# Patient Record
Sex: MALE | Race: WHITE | NOT HISPANIC OR LATINO | Employment: OTHER | URBAN - METROPOLITAN AREA
[De-identification: names, ages, dates, MRNs, and addresses within clinical notes are randomized per-mention and may not be internally consistent; named-entity substitution may affect disease eponyms.]

---

## 2017-03-13 ENCOUNTER — PATIENT OUTREACH (OUTPATIENT)
Dept: ADMINISTRATIVE | Facility: HOSPITAL | Age: 77
End: 2017-03-13

## 2017-03-27 ENCOUNTER — LAB VISIT (OUTPATIENT)
Dept: LAB | Facility: OTHER | Age: 77
End: 2017-03-27
Attending: FAMILY MEDICINE
Payer: COMMERCIAL

## 2017-03-27 ENCOUNTER — OFFICE VISIT (OUTPATIENT)
Dept: INTERNAL MEDICINE | Facility: CLINIC | Age: 77
End: 2017-03-27
Attending: FAMILY MEDICINE
Payer: COMMERCIAL

## 2017-03-27 VITALS
HEART RATE: 78 BPM | BODY MASS INDEX: 24.48 KG/M2 | WEIGHT: 156 LBS | HEIGHT: 67 IN | SYSTOLIC BLOOD PRESSURE: 110 MMHG | DIASTOLIC BLOOD PRESSURE: 64 MMHG | OXYGEN SATURATION: 97 %

## 2017-03-27 DIAGNOSIS — Z00.00 PREVENTATIVE HEALTH CARE: ICD-10-CM

## 2017-03-27 DIAGNOSIS — N40.0 BENIGN NON-NODULAR PROSTATIC HYPERPLASIA WITHOUT LOWER URINARY TRACT SYMPTOMS: ICD-10-CM

## 2017-03-27 DIAGNOSIS — G60.8 HEREDITARY SENSORY NEUROPATHY: ICD-10-CM

## 2017-03-27 DIAGNOSIS — M79.605 PAIN IN BOTH LOWER EXTREMITIES: ICD-10-CM

## 2017-03-27 DIAGNOSIS — M79.604 PAIN IN BOTH LOWER EXTREMITIES: ICD-10-CM

## 2017-03-27 DIAGNOSIS — R06.83 SNORING: ICD-10-CM

## 2017-03-27 DIAGNOSIS — Z00.00 PREVENTATIVE HEALTH CARE: Primary | ICD-10-CM

## 2017-03-27 LAB
ALBUMIN SERPL BCP-MCNC: 3.8 G/DL
ALP SERPL-CCNC: 57 U/L
ALT SERPL W/O P-5'-P-CCNC: 28 U/L
ANION GAP SERPL CALC-SCNC: 10 MMOL/L
AST SERPL-CCNC: 29 U/L
BASOPHILS # BLD AUTO: 0.02 K/UL
BASOPHILS NFR BLD: 0.3 %
BILIRUB SERPL-MCNC: 0.3 MG/DL
BUN SERPL-MCNC: 30 MG/DL
CALCIUM SERPL-MCNC: 9.5 MG/DL
CHLORIDE SERPL-SCNC: 103 MMOL/L
CO2 SERPL-SCNC: 26 MMOL/L
CREAT SERPL-MCNC: 0.8 MG/DL
DIFFERENTIAL METHOD: ABNORMAL
EOSINOPHIL # BLD AUTO: 0.1 K/UL
EOSINOPHIL NFR BLD: 1.8 %
ERYTHROCYTE [DISTWIDTH] IN BLOOD BY AUTOMATED COUNT: 13.1 %
EST. GFR  (AFRICAN AMERICAN): >60 ML/MIN/1.73 M^2
EST. GFR  (NON AFRICAN AMERICAN): >60 ML/MIN/1.73 M^2
GLUCOSE SERPL-MCNC: 89 MG/DL
HCT VFR BLD AUTO: 40.6 %
HGB BLD-MCNC: 13.2 G/DL
LYMPHOCYTES # BLD AUTO: 2.2 K/UL
LYMPHOCYTES NFR BLD: 31.6 %
MCH RBC QN AUTO: 31.2 PG
MCHC RBC AUTO-ENTMCNC: 32.5 %
MCV RBC AUTO: 96 FL
MONOCYTES # BLD AUTO: 0.5 K/UL
MONOCYTES NFR BLD: 7.6 %
NEUTROPHILS # BLD AUTO: 4.2 K/UL
NEUTROPHILS NFR BLD: 58.6 %
PLATELET # BLD AUTO: 263 K/UL
PMV BLD AUTO: 9.7 FL
POTASSIUM SERPL-SCNC: 4.6 MMOL/L
PROT SERPL-MCNC: 7.3 G/DL
RBC # BLD AUTO: 4.23 M/UL
SODIUM SERPL-SCNC: 139 MMOL/L
TSH SERPL DL<=0.005 MIU/L-ACNC: 2.16 UIU/ML
WBC # BLD AUTO: 7.09 K/UL

## 2017-03-27 PROCEDURE — 36415 COLL VENOUS BLD VENIPUNCTURE: CPT

## 2017-03-27 PROCEDURE — 83036 HEMOGLOBIN GLYCOSYLATED A1C: CPT

## 2017-03-27 PROCEDURE — 99999 PR PBB SHADOW E&M-EST. PATIENT-LVL III: CPT | Mod: PBBFAC,,, | Performed by: FAMILY MEDICINE

## 2017-03-27 PROCEDURE — 80061 LIPID PANEL: CPT

## 2017-03-27 PROCEDURE — 84153 ASSAY OF PSA TOTAL: CPT

## 2017-03-27 PROCEDURE — 99397 PER PM REEVAL EST PAT 65+ YR: CPT | Mod: S$GLB,,, | Performed by: FAMILY MEDICINE

## 2017-03-27 PROCEDURE — 85025 COMPLETE CBC W/AUTO DIFF WBC: CPT

## 2017-03-27 PROCEDURE — 80053 COMPREHEN METABOLIC PANEL: CPT

## 2017-03-27 PROCEDURE — 84443 ASSAY THYROID STIM HORMONE: CPT

## 2017-03-27 RX ORDER — HYDROCODONE BITARTRATE AND ACETAMINOPHEN 7.5; 325 MG/1; MG/1
TABLET ORAL
Refills: 0 | COMMUNITY
Start: 2017-03-16 | End: 2017-03-27

## 2017-03-27 RX ORDER — ALPRAZOLAM 0.5 MG/1
TABLET ORAL
Refills: 0 | COMMUNITY
Start: 2017-03-16 | End: 2017-03-27

## 2017-03-27 RX ORDER — AMOXICILLIN AND CLAVULANATE POTASSIUM 875; 125 MG/1; MG/1
TABLET, FILM COATED ORAL
Refills: 0 | COMMUNITY
Start: 2017-03-16 | End: 2017-03-27

## 2017-03-27 NOTE — MR AVS SNAPSHOT
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  Call for this free service at [phone].             Medications           Message regarding Medications     Verify the changes and/or additions to your medication regime listed below are the same as discussed with your clinician today.  If any of these changes or additions are incorrect, please notify your healthcare provider.        STOP taking these medications     ibuprofen (ADVIL,MOTRIN) 100 MG tablet Take 100 mg by mouth every 6 (six) hours as needed.      hydrocodone-acetaminophen 7.5-325mg (NORCO) 7.5-325 mg per tablet TK 1 T PO Q 4 TO 6 H PRN P    amitriptyline (ELAVIL) 25 MG tablet Take 25 mg by mouth every other day.    amoxicillin-clavulanate 875-125mg (AUGMENTIN) 875-125 mg per tablet     alprazolam (XANAX) 0.5 MG tablet TK 1 T PO THE NIGHT BEFORE AND 1 HOUR BEFORE  "DENTAL PROCEDURE           Verify that the below list of medications is an accurate representation of the medications you are currently taking.  If none reported, the list may be blank. If incorrect, please contact your healthcare provider. Carry this list with you in case of emergency.           Current Medications     ALPHA LIPOIC ACID ORAL Take by mouth once daily.     ascorbic acid (VITAMIN C) 500 MG tablet Take 500 mg by mouth once daily.    fluticasone (FLONASE) 50 mcg/actuation nasal spray 2 sprays by Each Nare route once daily.    MULTIVIT-IRON-MIN-FOLIC ACID 3,500-18-0.4 UNIT-MG-MG ORAL CHEW Take by mouth once daily.     PREVIDENT 5000 BOOSTER PLUS 1.1 % Pste once daily.     multivitamin (ONE DAILY MULTIVITAMIN) per tablet Take 1 tablet by mouth once daily.    sildenafil (VIAGRA) 100 MG tablet TAKE 1 TABLET BY MOUTH AS NEEDED           Clinical Reference Information           Your Vitals Were     BP Pulse Height Weight SpO2 BMI    110/64 78 5' 7" (1.702 m) 70.8 kg (156 lb) 97% 24.43 kg/m2      Blood Pressure          Most Recent Value    BP  110/64      Allergies as of 3/27/2017     Gabapentin    Vioxx [Rofecoxib]      Immunizations Administered on Date of Encounter - 3/27/2017     None      Orders Placed During Today's Visit      Normal Orders This Visit    Ambulatory consult to Sleep Disorders     Ambulatory Referral to Physical/Occupational Therapy     Future Labs/Procedures Expected by Expires    CBC auto differential  3/27/2017 6/25/2017    Comprehensive metabolic panel  3/27/2017 5/26/2017    Hemoglobin A1c  3/27/2017 6/25/2017    Lipid panel  3/27/2017 5/26/2017    PSA, Screening  3/27/2017 6/25/2017    TSH  3/27/2017 6/25/2017      Language Assistance Services     ATTENTION: Language assistance services are available, free of charge. Please call [phone].      ATENCIÓN: Si habla jonathan, tiene a de la cruz disposición servicios gratuitos de asistencia lingüística. Llame al [phone].     CHÚ Ý: " N?u b?n nói Ti?ng Vi?t, có các d?ch v? h? tr? ngôn ng? mi?n phí dành cho b?n. G?i s? [phone].         [facility] complies with applicable Federal civil rights laws and does not discriminate on the basis of race, color, national origin, age, disability, or sex.

## 2017-03-27 NOTE — PROGRESS NOTES
CHIEF COMPLAINT:  Follow-up blood pressure    HISTORY OF PRESENT ILLNESS: The patient is a generally healthy 76 year-old WM.  The patient has no specific complaints today other than continuing neuropathic pain for which she is had a complete workup in the last year. He has tried multiple medications and is intolerant of all of them due to various side effects. He is currently using a topical cream which is fairly effective.  He also does well with physical therapy.    He was recently diagnosed with ocular degeneration.  When he was referred to the retina specialist his blood pressure was 135/80.  His retinal specialist is quite concerned about that.  The pressures very good in the office today.  He takes no medications for it.  He has been checking his blood pressure since his retinal visit and it has been good.  I reassured him that it was likely a situational blood pressure spike.    He also was advised that he should be evaluated for sleep apnea.  He freely admits that he does have a lot of problems with snoring per his wife.  I think an evaluation would be appropriate.    The patient used to see Dr. Bass.     REVIEW OF SYSTEMS:  GENERAL: No fever, chills, fatigability or weight loss.  SKIN: No rashes, itching or changes in color or texture of skin.  HEAD: No headaches or recent head trauma.  EYES: Visual acuity fine. No photophobia, ocular pain or diplopia.  EARS: Denies ear pain, discharge or vertigo.  NOSE: No loss of smell, no epistaxis or postnasal drip.  MOUTH & THROAT: No hoarseness or change in voice. No excessive gum bleeding.  NODES: Denies swollen glands.  CHEST: Denies SANCHES, cyanosis, wheezing, cough and sputum production.  CARDIOVASCULAR: Denies chest pain, PND, orthopnea or reduced exercise tolerance.  ABDOMEN: Appetite fine. No weight loss. Denies diarrhea, abdominal pain, hematemesis or blood in stool.  URINARY: No flank pain, dysuria or hematuria.  PERIPHERAL VASCULAR: No claudication or  "cyanosis.  MUSCULOSKELETAL: No joint stiffness or swelling. Denies back pain.  NEUROLOGIC: No history of seizures, paralysis, alteration of gait or coordination.    SOCIAL HISTORY: The patient does not smoke.  The patient consumes alcohol socially.  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.    PHYSICAL EXAMINATION:     Blood pressure 110/64, pulse 78, height 5' 7" (1.702 m), weight 70.8 kg (156 lb), SpO2 97 %.    APPEARANCE: Well nourished, well developed, in no acute distress.    HEAD: Normocephalic, atraumatic.  EYES: PERRL. EOMI.  Conjunctivae without injection and  anicteric  EARS: TM's intact. Light reflex normal. No retraction or perforation.    NOSE: Mucosa pink. Airway clear.  MOUTH & THROAT: No tonsillar enlargement. No pharyngeal erythema or exudate. No stridor.  NECK: Supple.   NODES: No cervical, axillary or inguinal lymph node enlargement.  CHEST: Lungs clear to auscultation.  No retractions are noted.  No rales or rhonchi are present.  CARDIOVASCULAR: Normal S1, S2. No rubs, murmurs or gallops.  ABDOMEN: Bowel sounds normal. Not distended. Soft. No tenderness or masses.  No ascites is noted.  MUSCULOSKELETAL:  There is no clubbing, cyanosis, or edema of the extremities x4.  There is full range of motion of the lumbar spine.  There is full range of motion of the extremities x4.  There is no deformity noted.    NEUROLOGIC:       Normal speech development.      Hearing normal.      Normal gait.      Motor and sensory exams grossly normal.      DTR's normal.  PSYCHIATRIC: Patient is alert and oriented x3.  Thought processes are all normal.  There is no homicidality.  There is no suicidality.  There is no evidence of psychosis.    LABORATORY/RADIOLOGY:   Chart reviewed.      ASSESSMENT:   Annual exam  Episodic hypertension, likely situational  Snoring  Idiopathic peripheral neuropathy    PLAN:  We will follow-up blood work which we expect to be normal.    He will keep a " blood pressure diary.  He is reassured that he does not need any treatment for his blood pressure.  Sleep medicine referral  Over-the-counter supplements as recommended by retinal specialist    Return to clinic in one year.

## 2017-03-28 LAB
CHOLEST/HDLC SERPL: 4.1 {RATIO}
COMPLEXED PSA SERPL-MCNC: 1.8 NG/ML
ESTIMATED AVG GLUCOSE: 111 MG/DL
HBA1C MFR BLD HPLC: 5.5 %
HDL/CHOLESTEROL RATIO: 24.4 %
HDLC SERPL-MCNC: 209 MG/DL
HDLC SERPL-MCNC: 51 MG/DL
LDLC SERPL CALC-MCNC: 119.8 MG/DL
NONHDLC SERPL-MCNC: 158 MG/DL
TRIGL SERPL-MCNC: 191 MG/DL

## 2017-03-29 ENCOUNTER — TELEPHONE (OUTPATIENT)
Dept: INTERNAL MEDICINE | Facility: CLINIC | Age: 77
End: 2017-03-29

## 2017-03-29 NOTE — TELEPHONE ENCOUNTER
----- Message from Neo Sneed MD sent at 3/28/2017  2:42 PM CDT -----  Laboratory is normal.  No significant changes from last year.  No changes in medications.  Followup as scheduled.

## 2017-04-06 ENCOUNTER — CLINICAL SUPPORT (OUTPATIENT)
Dept: REHABILITATION | Facility: HOSPITAL | Age: 77
End: 2017-04-06
Attending: FAMILY MEDICINE
Payer: COMMERCIAL

## 2017-04-06 DIAGNOSIS — M79.605 PAIN IN BOTH LOWER EXTREMITIES: ICD-10-CM

## 2017-04-06 DIAGNOSIS — M79.671 FOOT PAIN, BILATERAL: Primary | ICD-10-CM

## 2017-04-06 DIAGNOSIS — M79.604 PAIN IN BOTH LOWER EXTREMITIES: ICD-10-CM

## 2017-04-06 DIAGNOSIS — M79.672 FOOT PAIN, BILATERAL: Primary | ICD-10-CM

## 2017-04-06 PROBLEM — M79.606 LEG PAIN: Status: ACTIVE | Noted: 2017-04-06

## 2017-04-06 PROCEDURE — 97161 PT EVAL LOW COMPLEX 20 MIN: CPT

## 2017-04-06 PROCEDURE — 97110 THERAPEUTIC EXERCISES: CPT

## 2017-04-06 NOTE — PROGRESS NOTES
Physical Therapy Initial Evaluation     Name: Cricket Morrow [facility] Number: [id number]    Cricket Morrow is a 76 y.o. male evaluated on 04/06/2017.     Diagnosis:   Encounter Diagnosis   Name Primary?    Foot pain, bilateral Yes     Physician: Neo Sneed*  Treatment Orders: PT Eval and Treat    Past Medical History:   Diagnosis Date    Hereditary sensory neuropathy      Current Outpatient Prescriptions   Medication Sig    ALPHA LIPOIC ACID ORAL Take by mouth once daily.     ascorbic acid (VITAMIN C) 500 MG tablet Take 500 mg by mouth once daily.    fluticasone (FLONASE) 50 mcg/actuation nasal spray 2 sprays by Each Nare route once daily.    MULTIVIT-IRON-MIN-FOLIC ACID 3,500-18-0.4 UNIT-MG-MG ORAL CHEW Take by mouth once daily.     multivitamin (ONE DAILY MULTIVITAMIN) per tablet Take 1 tablet by mouth once daily.    PREVIDENT 5000 BOOSTER PLUS 1.1 % Pste once daily.     sildenafil (VIAGRA) 100 MG tablet TAKE 1 TABLET BY MOUTH AS NEEDED     No current facility-administered medications for this visit.      Review of patient's allergies indicates:   Allergen Reactions    Gabapentin Other (See Comments)     Sexual side effects    Vioxx [rofecoxib] Other (See Comments)     Abdominal pain     Precautions: standard    Subjective     Patient States: B thigh / upper leg pain waking him up in the night for approx 6 months.  Previously seen for B foot pain secondary to neuropathy without evidence or diagnosis of DM.  Pt reports pain decreases with movement  Pain Scale: Cricket Morrow rates pain on a scale of 0-10 to be 7 at worst; 1 currently; 1 at best . B groin/upper thigh  Onset: gradual  Radicular symptoms:  Tingling B feet from previous neuropathy  Aggravating factors:   Sitting/nighttime  Easing factors:  Movement/walking/exercise  Prior Therapy: PT with good results  Functional Deficits Leading to Referral: pain limiting activities/weakness LE's  Prior  functional status: pt walks daily for exercise  DME owned/used: none  Occupation:  Professor at [employer]                        Pts goals:  Exercises for legs    Objective     Posture: rounded shoulder/fwd head/rounded shoulders    Range of Motion: Knee   Left Right   Flexion: 140 140   Extension 0 0     HIP abd 20 R  40 L c/ pain  HIP flex L 110       R 115  Strength: Knee   Left Right   Quadriceps 4/5 4+/5   Hamstrings 4/5 4+/5       Strength: Hip    Left Right   Iliopsoas 4/5 4+/5   PGM 4/5 4/5   Adductors 4-/5 4/5   Hip ext 4- 4-            Joint Mobility: WNL  Palpation:TTP left medial/inner thigh/B medial distal adductors  Sensation: intact to light touch  Flexibility:  L HS -20 R -10/ Right piriformis tighter than L  Edema:  Left: absent  Right: absent    Gait: Mcdonnell ambulated with no assistive device.  Level of Assistance: independent  Patient displays decreased base of support.   Balance: Maintains SLS 3 seconds with good balance strategies.    DTR's:   Left Right   Patella Tendon 2+ 2+       TREATMENT     Time In: 2:10 pm  Time Out: 3:00pm    PT Evaluation Completed? Yes  Discussed Plan of Care with patient: Yes    Cricket Morrow received 15 minutes of therapeutic exercise including:   Bike  SLR 20x  SL ABD 20x  QS c/add squeeze 10x  HS stretch 1 x30sec  Quad stretch  Standing adductor stretch  ADD/ABD  SL clams  Bridges    Written Home Exercises Provided: SLR/SL ABD/QS/HSS/adductor stretch/Quad stretch  Cricket Morrow demonstrated good understanding of the education provided. Patient demonstrated good return demonstration of skill of exercises.    ASSESSMENT     Patient presents with decreased B LE strength/flexibility/  Pt prognosis is Good.  Pt will benefit from skilled outpatient physical therapy to address the above stated deficits, provide pt/family education and to maximize pt's level of independence.     History  Co-morbidities and personal factors that may impact the plan of care Examination  Body  Structures and Functions, activity limitations and participation restrictions that may impact the plan of care Clinical Presentation        stable Decision Making/ Complexity Score      Low complexity   Co-morbidities:         neuropathy        Personal Factors:    Body Regions:  B LE's  Body Systems:     Decreased  BLE strength  Decreased flexibility B LE's  Pain with ADL's      Activity limitations:       Participation Restrictions:                  Pt's spiritual, cultural and educational needs considered and pt agreeable to plan of care and goals as stated below:     Anticipated Barriers for physical therapy: none  Short Term GOALS: 3 weeks. Pt agrees with goals set.  1. Patient demonstrates independence with HEP.   2. Patient demonstrates independence with Postural Awareness.   3. Patient demonstrates independence with body mechanics.     Tool: FOTO Knee Survey  Score: 35% Limitation      PLAN     Outpatient physical therapy 1 times weekly to include: pt ed, hep, therapeutic exercises, neuromuscular re-education/ balance exercises, joint mobilizations, aquatic therapy and modalities prn. Cont PT for  6 weeks. Pt may be seen by PTA as part of the rehabilitation team.   Pt wishes to try exercises at home, will come in for 1-2 visits after evaluation to increase intensity of exercises and fine tune program to be done I  Therapist: Carli Colin, PT  4/6/2017

## 2017-04-20 ENCOUNTER — OFFICE VISIT (OUTPATIENT)
Dept: SLEEP MEDICINE | Facility: CLINIC | Age: 77
End: 2017-04-20
Attending: FAMILY MEDICINE
Payer: COMMERCIAL

## 2017-04-20 VITALS
WEIGHT: 154.31 LBS | DIASTOLIC BLOOD PRESSURE: 74 MMHG | HEART RATE: 68 BPM | BODY MASS INDEX: 24.22 KG/M2 | HEIGHT: 67 IN | SYSTOLIC BLOOD PRESSURE: 122 MMHG

## 2017-04-20 DIAGNOSIS — G47.30 UNSPECIFIED SLEEP APNEA: Primary | ICD-10-CM

## 2017-04-20 PROCEDURE — 99204 OFFICE O/P NEW MOD 45 MIN: CPT | Mod: S$GLB,,, | Performed by: NURSE PRACTITIONER

## 2017-04-20 PROCEDURE — 1126F AMNT PAIN NOTED NONE PRSNT: CPT | Mod: S$GLB,,, | Performed by: NURSE PRACTITIONER

## 2017-04-20 PROCEDURE — 1159F MED LIST DOCD IN RCRD: CPT | Mod: S$GLB,,, | Performed by: NURSE PRACTITIONER

## 2017-04-20 PROCEDURE — 1157F ADVNC CARE PLAN IN RCRD: CPT | Mod: 8P,S$GLB,, | Performed by: NURSE PRACTITIONER

## 2017-04-20 PROCEDURE — 1160F RVW MEDS BY RX/DR IN RCRD: CPT | Mod: S$GLB,,, | Performed by: NURSE PRACTITIONER

## 2017-04-20 PROCEDURE — 99999 PR PBB SHADOW E&M-EST. PATIENT-LVL III: CPT | Mod: PBBFAC,,, | Performed by: NURSE PRACTITIONER

## 2017-04-20 NOTE — PROGRESS NOTES
"Cricket Mcdonnell was seen as a new patient, referred by Dr. Sneed, for the evaluation of obstructive sleep apnea.     CHIEF COMPLAINT: Disrupted sleep    HISTORY OF PRESENT ILLNESS:Cricket Mcdonnell a male presents for the evaluation of obstructive sleep apnea. He is not sure whey he is here today. Recent dx of macular degeneration. Had some spikes in blood pressure.  He has never had a sleep study. Wife does not endorse snoring. He has deviated nasal septum and uses Flonase NS bid. Wakes up refreshed. Denies witnessed apneic pauses. Nocturia 3-4x, he attributes to his age. Wears mouth guard for teeth grinding. Sleeps 8h/night. Denies am headaches. Sleeps on his back to help foot neuropathic pain. Sleeps pretty still throughout the night. +oral drying in am.     Neuropathic pain, intolerable to meds s/e  Has seen ENT surgeon, no need for repair septal deviation    Denies symptoms of restless legs or kicking during sleep.     On todays [place] Sleepiness Scale the patient scores a 3/24.     FAMILY HISTORY: No known sleep disorders.     SOCIAL HISTORY: . no ETOH, no tobacco. [employer] professor creative writing    REVIEW OF SYSTEMS:  Sleep related symptoms as per HPI; Sinus congestion; Denies dyspnea; Denies palpitations; Denies acid reflux; Denies polyuria; Denies headaches;Denies mood disturbance.  Otherwise, a balance of 10 systems reviewed is negative        PHYSICAL EXAM:   /74  Pulse 68  Ht 5' 7" (1.702 m)  Wt 70 kg (154 lb 5.2 oz)  BMI 24.17 kg/m2  GENERAL: W/N,W/D  body habitus, well groomed   HEENT: Conjunctivae are non-erythematous; Pupils equal, round, and reactive to light; Nose is symmetrical; Nasal mucosa is normal; Septum is deviated left; Inferior turbinates are normal; Nasal airflow is normal; Posterior pharynx is pink; Modified Mallampati: IV; Posterior palate is low; Tonsils absent; Uvula is normal;Tongue is high,coated; Dentition is fair; No TMJ tenderness; Jaw opening and " protrusion without click and without discomfort.   NECK: Supple. Neck circumference is 15.25 inches. No thyromegaly. No palpable nodes.   SKIN: On face and neck: No abrasions, no rashes, no lesions. No subcutaneous nodules are palpable.   RESPIRATORY: Chest is clear to auscultation. Normal chest expansion and non-labored breathing at rest.   CARDIOVASCULAR: Normal S1, S2. No murmurs, gallops or rubs. No carotid bruits bilaterally.   EXTREMITIES: No edema. No clubbing. No cyanosis. Station normal. Gait normal.   NEURO/PSYCH: Oriented to time, place and person. Normal attention span and concentration. Affect is full. Mood is normal.       ASSESSMENT:     Unspecified Sleep Apnea, with symptoms of questionable snoring, frequent disrupted sleep, nasal septal deviation, teeth grinding, mild daytime fatigue, with exam findings of a mildy crowded oral airway, with medical comorbidities of macular degernation. Warrants further investigation for untreated sleep apnea.     PLAN:   1. Home Sleep Study, discussed plan of care. [facility] results  2. Discussed etiology of KARMA and potential ramifications of untreated KARMA, including stroke, heart disease, HTN.  We discussed potential treatment options, which could include weight loss (10-15%), body positioning, continuous positive airway pressure (CPAP-definitive), mandibular advancement splint by dentist, or referral for surgical consideration.   3. The patient was advised to abstain from driving should he feel sleepy or drowsy.   4. Continue Flonase NS to reduce potential UARS.    Thank you for allowing me the opportunity to participate in the care of your patient

## 2017-04-20 NOTE — PATIENT INSTRUCTIONS
Obstructive Sleep Apnea  Obstructive sleep apnea is a condition that causes your air passages to become narrowed or blocked during sleep. As a result, breathing stops for short periods. Your body wakes up enough for breathing to begin again, though you don't remember it. The cycle of stopped breathing and brief awakenings can repeat dozens of times a night. This prevents the body from getting to the deeper stages of sleep that are needed for good rest.  Signs of sleep apnea include loud snoring, noisy breathing, and gasping sounds during sleep. Daytime symptoms include waking up tired after a full night's sleep, waking up with headaches, feeling very sleepy or falling asleep during the day, and having problems with memory or concentration.  Risk factors for sleep apnea include:  · Being overweight  · Being a man, or a woman in menopause  · Smoking  · Using alcohol or sedating medications or herbs  · Having enlarged structures in the nose or throat  Home care  Lifestyle changes that can help treat snoring and sleep apnea include the following:  · If you are overweight, lose weight. Talk to your healthcare provider about a weight-loss plan for you.  · Avoid alcohol for 3 to 4 hours before bedtime. Avoid sedating medications. Ask your healthcare provider about the medications you take.  · If you smoke, talk to your healthcare provider about ways to quit.  · Sleep on your side. This can help prevent gravity from pulling relaxed throat tissues into your breathing passages.  · If you have allergies or sinus problems that block your nose, ask your healthcare provider for help.  Follow up  Follow up with your healthcare provider as advised. A diagnosis of sleep apnea is made with a sleep study. Your healthcare provider can tell you more about this test.  When to seek medical care  Sleep apnea can make you more likely to have certain health problems. These include high blood pressure, heart attack, stroke, and sexual  dysfunction. If you have sleep apnea, talk to your healthcare provider about the best treatments for you.  Date Last Reviewed: 5/3/2015  © 3110-0406 [employer]. [address] [place] [address]. All rights reserved. This information is not intended as a substitute for professional medical care. Always follow your healthcare professional's instructions.        Continuous Positive Air Pressure (CPAP)  Continuous positive air pressure (CPAP) uses gentle air pressure to hold the airway open. CPAP is often the most effective treatment for sleep apnea and severe snoring. It works very well for many people. But keep in mind that it can take several adjustments before the setup is right for you.    How CPAP works  The CPAPmachine  is a small portable pump beside the bed. The pump sends air through a hose, which is held over your nose and mouth by a mask. Mild air pressure is gently pushed through your airway. The air pressure nudges sagging tissues aside. This widens the airway so you can breathe better. CPAP may be combined with other kinds of therapy for sleep apnea.     A mask over the nose gently directs air into the throat to keep the airway open.   Types of air pressure treatments  There are different types of CPAP. Your doctor or CPAP technician will help you decide which type is best for you:  · Basic CPAP keeps the pressure constant all night long.  · A bilevel device (BiPAP) provides more pressure when you breathe in and less when you breathe out. A BiPAP machine also may be set to provide automatic breaths to maintain breathing if you stop breathing while sleeping.  · An autoCPAP device automatically adjusts pressure throughout the night and in response to changes such as body position, sleep stage, and snoring.  Date Last Reviewed: 8/10/2015  © 6204-7660 [employer]. [address] [place] [address]. All rights reserved. This information is not intended as a  substitute for professional medical care. Always follow your healthcare professional's instructions.        Mouthpieces for Sleep Apnea  For simple snoring or mild to moderate apnea, a special mouthpiece may help. A dental specialist works with your healthcare provider to build and fit a mouthpiece just for you. A follow-up sleep study checks how well the device is working for you. Mouthpieces are also called oral appliances.     Moving the jaw and tongue forward with a mouthpiece can open the airway to reduce sleep apnea.   Moving the jaw forward  Most mouthpieces move the jaw and tongue forward. That keeps the tongue from blocking the airway. Mouthpieces can work well, but they are not for everyone. Work with your healthcare provider to get a mouthpiece that fits just right for you. Oral appliances are usually custom made for you by a dental professional. And, avoid over-the-counter mouthpieces -- they often do not work.  Tips  To have the most success with your mouthpiece, keep these tips in mind:  · It will take some time to get used to wearing a mouthpiece. At first it may feel uncomfortable or make your mouth water. If these problems last, tell your healthcare provider.  · Expect several rounds of adjustments to get the mouthpiece to fit and work just right for you.  · Mouthpieces dont cure the problems that cause snoring or sleep apnea. So you need to use your mouthpiece all night, every night.  · Follow your healthcare providers instructions for keeping the mouthpiece clean.  · When youre not wearing your mouthpiece, store it in its case.  Date Last Reviewed: 8/9/2015 © 2000-2016 [employer]. [address] [place] [address], [place] [address]. All rights reserved. This information is not intended as a substitute for professional medical care. Always follow your healthcare professional's instructions.      Christina or Antione will contact you to schedule your sleep study. Their number is [phone] (ext  [phone]). The [facility] is located on 7th floor of the [facility].    We will call you when the sleep study results are ready - if you have not heard from us by 2 weeks from the date of the study, please call [phone] (ext 2).    You are advised to abstain from driving should you feel sleepy or drowsy.

## 2017-04-20 NOTE — LETTER
April 20, 2017      Neo Sneed MD  [address]  [address]  [facility] [address]           [facility]  [address]  [facility] [address]  Phone: [phone]          Patient: Cricket Mcdonnell   MR Number: [id number]   YOB: 1940   Date of Visit: 4/20/2017       Dear Dr. Neo Sneed:    Thank you for referring Cricket Mcdonnell to me for evaluation. Attached you will find relevant portions of my assessment and plan of care.    If you have questions, please do not hesitate to call me. I look forward to following Cricket Mcdonnell along with you.    Sincerely,    Anyi Orta, NP    Enclosure  CC:  No Recipients    If you would like to receive this communication electronically, please contact [email or web address] or [phone] to request more information on [employer] Link access.    For providers and/or their staff who would like to refer a patient to [facility], please contact us through our one-stop-shop provider referral line, [facility] , at [phone].    If you feel you have received this communication in error or would no longer like to receive these types of communications, please e-mail [email or web address]

## 2017-04-20 NOTE — MR AVS SNAPSHOT
[facility]  [address]  [facility] [address]  Phone: [phone]                  Cricket Mcdonnell   2017 11:00 AM   Office Visit    Description:  Male : 1940   Provider:  Anyi Orta NP   Department:  [facility]           Diagnoses this Visit        Comments    Unspecified sleep apnea    -  Primary            To Do List           Future Appointments        Provider Department Dept Phone    2017 10:00 AM Carli Colin PT [facility]       Goals (5 Years of Data)     None      [facility]     [facility] Care Line -  Assistance  Unless otherwise directed by your provider, please contact [facility], our nurse care line that is available for  assistance.     Registered nurses in the [facility] provide: appointment scheduling, clinical advisement, health education, and other advisory services.  Call: [phone] (toll free)               Medications           Message regarding Medications     Verify the changes and/or additions to your medication regime listed below are the same as discussed with your clinician today.  If any of these changes or additions are incorrect, please notify your healthcare provider.             Verify that the below list of medications is an accurate representation of the medications you are currently taking.  If none reported, the list may be blank. If incorrect, please contact your healthcare provider. Carry this list with you in case of emergency.           Current Medications     ALPHA LIPOIC ACID ORAL Take by mouth once daily.     ascorbic acid (VITAMIN C) 500 MG tablet Take 500 mg by mouth once daily.    fluticasone (FLONASE) 50 mcg/actuation nasal spray 2 sprays by Each Nare route once daily.    MULTIVIT-IRON-MIN-FOLIC ACID 3,500-18-0.4 UNIT-MG-MG ORAL CHEW Take by mouth once daily.     multivitamin (ONE DAILY MULTIVITAMIN) per tablet Take 1 tablet by  "mouth once daily.    PREVIDENT 5000 BOOSTER PLUS 1.1 % Pste once daily.     sildenafil (VIAGRA) 100 MG tablet TAKE 1 TABLET BY MOUTH AS NEEDED           Clinical Reference Information           Your Vitals Were     BP Pulse Height Weight BMI    122/74 68 5' 7" (1.702 m) 70 kg (154 lb 5.2 oz) 24.17 kg/m2      Blood Pressure          Most Recent Value    BP  122/74      Allergies as of 4/20/2017     Gabapentin    Vioxx [Rofecoxib]      Immunizations Administered on Date of Encounter - 4/20/2017     None      Orders Placed During Today's Visit     Future Labs/Procedures Expected by Expires    Home Sleep Studies  As directed 4/20/2018      Instructions      Obstructive Sleep Apnea  Obstructive sleep apnea is a condition that causes your air passages to become narrowed or blocked during sleep. As a result, breathing stops for short periods. Your body wakes up enough for breathing to begin again, though you don't remember it. The cycle of stopped breathing and brief awakenings can repeat dozens of times a night. This prevents the body from getting to the deeper stages of sleep that are needed for good rest.  Signs of sleep apnea include loud snoring, noisy breathing, and gasping sounds during sleep. Daytime symptoms include waking up tired after a full night's sleep, waking up with headaches, feeling very sleepy or falling asleep during the day, and having problems with memory or concentration.  Risk factors for sleep apnea include:  · Being overweight  · Being a man, or a woman in menopause  · Smoking  · Using alcohol or sedating medications or herbs  · Having enlarged structures in the nose or throat  Home care  Lifestyle changes that can help treat snoring and sleep apnea include the following:  · If you are overweight, lose weight. Talk to your healthcare provider about a weight-loss plan for you.  · Avoid alcohol for 3 to 4 hours before bedtime. Avoid sedating medications. Ask your healthcare provider about the " medications you take.  · If you smoke, talk to your healthcare provider about ways to quit.  · Sleep on your side. This can help prevent gravity from pulling relaxed throat tissues into your breathing passages.  · If you have allergies or sinus problems that block your nose, ask your healthcare provider for help.  Follow up  Follow up with your healthcare provider as advised. A diagnosis of sleep apnea is made with a sleep study. Your healthcare provider can tell you more about this test.  When to seek medical care  Sleep apnea can make you more likely to have certain health problems. These include high blood pressure, heart attack, stroke, and sexual dysfunction. If you have sleep apnea, talk to your healthcare provider about the best treatments for you.  Date Last Reviewed: 5/3/2015  © 0423-7113 [employer]. [address] [place] [address], [place] [address]. All rights reserved. This information is not intended as a substitute for professional medical care. Always follow your healthcare professional's instructions.        Continuous Positive Air Pressure (CPAP)  Continuous positive air pressure (CPAP) uses gentle air pressure to hold the airway open. CPAP is often the most effective treatment for sleep apnea and severe snoring. It works very well for many people. But keep in mind that it can take several adjustments before the setup is right for you.    How CPAP works  The CPAPmachine  is a small portable pump beside the bed. The pump sends air through a hose, which is held over your nose and mouth by a mask. Mild air pressure is gently pushed through your airway. The air pressure nudges sagging tissues aside. This widens the airway so you can breathe better. CPAP may be combined with other kinds of therapy for sleep apnea.     A mask over the nose gently directs air into the throat to keep the airway open.   Types of air pressure treatments  There are different types of CPAP. Your doctor or CPAP technician  will help you decide which type is best for you:  · Basic CPAP keeps the pressure constant all night long.  · A bilevel device (BiPAP) provides more pressure when you breathe in and less when you breathe out. A BiPAP machine also may be set to provide automatic breaths to maintain breathing if you stop breathing while sleeping.  · An autoCPAP device automatically adjusts pressure throughout the night and in response to changes such as body position, sleep stage, and snoring.  Date Last Reviewed: 8/10/2015  © 5365-0630 [employer]. [address] [place] [address]. All rights reserved. This information is not intended as a substitute for professional medical care. Always follow your healthcare professional's instructions.        Mouthpieces for Sleep Apnea  For simple snoring or mild to moderate apnea, a special mouthpiece may help. A dental specialist works with your healthcare provider to build and fit a mouthpiece just for you. A follow-up sleep study checks how well the device is working for you. Mouthpieces are also called oral appliances.     Moving the jaw and tongue forward with a mouthpiece can open the airway to reduce sleep apnea.   Moving the jaw forward  Most mouthpieces move the jaw and tongue forward. That keeps the tongue from blocking the airway. Mouthpieces can work well, but they are not for everyone. Work with your healthcare provider to get a mouthpiece that fits just right for you. Oral appliances are usually custom made for you by a dental professional. And, avoid over-the-counter mouthpieces -- they often do not work.  Tips  To have the most success with your mouthpiece, keep these tips in mind:  · It will take some time to get used to wearing a mouthpiece. At first it may feel uncomfortable or make your mouth water. If these problems last, tell your healthcare provider.  · Expect several rounds of adjustments to get the mouthpiece to fit and work just right for  you.  · Mouthpieces dont cure the problems that cause snoring or sleep apnea. So you need to use your mouthpiece all night, every night.  · Follow your healthcare providers instructions for keeping the mouthpiece clean.  · When youre not wearing your mouthpiece, store it in its case.  Date Last Reviewed: 8/9/2015  © 4907-0191 [employer]. [address] [place] [address], [place] [address]. All rights reserved. This information is not intended as a substitute for professional medical care. Always follow your healthcare professional's instructions.      Christina or Antione will contact you to schedule your sleep study. Their number is [phone] (ext 1). The [facility] is located on 7th floor of the [facility].    We will call you when the sleep study results are ready - if you have not heard from us by 2 weeks from the date of the study, please call [phone] (ext 2).    You are advised to abstain from driving should you feel sleepy or drowsy.         Language Assistance Services     ATTENTION: Language assistance services are available, free of charge. Please call [phone].      ATENCIÓN: Si habla español, tiene a de la cruz disposición servicios gratuitos de asistencia lingüística. Llame al [phone].     CHÚ Ý: N?u b?n nói Ti?ng Vi?t, có các d?ch v? h? tr? ngôn ng? mi?n phí dành cho b?n. G?i s? [phone].         [facility] complies with applicable Federal civil rights laws and does not discriminate on the basis of race, color, national origin, age, disability, or sex.

## 2017-04-25 ENCOUNTER — TELEPHONE (OUTPATIENT)
Dept: SLEEP MEDICINE | Facility: OTHER | Age: 77
End: 2017-04-25

## 2017-04-28 ENCOUNTER — TELEPHONE (OUTPATIENT)
Dept: SLEEP MEDICINE | Facility: OTHER | Age: 77
End: 2017-04-28

## 2017-05-02 ENCOUNTER — TELEPHONE (OUTPATIENT)
Dept: SLEEP MEDICINE | Facility: OTHER | Age: 77
End: 2017-05-02

## 2017-05-09 ENCOUNTER — CLINICAL SUPPORT (OUTPATIENT)
Dept: REHABILITATION | Facility: HOSPITAL | Age: 77
End: 2017-05-09
Attending: FAMILY MEDICINE
Payer: COMMERCIAL

## 2017-05-09 DIAGNOSIS — M79.605 PAIN IN BOTH LOWER EXTREMITIES: Primary | ICD-10-CM

## 2017-05-09 DIAGNOSIS — M79.604 PAIN IN BOTH LOWER EXTREMITIES: Primary | ICD-10-CM

## 2017-05-09 PROCEDURE — 97110 THERAPEUTIC EXERCISES: CPT

## 2017-05-09 NOTE — PROGRESS NOTES
Physical Therapy Daily Note     Name: Cricket Morrow [facility] Number: [id number]  Diagnosis:   Encounter Diagnosis   Name Primary?    Pain in both lower extremities Yes     Physician: eNo Sneed*  Precautions:  Visit #: 2 of 6  PTA Visit #: 0  Time In: 2:30pm  Time Out: 3:30pm  Total Treatment Time 1:1: 20  Cancels/no shows:0    Subjective     Pt reports: the exercises have helped in terms of  weakness  Pain Scale: Cricket Morrow rates pain on a scale of 0-10 to be 2 currently.    Objective     Cricket Morrow received individual therapeutic exercises to develop strength, endurance and ROM for 60 minutes including:  Bike 5 minutes  SLR 20x  SL ABD 20x  QS c/add squeeze 20x  HS stretch 2 x30sec  Quad stretch prone 3x 30sec  Standing adductor stretch  ADD/ABD 20x YTB  SL clams 10x  Bridges 20 x YTB  Steamboats 20x RTB    Written Home Exercises Provided:SLR/SL ABD/QS/HSS/adductor stretch/Quad stretch, added bridge with band/steamboats/crabwalks/SL clams.  Issued OTB for ex  Pt demo good understanding of the education provided. Cricket Morrow demonstrated good return demonstration of activities.     Education provided re: discussed benefits of exercises  Cricket Morrow verbalized good understanding of education provided.   No spiritual or educational barriers to learning provided    PRECAUTIONS standard  Assessment     Patient tolerated treatment well.  Pt able to complete all ex without increased pain.  Added to HEP. Pt to come for one more visit then D/C to I HEP  This is a 76 y.o. male referred to outpatient physical therapy and presents with a medical diagnosis of B Leg pain and demonstrates limitations as described in the problem list. Pt prognosis is Good. Pt will continue to benefit from skilled outpatient physical therapy to address the deficits listed in the problem list, provide pt/family education and to maximize pt's level of independence in the home and  community environment.     Goals as follows:  Short Term GOALS: 3 weeks. Pt agrees with goals set.  1. Patient demonstrates independence with HEP.   2. Patient demonstrates independence with Postural Awareness.   3. Patient demonstrates independence with body mechanics.        Plan     Continue with established Plan of Care towards PT goals.    Therapist: Carli Colin, PT  5/9/2017

## 2017-05-23 ENCOUNTER — CLINICAL SUPPORT (OUTPATIENT)
Dept: REHABILITATION | Facility: HOSPITAL | Age: 77
End: 2017-05-23
Attending: FAMILY MEDICINE
Payer: COMMERCIAL

## 2017-05-23 DIAGNOSIS — M79.605 PAIN IN BOTH LOWER EXTREMITIES: Primary | ICD-10-CM

## 2017-05-23 DIAGNOSIS — M79.604 PAIN IN BOTH LOWER EXTREMITIES: Primary | ICD-10-CM

## 2017-05-23 PROCEDURE — 97110 THERAPEUTIC EXERCISES: CPT

## 2017-05-23 NOTE — PROGRESS NOTES
DC/   Physical Therapy Daily Note     Name: Cricket Morrow [facility] Number: [id number]  Diagnosis:   Encounter Diagnosis   Name Primary?    Pain in both lower extremities Yes     Physician: Neo Sneed*  Precautions:  Visit #: 3 of 6  PTA Visit #: 0  Time In: 3:00pm  Time Out: 3:45pm  Total Treatment Time 1:1: 30  Cancels/no shows:0    Subjective     Pt reports: I had flu last week and didn't exercise as much as normal  Pain Scale: Cricket Morrow rates pain on a scale of 0-10 to be 2 currently.    Objective       FOTO 18%  Cricket Morrow received individual therapeutic exercises to develop strength, endurance and ROM for 45 minutes including:  Bike 5 minutes  SLR 20x  SL ABD 20x  QS c/add squeeze 20x  HS stretch 2 x30sec  Quad stretch prone 3x 30sec  Standing adductor stretch  ADD/ABD 20x YTB  SL clams 10x  Bridges 20 x YTB  Steamboats 20x RTB    Written Home Exercises Provided:SLR/SL ABD/QS/HSS/adductor stretch/Quad stretch, added bridge with band/steamboats/crabwalks/SL clams.  Issued OTB for ex. Added hip flex stretch in supine/wall squats/HR/  Pt demo good understanding of the education provided. Cricket Morrow demonstrated good return demonstration of activities.     Education provided re: discussed benefits of exercises.  Instructed pt to stretch L>R LE secondary to dec flexibility  Cricket Morrow verbalized good understanding of education provided.   No spiritual or educational barriers to learning provided    PRECAUTIONS standard  Assessment     Patient tolerated treatment well.  Pt D/C'd today secondary to going on a trip abroad.  Added to HEP to facilitate continued stretching and Flexibility    Goals as follows:  Short Term GOALS: 3 weeks. Pt agrees with goals set.  1. Patient demonstrates independence with HEP. Met  2. Patient demonstrates independence with Postural Awareness. Met  3. Patient demonstrates independence with body mechanics. Met       Plan   DC to  I HEP  Therapist: Carli Colin, PT  5/23/2017

## 2017-05-24 ENCOUNTER — TELEPHONE (OUTPATIENT)
Dept: INTERNAL MEDICINE | Facility: CLINIC | Age: 77
End: 2017-05-24

## 2017-05-24 NOTE — TELEPHONE ENCOUNTER
----- Message from Letha Altamirano sent at 5/24/2017  2:03 PM CDT -----  Contact: pt  Pt called and states he receive a letter in the mail for him to call and schedule an follow up appt in the month of July, pt states he normally see the doctor once a year and he already came in. Pt  Would like for someone to call him back in regards to the letter. Pt can be reached at [phone].

## 2017-05-25 ENCOUNTER — TELEPHONE (OUTPATIENT)
Dept: UROLOGY | Facility: CLINIC | Age: 77
End: 2017-05-25

## 2017-05-25 NOTE — TELEPHONE ENCOUNTER
----- Message from Cynthia Elizalde LPN sent at 5/24/2017  2:06 PM CDT -----  Contact: pt  i attempted to call pt and let him know that was the soonest dr donaldson could see him. i was unable to get him. Left message with contact info  ----- Message -----  From: Letha Altamirano  Sent: 5/24/2017   1:59 PM  To: Kenrick PATEL Staff    Pt called and states he would like a sooner appt then 7/24/17. Pt would like for someone to call him back in regards to an appt. Pt can be reached at [phone].

## 2017-06-08 ENCOUNTER — TELEPHONE (OUTPATIENT)
Dept: SLEEP MEDICINE | Facility: OTHER | Age: 77
End: 2017-06-08

## 2017-06-09 ENCOUNTER — HOSPITAL ENCOUNTER (OUTPATIENT)
Dept: SLEEP MEDICINE | Facility: OTHER | Age: 77
Discharge: HOME | End: 2017-06-09
Attending: NURSE PRACTITIONER
Payer: COMMERCIAL

## 2017-06-09 DIAGNOSIS — G47.33 OSA (OBSTRUCTIVE SLEEP APNEA): ICD-10-CM

## 2017-06-09 DIAGNOSIS — G47.30 UNSPECIFIED SLEEP APNEA: ICD-10-CM

## 2017-06-09 PROCEDURE — 95800 SLP STDY UNATTENDED: CPT | Mod: 26,,, | Performed by: PSYCHIATRY & NEUROLOGY

## 2017-06-09 PROCEDURE — 95800 SLP STDY UNATTENDED: CPT

## 2017-06-20 ENCOUNTER — PATIENT MESSAGE (OUTPATIENT)
Dept: SLEEP MEDICINE | Facility: CLINIC | Age: 77
End: 2017-06-20

## 2017-06-20 DIAGNOSIS — G47.33 OBSTRUCTIVE SLEEP APNEA: Primary | ICD-10-CM

## 2017-07-27 ENCOUNTER — OFFICE VISIT (OUTPATIENT)
Dept: INTERNAL MEDICINE | Facility: CLINIC | Age: 77
End: 2017-07-27
Attending: FAMILY MEDICINE
Payer: COMMERCIAL

## 2017-07-27 VITALS
HEIGHT: 67 IN | WEIGHT: 156.31 LBS | HEART RATE: 56 BPM | DIASTOLIC BLOOD PRESSURE: 70 MMHG | BODY MASS INDEX: 24.53 KG/M2 | SYSTOLIC BLOOD PRESSURE: 112 MMHG

## 2017-07-27 DIAGNOSIS — H35.30 MACULAR DEGENERATION OF RIGHT EYE: Primary | ICD-10-CM

## 2017-07-27 DIAGNOSIS — G47.33 OSA (OBSTRUCTIVE SLEEP APNEA): ICD-10-CM

## 2017-07-27 DIAGNOSIS — G60.8 HEREDITARY SENSORY NEUROPATHY: ICD-10-CM

## 2017-07-27 DIAGNOSIS — N40.1 BENIGN PROSTATIC HYPERPLASIA WITH LOWER URINARY TRACT SYMPTOMS, SYMPTOM DETAILS UNSPECIFIED: ICD-10-CM

## 2017-07-27 PROCEDURE — 99999 PR PBB SHADOW E&M-EST. PATIENT-LVL III: CPT | Mod: PBBFAC,,, | Performed by: FAMILY MEDICINE

## 2017-07-27 PROCEDURE — 1126F AMNT PAIN NOTED NONE PRSNT: CPT | Mod: S$GLB,,, | Performed by: FAMILY MEDICINE

## 2017-07-27 PROCEDURE — 1159F MED LIST DOCD IN RCRD: CPT | Mod: S$GLB,,, | Performed by: FAMILY MEDICINE

## 2017-07-27 PROCEDURE — 99214 OFFICE O/P EST MOD 30 MIN: CPT | Mod: S$GLB,,, | Performed by: FAMILY MEDICINE

## 2017-07-27 RX ORDER — NEOMYCIN SULFATE, POLYMYXIN B SULFATE, AND DEXAMETHASONE 3.5; 10000; 1 MG/G; [USP'U]/G; MG/G
OINTMENT OPHTHALMIC
COMMUNITY
Start: 2017-06-21 | End: 2017-08-01

## 2017-07-27 RX ORDER — LOTEPREDNOL ETABONATE 5 MG/ML
SUSPENSION/ DROPS OPHTHALMIC
Refills: 1 | COMMUNITY
Start: 2017-06-12 | End: 2020-03-11

## 2017-07-27 NOTE — PROGRESS NOTES
CHIEF COMPLAINT:  Follow-up macular degeneration    HISTORY OF PRESENT ILLNESS: The patient is a generally healthy 76 year-old WM.  The patient has no specific complaints today other than continuing neuropathic pain for which she is had a complete workup in the last year. He has tried multiple medications and is intolerant of all of them due to various side effects. He is currently using a topical cream which is fairly effective.  He also does well with physical therapy.    He was recently diagnosed with macular degeneration.  When he was seen in his retinal specialist office yesterday he was given the same list of risk factors for progression of the macular degeneration.  The first one is blood pressure.  Once again his blood pressure is well controlled.  Second longest cholesterol.  His cholesterol is well controlled.  The third one is stress.  He states he is under no stress.  He adamantly declines any medication for stress as he is under no stress.  The fourth one is untreated obstructive sleep apnea.  He has had a sleep study this year that did demonstrate severe sleep apnea.  He has no interest in any treatment for sleep apnea other than a pill.  Since there is no pill for sleep apnea he declines any treatment for it at this time.  Therefore there is nothing that we can do to assist the retinal specialist with his concerns.  This was communicated to the patient today.    The patient used to see Dr. Bass.     REVIEW OF SYSTEMS:  GENERAL: No fever, chills, fatigability or weight loss.  SKIN: No rashes, itching or changes in color or texture of skin.  HEAD: No headaches or recent head trauma.  EYES:  No photophobia, ocular pain or diplopia.  EARS: Denies ear pain, discharge or vertigo.  NOSE: No loss of smell, no epistaxis or postnasal drip.  MOUTH & THROAT: No hoarseness or change in voice. No excessive gum bleeding.  NODES: Denies swollen glands.  CHEST: Denies SANCHES, cyanosis, wheezing, cough and sputum  "production.  CARDIOVASCULAR: Denies chest pain, PND, orthopnea or reduced exercise tolerance.  ABDOMEN: Appetite fine. No weight loss. Denies diarrhea, abdominal pain, hematemesis or blood in stool.  URINARY: No flank pain, dysuria or hematuria.  PERIPHERAL VASCULAR: No claudication or cyanosis.  MUSCULOSKELETAL: No joint stiffness or swelling. Denies back pain.  NEUROLOGIC: No history of seizures, paralysis, alteration of gait or coordination.    SOCIAL HISTORY: The patient does not smoke.  The patient consumes alcohol socially.  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    PHYSICAL EXAMINATION:     Blood pressure 112/70, pulse (!) 56, height 5' 7" (1.702 m), weight 70.9 kg (156 lb 4.9 oz).    APPEARANCE: Well nourished, well developed, in no acute distress.    HEAD: Normocephalic, atraumatic.  EYES: PERRL. EOMI.  Conjunctivae without injection and  anicteric  EARS: TM's intact. Light reflex normal. No retraction or perforation.    NOSE: Mucosa pink. Airway clear.  MOUTH & THROAT: No tonsillar enlargement. No pharyngeal erythema or exudate. No stridor.  NECK: Supple.   NODES: No cervical, axillary or inguinal lymph node enlargement.  CHEST: Lungs clear to auscultation.  No retractions are noted.  No rales or rhonchi are present.  CARDIOVASCULAR: Normal S1, S2. No rubs, murmurs or gallops.  ABDOMEN: Bowel sounds normal. Not distended. Soft. No tenderness or masses.  No ascites is noted.  MUSCULOSKELETAL:  There is no clubbing, cyanosis, or edema of the extremities x4.  There is full range of motion of the lumbar spine.  There is full range of motion of the extremities x4.  There is no deformity noted.    NEUROLOGIC:       Normal speech development.      Hearing normal.      Normal gait.      Motor and sensory exams grossly normal.      DTR's normal.  PSYCHIATRIC: Patient is alert and oriented x3.  Thought processes are all normal.  There is no homicidality.  There is no " suicidality.  There is no evidence of psychosis.    LABORATORY/RADIOLOGY:   Chart reviewed.      ASSESSMENT:   Macular degeneration  Snoring due to sleep study proven severe sleep apnea  Idiopathic peripheral neuropathy    PLAN:  He is reassured that he is doing everything that can be done to minimize his risk for progression of the macular degeneration.    He declines referral to sleep for further management of the KARMA  Over-the-counter supplements as recommended by retinal specialist    Return to clinic in one year.

## 2017-08-01 ENCOUNTER — OFFICE VISIT (OUTPATIENT)
Dept: UROLOGY | Facility: CLINIC | Age: 77
End: 2017-08-01
Payer: COMMERCIAL

## 2017-08-01 VITALS — RESPIRATION RATE: 16 BRPM | BODY MASS INDEX: 24.63 KG/M2 | HEIGHT: 67 IN | WEIGHT: 156.94 LBS

## 2017-08-01 DIAGNOSIS — R35.1 NOCTURIA: ICD-10-CM

## 2017-08-01 DIAGNOSIS — Z80.42 FAMILY HISTORY OF PROSTATE CANCER: ICD-10-CM

## 2017-08-01 DIAGNOSIS — N52.9 INABILITY TO MAINTAIN ERECTION: Primary | ICD-10-CM

## 2017-08-01 DIAGNOSIS — N40.1 BENIGN PROSTATIC HYPERPLASIA WITH LOWER URINARY TRACT SYMPTOMS, SYMPTOM DETAILS UNSPECIFIED: ICD-10-CM

## 2017-08-01 PROCEDURE — 1126F AMNT PAIN NOTED NONE PRSNT: CPT | Mod: S$GLB,,, | Performed by: UROLOGY

## 2017-08-01 PROCEDURE — 1159F MED LIST DOCD IN RCRD: CPT | Mod: S$GLB,,, | Performed by: UROLOGY

## 2017-08-01 PROCEDURE — 99999 PR PBB SHADOW E&M-EST. PATIENT-LVL III: CPT | Mod: PBBFAC,,, | Performed by: UROLOGY

## 2017-08-01 PROCEDURE — 99214 OFFICE O/P EST MOD 30 MIN: CPT | Mod: S$GLB,,, | Performed by: UROLOGY

## 2017-08-01 RX ORDER — SILDENAFIL 100 MG/1
TABLET, FILM COATED ORAL
Qty: 6 TABLET | Refills: 11 | Status: SHIPPED | OUTPATIENT
Start: 2017-08-01 | End: 2018-05-22 | Stop reason: SDUPTHER

## 2017-08-01 NOTE — PROGRESS NOTES
Cricket Mcdonnell is a 76 y.o. man who is here for the follow up of Benign Prostatic Hypertrophy (rtc one year -voices no complaints ) and Erectile Dysfunction (needs viagra refill )  no problem with emptying, he drinks a lot of water resulting nocturia x 4. Not bothered by it.   However regarding ED, he can only use Viagra once a week due to the cost.  Denies flank pain, dysuira, hematuria.  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.    His father was diagnosed with prostate cancer at the age of late 80's but lived up to 90's.  Denies flank pain, dysuira, hematuria .      Past Medical History:   Diagnosis Date    Hereditary sensory neuropathy      Past Surgical History:   Procedure Laterality Date    HERNIA REPAIR      tonsillectomy       Social History   Substance Use Topics    Smoking status: Former Smoker     Quit date: 7/19/2001    Smokeless tobacco: Never Used    Alcohol use No     Family History   Problem Relation Age of Onset    Lung cancer Sister     COPD Sister      Allergy:  Allergies   Allergen Reactions    Gabapentin Other (See Comments)     Sexual side effects    Vioxx [Rofecoxib] Other (See Comments)     Abdominal pain     Outpatient Encounter Prescriptions as of 8/1/2017   Medication Sig Dispense Refill    ALPHA LIPOIC ACID ORAL Take by mouth once daily.       ascorbic acid (VITAMIN C) 500 MG tablet Take 500 mg by mouth once daily.      fluticasone (FLONASE) 50 mcg/actuation nasal spray 2 sprays by Each Nare route once daily. 1 Bottle 2    LOTEMAX 0.5 % ophthalmic suspension INT 1 GTT IN OD BID FOR 4 WEEKS  1    MULTIVIT-IRON-MIN-FOLIC ACID 3,500-18-0.4 UNIT-MG-MG ORAL CHEW Take by mouth once daily.       PREVIDENT 5000 BOOSTER PLUS 1.1 % Pste once daily.   0    sildenafil (VIAGRA) 100 MG tablet TAKE 1 TABLET BY MOUTH AS NEEDED 6 tablet 11    [DISCONTINUED] sildenafil (VIAGRA) 100 MG tablet TAKE 1 TABLET BY MOUTH AS NEEDED 6 tablet 11    [DISCONTINUED] multivitamin (ONE  DAILY MULTIVITAMIN) per tablet Take 1 tablet by mouth once daily.      [DISCONTINUED] neomycin-polymyxin-dexamethasone (DEXACINE) 3.5 mg/g-10,000 unit/g-0.1 % Oint        No facility-administered encounter medications on file as of 8/1/2017.      Review of Systems   General ROS: GENERAL:  No weight gain or loss  SKIN:  No rashes or lacerations  HEAD:  No headaches  EYES:  No exophthalmos, jaundice or blindness  EARS:  No dizziness, tinnitus or hearing loss  NOSE:  No changes in smell  MOUTH & THROAT:  No dyskinetic movements or obvious goiter  CHEST:  No shortness of breath, hyperventilation or cough  CARDIOVASCULAR:  No tachycardia or chest pain  ABDOMEN:  No nausea, vomiting, pain, constipation or diarrhea  URINARY:  No frequency, dysuria or sexual dysfunction  ENDOCRINE:  No polydipsia, polyuria  MUSCULOSKELETAL:  No pain or stiffness of the joints  NEUROLOGIC:  No weakness, sensory changes, seizures, confusion, memory loss, tremor or other abnormal movements  Physical Exam     Vitals:    08/01/17 1338   Resp: 16     General Appearance:  Alert, cooperative, no distress, appears stated age   Head:  Normocephalic, without obvious abnormality, atraumatic   Eyes:  PERRL, conjunctiva/corneas clear, EOM's intact, fundi benign, both eyes   Ears:  Normal TM's and external ear canals, both ears   Nose: Nares normal, septum midline, mucosa normal, no drainage or sinus tenderness   Throat: Lips, mucosa, and tongue normal; teeth and gums normal   Neck: Supple, symmetrical, trachea midline, no adenopathy, thyroid: not enlarged, symmetric, no tenderness/mass/nodules, no carotid bruit or JVD   Back:   Symmetric, no curvature, ROM normal, no CVA tenderness   Lungs:   Clear to auscultation bilaterally, respirations unlabored   Chest Wall:  No tenderness or deformity   Heart:  Regular rate and rhythm, S1, S2 normal, no murmur, rub or gallop   Abdomen:   Soft, non-tender, bowel sounds active all four quadrants,  no masses, no  organomegaly of liver and spleen  No hernia noted   Genitalia:  Scrotum: no rash or lesion  Normal symmetric epididymis without masses  Normal vas palpated  Normal size, symmetric testicles with no masses   Normal urethral meatus with no discharge  Normal circumcised penis with no lesion   Rectal:  Normal perineum and anus upon inspection.  Normal tone, no masses or tenderness;   Extremities: Extremities normal, atraumatic, no cyanosis or edema   Pulses: 2+ and symmetric   Skin: Skin color, texture, turgor normal, no rashes or lesions   Lymph nodes: Cervical, supraclavicular, and axillary nodes normal   Neurologic: Normal     Prostate 20 grams smooth with no nodule or tenderness.    LABS:  Lab Results   Component Value Date    PSA 1.8 03/27/2017    PSA 1.82 05/31/2013    PSA 1.62 07/03/2012    PSA 1.55 05/31/2012    PSA 1.7 07/01/2011    PSA 1.9 06/09/2010    PSA 1.8 05/13/2009    PSA 1.0 06/17/2008    PSA 0.8 06/05/2007    PSA 0.9 03/23/2007    PSADIAG 1.7 07/07/2016    PSADIAG 1.9 07/17/2014    PSATOTAL 2.1 07/06/2015    PSAFREE 0.32 07/06/2015    PSAFREEPCT 15.24 07/06/2015     Results for orders placed or performed in visit on 07/17/14   Prostate Specific Antigen, Diagnostic   Result Value Ref Range    PSA DIAGNOSTIC 1.9 0.00-4.00 ng/mL     Lab Results   Component Value Date    CREATININE 0.8 03/27/2017    CREATININE 0.8 07/07/2016    CREATININE 0.9 07/06/2015     Results for orders placed or performed in visit on 07/19/13   Testosterone   Result Value Ref Range    Testosterone, Total 302 195 - 1138 ng/dl     Assessment and Plan:  Cricket Morrow was seen today for benign prostatic hypertrophy and erectile dysfunction.    Diagnoses and all orders for this visit:    Inability to maintain erection  -     sildenafil (VIAGRA) 100 MG tablet; TAKE 1 TABLET BY MOUTH AS NEEDED    Nocturia    Benign prostatic hyperplasia with lower urinary tract symptoms, symptom details unspecified    Family history of prostate  cancer    viagra refills given.  He can get discount coupons thru website.  Reassurance given.  He wants to continue to check his PSA considering his family hx of prostate cancer.  I explained that his PSA is low and stable. No significant risk of developing prostate cancer at his age knowing that his PSA has been low and stable.  He can be followed by his PCP for refill meds.  RTC prn.  No more PSA is needed.     I spent 25 minutes with the patient of which more than half was spent in direct consultation with the patient in regards to our treatment and plan.      Follow-up:  Return if symptoms worsen or fail to improve.

## 2017-08-01 NOTE — PATIENT INSTRUCTIONS
Lab Results   Component Value Date    PSA 1.8 03/27/2017    PSA 1.82 05/31/2013    PSA 1.62 07/03/2012    PSADIAG 1.7 07/07/2016    PSADIAG 1.9 07/17/2014    PSATOTAL 2.1 07/06/2015    PSAFREE 0.32 07/06/2015    PSAFREEPCT 15.24 07/06/2015

## 2017-08-03 DIAGNOSIS — N52.9 INABILITY TO MAINTAIN ERECTION: ICD-10-CM

## 2017-08-03 RX ORDER — SILDENAFIL CITRATE 100 MG/1
TABLET, FILM COATED ORAL
Qty: 6 TABLET | Refills: 0 | Status: SHIPPED | OUTPATIENT
Start: 2017-08-03 | End: 2018-03-01 | Stop reason: SDUPTHER

## 2017-11-09 ENCOUNTER — OFFICE VISIT (OUTPATIENT)
Dept: INTERNAL MEDICINE | Facility: CLINIC | Age: 77
End: 2017-11-09
Attending: FAMILY MEDICINE
Payer: COMMERCIAL

## 2017-11-09 VITALS
HEART RATE: 70 BPM | WEIGHT: 153.44 LBS | HEIGHT: 67 IN | SYSTOLIC BLOOD PRESSURE: 104 MMHG | DIASTOLIC BLOOD PRESSURE: 70 MMHG | BODY MASS INDEX: 24.08 KG/M2

## 2017-11-09 DIAGNOSIS — H35.30 MACULAR DEGENERATION OF RIGHT EYE: ICD-10-CM

## 2017-11-09 DIAGNOSIS — G47.33 OSA (OBSTRUCTIVE SLEEP APNEA): Primary | ICD-10-CM

## 2017-11-09 PROCEDURE — 99999 PR PBB SHADOW E&M-EST. PATIENT-LVL III: CPT | Mod: PBBFAC,,, | Performed by: FAMILY MEDICINE

## 2017-11-09 PROCEDURE — 99214 OFFICE O/P EST MOD 30 MIN: CPT | Mod: S$GLB,,, | Performed by: FAMILY MEDICINE

## 2017-11-09 RX ORDER — LATANOPROST 50 UG/ML
SOLUTION/ DROPS OPHTHALMIC
COMMUNITY
Start: 2017-10-26 | End: 2020-03-11

## 2017-11-09 RX ORDER — NEOMYCIN SULFATE, POLYMYXIN B SULFATE, AND DEXAMETHASONE 3.5; 10000; 1 MG/G; [USP'U]/G; MG/G
OINTMENT OPHTHALMIC
COMMUNITY
Start: 2017-10-03 | End: 2020-03-11

## 2017-11-09 NOTE — PROGRESS NOTES
CHIEF COMPLAINT:  Follow-up macular degeneration    HISTORY OF PRESENT ILLNESS: The patient is a generally healthy 76 year-old WM.  The patient has continuing neuropathic pain for which he had a complete workup in the last year. He has tried multiple medications and is intolerant of all of them due to various side effects.    He was recently diagnosed with macular degeneration.  When he was seen in his retinal specialist office recently.  He was told that he should get on an antidepressant to help with his ocular health.  He vehemently denies depression.  There was no specific class were dose of antidepressant recommended per patient.  I am not really sure what the role would be.  He is going to consult with his ophthalmologist this afternoon and get back to me with more specific recommendations.      His primary complaint is that he has excessive daytime somnolence.  This is not surprising given his severe apnea.  He  He does have untreated obstructive sleep apnea.  He has had a sleep study this year that did demonstrate severe sleep apnea.  We went over this once again today.  This visit was almost a rehash of the last time we got together.  He doesn't remember seeing the sleep specialist.  He does not particularly have interest in any treatment for sleep apnea.  I think his ocular problems and daytime somnolence would both benefit from aggressive treatment of his KARMA.  He is somewhat reluctantly agreeable to see the sleep specialists again.    The patient used to see Dr. Bass.     REVIEW OF SYSTEMS:  GENERAL: No fever, chills, fatigability or weight loss.  SKIN: No rashes, itching or changes in color or texture of skin.  HEAD: No headaches or recent head trauma.  EYES:  No photophobia, ocular pain or diplopia.  EARS: Denies ear pain, discharge or vertigo.  NOSE: No loss of smell, no epistaxis or postnasal drip.  MOUTH & THROAT: No hoarseness or change in voice. No excessive gum bleeding.  NODES: Denies swollen  "glands.  CHEST: Denies SANCHES, cyanosis, wheezing, cough and sputum production.  CARDIOVASCULAR: Denies chest pain, PND, orthopnea or reduced exercise tolerance.  ABDOMEN: Appetite fine. No weight loss. Denies diarrhea, abdominal pain, hematemesis or blood in stool.  URINARY: No flank pain, dysuria or hematuria.  PERIPHERAL VASCULAR: No claudication or cyanosis.  MUSCULOSKELETAL: No joint stiffness or swelling. Denies back pain.  NEUROLOGIC: No history of seizures, paralysis, alteration of gait or coordination.    SOCIAL HISTORY: The patient does not smoke.  The patient consumes alcohol socially.  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    PHYSICAL EXAMINATION:     Blood pressure 104/70, pulse 70, height 5' 7" (1.702 m), weight 69.6 kg (153 lb 7 oz).    APPEARANCE: Well nourished, well developed, in no acute distress.    HEAD: Normocephalic, atraumatic.  EYES: PERRL. EOMI.  Conjunctivae without injection and  anicteric  EARS: TM's intact. Light reflex normal. No retraction or perforation.    NOSE: Mucosa pink. Airway clear.  MOUTH & THROAT: No tonsillar enlargement. No pharyngeal erythema or exudate. No stridor.  NECK: Supple.   NODES: No cervical, axillary or inguinal lymph node enlargement.  CHEST: Lungs clear to auscultation.  No retractions are noted.  No rales or rhonchi are present.  CARDIOVASCULAR: Normal S1, S2. No rubs, murmurs or gallops.  ABDOMEN: Bowel sounds normal. Not distended. Soft. No tenderness or masses.  No ascites is noted.  MUSCULOSKELETAL:  There is no clubbing, cyanosis, or edema of the extremities x4.  There is full range of motion of the lumbar spine.  There is full range of motion of the extremities x4.  There is no deformity noted.    NEUROLOGIC:       Normal speech development.      Hearing normal.      Normal gait.      Motor and sensory exams grossly normal.      DTR's normal.  PSYCHIATRIC: Patient is alert and oriented x3.  Thought processes are " all normal.  There is no homicidality.  There is no suicidality.  There is no evidence of psychosis.    LABORATORY/RADIOLOGY:   Chart reviewed.      ASSESSMENT:   Macular degeneration  Snoring due to sleep study proven severe sleep apnea  Idiopathic peripheral neuropathy    PLAN:  He is aware that treating his KARMA would be helpful to minimize his risk for progression of the macular degeneration.    Referral to sleep for further management of the KARMA  Over-the-counter supplements as recommended by retinal specialist  He is meeting with his ophthalmologist this afternoon.  He will query whether there is some specific antidepressant that is appropriate.  Return to clinic in one year.

## 2017-11-17 ENCOUNTER — OFFICE VISIT (OUTPATIENT)
Dept: SLEEP MEDICINE | Facility: CLINIC | Age: 77
End: 2017-11-17
Attending: FAMILY MEDICINE
Payer: COMMERCIAL

## 2017-11-17 VITALS
HEIGHT: 67 IN | OXYGEN SATURATION: 95 % | HEART RATE: 79 BPM | BODY MASS INDEX: 23.76 KG/M2 | SYSTOLIC BLOOD PRESSURE: 100 MMHG | DIASTOLIC BLOOD PRESSURE: 62 MMHG | WEIGHT: 151.38 LBS

## 2017-11-17 DIAGNOSIS — G47.33 OBSTRUCTIVE SLEEP APNEA: Primary | ICD-10-CM

## 2017-11-17 DIAGNOSIS — Z71.89 CPAP USE COUNSELING: ICD-10-CM

## 2017-11-17 PROCEDURE — 99999 PR PBB SHADOW E&M-EST. PATIENT-LVL IV: CPT | Mod: PBBFAC,,, | Performed by: NURSE PRACTITIONER

## 2017-11-17 PROCEDURE — 99214 OFFICE O/P EST MOD 30 MIN: CPT | Mod: S$GLB,,, | Performed by: NURSE PRACTITIONER

## 2017-11-17 RX ORDER — BRIMONIDINE TARTRATE 2 MG/ML
SOLUTION/ DROPS OPHTHALMIC
COMMUNITY
Start: 2017-11-09 | End: 2021-04-16

## 2017-11-17 NOTE — PROGRESS NOTES
Cricket Mcdonnell was seen in follow-up for KARMA management and re-discuss treatment options. Last seen in clinic by LISE Orta NP 04/20/2017. This is his initial visit with me.     04/20/2017 LISE Orta NP: CHIEF COMPLAINT: Disrupted sleep    HISTORY OF PRESENT ILLNESS:Cricekt Mcdonnell a male presents for the evaluation of obstructive sleep apnea. He is not sure whey he is here today. Recent dx of macular degeneration. Had some spikes in blood pressure.  He has never had a sleep study. Wife does not endorse snoring. He has deviated nasal septum and uses Flonase NS bid. Wakes up refreshed. Denies witnessed apneic pauses. Nocturia 3-4x, he attributes to his age. Wears mouth guard for teeth grinding. Sleeps 8h/night. Denies am headaches. Sleeps on his back to help foot neuropathic pain. Sleeps pretty still throughout the night. +oral drying in am.     Neuropathic pain, intolerable to meds s/e  Has seen ENT surgeon, no need for repair septal deviation    Denies symptoms of restless legs or kicking during sleep.     On todays [place] Sleepiness Scale the patient scores a 3/24.     INTERVAL HISTORY:    11/17/2017 KAYLEY Claros NP: Since pt's last clinic appt he has completed HST On 06/09/2017. Results previously emailed to pt, but re-discussed today per patient's request. Pt was prescribed APAP but he did not get set up with it as he was not open at the time to wear interface for PAP therapy. Many reservations about using mask for sleep. Recently told by ophthalmologist that KARMA treatment could also help macular degeneration and has been motivated since to trial therapy. ESS 7.     Baseline Sleep Study: 06/09/2017 The overall AHI was 45 and overall RDI was 46. The oxygen lisa was 70.8% and % time < 90% SpO2 was 37.7%.       FAMILY HISTORY: No known sleep disorders.     SOCIAL HISTORY: . no ETOH, no tobacco. Mayo gutierrez creative writing    REVIEW OF SYSTEMS:  Sleep related symptoms as per HPI; Sinus congestion;  "Denies dyspnea; Denies palpitations; Denies acid reflux; Denies polyuria; Denies headaches;Denies mood disturbance.  Otherwise, a balance of 10 systems reviewed is negative    PHYSICAL EXAM:   /62 (Patient Position: Sitting, BP Method: Medium (Manual))   Pulse 79   Ht 5' 7" (1.702 m)   Wt 68.6 kg (151 lb 5.5 oz)   SpO2 95%   BMI 23.70 kg/m²   GENERAL: W/N,W/D  body habitus, well groomed   HEENT: Conjunctivae are non-erythematous; Pupils equal, round, and reactive to light; Nose is symmetrical; Nasal mucosa is normal; Septum is deviated left; Inferior turbinates are normal; Nasal airflow is normal; Posterior pharynx is pink; Modified Mallampati: IV; Posterior palate is low; Tonsils absent; Uvula is normal;Tongue is high,coated; Dentition is fair; No TMJ tenderness; Jaw opening and protrusion without click and without discomfort.   NECK: Supple. Neck circumference is 15.25 inches. No thyromegaly. No palpable nodes.   SKIN: On face and neck: No abrasions, no rashes, no lesions. No subcutaneous nodules are palpable.   RESPIRATORY: Chest is clear to auscultation. Normal chest expansion and non-labored breathing at rest.   CARDIOVASCULAR: Normal S1, S2. No murmurs, gallops or rubs. No carotid bruits bilaterally.   EXTREMITIES: No edema. No clubbing. No cyanosis. Station normal. Gait normal.   NEURO/PSYCH: Oriented to time, place and person. Normal attention span and concentration. Affect is full. Mood is normal.       ASSESSMENT:     Obstructive sleep apnea, severe by AHI. The patient symptomatically has symptoms of questionable snoring, frequent disrupted sleep, nasal septal deviation, teeth grinding, mild daytime fatigue, with exam findings of a mildy crowded oral airway, with medical comorbidities of macular degernation. This warrants treatment for KARMA.     PLAN:     Education: During our discussion today, we talked about the etiology of obstructive sleep apnea as well as the potential ramifications of " untreated sleep apnea, which could include daytime sleepiness, hypertension, heart disease and/or stroke. We discussed potential treatment options, which could include weight loss, body positioning, continuous positive airway pressure (CPAP), OA, EPAP, or referral for surgical consideration.     Treatment:  -prefers treatment ASAP via APAP vs dedicated titration study  -trial APAP 6 - 20 cm with nasal mask at [facility] today at 12 pm. If patient has difficulty with CPAP adherence or ongoing KARMA symptoms despite CPAP adherence, then consider an in-lab titration sleep study in order to determine optimal fixed CPAP setting.  -RTC 31 - 90 days after     Precautions: The patient was advised to abstain from driving should they feel sleepy  or drowsy.

## 2018-01-25 ENCOUNTER — OFFICE VISIT (OUTPATIENT)
Dept: SLEEP MEDICINE | Facility: CLINIC | Age: 78
End: 2018-01-25
Payer: COMMERCIAL

## 2018-01-25 VITALS
DIASTOLIC BLOOD PRESSURE: 64 MMHG | HEIGHT: 67 IN | HEART RATE: 58 BPM | BODY MASS INDEX: 24.4 KG/M2 | SYSTOLIC BLOOD PRESSURE: 103 MMHG | WEIGHT: 155.44 LBS

## 2018-01-25 DIAGNOSIS — G47.33 OSA (OBSTRUCTIVE SLEEP APNEA): Primary | ICD-10-CM

## 2018-01-25 DIAGNOSIS — Z71.89 CPAP USE COUNSELING: ICD-10-CM

## 2018-01-25 PROCEDURE — 99213 OFFICE O/P EST LOW 20 MIN: CPT | Mod: SA,S$GLB,, | Performed by: NURSE PRACTITIONER

## 2018-01-25 PROCEDURE — 99999 PR PBB SHADOW E&M-EST. PATIENT-LVL IV: CPT | Mod: PBBFAC,,, | Performed by: NURSE PRACTITIONER

## 2018-01-25 NOTE — PROGRESS NOTES
Cricket Mcdonnell was seen in follow-up for KARMA management and CPAP equipment check after set up.     INTERVAL HISTORY:    01/25/2018 KAYLEY Claros NP: Returns after set up of CPAP machine on 11/17/2017. Pt reports sleeping considerably better with CPAP - sleep is continuous and refreshing. Denies oral/nasal drying with Dreamwear nasal mask. Pressure was initially very difficult to get acclimated to due to deviated septum, but is now comfortable. Denies pressure intolerance. ESS 5.     CPAP Interrogation: APAP 6 - 20 cm  Compliance Summary Days with Device Usage: 28 days Percentage of Days >=4 Hours: 63.3% Average Usage (Days Used): 4 hrs. 16 mins. 1 secs. Average Usage (All Days): 3 hrs. 58 mins. 57 secs.   Apnea Indices Average AHI: 9.4 Average OA Index: 5.9 Average CA Index: 2.0  Large Leak Average Time in Large Leak: 1 mins. 43 secs. Average % of Night in Large Leak: 0.7%   Periodic Breathing Average % of Night in PB: 6.8%   90%tile pressure: 13.2 cm  Machine condition: non-disposable and disposable filters both dark gray       11/17/2017 KAYLEY Claros NP: Since pt's last clinic appt he has completed HST On 06/09/2017. Results previously emailed to pt, but re-discussed today per patient's request. Pt was prescribed APAP but he did not get set up with it as he was not open at the time to wear interface for PAP therapy. Many reservations about using mask for sleep. Recently told by ophthalmologist that KARMA treatment could also help macular degeneration and has been motivated since to trial therapy. ESS 7.       04/20/2017 LISE Orta NP: CHIEF COMPLAINT: Disrupted sleep    HISTORY OF PRESENT ILLNESS:Cricket Mcdonnell a male presents for the evaluation of obstructive sleep apnea. He is not sure whey he is here today. Recent dx of macular degeneration. Had some spikes in blood pressure.  He has never had a sleep study. Wife does not endorse snoring. He has deviated nasal septum and uses Flonase NS bid. Wakes up refreshed.  "Denies witnessed apneic pauses. Nocturia 3-4x, he attributes to his age. Wears mouth guard for teeth grinding. Sleeps 8h/night. Denies am headaches. Sleeps on his back to help foot neuropathic pain. Sleeps pretty still throughout the night. +oral drying in am.     Neuropathic pain, intolerable to meds s/e  Has seen ENT surgeon, no need for repair septal deviation    Denies symptoms of restless legs or kicking during sleep.     On todays [place] Sleepiness Scale the patient scores a 3/24.     Baseline Sleep Study: 06/09/2017 The overall AHI was 45 and overall RDI was 46. The oxygen lisa was 70.8% and % time < 90% SpO2 was 37.7%.       FAMILY HISTORY: No known sleep disorders.     SOCIAL HISTORY: . no ETOH, no tobacco. Mayo gutierrez creative writing    REVIEW OF SYSTEMS:  Sleep related symptoms as per HPI; Sinus congestion; Denies dyspnea; Denies palpitations; Denies acid reflux; Denies polyuria; Denies headaches;Denies mood disturbance.  Otherwise, a balance of 10 systems reviewed is negative    PHYSICAL EXAM:   /64   Pulse (!) 58   Ht 5' 7" (1.702 m)   Wt 70.5 kg (155 lb 6.8 oz)   BMI 24.34 kg/m²   GENERAL: W/N,W/D  body habitus, well groomed   HEENT: Conjunctivae are non-erythematous; Pupils equal, round, and reactive to light; Nose is symmetrical; Nasal mucosa is normal; Septum is deviated left; Inferior turbinates are normal; Nasal airflow is normal; Posterior pharynx is pink; Modified Mallampati: IV; Posterior palate is low; Tonsils absent; Uvula is normal;Tongue is high,coated; Dentition is fair; No TMJ tenderness; Jaw opening and protrusion without click and without discomfort.   NECK: Supple. Neck circumference is 15.25 inches. No thyromegaly. No palpable nodes.   SKIN: On face and neck: No abrasions, no rashes, no lesions. No subcutaneous nodules are palpable.   RESPIRATORY: Chest is clear to auscultation. Normal chest expansion and non-labored breathing at rest.   CARDIOVASCULAR: " Normal S1, S2. No murmurs, gallops or rubs. No carotid bruits bilaterally.   EXTREMITIES: No edema. No clubbing. No cyanosis. Station normal. Gait normal.   NEURO/PSYCH: Oriented to time, place and person. Normal attention span and concentration. Affect is full. Mood is normal.       ASSESSMENT:     Obstructive sleep apnea, severe by AHI. The patient symptomatically has symptoms of questionable snoring, frequent disrupted sleep, nasal septal deviation, teeth grinding, mild daytime fatigue, resolved with CPAP use. The patient has not yet met insurance compliance requirements since set up but is symptomatically benefiting from CPAP. Medical comorbidities of macular degernation. This warrants treatment for KARMA.     PLAN:     Treatment:  -continue auto CPAP 6 - 20 cm with nasal mask. Increase mask-on time, goal >6 hours per night, ultimately every hour every night  -Immediately go to [facility] to get replacement filters   -If mild residual AHI persists despite increased compliance, discussed considering an in-lab titration sleep study in order to determine optimal fixed CPAP setting; to be discussed further at short-interval f/u  -RTC 2-3 weeks    Education: During our discussion today, we talked about the etiology of obstructive sleep apnea as well as the potential ramifications of untreated sleep apnea, which could include daytime sleepiness, hypertension, heart disease and/or stroke. We discussed potential treatment options, which could include weight loss, body positioning, continuous positive airway pressure (CPAP), OA, EPAP, or referral for surgical consideration.     Precautions: The patient was advised to abstain from driving should they feel sleepy  or drowsy.

## 2018-02-16 DIAGNOSIS — M65.9 SYNOVITIS AND TENOSYNOVITIS, UNSPECIFIED: Primary | ICD-10-CM

## 2018-03-01 ENCOUNTER — OFFICE VISIT (OUTPATIENT)
Dept: SLEEP MEDICINE | Facility: CLINIC | Age: 78
End: 2018-03-01
Payer: COMMERCIAL

## 2018-03-01 VITALS
WEIGHT: 153.69 LBS | HEART RATE: 58 BPM | DIASTOLIC BLOOD PRESSURE: 66 MMHG | SYSTOLIC BLOOD PRESSURE: 104 MMHG | HEIGHT: 67 IN | BODY MASS INDEX: 24.12 KG/M2

## 2018-03-01 DIAGNOSIS — G47.33 OSA (OBSTRUCTIVE SLEEP APNEA): Primary | ICD-10-CM

## 2018-03-01 PROCEDURE — 99213 OFFICE O/P EST LOW 20 MIN: CPT | Mod: SA,S$GLB,, | Performed by: NURSE PRACTITIONER

## 2018-03-01 PROCEDURE — 99999 PR PBB SHADOW E&M-EST. PATIENT-LVL III: CPT | Mod: PBBFAC,,, | Performed by: NURSE PRACTITIONER

## 2018-03-01 RX ORDER — TIMOLOL MALEATE 5 MG/ML
SOLUTION/ DROPS OPHTHALMIC
COMMUNITY
Start: 2018-02-08 | End: 2018-10-09

## 2018-03-01 RX ORDER — EFINACONAZOLE 100 MG/ML
SOLUTION TOPICAL
Refills: 6 | COMMUNITY
Start: 2018-02-01 | End: 2020-03-11

## 2018-03-01 RX ORDER — TIMOLOL MALEATE 2.5 MG/ML
1 SOLUTION/ DROPS OPHTHALMIC DAILY
COMMUNITY
Start: 2018-02-20 | End: 2024-02-26 | Stop reason: ALTCHOICE

## 2018-03-01 RX ORDER — OFLOXACIN 3 MG/ML
SOLUTION/ DROPS OPHTHALMIC
COMMUNITY
Start: 2018-02-20 | End: 2018-03-01

## 2018-03-01 NOTE — PROGRESS NOTES
"Cricket Mcdonnell was seen in follow-up for KARMA management and CPAP equipment check.    INTERVAL HISTORY:    03/01/2018 KAYLEY Claros NP: Pt did not meet insurance compliance during last visit. Now using APAP nightly with resolution of interrupted sleep and daytime fatigue. "I can really tell a difference now that I am using it every night". Denies oral/nasal drying with nasal mask. Denies pressure intolerance.     CPAP Interrogation: APAP 6 - 20 cm   Compliance Summary Days with Device Usage: 30 days Percentage of Days >=4 Hours: 93.3% Average Usage (Days Used): 5 hrs. 49 mins. 58 secs. Average Usage (All Days): 5 hrs. 49 mins. 58 secs.  Apnea Indices Average AHI: 7.5 Average OA Index: 4.8 Average CA Index: 1.5  Large Leak Average Time in Large Leak: 3 mins. 50 secs. Average % of Night in Large Leak: 1.1%   Periodic Breathing Average % of Night in PB: 1.6%  90%tile pressure: 12.4 cm    EPWORTH SLEEPINESS SCALE 3/1/2018   Sitting and reading 1   Watching TV 1   Sitting, inactive in a public place (e.g. a theatre or a meeting) 0   As a passenger in a car for an hour without a break 0   Lying down to rest in the afternoon when circumstances permit 1   Sitting and talking to someone 0   Sitting quietly after a lunch without alcohol 0   In a car, while stopped for a few minutes in traffic 0   Total score 3           01/25/2018 KAYLEY Claros NP: Returns after set up of CPAP machine on 11/17/2017. Pt reports sleeping considerably better with CPAP - sleep is continuous and refreshing. Denies oral/nasal drying with Dreamwear nasal mask. Pressure was initially very difficult to get acclimated to due to deviated septum, but is now comfortable. Denies pressure intolerance. ESS 5.     CPAP Interrogation: APAP 6 - 20 cm  Compliance Summary Days with Device Usage: 28 days Percentage of Days >=4 Hours: 63.3% Average Usage (Days Used): 4 hrs. 16 mins. 1 secs. Average Usage (All Days): 3 hrs. 58 mins. 57 secs.   Apnea Indices Average AHI: " 9.4 Average OA Index: 5.9 Average CA Index: 2.0  Large Leak Average Time in Large Leak: 1 mins. 43 secs. Average % of Night in Large Leak: 0.7%   Periodic Breathing Average % of Night in PB: 6.8%   90%tile pressure: 13.2 cm  Machine condition: non-disposable and disposable filters both dark gray       11/17/2017 KAYLEY Claros NP: Since pt's last clinic appt he has completed HST On 06/09/2017. Results previously emailed to pt, but re-discussed today per patient's request. Pt was prescribed APAP but he did not get set up with it as he was not open at the time to wear interface for PAP therapy. Many reservations about using mask for sleep. Recently told by ophthalmologist that KARMA treatment could also help macular degeneration and has been motivated since to trial therapy. ESS 7.       04/20/2017 LISE Orta NP: CHIEF COMPLAINT: Disrupted sleep    HISTORY OF PRESENT ILLNESS:Cricket Mcdonnell a male presents for the evaluation of obstructive sleep apnea. He is not sure whey he is here today. Recent dx of macular degeneration. Had some spikes in blood pressure.  He has never had a sleep study. Wife does not endorse snoring. He has deviated nasal septum and uses Flonase NS bid. Wakes up refreshed. Denies witnessed apneic pauses. Nocturia 3-4x, he attributes to his age. Wears mouth guard for teeth grinding. Sleeps 8h/night. Denies am headaches. Sleeps on his back to help foot neuropathic pain. Sleeps pretty still throughout the night. +oral drying in am.     Neuropathic pain, intolerable to meds s/e  Has seen ENT surgeon, no need for repair septal deviation    Denies symptoms of restless legs or kicking during sleep.     On todays [place] Sleepiness Scale the patient scores a 3/24.     Baseline Sleep Study: 06/09/2017 HST The overall AHI was 45 and overall RDI was 46. The oxygen lisa was 70.8% and % time < 90% SpO2 was 37.7%.       FAMILY HISTORY: No known sleep disorders.     SOCIAL HISTORY: . no ETOH, no tobacco.  "[employer] professor creative writing    REVIEW OF SYSTEMS:  Sleep related symptoms as per HPI; Sinus congestion; Denies dyspnea; Denies palpitations; Denies acid reflux; Denies polyuria; Denies headaches;Denies mood disturbance.  Otherwise, a balance of 10 systems reviewed is negative    PHYSICAL EXAM:   /66   Pulse (!) 58   Ht 5' 7" (1.702 m)   Wt 69.7 kg (153 lb 10.6 oz)   BMI 24.07 kg/m²   GENERAL: W/N,W/D  body habitus, well groomed   HEENT: Conjunctivae are non-erythematous; Pupils equal, round, and reactive to light; Nose is symmetrical; Nasal mucosa is normal; Septum is deviated left; Inferior turbinates are normal; Nasal airflow is normal; Posterior pharynx is pink; Modified Mallampati: IV; Posterior palate is low; Tonsils absent; Uvula is normal;Tongue is high,coated; Dentition is fair; No TMJ tenderness; Jaw opening and protrusion without click and without discomfort.   NECK: Supple. Neck circumference is 15.25 inches. No thyromegaly. No palpable nodes.   SKIN: On face and neck: No abrasions, no rashes, no lesions. No subcutaneous nodules are palpable.   RESPIRATORY: Chest is clear to auscultation. Normal chest expansion and non-labored breathing at rest.   CARDIOVASCULAR: Normal S1, S2. No murmurs, gallops or rubs. No carotid bruits bilaterally.   EXTREMITIES: No edema. No clubbing. No cyanosis. Station normal. Gait normal.   NEURO/PSYCH: Oriented to time, place and person. Normal attention span and concentration. Affect is full. Mood is normal.       ASSESSMENT:     Obstructive sleep apnea, severe by AHI. The patient symptomatically has symptoms of questionable snoring, frequent disrupted sleep, and mild daytime fatigue, resolved with CPAP use. The patient is adherent on CPAP and experiencing symptomatic benefit despite mild residual AHI. Medical comorbidities of macular degernation. This warrants treatment for KARMA.     PLAN:     Treatment:  -Recommended at this time to proceed with titration " study since AHI > 5 despite increasing mask-on time. However, pt prefers to postpone to see if further improving CPAP use will further improve AHI to < 5 because he admits he still has more improvement.   -continue auto CPAP 6 - 20 cm with nasal mask. Increase mask-on time, goal >6 hours per night, ultimately every hour every night  -RTC 3 months, if residual AHI pt agreed to proceed with dedicated in-lab sleep study for optimal CPAP setting    Education: During our discussion today, we talked about the etiology of obstructive sleep apnea as well as the potential ramifications of untreated sleep apnea, which could include daytime sleepiness, hypertension, heart disease and/or stroke. We discussed potential treatment options, which could include weight loss, body positioning, continuous positive airway pressure (CPAP), OA, EPAP, or referral for surgical consideration.     Precautions: The patient was advised to abstain from driving should they feel sleepy  or drowsy.

## 2018-03-02 ENCOUNTER — CLINICAL SUPPORT (OUTPATIENT)
Dept: REHABILITATION | Facility: HOSPITAL | Age: 78
End: 2018-03-02
Attending: PODIATRIST
Payer: COMMERCIAL

## 2018-03-02 DIAGNOSIS — M25.579 CHRONIC ANKLE PAIN, UNSPECIFIED LATERALITY: ICD-10-CM

## 2018-03-02 DIAGNOSIS — G89.29 CHRONIC ANKLE PAIN, UNSPECIFIED LATERALITY: ICD-10-CM

## 2018-03-02 PROBLEM — M79.606 LEG PAIN: Status: RESOLVED | Noted: 2017-04-06 | Resolved: 2018-03-02

## 2018-03-02 PROCEDURE — 97161 PT EVAL LOW COMPLEX 20 MIN: CPT

## 2018-03-02 NOTE — PLAN OF CARE
Physical Therapy Initial Evaluation     Name: Cricket Morrow [facility] Number: [id number]    Cricket Morrow is a 77 y.o. male evaluated on 03/02/2018.     Diagnosis:   Encounter Diagnosis   Name Primary?    Chronic ankle pain, unspecified laterality      Physician: Bharat Sotelo DPM  Treatment Orders: PT Eval and Treat    Past Medical History:   Diagnosis Date    Hereditary sensory neuropathy      Current Outpatient Prescriptions   Medication Sig    ALPHA LIPOIC ACID ORAL Take by mouth once daily.     ascorbic acid (VITAMIN C) 500 MG tablet Take 500 mg by mouth once daily.    brimonidine 0.2% (ALPHAGAN) 0.2 % Drop     fluticasone (FLONASE) 50 mcg/actuation nasal spray 2 sprays by Each Nare route once daily.    JUBLIA 10 % Betty APPLY ONE DROP TO ALL SMALLER TOES AND 2 DROPS TO GREATER TOES DAILY    latanoprost 0.005 % ophthalmic solution     LOTEMAX 0.5 % ophthalmic suspension INT 1 GTT IN OD BID FOR 4 WEEKS    MULTIVIT-IRON-MIN-FOLIC ACID 3,500-18-0.4 UNIT-MG-MG ORAL CHEW Take by mouth once daily.     neomycin-polymyxin-dexamethasone (DEXACINE) 3.5 mg/g-10,000 unit/g-0.1 % Oint     PREVIDENT 5000 BOOSTER PLUS 1.1 % Pste once daily.     sildenafil (VIAGRA) 100 MG tablet TAKE 1 TABLET BY MOUTH AS NEEDED    timolol maleate 0.25% (TIMOPTIC) 0.25 % Drop     timolol maleate 0.5% (TIMOPTIC) 0.5 % Drop      No current facility-administered medications for this visit.      Review of patient's allergies indicates:   Allergen Reactions    Amitriptyline Other (See Comments)     Sleepy    Duloxetine Other (See Comments)     Sleepy    Gabapentin Other (See Comments)     Sexual side effects    Lortab [hydrocodone-acetaminophen]     Vioxx [rofecoxib] Other (See Comments)     Abdominal pain     Precautions: Fall    Time In: 11:00 am   Time Out: 11:50 am     Subjective     Patient reports he has neuropathy in B LEs. Pt has B ankle pain (L>R). Pt got L ankle  injections 2 weeks ago with some benefit. Pt reports his feet are very sensitive but wouldn't say that they are numb. Pt reports shoes and socks irritate his feet because they are so sensitive. Pt reports he is taking medication to help with the neuropathy but the ankle pain is persistent. Pt reports he buys all of his shoes from [employer] and they are customized to his feet. Pt denies any trauma. Pt reports the pain is worst at night and wakes him up during the night. Pt reports he gets up and walks around when the pain wakes him up, which helps decrease the pain for him to return to sleep temporarily until it happens again. Pt reports he gets cramping in B calf muscles that is improved with walking. Pt has not had any imaging on ankles/feet.   Onset: gradual  Pain Scale: Cricket Morrow rates pain on a scale of 0-10 to be 8 at worst; 4 currently; 2 at best.  Aggravating Factors: worst at night - pain wakes pt up  Relieving Factors: walking   Prior Therapy: yes, PT was beneficial in the past   Functional Deficits Leading to Referral: pain and limitations with functional mobility  Prior functional status: walks for exercise every morning and evening, independent, swims   Occupation:  Professor at [employer], sits the majority of the day                        Patient Goals: alleviate the pain     Objective     Observation: mild B pes planus with increase pronation     Palpation: B lateral ankle malleolus and peroneals (L>R)    Range of Motion: Ankle (degrees)   Left Right   Dorsiflexion: 0 -2   Plantarflexion 40 40   Inversion 8 5   Eversion 15 15     Strength: Ankle    Left Right   Gastrocnemius 4/5 4/5   Soleus 4/5 4/5   Dorsiflexion 5/5 5/5   Inversion 4/5 4/5   Eversion 4+/5 4+/5     Strength: Knee   Left Right   Quadriceps 5/5 5/5   Hamstrings 5/5 5/5       Strength: Hip    Left Right   Iliopsoas 4+/5 5/5   PGM 4+/5 4+/5   IR 5/5 5/5   ER 5/5 5/5   Ext 4/5 4/5       Joint Mobility: decrease talocrural mobility  "  Sensation: intact to light touch    Significant lack of soleus/gastroc flexibility     Edema:  Left: absent  Right: absent    Gait: Mcdonnell ambulated with none.  Level of Assistance: independent  Patient displays decreased heel strike, no push-off.     TREATMENT     PT Evaluation Completed? Yes  Discussed Plan of Care with patient: Yes    Cricket Morrow received 5 minutes of therapeutic exercise:   Seated calf stretch 3x30"  Ankle ABCs 2x    Written Home Exercises Provided: exercises listed above (see handout)  Cricket Morrow demo good understanding of the education provided. Patient demo good return demo of skill of exercises.    ASSESSMENT     History  Co-morbidities and personal factors that may impact the plan of care Examination  Body Structures and Functions, activity limitations and participation restrictions that may impact the plan of care    Clinical Presentation   Co-morbidities:   advanced age  neuropathy       Personal Factors:   age Body Regions:   lower extremities    Body Systems:    gross symmetry  ROM  strength  gross coordinated movement  balance  gait            Participation Restrictions:   Sleep disturbances secondary to pain, abnormal gait     Activity limitations:   Learning and applying knowledge  no deficits    General Tasks and Commands  no deficits    Communication  no deficits    Mobility  walking    Self care  no deficits    Domestic Life  no deficits    Interactions/Relationships  no deficits    Life Areas  no deficits    Community and Social Life  community life  recreation and leisure         stable and uncomplicated                      low   low  low Decision Making/ Complexity Score:  low     Pt presents with B ankle pain, gait deviations, decreased strength, decreased ROM, decreased flexibility, and subsequent functional limitations. Pt will benefit from PT to address these deficits.  Pt prognosis is Good.  Pt will benefit from skilled outpatient physical therapy to address the above " stated deficits, provide pt/family education and to maximize pt's level of independence.     Medical necessity is demonstrated by the following IMPAIRMENTS/PROBLEMS:  1. Decreased ROM/joint mobility   2. Decreased strength  3. Pain/tenderness  4. Functional limitations     Pt's spiritual, cultural and educational needs considered and pt agreeable to plan of care and goals as stated below:     Anticipated Barriers for physical therapy: chronic pain    Short Term GOALS: 3 weeks. Pt agrees with goals set.  1. Patient demonstrates independence with HEP.   2. Patient demonstrates independence with Postural Awareness.   3. Patient demonstrates independence with body mechanics.     Long Term GOALS: 6 weeks. Pt agrees with goals set.  1. Patient demonstrates increased B ankle DR to 5 to improve tolerance to functional activities pain free.   2. Patient demonstrates increased strength BLE's to 5/5 or greater to improve tolerance to functional activities pain free.   3. Patient demonstrates improved overall function per FOTO Ankle Survey to 29% Limitation or less.    Functional Limitations Reports  Tool: FOTO Ankle Survey   Score: 39% Limitation     PLAN     Outpatient physical therapy 2 times weekly to include: pt ed, hep, therapeutic exercises, neuromuscular re-education/ balance exercises, joint mobilizations, aquatic therapy and modalities prn. Cont PT for  6 weeks. Pt may be seen by PTA as part of the rehabilitation team.     Therapist: Luis Chan, PT  3/2/2018

## 2018-03-13 ENCOUNTER — CLINICAL SUPPORT (OUTPATIENT)
Dept: REHABILITATION | Facility: HOSPITAL | Age: 78
End: 2018-03-13
Attending: PODIATRIST
Payer: COMMERCIAL

## 2018-03-13 DIAGNOSIS — M25.579 CHRONIC ANKLE PAIN, UNSPECIFIED LATERALITY: Primary | ICD-10-CM

## 2018-03-13 DIAGNOSIS — G89.29 CHRONIC ANKLE PAIN, UNSPECIFIED LATERALITY: Primary | ICD-10-CM

## 2018-03-13 PROCEDURE — 97110 THERAPEUTIC EXERCISES: CPT

## 2018-03-13 NOTE — PROGRESS NOTES
Physical Therapy Daily Note     Name: Cricket Morrow [facility] Number: [id number]  Diagnosis:   Encounter Diagnosis   Name Primary?    Chronic ankle pain, unspecified laterality Yes     Physician: Bharat Sotelo DPM  Precautions: Neuropathy B feet  Visit #: 2 of 12  PTA Visit #: -  Time In: 3:35 p  Time Out: 4:25 p  Total Treatment Time 1:1: 50    Evaluation Date: 3/2/18  Visit # authorized: 20  Authorization period: 12/31/2018  Plan of care Expiration: 04/13/2018  MD referral: Bharat Sotelo DPM    Subjective     Pt reports: Patient reports no pain in B feet, mainly behind B knees.  Patient has been compliant with HEP.   Pain Scale: Cricket Morrow rates pain on a scale of 0-10 to be 2 currently.    FOTO from evaluation:  39%  Objective     Cricket Morrow received individual therapeutic exercises to develop strength, endurance, ROM, flexibility and posture for 45 minutes including:  Bike 5' - stopped at 2' 2* to p!  Ankle alphabet 1x  Bridges 3x10  SLR 3x10  Clams 3x10  DKTC 3x10 w SB  Step Ups L3 3x10  Shuttle 2C 3x10  Steamboats B x10 each direction    Written Home Exercises Provided: Eval  Pt demo good understanding of the education provided. Cricket Morrow demonstrated good return demonstration of activities.     Education provided re: performance of HEP three times per day to decrease symptoms.  Also educated patient on proper sitting and standing posture.   Cricket Morrow verbalized good understanding of education provided.   No spiritual or educational barriers to learning provided    Assessment     Patient tolerated treatment well.  Patient reported no increased symptoms throughout treatment.  Introduced new exercises with good response.  Patient is a good candidate for therapy and is making good progress towards goals.      This is a 77 y.o. male referred to outpatient physical therapy and presents with a medical diagnosis of ankle pain and demonstrates limitations as  described in the problem list. Pt prognosis is Good. Pt will continue to benefit from skilled outpatient physical therapy to address the deficits listed in the problem list, provide pt/family education and to maximize pt's level of independence in the home and community environment.     Goals as follows:  Short Term GOALS: 3 weeks. Pt agrees with goals set.  1. Patient demonstrates independence with HEP.   2. Patient demonstrates independence with Postural Awareness.   3. Patient demonstrates independence with body mechanics.      Long Term GOALS: 6 weeks. Pt agrees with goals set.  1. Patient demonstrates increased B ankle DR to 5 to improve tolerance to functional activities pain free.   2. Patient demonstrates increased strength BLE's to 5/5 or greater to improve tolerance to functional activities pain free.   3. Patient demonstrates improved overall function per FOTO Ankle Survey to 29% Limitation or less.     Plan     Continue with established Plan of Care towards PT goals.    Therapist: Kayleen Gregory, PT  3/13/2018

## 2018-04-27 ENCOUNTER — TELEPHONE (OUTPATIENT)
Dept: INTERNAL MEDICINE | Facility: CLINIC | Age: 78
End: 2018-04-27

## 2018-04-27 DIAGNOSIS — Z00.00 ANNUAL PHYSICAL EXAM: Primary | ICD-10-CM

## 2018-04-27 NOTE — TELEPHONE ENCOUNTER
----- Message from Opal Morton sent at 4/27/2018  3:05 PM CDT -----  Contact: MALACHI GOULD [[id number]]            Name of Who is Calling: MALACHI GOULD [[id number]]    What is the request in detail: Patient call and scheduled an appointment for 05/22 and would like to schedule his labs for 05/21 at 1pm      Can the clinic reply by [facility]: No      What Number to Call Back if not in [facility]: [phone]

## 2018-05-09 ENCOUNTER — PATIENT OUTREACH (OUTPATIENT)
Dept: ADMINISTRATIVE | Facility: HOSPITAL | Age: 78
End: 2018-05-09

## 2018-05-09 NOTE — PROGRESS NOTES
[facility] is committed to your overall health.  To help you get the most out of each of your visits, we will review your information to make sure you are up to date on all of your recommended tests and/or procedures.       Your PCP  Neo Sneed MD   found that you may be due for:       Health Maintenance Due   Topic Date Due    Zoster Vaccine  11/19/2000    Pneumococcal (65+) (1 of 2 - PCV13) 11/19/2005     You are more than welcome to visit our pharmacy here on campus to receive your vaccinations on the same day of your upcoming scheduled visit.         If you have had any of the above done at another facility, please bring the records or information with you so that your record at [facility] will be complete.  If you would like to schedule any of these, please contact me.     If you are currently taking medication, please bring it with you to your appointment for review.     Also, if you have any type of Advanced Directives, please bring them with you to your office visit so we may scan them into your chart.       Thank you for Choosing [facility] for your healthcare needs.        Additional Information  If you have questions, you can email [email or web address] or call [phone]  to talk to our [facility] staff. Remember, [facility] is NOT to be used for urgent needs. For medical emergencies, dial 911.

## 2018-05-21 ENCOUNTER — LAB VISIT (OUTPATIENT)
Dept: LAB | Facility: OTHER | Age: 78
End: 2018-05-21
Attending: FAMILY MEDICINE
Payer: COMMERCIAL

## 2018-05-21 DIAGNOSIS — Z00.00 ANNUAL PHYSICAL EXAM: ICD-10-CM

## 2018-05-21 PROBLEM — J31.0 CHRONIC RHINITIS: Status: ACTIVE | Noted: 2018-05-21

## 2018-05-21 PROBLEM — J34.2 DEVIATED NASAL SEPTUM: Status: ACTIVE | Noted: 2018-05-21

## 2018-05-21 PROBLEM — J34.89 NASAL OBSTRUCTION: Status: ACTIVE | Noted: 2018-05-21

## 2018-05-21 PROBLEM — J34.3 HYPERTROPHY OF NASAL TURBINATES: Status: ACTIVE | Noted: 2018-05-21

## 2018-05-21 LAB
ALBUMIN SERPL BCP-MCNC: 4.1 G/DL
ALP SERPL-CCNC: 56 U/L
ALT SERPL W/O P-5'-P-CCNC: 25 U/L
ANION GAP SERPL CALC-SCNC: 7 MMOL/L
AST SERPL-CCNC: 27 U/L
BASOPHILS # BLD AUTO: 0.02 K/UL
BASOPHILS NFR BLD: 0.4 %
BILIRUB SERPL-MCNC: 0.5 MG/DL
BUN SERPL-MCNC: 12 MG/DL
CALCIUM SERPL-MCNC: 9.4 MG/DL
CHLORIDE SERPL-SCNC: 104 MMOL/L
CHOLEST SERPL-MCNC: 209 MG/DL
CHOLEST/HDLC SERPL: 3.7 {RATIO}
CO2 SERPL-SCNC: 25 MMOL/L
COMPLEXED PSA SERPL-MCNC: 2.1 NG/ML
CREAT SERPL-MCNC: 0.7 MG/DL
DIFFERENTIAL METHOD: ABNORMAL
EOSINOPHIL # BLD AUTO: 0.1 K/UL
EOSINOPHIL NFR BLD: 1.8 %
ERYTHROCYTE [DISTWIDTH] IN BLOOD BY AUTOMATED COUNT: 12.9 %
EST. GFR  (AFRICAN AMERICAN): >60 ML/MIN/1.73 M^2
EST. GFR  (NON AFRICAN AMERICAN): >60 ML/MIN/1.73 M^2
ESTIMATED AVG GLUCOSE: 97 MG/DL
GLUCOSE SERPL-MCNC: 103 MG/DL
HBA1C MFR BLD HPLC: 5 %
HCT VFR BLD AUTO: 38.7 %
HDLC SERPL-MCNC: 56 MG/DL
HDLC SERPL: 26.8 %
HGB BLD-MCNC: 13 G/DL
LDLC SERPL CALC-MCNC: 135.4 MG/DL
LYMPHOCYTES # BLD AUTO: 1.6 K/UL
LYMPHOCYTES NFR BLD: 32 %
MCH RBC QN AUTO: 32.5 PG
MCHC RBC AUTO-ENTMCNC: 33.6 G/DL
MCV RBC AUTO: 97 FL
MONOCYTES # BLD AUTO: 0.4 K/UL
MONOCYTES NFR BLD: 8.2 %
NEUTROPHILS # BLD AUTO: 2.9 K/UL
NEUTROPHILS NFR BLD: 57.6 %
NONHDLC SERPL-MCNC: 153 MG/DL
PLATELET # BLD AUTO: 241 K/UL
PMV BLD AUTO: 9.6 FL
POTASSIUM SERPL-SCNC: 4 MMOL/L
PROT SERPL-MCNC: 7.1 G/DL
RBC # BLD AUTO: 4 M/UL
SODIUM SERPL-SCNC: 136 MMOL/L
TRIGL SERPL-MCNC: 88 MG/DL
TSH SERPL DL<=0.005 MIU/L-ACNC: 1.87 UIU/ML
WBC # BLD AUTO: 5.03 K/UL

## 2018-05-21 PROCEDURE — 84443 ASSAY THYROID STIM HORMONE: CPT

## 2018-05-21 PROCEDURE — 80061 LIPID PANEL: CPT

## 2018-05-21 PROCEDURE — 84153 ASSAY OF PSA TOTAL: CPT

## 2018-05-21 PROCEDURE — 85025 COMPLETE CBC W/AUTO DIFF WBC: CPT

## 2018-05-21 PROCEDURE — 36415 COLL VENOUS BLD VENIPUNCTURE: CPT

## 2018-05-21 PROCEDURE — 83036 HEMOGLOBIN GLYCOSYLATED A1C: CPT

## 2018-05-21 PROCEDURE — 80053 COMPREHEN METABOLIC PANEL: CPT

## 2018-05-22 ENCOUNTER — OFFICE VISIT (OUTPATIENT)
Dept: INTERNAL MEDICINE | Facility: CLINIC | Age: 78
End: 2018-05-22
Attending: FAMILY MEDICINE
Payer: COMMERCIAL

## 2018-05-22 VITALS
WEIGHT: 150.13 LBS | OXYGEN SATURATION: 95 % | SYSTOLIC BLOOD PRESSURE: 110 MMHG | DIASTOLIC BLOOD PRESSURE: 60 MMHG | HEART RATE: 71 BPM | HEIGHT: 67 IN | BODY MASS INDEX: 23.56 KG/M2

## 2018-05-22 DIAGNOSIS — G60.8 HEREDITARY SENSORY NEUROPATHY: ICD-10-CM

## 2018-05-22 DIAGNOSIS — M79.604 PAIN IN BOTH LOWER EXTREMITIES: ICD-10-CM

## 2018-05-22 DIAGNOSIS — Z23 NEED FOR VACCINATION: ICD-10-CM

## 2018-05-22 DIAGNOSIS — M79.605 PAIN IN BOTH LOWER EXTREMITIES: ICD-10-CM

## 2018-05-22 DIAGNOSIS — M25.571 CHRONIC PAIN OF BOTH ANKLES: ICD-10-CM

## 2018-05-22 DIAGNOSIS — G89.29 CHRONIC PAIN OF BOTH ANKLES: ICD-10-CM

## 2018-05-22 DIAGNOSIS — N52.9 INABILITY TO MAINTAIN ERECTION: ICD-10-CM

## 2018-05-22 DIAGNOSIS — M25.572 CHRONIC PAIN OF BOTH ANKLES: ICD-10-CM

## 2018-05-22 DIAGNOSIS — G47.33 OSA (OBSTRUCTIVE SLEEP APNEA): ICD-10-CM

## 2018-05-22 DIAGNOSIS — Z00.00 ANNUAL PHYSICAL EXAM: Primary | ICD-10-CM

## 2018-05-22 PROCEDURE — 99999 PR PBB SHADOW E&M-EST. PATIENT-LVL III: CPT | Mod: PBBFAC,,, | Performed by: FAMILY MEDICINE

## 2018-05-22 PROCEDURE — 90670 PCV13 VACCINE IM: CPT | Mod: S$GLB,,, | Performed by: FAMILY MEDICINE

## 2018-05-22 PROCEDURE — 99397 PER PM REEVAL EST PAT 65+ YR: CPT | Mod: 25,S$GLB,, | Performed by: FAMILY MEDICINE

## 2018-05-22 PROCEDURE — 90471 IMMUNIZATION ADMIN: CPT | Mod: S$GLB,,, | Performed by: FAMILY MEDICINE

## 2018-05-22 RX ORDER — OFLOXACIN 3 MG/ML
SOLUTION/ DROPS OPHTHALMIC
Refills: 0 | COMMUNITY
Start: 2018-05-02 | End: 2020-06-12

## 2018-05-22 RX ORDER — IBUPROFEN AND FAMOTIDINE 800; 26.6 MG/1; MG/1
1 TABLET, COATED ORAL 3 TIMES DAILY
Refills: 0 | COMMUNITY
Start: 2018-05-09 | End: 2020-06-12

## 2018-05-22 RX ORDER — SILDENAFIL 100 MG/1
TABLET, FILM COATED ORAL
Qty: 6 TABLET | Refills: 11 | Status: SHIPPED | OUTPATIENT
Start: 2018-05-22 | End: 2020-01-16

## 2018-05-22 NOTE — PROGRESS NOTES
Patient given Prevnar 13 IM  in the LD using 25G x 1 needle,site cleaned with alcohol prep. Patient tolerated well and Band-Aid was applied. Lot#[id number] Exp:2/20. Patient advised to wait in the lobby for 15 min to make sure no adverse reactions occur. Patient states verbal understanding and has no further questions. Patient given vaccine information sheet.  Ndc#[id number]

## 2018-05-22 NOTE — PROGRESS NOTES
"CHIEF COMPLAINT:  Annual exam in pt w/ macular degeneration and sleep apnea    HISTORY OF PRESENT ILLNESS: The patient is a generally healthy 77 year-old WM.  The patient has continuing neuropathic pain for which he had a complete workup in the last year. He has tried multiple medications and is intolerant of all of them due to various side effects.    He was previously diagnosed with macular degeneration.     His primary complaint is that he has excessive daytime somnolence.  This is not surprising given his severe apnea.  He does have CPAP for obstructive sleep apnea.  He has had very good results in terms of daytime somnolence and MD is improved as well.    The patient used to see Dr. Bass.     REVIEW OF SYSTEMS:  GENERAL: No fever, chills, fatigability or weight loss.  SKIN: No rashes, itching or changes in color or texture of skin.  HEAD: No headaches or recent head trauma.  EYES:  No photophobia, ocular pain or diplopia.  EARS: Denies ear pain, discharge or vertigo.  NOSE: No loss of smell, no epistaxis or postnasal drip.  MOUTH & THROAT: No hoarseness or change in voice. No excessive gum bleeding.  NODES: Denies swollen glands.  CHEST: Denies SANCHES, cyanosis, wheezing, cough and sputum production.  CARDIOVASCULAR: Denies chest pain, PND, orthopnea or reduced exercise tolerance.  ABDOMEN: Appetite fine. No weight loss. Denies diarrhea, abdominal pain, hematemesis or blood in stool.  URINARY: No flank pain, dysuria or hematuria.  PERIPHERAL VASCULAR: No claudication or cyanosis.  MUSCULOSKELETAL: No joint stiffness or swelling. Denies back pain.  NEUROLOGIC: No history of seizures, paralysis, alteration of gait or coordination.    SOCIAL HISTORY: The patient does not smoke.  The patient consumes alcohol socially.  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.    PHYSICAL EXAMINATION:     Blood pressure 110/60, pulse 71, height 5' 7" (1.702 m), weight 68.1 kg (150 lb 2.1 oz), " SpO2 95 %.    APPEARANCE: Well nourished, well developed, in no acute distress.    HEAD: Normocephalic, atraumatic.  EYES: PERRL. EOMI.  Conjunctivae without injection and  anicteric  EARS: TM's intact. Light reflex normal. No retraction or perforation.    NOSE: Mucosa pink. Airway clear.  MOUTH & THROAT: No tonsillar enlargement. No pharyngeal erythema or exudate. No stridor.  NECK: Supple.   NODES: No cervical, axillary or inguinal lymph node enlargement.  CHEST: Lungs clear to auscultation.  No retractions are noted.  No rales or rhonchi are present.  CARDIOVASCULAR: Normal S1, S2. No rubs, murmurs or gallops.  ABDOMEN: Bowel sounds normal. Not distended. Soft. No tenderness or masses.  No ascites is noted.  MUSCULOSKELETAL:  There is no clubbing, cyanosis, or edema of the extremities x4.  There is full range of motion of the lumbar spine.  There is full range of motion of the extremities x4.  There is no deformity noted.    NEUROLOGIC:       Normal speech development.      Hearing normal.      Normal gait.      Motor and sensory exams grossly normal.      DTR's normal.  PSYCHIATRIC: Patient is alert and oriented x3.  Thought processes are all normal.  There is no homicidality.  There is no suicidality.  There is no evidence of psychosis.    LABORATORY/RADIOLOGY:   Chart reviewed.      ASSESSMENT:   Annual  Macular degeneration  Snoring due to sleep study proven severe sleep apnea  Idiopathic peripheral neuropathy    PLAN:  Recent blood work looked very good.  He is aware that treating his KARMA is helpful to minimize his risk for progression of the macular degeneration.  Follow up sleep for KARMA  Over-the-counter supplements as recommended by retinal specialist  Return to clinic in one year.

## 2018-05-31 ENCOUNTER — CLINICAL SUPPORT (OUTPATIENT)
Dept: REHABILITATION | Facility: HOSPITAL | Age: 78
End: 2018-05-31
Attending: FAMILY MEDICINE
Payer: COMMERCIAL

## 2018-05-31 DIAGNOSIS — M25.572 CHRONIC PAIN OF BOTH ANKLES: Primary | ICD-10-CM

## 2018-05-31 DIAGNOSIS — G89.29 CHRONIC PAIN OF BOTH ANKLES: Primary | ICD-10-CM

## 2018-05-31 DIAGNOSIS — M25.571 CHRONIC PAIN OF BOTH ANKLES: Primary | ICD-10-CM

## 2018-05-31 PROCEDURE — 97161 PT EVAL LOW COMPLEX 20 MIN: CPT

## 2018-05-31 PROCEDURE — 97110 THERAPEUTIC EXERCISES: CPT

## 2018-05-31 NOTE — PLAN OF CARE
Physical Therapy Initial Evaluation     Name: Cricket Morrow [facility] Number: [id number]    Cricket Morrow is a 77 y.o. male evaluated on 05/31/2018.     Diagnosis:   Encounter Diagnosis   Name Primary?    Chronic pain of both ankles Yes     Physician: Neo Sneed*  Treatment Orders: PT Eval and Treat    Past Medical History:   Diagnosis Date    Hereditary sensory neuropathy      Current Outpatient Prescriptions   Medication Sig    ALPHA LIPOIC ACID ORAL Take by mouth once daily.     ascorbic acid (VITAMIN C) 500 MG tablet Take 500 mg by mouth once daily.    brimonidine 0.2% (ALPHAGAN) 0.2 % Drop     DUEXIS 800-26.6 mg Tab Take 1 tablet by mouth 3 (three) times daily.    fluticasone (FLONASE) 50 mcg/actuation nasal spray 2 sprays by Each Nare route once daily.    JUBLIA 10 % Betty APPLY ONE DROP TO ALL SMALLER TOES AND 2 DROPS TO GREATER TOES DAILY    latanoprost 0.005 % ophthalmic solution     LOTEMAX 0.5 % ophthalmic suspension INT 1 GTT IN OD BID FOR 4 WEEKS    MULTIVIT-IRON-MIN-FOLIC ACID 3,500-18-0.4 UNIT-MG-MG ORAL CHEW Take by mouth once daily.     neomycin-polymyxin-dexamethasone (DEXACINE) 3.5 mg/g-10,000 unit/g-0.1 % Oint     ofloxacin (OCUFLOX) 0.3 % ophthalmic solution INSTILL 1 DROP IN LEFT EYE TID - USE ONLY 3 DAYS AFTER EACH INJECTION    PREVIDENT 5000 BOOSTER PLUS 1.1 % Pste once daily.     sildenafil (VIAGRA) 100 MG tablet TAKE 1 TABLET BY MOUTH AS NEEDED    timolol maleate 0.25% (TIMOPTIC) 0.25 % Drop     timolol maleate 0.5% (TIMOPTIC) 0.5 % Drop      No current facility-administered medications for this visit.      Review of patient's allergies indicates:   Allergen Reactions    Amitriptyline Other (See Comments)     Sleepy    Duloxetine Other (See Comments)     Sleepy    Gabapentin Other (See Comments)     Sexual side effects    Lortab [hydrocodone-acetaminophen]     [place]     Poison ivy extract     Vioxx  [rofecoxib] Other (See Comments)     Abdominal pain     Precautions: Fall    Time In: 1:00 pm   Time Out: 11:55 pm     Evaluation Date: 5/31/18  Visit # authorized: 20  Authorization period: 12/31/18  Plan of care Expiration: 7/19/18    Subjective     Patient reports he has neuropathy in B LEs. Pt has B ankle pain (L>R) that he feels has worsened lately. Pt got L ankle injections with some benefit. Pt reports his feet are numb and it worsens with sleeping. Pt reports he is taking medication to help with the neuropathy but the ankle pain is persistent. Pt reports he buys all of his shoes from foot [employer] and they are customized to his feet. Pt denies any trauma. Pt reports the pain is worst at night and wakes him up during the night. Pt reports he gets up and walks around when the pain wakes him up, which helps decrease the pain for him to return to sleep temporarily until it happens again. Pt reports he gets cramping in B calf muscles that is improved with walking. Pt has not had any imaging on ankles/feet. The cause of neuropathy is unknown.   Onset: gradual  Pain Scale: Cricket Morrow rates pain on a scale of 0-10 to be 8 at worst; 3 currently; 1 at best.  Aggravating Factors: worst at night - pain wakes pt up, prolonged sitting   Relieving Factors: walking   Prior Therapy: yes, PT was beneficial in the past   Functional Deficits Leading to Referral: pain and limitations with functional mobility  Prior functional status: walks for exercise every morning and evening, independent, swims   Occupation: recently retired professor at [employer]                     Patient Goals: alleviate the pain     Objective     Observation: mild B pes planus with increase pronation      Palpation: B medial and lateral ankle malleolus     Range of Motion: Ankle (degrees)    Left Right   Dorsiflexion: -4 0   Plantarflexion 40 36   Inversion 12 5   Eversion 12 15      Strength: Ankle     Left Right   Gastrocnemius 4/5 4/5   Soleus 4+/5 4+/5  "  Dorsiflexion 4+/5 4+/5   Inversion 4/5 4/5   Eversion 4+/5 4+/5      Strength: Knee    Left Right   Quadriceps 5/5 5/5   Hamstrings 5/5 5/5      Strength: Hip     Left Right   Iliopsoas 4/5 4+/5   PGM 4/5 4/5   IR 5/5 5/5   ER 5/5 5/5   Ext 4-/5 4-/5         Joint Mobility: decrease talocrural mobility   Sensation: intact to light touch     Significant lack of soleus/gastroc flexibility      Edema:  Left: absent  Right: absent     Gait: Mcdonnell ambulated with none.  Level of Assistance: independent  Patient displays decreased heel strike, no push-off.     TREATMENT     PT Evaluation Completed? Yes  Discussed Plan of Care with patient: Yes     Cricket Morrow received 5 minutes of therapeutic exercise:   Bike 5 min  Standing calf stretch on wedge 3x30"  Ankle ABCs 2x  Bridges 2x10  SL hip ABD 2x10     Written Home Exercises Provided: pt continues to perform HEP from previous eval (seated calf stretch and ankle ABCs)  Cricket Morrow demo good understanding of the education provided. Patient demo good return demo of skill of exercises.    ASSESSMENT              History  Co-morbidities and personal factors that may impact the plan of care Examination  Body Structures and Functions, activity limitations and participation restrictions that may impact the plan of care      Clinical Presentation   Co-morbidities:   advanced age  neuropathy         Personal Factors:   age Body Regions:   lower extremities     Body Systems:    gross symmetry  ROM  strength  gross coordinated movement  balance  gait                 Participation Restrictions:   Sleep disturbances secondary to pain, abnormal gait       Activity limitations:   Learning and applying knowledge  no deficits     General Tasks and Commands  no deficits     Communication  no deficits     Mobility  walking     Self care  no deficits     Domestic Life  no deficits     Interactions/Relationships  no deficits     Life Areas  no deficits     Community and Social Life  community " life  recreation and leisure             stable and uncomplicated                                low   low   low Decision Making/ Complexity Score:  low      Pt presents with B ankle pain, gait deviations, decreased strength, decreased ROM, decreased flexibility, and subsequent functional limitations. Pt will benefit from PT to address these deficits. Good tolerance to exercises today with no increased pain or adverse reaction.  Pt prognosis is Good.  Pt will benefit from skilled outpatient physical therapy to address the above stated deficits, provide pt/family education and to maximize pt's level of independence.      Medical necessity is demonstrated by the following IMPAIRMENTS/PROBLEMS:  1. Decreased ROM/joint mobility   2. Decreased strength  3. Pain/tenderness  4. Functional limitations      Pt's spiritual, cultural and educational needs considered and pt agreeable to plan of care and goals as stated below:      Anticipated Barriers for physical therapy: chronic pain     Short Term GOALS: 3 weeks. Pt agrees with goals set.  1. Patient demonstrates independence with HEP.   2. Patient demonstrates independence with Postural Awareness.   3. Patient demonstrates independence with body mechanics.      Long Term GOALS: 6 weeks. Pt agrees with goals set.  1. Patient demonstrates increased B ankle DR to 5 to improve tolerance to functional activities pain free.   2. Patient demonstrates increased strength BLE's to 5/5 or greater to improve tolerance to functional activities pain free.   3. Patient demonstrates improved overall function per FOTO Ankle Survey to 29% Limitation or less.     Functional Limitations Reports  Tool: FOTO Ankle Survey   Score: 41% Limitation     PLAN     Outpatient physical therapy 2 times weekly to include: pt ed, hep, therapeutic exercises, neuromuscular re-education/ balance exercises, joint mobilizations, aquatic therapy and modalities prn. Cont PT for  6 weeks. Pt may be seen by PTA as  part of the rehabilitation team.     Therapist: Luis Chan, PT  5/31/2018

## 2018-06-04 ENCOUNTER — PATIENT OUTREACH (OUTPATIENT)
Dept: ADMINISTRATIVE | Facility: HOSPITAL | Age: 78
End: 2018-06-04

## 2018-06-04 ENCOUNTER — OFFICE VISIT (OUTPATIENT)
Dept: SLEEP MEDICINE | Facility: CLINIC | Age: 78
End: 2018-06-04
Payer: COMMERCIAL

## 2018-06-04 VITALS
HEART RATE: 76 BPM | WEIGHT: 149.5 LBS | SYSTOLIC BLOOD PRESSURE: 106 MMHG | BODY MASS INDEX: 23.46 KG/M2 | OXYGEN SATURATION: 97 % | DIASTOLIC BLOOD PRESSURE: 64 MMHG | HEIGHT: 67 IN

## 2018-06-04 DIAGNOSIS — G47.33 OSA (OBSTRUCTIVE SLEEP APNEA): Primary | ICD-10-CM

## 2018-06-04 PROCEDURE — 99999 PR PBB SHADOW E&M-EST. PATIENT-LVL IV: CPT | Mod: PBBFAC,,, | Performed by: NURSE PRACTITIONER

## 2018-06-04 PROCEDURE — 99214 OFFICE O/P EST MOD 30 MIN: CPT | Mod: S$GLB,,, | Performed by: NURSE PRACTITIONER

## 2018-06-04 NOTE — PROGRESS NOTES
[facility] is committed to your overall health.  To help you get the most out of each of your visits, we will review your information to make sure you are up to date on all of your recommended tests and/or procedures.       Your PCP  Neo Sneed MD   found that you may be due for:       Health Maintenance Due   Topic Date Due    Zoster Vaccine  11/19/2000             If you have had any of the above done at another facility, please bring the records or information with you so that your record at [facility] will be complete.  If you would like to schedule any of these, please contact me.     If you are currently taking medication, please bring it with you to your appointment for review.     Also, if you have any type of Advanced Directives, please bring them with you to your office visit so we may scan them into your chart.       Thank you for Choosing [facility] for your healthcare needs.        Additional Information  If you have questions, you can email [email or web address] or call [phone]  to talk to our [facility] staff. Remember, [facility] is NOT to be used for urgent needs. For medical emergencies, dial 911.

## 2018-06-04 NOTE — PROGRESS NOTES
"Cricket Mcdonnell was seen in follow-up for KARMA management and CPAP equipment check.    INTERVAL HISTORY:    2018 KAYLEY Claros NP: Continues to use CPAP most nights. Denies breakthrough symptoms. Denies oral/nasal drying. Wife passed away in March and this has affected sleep, now sleep normalizing. Reports that he requires less caffeine during the day and now feels restored with as much as 7 hours of sleep vs requiring 8 hours. Sleep is also better consolidated. Has upcoming trip to [place]  - 2018 - addressed travel questions with CPAP today.   EPWORTH SLEEPINESS SCALE 2018   Sitting and reading 0   Watching TV 0   Sitting, inactive in a public place (e.g. a theatre or a meeting) 0   As a passenger in a car for an hour without a break 0   Lying down to rest in the afternoon when circumstances permit 1   Sitting and talking to someone 0   Sitting quietly after a lunch without alcohol 0   In a car, while stopped for a few minutes in traffic 0   Total score 1       CPAP Interrogation: DreamStation APAP 6 - 20 cm, Therapy Hours: 2211.3, Blower Hours: 2581.3, Days > 4 hours: , 30-day average: 5.2 hours, Mask Fit: 96%, Predicted AHI: 11, Periodic Breathin%, 90%tile pressure: 13.8 cm    2018 KAYLEY Claros NP: Pt did not meet insurance compliance during last visit. Now using APAP nightly with resolution of interrupted sleep and daytime fatigue. "I can really tell a difference now that I am using it every night". Denies oral/nasal drying with nasal mask. Denies pressure intolerance.     CPAP Interrogation: APAP 6 - 20 cm   Compliance Summary Days with Device Usage: 30 days Percentage of Days >=4 Hours: 93.3% Average Usage (Days Used): 5 hrs. 49 mins. 58 secs. Average Usage (All Days): 5 hrs. 49 mins. 58 secs.  Apnea Indices Average AHI: 7.5 Average OA Index: 4.8 Average CA Index: 1.5  Large Leak Average Time in Large Leak: 3 mins. 50 secs. Average % of Night in Large Leak: 1.1%   Periodic " Breathing Average % of Night in PB: 1.6%  90%tile pressure: 12.4 cm    EPWORTH SLEEPINESS SCALE 3/1/2018   Sitting and reading 1   Watching TV 1   Sitting, inactive in a public place (e.g. a theatre or a meeting) 0   As a passenger in a car for an hour without a break 0   Lying down to rest in the afternoon when circumstances permit 1   Sitting and talking to someone 0   Sitting quietly after a lunch without alcohol 0   In a car, while stopped for a few minutes in traffic 0   Total score 3           01/25/2018 KAYLEY lCaros NP: Returns after set up of CPAP machine on 11/17/2017. Pt reports sleeping considerably better with CPAP - sleep is continuous and refreshing. Denies oral/nasal drying with Dreamwear nasal mask. Pressure was initially very difficult to get acclimated to due to deviated septum, but is now comfortable. Denies pressure intolerance. ESS 5.     CPAP Interrogation: APAP 6 - 20 cm  Compliance Summary Days with Device Usage: 28 days Percentage of Days >=4 Hours: 63.3% Average Usage (Days Used): 4 hrs. 16 mins. 1 secs. Average Usage (All Days): 3 hrs. 58 mins. 57 secs.   Apnea Indices Average AHI: 9.4 Average OA Index: 5.9 Average CA Index: 2.0  Large Leak Average Time in Large Leak: 1 mins. 43 secs. Average % of Night in Large Leak: 0.7%   Periodic Breathing Average % of Night in PB: 6.8%   90%tile pressure: 13.2 cm  Machine condition: non-disposable and disposable filters both dark gray       11/17/2017 KAYLEY Claros NP: Since pt's last clinic appt he has completed HST On 06/09/2017. Results previously emailed to pt, but re-discussed today per patient's request. Pt was prescribed APAP but he did not get set up with it as he was not open at the time to wear interface for PAP therapy. Many reservations about using mask for sleep. Recently told by ophthalmologist that KARMA treatment could also help macular degeneration and has been motivated since to trial therapy. ESS 7.       04/20/2017 LISE Orta NP: CHIEF  "COMPLAINT: Disrupted sleep    HISTORY OF PRESENT ILLNESS:Cricket Mcdonnell a male presents for the evaluation of obstructive sleep apnea. He is not sure whey he is here today. Recent dx of macular degeneration. Had some spikes in blood pressure.  He has never had a sleep study. Wife does not endorse snoring. He has deviated nasal septum and uses Flonase NS bid. Wakes up refreshed. Denies witnessed apneic pauses. Nocturia 3-4x, he attributes to his age. Wears mouth guard for teeth grinding. Sleeps 8h/night. Denies am headaches. Sleeps on his back to help foot neuropathic pain. Sleeps pretty still throughout the night. +oral drying in am.     Neuropathic pain, intolerable to meds s/e  Has seen ENT surgeon, no need for repair septal deviation    Denies symptoms of restless legs or kicking during sleep.     On todays [place] Sleepiness Scale the patient scores a 3/24.     Baseline Sleep Study: 06/09/2017 HST The overall AHI was 45 and overall RDI was 46. The oxygen lisa was 70.8% and % time < 90% SpO2 was 37.7%.       FAMILY HISTORY: No known sleep disorders.     SOCIAL HISTORY: . no ETOH, no tobacco. [employer] professor creative writing    REVIEW OF SYSTEMS:  Sleep related symptoms as per HPI; Sinus congestion; Denies dyspnea; Denies palpitations; Denies acid reflux; Denies polyuria; Denies headaches;Denies mood disturbance.  Otherwise, a balance of 10 systems reviewed is negative    PHYSICAL EXAM:   /64 (BP Location: Right arm, Patient Position: Sitting, BP Method: Medium (Manual))   Pulse 76   Ht 5' 7" (1.702 m)   Wt 67.8 kg (149 lb 7.6 oz)   SpO2 97%   BMI 23.41 kg/m²   GENERAL: W/N,W/D  body habitus, well groomed   HEENT: Conjunctivae are non-erythematous; Pupils equal, round, and reactive to light; Nose is symmetrical; Nasal mucosa is normal; Septum is deviated left; Inferior turbinates are normal; Nasal airflow is normal; Posterior pharynx is pink; Modified Mallampati: IV; Posterior palate is " low; Tonsils absent; Uvula is normal;Tongue is high,coated; Dentition is fair; No TMJ tenderness; Jaw opening and protrusion without click and without discomfort.   NECK: Supple. Neck circumference is 15.25 inches. No thyromegaly. No palpable nodes.   SKIN: On face and neck: No abrasions, no rashes, no lesions. No subcutaneous nodules are palpable.   RESPIRATORY: Chest is clear to auscultation. Normal chest expansion and non-labored breathing at rest.   CARDIOVASCULAR: Normal S1, S2. No murmurs, gallops or rubs. No carotid bruits bilaterally.   EXTREMITIES: No edema. No clubbing. No cyanosis. Station normal. Gait normal.   NEURO/PSYCH: Oriented to time, place and person. Normal attention span and concentration. Affect is full. Mood is normal.       ASSESSMENT:     Obstructive sleep apnea, severe by AHI. The patient symptomatically has symptoms of questionable snoring, frequent disrupted sleep, and mild daytime fatigue, resolved with CPAP use. The patient is adherent on CPAP and experiencing symptomatic benefit despite mild residual AHI. Medical comorbidities of macular degernation. This warrants treatment for KARMA.     PLAN:     Treatment:  -Recommended at this time to proceed with titration study since AHI > 5 despite increasing mask-on time. However, pt prefers to postpone due to upcoming travel and he feels significant symptomatic improvement. If residual AHI persists at 6 month follow-up pt agreed to seriously consider recommended titration study at that time  -continue auto CPAP, pressure adjusted to 10 - 20 cm with Ramp x15 min starting at 4 cm.  Increase mask-on time, goal >6 hours per night, ultimately every hour every night  -Immediately go to [facility] to get replacement supplies   -RTC 6 months    Education: During our discussion today, we talked about the etiology of obstructive sleep apnea as well as the potential ramifications of untreated sleep apnea, which could include daytime sleepiness, hypertension,  heart disease and/or stroke. We discussed potential treatment options, which could include weight loss, body positioning, continuous positive airway pressure (CPAP), OA, EPAP, or referral for surgical consideration.     Precautions: The patient was advised to abstain from driving should they feel sleepy  or drowsy.

## 2018-06-06 ENCOUNTER — CLINICAL SUPPORT (OUTPATIENT)
Dept: REHABILITATION | Facility: HOSPITAL | Age: 78
End: 2018-06-06
Attending: PODIATRIST
Payer: COMMERCIAL

## 2018-06-06 DIAGNOSIS — G89.29 CHRONIC PAIN OF BOTH ANKLES: Primary | ICD-10-CM

## 2018-06-06 DIAGNOSIS — M25.571 CHRONIC PAIN OF BOTH ANKLES: Primary | ICD-10-CM

## 2018-06-06 DIAGNOSIS — M25.572 CHRONIC PAIN OF BOTH ANKLES: Primary | ICD-10-CM

## 2018-06-06 PROCEDURE — 97110 THERAPEUTIC EXERCISES: CPT

## 2018-06-06 NOTE — PROGRESS NOTES
"                                                    Physical Therapy Daily Note     Name: Cricket Morrow [facility] Number: [id number]  Diagnosis:   Encounter Diagnosis   Name Primary?    Chronic pain of both ankles Yes     Physician: Neo Sneed*  Precautions: neuropathy B LEs  Visit #: 2 of 12  PTA Visit #: 1  Time In: 10:30  Time Out: 11:12  Total Treatment Time 1:1: 38    Evaluation Date: 5/31/18  Plan of care Expiration: 7/12/18      Subjective     Pt reports: pain not too bad today B ankle  Pain Scale: Cricket Morrow rates pain at B ankles on a scale of 0-10 to be 3 currently.    Objective     Cricket Morrow received individual therapeutic exercises to develop strength, endurance, ROM and core stabilization for 42 minutes including:  Bike x 5 min  Gastroc S 2x1'  Shuttle 2 c 2x10  Tandem stance 2x30" 1xR 1xL  Rhomberg on airex 2x30"  1x EO/1x EC  Step up on airex 2x10 B    SLR 2x10  Bridges 2x10  Ankle 4 way YTB 2x10  SL hip abd 2x10 R 1x10 L    Cricket Morrow received the following manual therapy techniques: NT      Written Home Exercises Provided: as per kellen  Pt demo good understanding of the education provided. Cricket Morrow demonstrated good return demonstration of activities.     Education provided re: proper mechanics for SL hip   Cricket Morrow verbalized good understanding of education provided.   No spiritual or educational barriers to learning provided    Assessment     Patient tolerated rafael well w/o adverse reaction. Pt had difficulty w/ SL hip abd 2* weakness and pain, try in standing next session. Tandem stance challenging, times of LOB but able to catch self EOT. Pt benefits from skilled PT.   This is a 77 y.o. male referred to outpatient physical therapy and presents with a medical diagnosis of B ankle pain and demonstrates limitations as described in the problem list. Pt prognosis is Good. Pt will continue to benefit from skilled outpatient physical therapy to address the deficits listed in the problem " list, provide pt/family education and to maximize pt's level of independence in the home and community environment.     Goals as follows:  Short Term GOALS: 3 weeks. Pt agrees with goals set.  1. Patient demonstrates independence with HEP.   2. Patient demonstrates independence with Postural Awareness.   3. Patient demonstrates independence with body mechanics.      Long Term GOALS: 6 weeks. Pt agrees with goals set.  1. Patient demonstrates increased B ankle DR to 5 to improve tolerance to functional activities pain free.   2. Patient demonstrates increased strength BLE's to 5/5 or greater to improve tolerance to functional activities pain free.   3. Patient demonstrates improved overall function per FOTO Ankle Survey to 29% Limitation or less     Plan     Continue with established Plan of Care towards PT goals.    Therapist: Liliam Scott PTA  6/6/2018    Luis Chan PT and Liliam Scott PTA met face to face to discuss patient's treatment plan and progress towards established goals.  Treatment will be continued as described in initial report/eval and progress notes. Patient will be seen by physical therapist every sixth visit and minimally once per month.

## 2018-06-08 ENCOUNTER — CLINICAL SUPPORT (OUTPATIENT)
Dept: REHABILITATION | Facility: HOSPITAL | Age: 78
End: 2018-06-08
Attending: PODIATRIST
Payer: COMMERCIAL

## 2018-06-08 DIAGNOSIS — G89.29 CHRONIC PAIN OF BOTH ANKLES: Primary | ICD-10-CM

## 2018-06-08 DIAGNOSIS — M25.572 CHRONIC PAIN OF BOTH ANKLES: Primary | ICD-10-CM

## 2018-06-08 DIAGNOSIS — M25.571 CHRONIC PAIN OF BOTH ANKLES: Primary | ICD-10-CM

## 2018-06-08 PROCEDURE — 97110 THERAPEUTIC EXERCISES: CPT

## 2018-06-08 NOTE — PROGRESS NOTES
"                                                    Physical Therapy Daily Note     Name: Cricket Morrow [facility] Number: [id number]  Diagnosis:   Encounter Diagnosis   Name Primary?    Chronic pain of both ankles Yes     Physician: Neo Sneed*  Precautions: neuropathy B LEs  Visit #: 3 of 12  PTA Visit #: 2  Time In: 10:00  Time Out: 10:55  Total Treatment Time 1:1: 55    Evaluation Date: 5/31/18  Plan of care Expiration: 7/12/18      Subjective     Pt reports: B ankles feel good  Pain Scale: Cricket Morrow rates pain at B ankles on a scale of 0-10 to be 3 currently.    Objective     Cricket Morrow received individual therapeutic exercises to develop strength, endurance, ROM and core stabilization for 55 minutes including:  Bike x 5 min  Gastroc S 2x1'  Shuttle 2 c 2x10  Tandem stance 2x30" 1xR 1xL  Rhomberg on airex 2x30"  1x EO/1x EC  Step up on airex 2x10 B  Crabwalks no resistance 1 lap  Rocker board 20x ea.  Standing hip abd 2x10  Heel raises 2x10    SLR 3x10  Bridges 3x10  Ankle 4 way YTB 3x10  SL hip abd 2x10 R 1x10 L-NT    Cricket Morrow received the following manual therapy techniques: NT      Written Home Exercises Provided: standing hip abd, SLR, bridges, heel raises  Pt demo good understanding of the education provided. Cricket Morrow demonstrated good return demonstration of activities.     Education provided re: proper mechanics for SL hip   Cricket Morrow verbalized good understanding of education provided.   No spiritual or educational barriers to learning provided    Assessment     Patient tolerated rafael well w/o adverse reaction. Pt performed hip abd better and less discomfort in standing. Added rafael above to work on ankle stability and strength. Updated HEP. Cont to benefit from skilled PT.   This is a 77 y.o. male referred to outpatient physical therapy and presents with a medical diagnosis of B ankle pain and demonstrates limitations as described in the problem list. Pt prognosis is Good. Pt will " continue to benefit from skilled outpatient physical therapy to address the deficits listed in the problem list, provide pt/family education and to maximize pt's level of independence in the home and community environment.     Goals as follows:  Short Term GOALS: 3 weeks. Pt agrees with goals set.  1. Patient demonstrates independence with HEP.   2. Patient demonstrates independence with Postural Awareness.   3. Patient demonstrates independence with body mechanics.      Long Term GOALS: 6 weeks. Pt agrees with goals set.  1. Patient demonstrates increased B ankle DR to 5 to improve tolerance to functional activities pain free.   2. Patient demonstrates increased strength BLE's to 5/5 or greater to improve tolerance to functional activities pain free.   3. Patient demonstrates improved overall function per FOTO Ankle Survey to 29% Limitation or less     Plan     Continue with established Plan of Care towards PT goals.    Therapist: Liliam Scott PTA  6/8/2018    Luis Chan PT and Liliam Scott PTA met face to face to discuss patient's treatment plan and progress towards established goals.  Treatment will be continued as described in initial report/eval and progress notes. Patient will be seen by physical therapist every sixth visit and minimally once per month.

## 2018-06-11 ENCOUNTER — CLINICAL SUPPORT (OUTPATIENT)
Dept: REHABILITATION | Facility: HOSPITAL | Age: 78
End: 2018-06-11
Attending: PODIATRIST
Payer: COMMERCIAL

## 2018-06-11 DIAGNOSIS — M25.572 CHRONIC PAIN OF BOTH ANKLES: Primary | ICD-10-CM

## 2018-06-11 DIAGNOSIS — G89.29 CHRONIC PAIN OF BOTH ANKLES: Primary | ICD-10-CM

## 2018-06-11 DIAGNOSIS — M25.571 CHRONIC PAIN OF BOTH ANKLES: Primary | ICD-10-CM

## 2018-06-11 PROCEDURE — 97110 THERAPEUTIC EXERCISES: CPT

## 2018-06-11 NOTE — PROGRESS NOTES
"                                                    Physical Therapy Daily Note     Name: Cricket oMrrow [facility] Number: [id number]  Diagnosis:   Encounter Diagnosis   Name Primary?    Chronic pain of both ankles Yes     Physician: Neo Sneed*  Precautions: neuropathy B LEs  Visit #: 4 of 12  PTA Visit #: 2  Time In: 10:30 am   Time Out: 11:30 am     Evaluation Date: 5/31/18  Plan of care Expiration: 7/12/18    Subjective     Pt reports: "its getting better"  Pain Scale: Cricket Morrow rates pain at B ankles on a scale of 0-10 to be 1.5 currently.    Objective     Cricket Morrow received individual therapeutic exercises to develop strength, endurance, ROM and core stabilization for 60 minutes including:  Bike x 5 min  Gastroc S 2x1'  Shuttle 2 c 3x10  Tandem stance 2x30" 1xR 1xL  Rhomberg on airex 2x30"  1x EO/1x EC  Step up on airex 2x10 B  Crabwalks no resistance 2 laps  Rocker board 30x ea.  Standing hip abd 3x10  Heel raises 3x10    SLR 3x10  Bridges 3x10  Ankle 4 way YTB 3x10  SL hip abd 2x10  Clams 2x10     Cricket Morrow received the following manual therapy techniques: NT    Written Home Exercises Provided: standing hip abd, SLR, bridges, heel raises  Pt demo good understanding of the education provided. Cricket Morrow demonstrated good return demonstration of activities.     Education provided re: proper mechanics for SL hip   Cricket Morrow verbalized good understanding of education provided.   No spiritual or educational barriers to learning provided    Assessment     Patient tolerated rafael well w/o adverse reaction. Pt challenged with SL hip ABD. Pt progressing well with decreased subjective complaints. Cont to benefit from skilled PT.   This is a 77 y.o. male referred to outpatient physical therapy and presents with a medical diagnosis of B ankle pain and demonstrates limitations as described in the problem list. Pt prognosis is Good. Pt will continue to benefit from skilled outpatient physical therapy to " address the deficits listed in the problem list, provide pt/family education and to maximize pt's level of independence in the home and community environment.     Goals as follows:  Short Term GOALS: 3 weeks. Pt agrees with goals set.  1. Patient demonstrates independence with HEP.   2. Patient demonstrates independence with Postural Awareness.   3. Patient demonstrates independence with body mechanics.      Long Term GOALS: 6 weeks. Pt agrees with goals set.  1. Patient demonstrates increased B ankle DR to 5 to improve tolerance to functional activities pain free.   2. Patient demonstrates increased strength BLE's to 5/5 or greater to improve tolerance to functional activities pain free.   3. Patient demonstrates improved overall function per FOTO Ankle Survey to 29% Limitation or less     Plan     Continue with established Plan of Care towards PT goals.    Therapist: Luis Chan PT  6/11/2018    Luis Chan PT and Liliam Scott PTA met face to face to discuss patient's treatment plan and progress towards established goals.  Treatment will be continued as described in initial report/eval and progress notes. Patient will be seen by physical therapist every sixth visit and minimally once per month.

## 2018-06-13 ENCOUNTER — CLINICAL SUPPORT (OUTPATIENT)
Dept: REHABILITATION | Facility: HOSPITAL | Age: 78
End: 2018-06-13
Attending: PODIATRIST
Payer: COMMERCIAL

## 2018-06-13 DIAGNOSIS — M25.571 CHRONIC PAIN OF BOTH ANKLES: Primary | ICD-10-CM

## 2018-06-13 DIAGNOSIS — M25.572 CHRONIC PAIN OF BOTH ANKLES: Primary | ICD-10-CM

## 2018-06-13 DIAGNOSIS — G89.29 CHRONIC PAIN OF BOTH ANKLES: Primary | ICD-10-CM

## 2018-06-13 PROCEDURE — 97110 THERAPEUTIC EXERCISES: CPT

## 2018-06-13 NOTE — PROGRESS NOTES
"                                                    Physical Therapy Daily Note     Name: Cricket Morrow [facility] Number: [id number]  Diagnosis:   Encounter Diagnosis   Name Primary?    Chronic pain of both ankles Yes     Physician: Neo Sneed*  Precautions: neuropathy B LEs  Visit #: 5 of 12  PTA Visit #: 0  Time In: 11:20 am   Time Out: 12:17 pm     Evaluation Date: 5/31/18  Plan of care Expiration: 7/12/18    Subjective     Pt reports: "its getting better"  Pain Scale: Cricket Morrow rates pain at B ankles on a scale of 0-10 to be 1.5 currently.    Objective     Cricket Morrow received individual therapeutic exercises to develop strength, endurance, ROM and core stabilization for 57 minutes including:  Bike x 5 min  Gastroc S 2x1'  Shuttle 2 c 3x10  Tandem stance 2x30" 1xR 1xL  Rhomberg on airex 2x30"  EO/EC  Step up on airex 2x10 B  Crabwalks RTB 2 laps  Rocker board 30x ea.  Steamboats RTB 2x10  Heel raises 3x10  Marches on trampoline 1'  rebounder yellow SB on airex 2x10      SLR 3x10 - NT  Bridges 3x10  Ankle 4 way YTB 3x10  SL hip abd 2x10 - NT  Clams 3x10     Cricket Morrow received the following manual therapy techniques: NT    Written Home Exercises Provided: standing hip abd, SLR, bridges, heel raises  Pt demo good understanding of the education provided. Cricket Morrow demonstrated good return demonstration of activities.     Education provided re: proper mechanics for SL hip   Cricket Morrow verbalized good understanding of education provided.   No spiritual or educational barriers to learning provided    Assessment     Patient tolerated rafael well w/o adverse reaction. Added dynamic balance exercises with good tolerance. Pt progressing well with decreased subjective complaints. Cont to benefit from skilled PT.   This is a 77 y.o. male referred to outpatient physical therapy and presents with a medical diagnosis of B ankle pain and demonstrates limitations as described in the problem list. Pt prognosis is Good. " Pt will continue to benefit from skilled outpatient physical therapy to address the deficits listed in the problem list, provide pt/family education and to maximize pt's level of independence in the home and community environment.     Goals as follows:  Short Term GOALS: 3 weeks. Pt agrees with goals set.  1. Patient demonstrates independence with HEP.   2. Patient demonstrates independence with Postural Awareness.   3. Patient demonstrates independence with body mechanics.      Long Term GOALS: 6 weeks. Pt agrees with goals set.  1. Patient demonstrates increased B ankle DR to 5 to improve tolerance to functional activities pain free.   2. Patient demonstrates increased strength BLE's to 5/5 or greater to improve tolerance to functional activities pain free.   3. Patient demonstrates improved overall function per FOTO Ankle Survey to 29% Limitation or less     Plan     Continue with established Plan of Care towards PT goals.    Therapist: Luis Chan PT  6/13/2018    Luis Chan PT and Liliam Scott PTA met face to face to discuss patient's treatment plan and progress towards established goals.  Treatment will be continued as described in initial report/eval and progress notes. Patient will be seen by physical therapist every sixth visit and minimally once per month.

## 2018-07-16 ENCOUNTER — CLINICAL SUPPORT (OUTPATIENT)
Dept: REHABILITATION | Facility: HOSPITAL | Age: 78
End: 2018-07-16
Attending: PODIATRIST
Payer: COMMERCIAL

## 2018-07-16 DIAGNOSIS — M25.572 CHRONIC PAIN OF BOTH ANKLES: Primary | ICD-10-CM

## 2018-07-16 DIAGNOSIS — G89.29 CHRONIC PAIN OF BOTH ANKLES: Primary | ICD-10-CM

## 2018-07-16 DIAGNOSIS — M25.571 CHRONIC PAIN OF BOTH ANKLES: Primary | ICD-10-CM

## 2018-07-16 PROCEDURE — 97110 THERAPEUTIC EXERCISES: CPT

## 2018-07-16 NOTE — PROGRESS NOTES
"                                                    Physical Therapy Daily Note     Name: Cricket Morrow [facility] Number: [id number]  Diagnosis:   Encounter Diagnosis   Name Primary?    Chronic pain of both ankles Yes     Physician: Neo Sneed*  Precautions: neuropathy B LEs  Visit #: 6 of 12   PTA Visit #: 0  Time In: 9:00 am   Time Out: 10:02 am     Evaluation Date: 5/31/18, re-eval 7/16/18  Plan of care Expiration: 7/12/18, new exp 8/13/18    Subjective     Pt reports: he is feeling better but had some spasms in the adductors during traveling.  Pain Scale: Cricket Morrow rates pain at B ankles on a scale of 0-10 to be 1.5 currently.    Objective   Range of Motion: Ankle (degrees)    Left Right   Dorsiflexion: -3 0   Plantarflexion 45 49   Eversion 10 5   Inversion  10 12      Strength: Ankle     Left Right   Gastrocnemius 5/5 5/5   Soleus 4/5 4/5   Dorsiflexion 5/5 5/5   Inversion 5/5 5/5   Eversion 4+/5 5/5      Strength: Knee    Left Right   Quadriceps 5/5 5/5   Hamstrings 5/5 5/5         Strength: Hip     Left Right   Iliopsoas 4+/5 5/5   PGM 4+/5 5/5   IR 5/5 5/5   ER 5/5 5/5   Ext 4/5 4/5      Cricket Morrow received individual therapeutic exercises to develop strength, endurance, ROM and core stabilization for 60 minutes including:  Bike x 5 min  Gastroc S 2x1'  Shuttle 2 c 3x10  Tandem stance 2x30" 1xR 1xL  Rhomberg on airex 2x30"  EO/EC  Step up on airex 3x10 B  Crabwalks RTB 2 laps  Rocker board 30x ea.  Steamboats RTB 2x10  Heel raises 3x10  Marches on trampoline 1'  rebounder yellow SB on airex 2x10      SLR 3x10 - NT  Bridges 3x10 - NT  Ankle 4 way YTB 3x10 - NT  SL hip abd 2x10 - NT  Clams 3x10 - NT    Cricket Morrow received the following manual therapy techniques: NT    Written Home Exercises Provided: standing hip abd, SLR, bridges, heel raises  Pt demo good understanding of the education provided. Cricket Morrow demonstrated good return demonstration of activities.     Education provided re: " discussed progress with pt; discussed importance of stretching as HEP  Cricket Morrow verbalized good understanding of education provided.   No spiritual or educational barriers to learning provided    Assessment     Since start of care, pt has made good progress toward all goal areas as evidenced by increased LE strength, improved ROM, and improved balance with ability to tolerate more challenging exercises. Due to pt lapse in treatment secondary to pt going out of town, pt cont to have remaining deficits of decreased ankle ROM with B LE tightness and decreased hip strength. Pt will cont to benefit from skilled PT interventions to address remaining deficits to improve function mobility.   This is a 77 y.o. male referred to outpatient physical therapy and presents with a medical diagnosis of B ankle pain and demonstrates limitations as described in the problem list. Pt prognosis is Good. Pt will continue to benefit from skilled outpatient physical therapy to address the deficits listed in the problem list, provide pt/family education and to maximize pt's level of independence in the home and community environment.     Goals as follows:  Short Term GOALS: 3 weeks. Pt agrees with goals set.  1. Patient demonstrates independence with HEP. - MET 7/16/18  2. Patient demonstrates independence with Postural Awareness. - MET 7/16/18  3. Patient demonstrates independence with body mechanics. - MET 7/16/18     Long Term GOALS: 6 weeks. Pt agrees with goals set.  1. Patient demonstrates increased B ankle DF to 5 to improve tolerance to functional activities pain free. - IN PROGRESS  2. Patient demonstrates increased strength BLE's to 5/5 or greater to improve tolerance to functional activities pain free. - IN PROGRESS  3. Patient demonstrates improved overall function per FOTO Ankle Survey to 29% Limitation or less - IN PROGRESS     Plan     Continue PT 2x/week x4 weeks to continues progressing towards PT goals.    Therapist:  Luis Chan, PT  7/16/2018    Luis Chan, PT and Liliam Scott PTA met face to face to discuss patient's treatment plan and progress towards established goals.  Treatment will be continued as described in initial report/eval and progress notes. Patient will be seen by physical therapist every sixth visit and minimally once per month.

## 2018-07-16 NOTE — PLAN OF CARE
"                                                    Physical Therapy Daily Note     Name: Cricket Morrow [facility] Number: [id number]  Diagnosis:   Encounter Diagnosis   Name Primary?    Chronic pain of both ankles Yes     Physician: Neo Sneed*  Precautions: neuropathy B LEs  Visit #: 6 of 12   PTA Visit #: 0  Time In: 9:00 am   Time Out: 10:02 am     Evaluation Date: 5/31/18, re-eval 7/16/18  Plan of care Expiration: 7/12/18, new exp 8/13/18    Subjective     Pt reports: he is feeling better but had some spasms in the adductors during traveling.  Pain Scale: Cricket Morrow rates pain at B ankles on a scale of 0-10 to be 1.5 currently.    Objective   Range of Motion: Ankle (degrees)    Left Right   Dorsiflexion: -3 0   Plantarflexion 45 49   Eversion 10 5   Inversion  10 12      Strength: Ankle     Left Right   Gastrocnemius 5/5 5/5   Soleus 4/5 4/5   Dorsiflexion 5/5 5/5   Inversion 5/5 5/5   Eversion 4+/5 5/5      Strength: Knee    Left Right   Quadriceps 5/5 5/5   Hamstrings 5/5 5/5         Strength: Hip     Left Right   Iliopsoas 4+/5 5/5   PGM 4+/5 5/5   IR 5/5 5/5   ER 5/5 5/5   Ext 4/5 4/5      Cricket Morrow received individual therapeutic exercises to develop strength, endurance, ROM and core stabilization for 60 minutes including:  Bike x 5 min  Gastroc S 2x1'  Shuttle 2 c 3x10  Tandem stance 2x30" 1xR 1xL  Rhomberg on airex 2x30"  EO/EC  Step up on airex 3x10 B  Crabwalks RTB 2 laps  Rocker board 30x ea.  Steamboats RTB 2x10  Heel raises 3x10  Marches on trampoline 1'  rebounder yellow SB on airex 2x10      SLR 3x10 - NT  Bridges 3x10 - NT  Ankle 4 way YTB 3x10 - NT  SL hip abd 2x10 - NT  Clams 3x10 - NT    Cricket Morrow received the following manual therapy techniques: NT    Written Home Exercises Provided: standing hip abd, SLR, bridges, heel raises  Pt demo good understanding of the education provided. Cricket Morrow demonstrated good return demonstration of activities.     Education provided re: " discussed progress with pt; discussed importance of stretching as HEP  Cricket Morrow verbalized good understanding of education provided.   No spiritual or educational barriers to learning provided    Assessment     Since start of care, pt has made good progress toward all goal areas as evidenced by increased LE strength, improved ROM, and improved balance with ability to tolerate more challenging exercises. Due to pt lapse in treatment secondary to pt going out of town, pt cont to have remaining deficits of decreased ankle ROM with B LE tightness and decreased hip strength. Pt will cont to benefit from skilled PT interventions to address remaining deficits to improve function mobility.   This is a 77 y.o. male referred to outpatient physical therapy and presents with a medical diagnosis of B ankle pain and demonstrates limitations as described in the problem list. Pt prognosis is Good. Pt will continue to benefit from skilled outpatient physical therapy to address the deficits listed in the problem list, provide pt/family education and to maximize pt's level of independence in the home and community environment.     Goals as follows:  Short Term GOALS: 3 weeks. Pt agrees with goals set.  1. Patient demonstrates independence with HEP. - MET 7/16/18  2. Patient demonstrates independence with Postural Awareness. - MET 7/16/18  3. Patient demonstrates independence with body mechanics. - MET 7/16/18     Long Term GOALS: 6 weeks. Pt agrees with goals set.  1. Patient demonstrates increased B ankle DF to 5 to improve tolerance to functional activities pain free. - IN PROGRESS  2. Patient demonstrates increased strength BLE's to 5/5 or greater to improve tolerance to functional activities pain free. - IN PROGRESS  3. Patient demonstrates improved overall function per FOTO Ankle Survey to 29% Limitation or less - IN PROGRESS     Plan     Continue PT 2x/week x4 weeks to continues progressing towards PT goals.    Therapist:  Luis Chan, PT  7/16/2018    Luis Chan, PT and Liliam Scott PTA met face to face to discuss patient's treatment plan and progress towards established goals.  Treatment will be continued as described in initial report/eval and progress notes. Patient will be seen by physical therapist every sixth visit and minimally once per month.

## 2018-09-18 DIAGNOSIS — N52.9 INABILITY TO MAINTAIN ERECTION: ICD-10-CM

## 2018-09-19 RX ORDER — SILDENAFIL 100 MG/1
TABLET, FILM COATED ORAL
Qty: 6 TABLET | Refills: 7 | Status: SHIPPED | OUTPATIENT
Start: 2018-09-19 | End: 2019-05-16

## 2018-09-26 ENCOUNTER — DOCUMENTATION ONLY (OUTPATIENT)
Dept: REHABILITATION | Facility: HOSPITAL | Age: 78
End: 2018-09-26

## 2018-09-26 NOTE — PROGRESS NOTES
Name: Cricket Mcdonnell  Referring Provider: MD Nedra  PT Order: PT evaluate and treat  Clinical #: [id number]  Discharge Summary Date: 9/26/2018  Diagnosis: chronic B ankle pain    Patient was seen for 6 OP PT visits from 5/31/18 to 7/16/18. Treatment included: evaluation, HEP, pt education, manual therapy, ther ex, and stretching. PT unable to fully assess goal achievement as pt did not return for follow up sessions/did not reschedule follow up visits. This patient is discharged from OP PT Services.    Anitha Maya, PT, DPT

## 2018-10-08 ENCOUNTER — TELEPHONE (OUTPATIENT)
Dept: INTERNAL MEDICINE | Facility: CLINIC | Age: 78
End: 2018-10-08

## 2018-10-08 NOTE — TELEPHONE ENCOUNTER
----- Message from Sue Rosario sent at 10/5/2018  4:05 PM CDT -----  Contact: Self            Name of Who is Calling: Self      What is the request in detail:  Pt states he needs a PA for the  Viagra prescription submitted to the [facility]'s pharmacy at [phone] fax [phone]. Please contact to further discuss and advise.         Can the clinic reply by [facility]: N      What [facility] Back [facility]: [phone]

## 2018-10-08 NOTE — TELEPHONE ENCOUNTER
Deniedon September 21  PA Case: [id number], Status: Denied. Notification: Completed.    Pt notified that it was denied, he said he was aware, but wanted to know if his PCP tried to get it approved would it then be approved. I told him to contact his insurance, his insurance denied the rx and the prior authorization.

## 2018-10-09 ENCOUNTER — OFFICE VISIT (OUTPATIENT)
Dept: INTERNAL MEDICINE | Facility: CLINIC | Age: 78
End: 2018-10-09
Attending: FAMILY MEDICINE
Payer: MEDICARE

## 2018-10-09 VITALS
HEIGHT: 67 IN | DIASTOLIC BLOOD PRESSURE: 72 MMHG | BODY MASS INDEX: 23.43 KG/M2 | HEART RATE: 61 BPM | WEIGHT: 149.25 LBS | OXYGEN SATURATION: 99 % | SYSTOLIC BLOOD PRESSURE: 109 MMHG

## 2018-10-09 DIAGNOSIS — N52.9 INABILITY TO MAINTAIN ERECTION: ICD-10-CM

## 2018-10-09 DIAGNOSIS — N40.0 BENIGN NON-NODULAR PROSTATIC HYPERPLASIA WITHOUT LOWER URINARY TRACT SYMPTOMS: ICD-10-CM

## 2018-10-09 DIAGNOSIS — H35.30 MACULAR DEGENERATION OF RIGHT EYE, UNSPECIFIED TYPE: ICD-10-CM

## 2018-10-09 DIAGNOSIS — G47.33 OSA (OBSTRUCTIVE SLEEP APNEA): Primary | ICD-10-CM

## 2018-10-09 PROCEDURE — 99999 PR PBB SHADOW E&M-EST. PATIENT-LVL III: CPT | Mod: PBBFAC,,, | Performed by: FAMILY MEDICINE

## 2018-10-09 PROCEDURE — 99213 OFFICE O/P EST LOW 20 MIN: CPT | Mod: PBBFAC | Performed by: FAMILY MEDICINE

## 2018-10-09 PROCEDURE — 1101F PT FALLS ASSESS-DOCD LE1/YR: CPT | Mod: CPTII,,, | Performed by: FAMILY MEDICINE

## 2018-10-09 PROCEDURE — 99214 OFFICE O/P EST MOD 30 MIN: CPT | Mod: S$PBB,,, | Performed by: FAMILY MEDICINE

## 2018-10-09 RX ORDER — ACETAZOLAMIDE 250 MG/1
250 TABLET ORAL 3 TIMES DAILY
COMMUNITY
End: 2020-03-11

## 2018-10-09 RX ORDER — SILDENAFIL 100 MG/1
100 TABLET, FILM COATED ORAL DAILY PRN
Qty: 30 TABLET | Refills: 2 | Status: SHIPPED | OUTPATIENT
Start: 2018-10-09 | End: 2019-05-16

## 2018-10-09 RX ORDER — ESCITALOPRAM OXALATE 10 MG/1
10 TABLET ORAL DAILY
COMMUNITY
End: 2020-03-11

## 2018-10-09 NOTE — PROGRESS NOTES
CHIEF COMPLAINT:  Preop exam in pt w/ macular degeneration and sleep apnea    HISTORY OF PRESENT ILLNESS: The patient is a generally healthy 77 year-old WM.      The patient has continuing neuropathic pain for which he had a complete workup in the last year. He has tried multiple medications and is intolerant of all of them due to various side effects.    He was previously diagnosed with macular degeneration.  He is scheduled to undergo ophthalmological surgery with general anesthesia in the near future.  He has undergone general anesthesia in past without any problems. He is remarkably healthy.    His primary complaint is that he has excessive daytime somnolence.  This is not surprising given his severe apnea.  He does have CPAP for obstructive sleep apnea.  He has had very good results in terms of daytime somnolence and MD is improved as well.    The patient used to see Dr. Bass.     REVIEW OF SYSTEMS:  GENERAL: No fever, chills, fatigability or weight loss.  SKIN: No rashes, itching or changes in color or texture of skin.  HEAD: No headaches or recent head trauma.  EYES:  No photophobia, ocular pain or diplopia.  EARS: Denies ear pain, discharge or vertigo.  NOSE: No loss of smell, no epistaxis or postnasal drip.  MOUTH & THROAT: No hoarseness or change in voice. No excessive gum bleeding.  NODES: Denies swollen glands.  CHEST: Denies SANCHES, cyanosis, wheezing, cough and sputum production.  CARDIOVASCULAR: Denies chest pain, PND, orthopnea or reduced exercise tolerance.  ABDOMEN: Appetite fine. No weight loss. Denies diarrhea, abdominal pain, hematemesis or blood in stool.  URINARY: No flank pain, dysuria or hematuria.  PERIPHERAL VASCULAR: No claudication or cyanosis.  MUSCULOSKELETAL: No joint stiffness or swelling. Denies back pain.  NEUROLOGIC: No history of seizures, paralysis, alteration of gait or coordination.    SOCIAL HISTORY: The patient does not smoke.  The patient consumes alcohol socially.  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    PHYSICAL EXAMINATION:     Blood pressure 109/72, pulse 61, height 5' 7" (1.702 m), weight 67.7 kg (149 lb 4 oz), SpO2 99 %.    APPEARANCE: Well nourished, well developed, in no acute distress.    HEAD: Normocephalic, atraumatic.  EYES: PERRL. EOMI.  Conjunctivae without injection and  anicteric  EARS: TM's intact. Light reflex normal. No retraction or perforation.    NOSE: Mucosa pink. Airway clear.  MOUTH & THROAT: No tonsillar enlargement. No pharyngeal erythema or exudate. No stridor.  NECK: Supple.   NODES: No cervical, axillary or inguinal lymph node enlargement.  CHEST: Lungs clear to auscultation.  No retractions are noted.  No rales or rhonchi are present.  CARDIOVASCULAR: Normal S1, S2. No rubs, murmurs or gallops.  ABDOMEN: Bowel sounds normal. Not distended. Soft. No tenderness or masses.  No ascites is noted.  MUSCULOSKELETAL:  There is no clubbing, cyanosis, or edema of the extremities x4.  There is full range of motion of the lumbar spine.  There is full range of motion of the extremities x4.  There is no deformity noted.    NEUROLOGIC:       Normal speech development.      Hearing normal.      Normal gait.      Motor and sensory exams grossly normal.      DTR's normal.  PSYCHIATRIC: Patient is alert and oriented x3.  Thought processes are all normal.  There is no homicidality.  There is no suicidality.  There is no evidence of psychosis.    LABORATORY/RADIOLOGY:   Chart reviewed.      ASSESSMENT:   Macular degeneration  Snoring due to sleep study proven severe sleep apnea  Idiopathic peripheral neuropathy    PLAN:  He is medically optimized and cleared for general anesthesia an ophthalmological surgery at minimal risk  He is aware that treating his KARMA is helpful to minimize his risk for progression of the macular degeneration.  Follow up sleep for KARMA  Over-the-counter supplements as recommended by retinal " specialist  Return to clinic next year.

## 2018-10-18 ENCOUNTER — CLINICAL SUPPORT (OUTPATIENT)
Dept: AUDIOLOGY | Facility: CLINIC | Age: 78
End: 2018-10-18
Payer: MEDICARE

## 2018-10-18 DIAGNOSIS — H90.3 SENSORINEURAL HEARING LOSS, BILATERAL: Primary | ICD-10-CM

## 2018-10-18 PROCEDURE — 92557 COMPREHENSIVE HEARING TEST: CPT | Mod: PBBFAC | Performed by: AUDIOLOGIST-HEARING AID FITTER

## 2018-10-18 NOTE — PROGRESS NOTES
Cricket Mcdonnell was seen in the clinic today for a hearing evaluation. Mr. Mcdonnell's last hearing test was in 2016.      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 A speech reception threshold was obtained at 35 dBHL in both ears. Speech recognition was 100% in both ears.    Recommendations:  1. Otologic evaluation  2. Annual hearing evaluation  3. Hearing aid consult

## 2018-11-30 ENCOUNTER — TELEPHONE (OUTPATIENT)
Dept: SLEEP MEDICINE | Facility: CLINIC | Age: 78
End: 2018-11-30

## 2018-11-30 ENCOUNTER — TELEPHONE (OUTPATIENT)
Dept: INTERNAL MEDICINE | Facility: CLINIC | Age: 78
End: 2018-11-30

## 2018-11-30 ENCOUNTER — PATIENT MESSAGE (OUTPATIENT)
Dept: SLEEP MEDICINE | Facility: CLINIC | Age: 78
End: 2018-11-30

## 2018-11-30 DIAGNOSIS — Z12.11 COLON CANCER SCREENING: Primary | ICD-10-CM

## 2018-11-30 NOTE — TELEPHONE ENCOUNTER
----- Message from Christina Rausch sent at 11/30/2018 12:49 PM CST -----  Contact: pt   Name of Who is Calling: MALACHI GOULD [[id number]]    What is the request in detail: patient is calling in regards to not having a colonoscopy and a prostate exam in a while and would like to know next steps....Please contact to further discuss and advise      Can the clinic reply by [facility]: No     What Number to Call Back if not in [facility]: [phone]/

## 2018-12-03 ENCOUNTER — TELEPHONE (OUTPATIENT)
Dept: ENDOSCOPY | Facility: HOSPITAL | Age: 78
End: 2018-12-03

## 2018-12-03 DIAGNOSIS — Z12.11 SPECIAL SCREENING FOR MALIGNANT NEOPLASMS, COLON: Primary | ICD-10-CM

## 2018-12-03 RX ORDER — POLYETHYLENE GLYCOL 3350, SODIUM SULFATE ANHYDROUS, SODIUM BICARBONATE, SODIUM CHLORIDE, POTASSIUM CHLORIDE 236; 22.74; 6.74; 5.86; 2.97 G/4L; G/4L; G/4L; G/4L; G/4L
4 POWDER, FOR SOLUTION ORAL ONCE
Qty: 4000 ML | Refills: 0 | Status: SHIPPED | OUTPATIENT
Start: 2018-12-03 | End: 2018-12-03

## 2018-12-04 ENCOUNTER — TELEPHONE (OUTPATIENT)
Dept: SLEEP MEDICINE | Facility: CLINIC | Age: 78
End: 2018-12-04

## 2018-12-05 ENCOUNTER — OFFICE VISIT (OUTPATIENT)
Dept: SLEEP MEDICINE | Facility: CLINIC | Age: 78
End: 2018-12-05
Payer: MEDICARE

## 2018-12-05 ENCOUNTER — PATIENT MESSAGE (OUTPATIENT)
Dept: SLEEP MEDICINE | Facility: CLINIC | Age: 78
End: 2018-12-05

## 2018-12-05 VITALS
HEIGHT: 67 IN | WEIGHT: 154.13 LBS | DIASTOLIC BLOOD PRESSURE: 60 MMHG | SYSTOLIC BLOOD PRESSURE: 101 MMHG | BODY MASS INDEX: 24.19 KG/M2 | HEART RATE: 60 BPM

## 2018-12-05 DIAGNOSIS — G47.33 OBSTRUCTIVE SLEEP APNEA: Primary | ICD-10-CM

## 2018-12-05 PROCEDURE — 1101F PT FALLS ASSESS-DOCD LE1/YR: CPT | Mod: CPTII,HCNC,S$GLB, | Performed by: NURSE PRACTITIONER

## 2018-12-05 PROCEDURE — 99999 PR PBB SHADOW E&M-EST. PATIENT-LVL IV: CPT | Mod: PBBFAC,HCNC,, | Performed by: NURSE PRACTITIONER

## 2018-12-05 PROCEDURE — 99214 OFFICE O/P EST MOD 30 MIN: CPT | Mod: HCNC,S$GLB,, | Performed by: NURSE PRACTITIONER

## 2018-12-05 NOTE — PATIENT INSTRUCTIONS
Recommended replacement schedule for CPAP and BPAP supplies:    1) CPAP Mask, change every 3 months. Notes: clean at least once per week    2) CPAP Headgear, change every 3 - 6 months. Notes: Clean as needed, when dirty    3) CPAP tubing, change every 3 months. Notes: Clean at least once per week     4) Disposable filter, 2 new filters every month. Notes: Change at least once a month or when dirty    5) Non-disposable filter, change every 6 months. Notes: clean at least once per week     6) CPAP chinstrap, change every 6 months. Notes: Clean as needed, when dirty     7) Full Face Mask, change every 3 months. Notes: Clean at least once per week    8) Nasal CPAP mask, change every 3 months. Notes: Clean at least once per week     9) Humidifier Chamber, change every 6 months. Notes: Use distilled water; change water every 1 to 2 days and clean once per week     10) CPAP machine, new machine generally every 5 years. Notes: frequency depends on insurance     11) BPAP machine, new machine generally every 5 years. Notes: frequency depends on insurance     Note: Clean all supplies at least once a week with warm soapy water and/or water and vinegar (3:1 ratio).     *To order supplies please call your designated durable medical equipment (DME) or also referred to as home medical equipment (HME) company.     *CPAP/BPAP supplies prescription expires after one year from date it was written       Sleep Clinic: where you discuss snoring, sleep disruption, fatigue, restless legs or sleep behaviors with a sleep specialist; review sleep study results; formulate treatment plan   Sleep Lab: comprehensive in-lab sleep studies are monitored overnight in a private room; in-home sleep studies are monitored by a portable sleep study recording device  Durable Medical Equipment/Home Medical Equipment: specialized sleep equipment, contact your DME company for CPAP supplies, mask fitting, or questions about CPAP machines

## 2018-12-05 NOTE — PROGRESS NOTES
Cricket Cristhian Mcdonnell was seen in follow-up for KARMA management and CPAP equipment check.    INTERVAL HISTORY:    2018 KAYLEY Claros NP: Continues to use CPAP regularly without breakthrough symptoms. Finds that sleep is continuous and refreshing with CPAP. No CPAP use for nearly 10 days due to bacterial URI that required antibiotic treatment. Since URI symptoms improved have resumed PAP use. Per pt he can tell difference when he did not use machine. Finds therapy very helpful. Denies oral/nasal drying with Dreamwear nasal pillows. Denies pressure intolerance.     Dreamstation APAP 10 - 20 cm, 3329.3816.9, > 4 hours: , 30-day ave:4.2 hours, MF: 98%, Predicted AHI: 6.6, PB: 1%, 90%tile pressure: 14.8 cm  Needs to replace both disposable and non-disposable filters    2018 KAYLEY Claros NP: Continues to use CPAP most nights. Denies breakthrough symptoms. Denies oral/nasal drying. Wife passed away in March and this has affected sleep, now sleep normalizing. Reports that he requires less caffeine during the day and now feels restored with as much as 7 hours of sleep vs requiring 8 hours. Sleep is also better consolidated. Has upcoming trip to [place]  - 2018 - addressed travel questions with CPAP today.   EPWORTH SLEEPINESS SCALE 2018   Sitting and reading 0   Watching TV 0   Sitting, inactive in a public place (e.g. a theatre or a meeting) 0   As a passenger in a car for an hour without a break 0   Lying down to rest in the afternoon when circumstances permit 1   Sitting and talking to someone 0   Sitting quietly after a lunch without alcohol 0   In a car, while stopped for a few minutes in traffic 0   Total score 1       CPAP Interrogation: DreamStation APAP 6 - 20 cm, Therapy Hours: 2211.3, Blower Hours: 2581.3, Days > 4 hours: , 30-day average: 5.2 hours, Mask Fit: 96%, Predicted AHI: 11, Periodic Breathin%, 90%tile pressure: 13.8 cm    2018 KAYLEY Claros NP: Pt did not meet  "insurance compliance during last visit. Now using APAP nightly with resolution of interrupted sleep and daytime fatigue. "I can really tell a difference now that I am using it every night". Denies oral/nasal drying with nasal mask. Denies pressure intolerance.     CPAP Interrogation: APAP 6 - 20 cm   Compliance Summary Days with Device Usage: 30 days Percentage of Days >=4 Hours: 93.3% Average Usage (Days Used): 5 hrs. 49 mins. 58 secs. Average Usage (All Days): 5 hrs. 49 mins. 58 secs.  Apnea Indices Average AHI: 7.5 Average OA Index: 4.8 Average CA Index: 1.5  Large Leak Average Time in Large Leak: 3 mins. 50 secs. Average % of Night in Large Leak: 1.1%   Periodic Breathing Average % of Night in PB: 1.6%  90%tile pressure: 12.4 cm    EPWORTH SLEEPINESS SCALE 3/1/2018   Sitting and reading 1   Watching TV 1   Sitting, inactive in a public place (e.g. a theatre or a meeting) 0   As a passenger in a car for an hour without a break 0   Lying down to rest in the afternoon when circumstances permit 1   Sitting and talking to someone 0   Sitting quietly after a lunch without alcohol 0   In a car, while stopped for a few minutes in traffic 0   Total score 3           01/25/2018 KAYLEY Claros NP: Returns after set up of CPAP machine on 11/17/2017. Pt reports sleeping considerably better with CPAP - sleep is continuous and refreshing. Denies oral/nasal drying with Dreamwear nasal mask. Pressure was initially very difficult to get acclimated to due to deviated septum, but is now comfortable. Denies pressure intolerance. ESS 5.     CPAP Interrogation: APAP 6 - 20 cm  Compliance Summary Days with Device Usage: 28 days Percentage of Days >=4 Hours: 63.3% Average Usage (Days Used): 4 hrs. 16 mins. 1 secs. Average Usage (All Days): 3 hrs. 58 mins. 57 secs.   Apnea Indices Average AHI: 9.4 Average OA Index: 5.9 Average CA Index: 2.0  Large Leak Average Time in Large Leak: 1 mins. 43 secs. Average % of Night in Large Leak: 0.7% "   Periodic Breathing Average % of Night in PB: 6.8%   90%tile pressure: 13.2 cm  Machine condition: non-disposable and disposable filters both dark gray       11/17/2017 KAYLEY Claros NP: Since pt's last clinic appt he has completed HST On 06/09/2017. Results previously emailed to pt, but re-discussed today per patient's request. Pt was prescribed APAP but he did not get set up with it as he was not open at the time to wear interface for PAP therapy. Many reservations about using mask for sleep. Recently told by ophthalmologist that KARMA treatment could also help macular degeneration and has been motivated since to trial therapy. ESS 7.       04/20/2017 LISE Orta NP: CHIEF COMPLAINT: Disrupted sleep    HISTORY OF PRESENT ILLNESS:Cricket Mcdonnell a male presents for the evaluation of obstructive sleep apnea. He is not sure whey he is here today. Recent dx of macular degeneration. Had some spikes in blood pressure.  He has never had a sleep study. Wife does not endorse snoring. He has deviated nasal septum and uses Flonase NS bid. Wakes up refreshed. Denies witnessed apneic pauses. Nocturia 3-4x, he attributes to his age. Wears mouth guard for teeth grinding. Sleeps 8h/night. Denies am headaches. Sleeps on his back to help foot neuropathic pain. Sleeps pretty still throughout the night. +oral drying in am.     Neuropathic pain, intolerable to meds s/e  Has seen ENT surgeon, no need for repair septal deviation    Denies symptoms of restless legs or kicking during sleep.     On todays [place] Sleepiness Scale the patient scores a 3/24.     Baseline Sleep Study: 06/09/2017 HST The overall AHI was 45 and overall RDI was 46. The oxygen lisa was 70.8% and % time < 90% SpO2 was 37.7%.       FAMILY HISTORY: No known sleep disorders.     SOCIAL HISTORY: . no ETOH, no tobacco. Mayo gtuierrez creative writing    REVIEW OF SYSTEMS:  Sleep related symptoms as per HPI; Sinus congestion; Denies dyspnea; Denies palpitations;  "Denies acid reflux; Denies polyuria; Denies headaches;Denies mood disturbance.  Otherwise, a balance of 10 systems reviewed is negative    PHYSICAL EXAM:   /60   Pulse 60   Ht 5' 7" (1.702 m)   Wt 69.9 kg (154 lb 1.6 oz)   BMI 24.14 kg/m²   GENERAL: W/N,W/D  body habitus, well groomed     ASSESSMENT:     Obstructive sleep apnea, severe by AHI. The patient symptomatically has symptoms of questionable snoring, frequent disrupted sleep, and mild daytime fatigue, resolved with CPAP use. The patient is adherent on CPAP and experiencing significant symptomatic benefit despite mild residual AHI. Medical comorbidities of macular degernation. This warrants treatment for KARMA.     PLAN:     Treatment:  -continue auto CPAP 10 - 20 cm   -[facility] to [facility] to get replacement supplies   -RTC 12 months, sooner if needed     Education: During our discussion today, we talked about the etiology of obstructive sleep apnea as well as the potential ramifications of untreated sleep apnea, which could include daytime sleepiness, hypertension, heart disease and/or stroke. We discussed potential treatment options, which could include weight loss, body positioning, continuous positive airway pressure (CPAP), OA, EPAP, or referral for surgical consideration.     Precautions: The patient was advised to abstain from driving should they feel sleepy  or drowsy.     "

## 2019-01-02 ENCOUNTER — NURSE TRIAGE (OUTPATIENT)
Dept: ADMINISTRATIVE | Facility: CLINIC | Age: 79
End: 2019-01-02

## 2019-01-03 ENCOUNTER — ANESTHESIA (OUTPATIENT)
Dept: ENDOSCOPY | Facility: HOSPITAL | Age: 79
End: 2019-01-03
Payer: MEDICARE

## 2019-01-03 ENCOUNTER — ANESTHESIA EVENT (OUTPATIENT)
Dept: ENDOSCOPY | Facility: HOSPITAL | Age: 79
End: 2019-01-03
Payer: MEDICARE

## 2019-01-03 ENCOUNTER — HOSPITAL ENCOUNTER (OUTPATIENT)
Facility: HOSPITAL | Age: 79
Discharge: HOME | End: 2019-01-03
Attending: COLON & RECTAL SURGERY | Admitting: COLON & RECTAL SURGERY
Payer: MEDICARE

## 2019-01-03 VITALS
SYSTOLIC BLOOD PRESSURE: 139 MMHG | WEIGHT: 150 LBS | BODY MASS INDEX: 23.54 KG/M2 | TEMPERATURE: 98 F | HEIGHT: 67 IN | RESPIRATION RATE: 18 BRPM | DIASTOLIC BLOOD PRESSURE: 72 MMHG | OXYGEN SATURATION: 97 % | HEART RATE: 58 BPM

## 2019-01-03 DIAGNOSIS — Z12.11 SCREENING FOR COLON CANCER: ICD-10-CM

## 2019-01-03 PROCEDURE — 37000008 HC ANESTHESIA 1ST 15 MINUTES: Performed by: COLON & RECTAL SURGERY

## 2019-01-03 PROCEDURE — E9220 PRA ENDO ANESTHESIA: HCPCS | Mod: HCNC,,, | Performed by: NURSE ANESTHETIST, CERTIFIED REGISTERED

## 2019-01-03 PROCEDURE — G0121 COLON CA SCRN NOT HI RSK IND: HCPCS | Mod: ,,, | Performed by: COLON & RECTAL SURGERY

## 2019-01-03 PROCEDURE — 25000003 PHARM REV CODE 250: Mod: HCNC | Performed by: NURSE PRACTITIONER

## 2019-01-03 PROCEDURE — E9220 PRA ENDO ANESTHESIA: ICD-10-PCS | Mod: HCNC,,, | Performed by: NURSE ANESTHETIST, CERTIFIED REGISTERED

## 2019-01-03 PROCEDURE — G0121 COLON CA SCRN NOT HI RSK IND: ICD-10-PCS | Mod: ,,, | Performed by: COLON & RECTAL SURGERY

## 2019-01-03 PROCEDURE — G0121 COLON CA SCRN NOT HI RSK IND: HCPCS | Performed by: COLON & RECTAL SURGERY

## 2019-01-03 PROCEDURE — 37000009 HC ANESTHESIA EA ADD 15 MINS: Performed by: COLON & RECTAL SURGERY

## 2019-01-03 PROCEDURE — 63600175 PHARM REV CODE 636 W HCPCS: Mod: HCNC | Performed by: NURSE ANESTHETIST, CERTIFIED REGISTERED

## 2019-01-03 RX ORDER — PROPOFOL 10 MG/ML
VIAL (ML) INTRAVENOUS CONTINUOUS PRN
Status: DISCONTINUED | OUTPATIENT
Start: 2019-01-03 | End: 2019-01-03

## 2019-01-03 RX ORDER — SODIUM CHLORIDE 0.9 % (FLUSH) 0.9 %
3 SYRINGE (ML) INJECTION
Status: DISCONTINUED | OUTPATIENT
Start: 2019-01-03 | End: 2019-01-03 | Stop reason: HOSPADM

## 2019-01-03 RX ORDER — PROPOFOL 10 MG/ML
VIAL (ML) INTRAVENOUS
Status: DISCONTINUED | OUTPATIENT
Start: 2019-01-03 | End: 2019-01-03

## 2019-01-03 RX ORDER — SODIUM CHLORIDE 9 MG/ML
INJECTION, SOLUTION INTRAVENOUS CONTINUOUS
Status: DISCONTINUED | OUTPATIENT
Start: 2019-01-03 | End: 2019-01-03 | Stop reason: HOSPADM

## 2019-01-03 RX ORDER — LIDOCAINE HCL/PF 100 MG/5ML
SYRINGE (ML) INTRAVENOUS
Status: DISCONTINUED | OUTPATIENT
Start: 2019-01-03 | End: 2019-01-03

## 2019-01-03 RX ADMIN — PROPOFOL 125 MCG/KG/MIN: 10 INJECTION, EMULSION INTRAVENOUS at 02:01

## 2019-01-03 RX ADMIN — LIDOCAINE HYDROCHLORIDE 80 MG: 20 INJECTION, SOLUTION INTRAVENOUS at 02:01

## 2019-01-03 RX ADMIN — PROPOFOL 60 MG: 10 INJECTION, EMULSION INTRAVENOUS at 02:01

## 2019-01-03 RX ADMIN — SODIUM CHLORIDE: 0.9 INJECTION, SOLUTION INTRAVENOUS at 12:01

## 2019-01-03 NOTE — ANESTHESIA PREPROCEDURE EVALUATION
01/03/2019  Cricket Mcdonnell is a 78 y.o., male.  Past Surgical History:   Procedure Laterality Date    CATARACT EXTRACTION      galucoma surgery      HERNIA REPAIR      tonsillectomy       Past Medical History:   Diagnosis Date    Hereditary sensory neuropathy        Anesthesia Evaluation    I have reviewed the Patient Summary Reports.    I have reviewed the Nursing Notes.   I have reviewed the Medications.     Review of Systems  Anesthesia Hx:  No problems with previous Anesthesia    Hematology/Oncology:  Hematology Normal   Oncology Normal     EENT/Dental:EENT/Dental Normal   Cardiovascular:  Cardiovascular Normal     Pulmonary:   Sleep Apnea    Renal/:  Renal/ Normal     Musculoskeletal:  Musculoskeletal Normal    Neurological:   Neuromuscular Disease,    Endocrine:  Endocrine Normal    Dermatological:  Skin Normal    Psych:   Psychiatric History          Physical Exam  General:  Well nourished    Airway/Jaw/Neck:  Airway Findings: Mouth Opening: Normal Tongue: Normal  Mallampati: II  Improves to I with phonation.  TM Distance: Normal, at least 6 cm        Eyes/Ears/Nose:  EYES/EARS/NOSE FINDINGS: Normal     Heart/Vascular:  Heart Findings: Normal Heart murmur: negative Vascular Findings: Normal    Abdomen:  Abdomen Findings: Normal    Musculoskeletal:  Musculoskeletal Findings: Normal   Skin:  Skin Findings: Normal    Mental Status:  Mental Status Findings: Normal        Anesthesia Plan  Type of Anesthesia, risks & benefits discussed:  Anesthesia Type:  general  Patient's Preference: general  Intra-op Monitoring Plan: standard ASA monitors  Intra-op Monitoring Plan Comments:   Post Op Pain Control Plan: IV/PO Opioids PRN and multimodal analgesia  Post Op Pain Control Plan Comments:   Induction:   IV  Beta Blocker:  Patient is not currently on a Beta-Blocker (No further documentation required).        Informed Consent: Patient understands risks and agrees with Anesthesia plan.  Questions answered. Anesthesia consent signed with patient.  ASA Score: 2     Day of Surgery Review of History & Physical: I have interviewed and examined the patient. I have reviewed the patient's H&P dated:  There are no significant changes.  H&P update referred to the surgeon.         Ready For Surgery From Anesthesia Perspective.

## 2019-01-03 NOTE — TELEPHONE ENCOUNTER
"    Reason for Disposition   Question about upcoming scheduled test, no triage required and triager able to answer question    Answer Assessment - Initial Assessment Questions  1. REASON FOR CALL or QUESTION: "What is your reason for calling today?" or "How can I best help you?" or "What question do you have that I can help answer?"      Can he have coffee tomorrow prior to colonoscopy?    Protocols used: ST INFORMATION ONLY CALL-A-AH      "

## 2019-01-03 NOTE — H&P
Endoscopy H&P    Procedure : Colonoscopy      asymptomatic screening exam  No rectal bleeding, change in bowel caliber, fever, chills or weight loss.  No family or personal hx of colon ca.      Past Medical History:   Diagnosis Date    Hereditary sensory neuropathy      Sedation Problems: NO    Past Surgical History:   Procedure Laterality Date    HERNIA REPAIR      tonsillectomy           Family History   Problem Relation Age of Onset    COPD Sister     Lung cancer Sister      Fam Hx of Sedation Problems: NO  Social History     Socioeconomic History    Marital status:      Spouse name: Not on file    Number of children: Not on file    Years of education: Not on file    Highest education level: Not on file   Social Needs    Financial resource strain: Not on file    Food insecurity - worry: Not on file    Food insecurity - inability: Not on file    Transportation needs - medical: Not on file    Transportation needs - non-medical: Not on file   Occupational History     Employer: [employer]   Tobacco Use    Smoking status: Former Smoker     Last attempt to quit: 2001     Years since quittin.4    Smokeless tobacco: Never Used   Substance and Sexual Activity    Alcohol use: No     Alcohol/week: 0.0 oz    Drug use: No    Sexual activity: Yes     Partners: Female   Other Topics Concern    Not on file   Social History Narrative    Not on file       No current facility-administered medications on file prior to encounter.      Current Outpatient Medications on File Prior to Encounter   Medication Sig Dispense Refill    acetaZOLAMIDE (DIAMOX) 250 MG tablet Take 250 mg by mouth 3 (three) times daily.      ALPHA LIPOIC ACID ORAL Take by mouth once daily.       ascorbic acid (VITAMIN C) 500 MG tablet Take 500 mg by mouth once daily.      brimonidine 0.2% (ALPHAGAN) 0.2 % Drop       DUEXIS 800-26.6 mg Tab Take 1 tablet by mouth 3 (three) times daily.  0    escitalopram oxalate  (LEXAPRO) 10 MG tablet Take 10 mg by mouth once daily.      fluticasone (FLONASE) 50 mcg/actuation nasal spray 2 sprays by Each Nare route once daily. 1 Bottle 2    JUBLIA 10 % Betty APPLY ONE DROP TO ALL SMALLER TOES AND 2 DROPS TO GREATER TOES DAILY  6    latanoprost 0.005 % ophthalmic solution       LOTEMAX 0.5 % ophthalmic suspension INT 1 GTT IN OD BID FOR 4 WEEKS  1    MULTIVIT-IRON-MIN-FOLIC ACID 3,500-18-0.4 UNIT-MG-MG ORAL CHEW Take by mouth once daily.       neomycin-polymyxin-dexamethasone (DEXACINE) 3.5 mg/g-10,000 unit/g-0.1 % Oint       ofloxacin (OCUFLOX) 0.3 % ophthalmic solution INSTILL 1 DROP IN LEFT EYE TID - USE ONLY 3 DAYS AFTER EACH INJECTION  0    PREVIDENT 5000 BOOSTER PLUS 1.1 % Pste once daily.   0    sildenafil (VIAGRA) 100 MG tablet TAKE 1 TABLET BY MOUTH AS NEEDED 6 tablet 11    sildenafil (VIAGRA) 100 MG tablet TAKE 1 TABLET BY MOUTH AS NEEDED AS DIRECTED 6 tablet 7    sildenafil (VIAGRA) 100 MG tablet Take 1 tablet (100 mg total) by mouth daily as needed for Erectile Dysfunction. 30 tablet 2    timolol maleate 0.25% (TIMOPTIC) 0.25 % Drop          Review of patient's allergies indicates:   Allergen Reactions    Amitriptyline Other (See Comments)     Sleepy    Duloxetine Other (See Comments)     Sleepy    Gabapentin Other (See Comments)     Sexual side effects    Lortab [hydrocodone-acetaminophen]     Cooper     Poison ivy extract     Vioxx [rofecoxib] Other (See Comments)     Abdominal pain       Review of Systems -   Constitutional: no fever or chills, pain controlled   Respiratory: no cough or shortness of breath   Cardiovascular: no chest pain or palpitations   Gastrointestinal: no abdominal pain or vomiting   Genitourinary: no hematuria or dysuria   Hematologic/Lymphatic: no easy bruising or lymphadenopathy   Musculoskeletal: no arthralgias or myalgias   Neurological: no seizures or tremors     Physical Exam:    Gen: NAD   HEENT: NCAT, trachea midline  Pulm:  Unlabored  Abd: Soft, nttp. No rebound, guarding.   Extremities: no cyanosis or edema, or clubbing  Skin: Skin color, texture, turgor normal. No rashes or lesions    Deep Sedation: Mallampati Score per anesthesia     SedationPlan :Choice     ASA : II

## 2019-01-03 NOTE — PROVATION PATIENT INSTRUCTIONS
Discharge Summary/Instructions after an Endoscopic Procedure  Patient Name: Cricket Mcdonnell  Patient MRN: [id number]  Patient YOB: 1940 Thursday, January 03, 2019  Cholo Yates MD  RESTRICTIONS:  During your procedure today, you received medications for sedation.  These   medications may affect your judgment, balance and coordination.  Therefore,   for 24 hours, you have the following restrictions:   - DO NOT drive a car, operate machinery, make legal/financial decisions,   sign important papers or drink alcohol.    ACTIVITY:  Today: no heavy lifting, straining or running due to procedural   sedation/anesthesia.  The following day: return to full activity including work.  DIET:  Eat and drink normally unless instructed otherwise.     TREATMENT FOR COMMON SIDE EFFECTS:  - Mild abdominal pain, nausea, belching, bloating or excessive gas:  rest,   eat lightly and use a heating pad.  - Sore Throat: treat with throat lozenges and/or gargle with warm salt   water.  - Because air was used during the procedure, expelling large amounts of air   from your rectum or belching is normal.  - If a bowel prep was taken, you may not have a bowel movement for 1-3 days.    This is normal.  SYMPTOMS TO WATCH FOR AND REPORT TO YOUR PHYSICIAN:  1. Abdominal pain or bloating, other than gas cramps.  2. Chest pain.  3. Back pain.  4. Signs of infection such as: chills or fever occurring within 24 hours   after the procedure.  5. Rectal bleeding, which would show as bright red, maroon, or black stools.   (A tablespoon of blood from the rectum is not serious, especially if   hemorrhoids are present.)  6. Vomiting.  7. Weakness or dizziness.  GO DIRECTLY TO THE NEAREST EMERGENCY ROOM IF YOU HAVE ANY OF THE FOLLOWING:      Difficulty breathing              Chills and/or fever over 101 F   Persistent vomiting and/or vomiting blood   Severe abdominal pain   Severe chest pain   Black, tarry stools   Bleeding- more than one  tablespoon   Any other symptom or condition that you feel may need urgent attention  Your doctor recommends these additional instructions:  If any biopsies were taken, your doctors clinic will contact you in 1 to 2   weeks with any results.  - Discharge patient to home (ambulatory).   - Resume previous diet indefinitely.   - Continue present medications.   - Repeat colonoscopy in 7 years for surveillance.  For questions, problems or results please call your physician - Cholo Yates MD at Work:  [phone].  [facility] [place], EMERGENCY ROOM PHONE NUMBER: [phone]  IF A COMPLICATION OR EMERGENCY SITUATION ARISES AND YOU ARE UNABLE TO REACH   YOUR PHYSICIAN - GO DIRECTLY TO THE EMERGENCY ROOM.  Cholo Yates MD  1/3/2019 2:57:11 PM  This report has been verified and signed electronically.  [employer]

## 2019-01-03 NOTE — TRANSFER OF CARE
"Anesthesia Transfer of Care Note    Patient: Cricket Mcdonnell    Procedure(s) Performed: Procedure(s) (LRB):  COLONOSCOPY (N/A)    Patient location: GI    Anesthesia Type: general    Transport from OR: Transported from OR on room air with adequate spontaneous ventilation    Post pain: adequate analgesia    Post assessment: no apparent anesthetic complications    Post vital signs: stable    Level of consciousness: responds to stimulation and awake    Nausea/Vomiting: no nausea/vomiting    Complications: none    Transfer of care protocol was followed      Last vitals:   Visit Vitals  /61 (BP Location: Left arm, Patient Position: Sitting)   Pulse (!) 56   Temp 36.6 °C (97.9 °F) (Temporal)   Resp 16   Ht 5' 7" (1.702 m)   Wt 68 kg (150 lb)   SpO2 98%   BMI 23.49 kg/m²     "

## 2019-01-03 NOTE — ANESTHESIA POSTPROCEDURE EVALUATION
"Anesthesia Post Evaluation    Patient: Cricket Mcdonnell    Procedure(s) Performed: Procedure(s) (LRB):  COLONOSCOPY (N/A)    Final Anesthesia Type: general  Patient location during evaluation: PACU  Patient participation: Yes- Able to Participate  Level of consciousness: awake and alert and oriented  Post-procedure vital signs: reviewed and stable  Pain management: adequate  Airway patency: patent  PONV status at discharge: No PONV  Anesthetic complications: no      Cardiovascular status: blood pressure returned to baseline and hemodynamically stable  Respiratory status: unassisted, room air and spontaneous ventilation  Hydration status: euvolemic  Follow-up not needed.        Visit Vitals  BP (!) 96/54 (BP Location: Left arm, Patient Position: Lying)   Pulse (!) 54   Temp 36.5 °C (97.7 °F) (Temporal)   Resp 18   Ht 5' 7" (1.702 m)   Wt 68 kg (150 lb)   SpO2 97%   BMI 23.49 kg/m²       Pain/Willie Score: Willie Score: 10 (1/3/2019  2:58 PM)        "

## 2019-01-03 NOTE — DISCHARGE INSTRUCTIONS
Colonoscopy     A camera attached to a flexible tube with a viewing lens is used to take video pictures.     Colonoscopy is a test to view the inside of your lower digestive tract (colon and rectum). Sometimes it can show the last part of the small intestine (ileum). During the test, small pieces of tissue may be removed for testing. This is called a biopsy. Small growths, such as polyps, may also be removed.   Why is colonoscopy done?  The test is done to help look for colon cancer. And it can help find the source of abdominal pain, bleeding, and changes in bowel habits. It may be needed once a year, depending on factors such as your:  · Age  · Health history  · Family health history  · Symptoms  · Results from any prior colonoscopy  Risks and possible complications  These include:  · Bleeding               · A puncture or tear in the colon   · Risks of anesthesia  · A cancer lesion not being seen  Getting ready   To prepare for the test:  · Talk with your healthcare provider about the risks of the test (see below). Also ask your healthcare provider about alternatives to the test.  · Tell your healthcare provider about any medicines you take. Also tell him or her about any health conditions you may have.  · Make sure your rectum and colon are empty for the test. Follow the diet and bowel prep instructions exactly. If you dont, the test may need to be rescheduled.  · Plan for a friend or family member to drive you home after the test.     Colonoscopy provides an inside view of the entire colon.     You may discuss the results with your doctor right away or at a future visit.  During the test   The test is usually done in the hospital on an outpatient basis. This means you go home the same day. The procedure takes about 30 minutes. During that time:  · You are given relaxing (sedating) medicine through an IV line. You may be drowsy, or fully asleep.  · The healthcare provider will first give you a physical exam to  check for anal and rectal problems.  · Then the anus is lubricated and the scope inserted.  · If you are awake, you may have a feeling similar to needing to have a bowel movement. You may also feel pressure as air is pumped into the colon. Its OK to pass gas during the procedure.  · Biopsy, polyp removal, or other treatments may be done during the test.  After the test   You may have gas right after the test. It can help to try to pass it to help prevent later bloating. Your healthcare provider may discuss the results with you right away. Or you may need to schedule a follow-up visit to talk about the results. After the test, you can go back to your normal eating and other activities. You may be tired from the sedation and need to rest for a few hours.  Date Last Reviewed: 11/1/2016 © 2000-2017 The "[employer]", [place] [place]. [address] [place] [address], [place] [address]. All rights reserved. This information is not intended as a substitute for professional medical care. Always follow your healthcare professional's instructions.

## 2019-01-10 ENCOUNTER — TELEPHONE (OUTPATIENT)
Dept: ENDOSCOPY | Facility: HOSPITAL | Age: 79
End: 2019-01-10

## 2019-04-01 ENCOUNTER — TELEPHONE (OUTPATIENT)
Dept: SLEEP MEDICINE | Facility: CLINIC | Age: 79
End: 2019-04-01

## 2019-04-01 DIAGNOSIS — G47.33 OSA (OBSTRUCTIVE SLEEP APNEA): Primary | ICD-10-CM

## 2019-04-01 NOTE — TELEPHONE ENCOUNTER
----- Message from Sandra Tai sent at 4/1/2019 10:40 AM CDT -----  Contact: Pt   Name of Who is Calling: MALACHI GOULD [[id number]]    What is the request in detail: Pt called to advise that his CPAP machine [place] an airport over the [place] and he wanted to purchase another. Pt states he was told an order has to be placed and he will pay out of pocket. Please call to further discuss and advise.     Can the clinic reply by [facility]: No     What Number to Call Back [facility] not [facility]: [phone]

## 2019-04-03 ENCOUNTER — TELEPHONE (OUTPATIENT)
Dept: SLEEP MEDICINE | Facility: CLINIC | Age: 79
End: 2019-04-03

## 2019-04-03 NOTE — TELEPHONE ENCOUNTER
"----- Message from Lisa Mcintyre sent at 4/3/2019  2:18 PM CDT -----  Contact: MALACHI GOULD [[id number]]  Name of Who is Calling: MALACHI GOULD [[id number]]      What is the request in detail:   Patient called requesting a call as this pertains to replacing his stole CPAP machine. Patient said, "unfortunately he doesn't have a police report."   Please give patient a call back at your earliest convenience.      THANKS!      Can the clinic reply by [facility]: No      What Number to Call Back:  MALACHI GOULD // # [phone] (H)                                  "

## 2019-04-04 ENCOUNTER — TELEPHONE (OUTPATIENT)
Dept: INTERNAL MEDICINE | Facility: CLINIC | Age: 79
End: 2019-04-04

## 2019-04-04 DIAGNOSIS — E11.9 DIABETES MELLITUS WITHOUT COMPLICATION: Primary | ICD-10-CM

## 2019-04-04 NOTE — TELEPHONE ENCOUNTER
Patient needs already taken care of by Areli Collins on 04/03/2019. Pt advised private pay for new PAP machine, $750. Pt is scheduled for set up of replacement PAP unit at [facility] on 04/08/2019.     Dr. Polo placed order for new PAP machine on 04/01/2019 at 6:06 pm. Upon chart review, pt contacted clinic on 04/01/2019 and for reasons unknown to me, intercepting MA Alf Jerez)  forwarded patient message to Anyi Orta NP who is regularly off on Mondays, so Dr. Polo who covers for You WILLIAMSON placed order for PAP per pt requested. First notification directed to me regarding this matter was morning of 04/04/2019.

## 2019-05-14 ENCOUNTER — LAB VISIT (OUTPATIENT)
Dept: LAB | Facility: OTHER | Age: 79
End: 2019-05-14
Attending: FAMILY MEDICINE
Payer: MEDICARE

## 2019-05-14 DIAGNOSIS — E11.9 DIABETES MELLITUS WITHOUT COMPLICATION: ICD-10-CM

## 2019-05-14 LAB
ALBUMIN SERPL BCP-MCNC: 4.3 G/DL (ref 3.5–5.2)
ALP SERPL-CCNC: 48 U/L (ref 55–135)
ALT SERPL W/O P-5'-P-CCNC: 26 U/L (ref 10–44)
ANION GAP SERPL CALC-SCNC: 8 MMOL/L (ref 8–16)
AST SERPL-CCNC: 23 U/L (ref 10–40)
BASOPHILS # BLD AUTO: 0.02 K/UL (ref 0–0.2)
BASOPHILS NFR BLD: 0.4 % (ref 0–1.9)
BILIRUB SERPL-MCNC: 0.5 MG/DL (ref 0.1–1)
BUN SERPL-MCNC: 18 MG/DL (ref 8–23)
CALCIUM SERPL-MCNC: 9.9 MG/DL (ref 8.7–10.5)
CHLORIDE SERPL-SCNC: 105 MMOL/L (ref 95–110)
CHOLEST SERPL-MCNC: 216 MG/DL (ref 120–199)
CHOLEST/HDLC SERPL: 3.6 {RATIO} (ref 2–5)
CO2 SERPL-SCNC: 25 MMOL/L (ref 23–29)
CREAT SERPL-MCNC: 0.7 MG/DL (ref 0.5–1.4)
DIFFERENTIAL METHOD: ABNORMAL
EOSINOPHIL # BLD AUTO: 0.1 K/UL (ref 0–0.5)
EOSINOPHIL NFR BLD: 1.3 % (ref 0–8)
ERYTHROCYTE [DISTWIDTH] IN BLOOD BY AUTOMATED COUNT: 13.2 % (ref 11.5–14.5)
EST. GFR  (AFRICAN AMERICAN): >60 ML/MIN/1.73 M^2
EST. GFR  (NON AFRICAN AMERICAN): >60 ML/MIN/1.73 M^2
ESTIMATED AVG GLUCOSE: 105 MG/DL (ref 68–131)
GLUCOSE SERPL-MCNC: 101 MG/DL (ref 70–110)
HBA1C MFR BLD HPLC: 5.3 % (ref 4–5.6)
HCT VFR BLD AUTO: 40 % (ref 40–54)
HDLC SERPL-MCNC: 60 MG/DL (ref 40–75)
HDLC SERPL: 27.8 % (ref 20–50)
HGB BLD-MCNC: 13.6 G/DL (ref 14–18)
LDLC SERPL CALC-MCNC: 132.6 MG/DL (ref 63–159)
LYMPHOCYTES # BLD AUTO: 1.9 K/UL (ref 1–4.8)
LYMPHOCYTES NFR BLD: 35.7 % (ref 18–48)
MCH RBC QN AUTO: 32.8 PG (ref 27–31)
MCHC RBC AUTO-ENTMCNC: 34 G/DL (ref 32–36)
MCV RBC AUTO: 96 FL (ref 82–98)
MONOCYTES # BLD AUTO: 0.4 K/UL (ref 0.3–1)
MONOCYTES NFR BLD: 8.2 % (ref 4–15)
NEUTROPHILS # BLD AUTO: 2.9 K/UL (ref 1.8–7.7)
NEUTROPHILS NFR BLD: 54.2 % (ref 38–73)
NONHDLC SERPL-MCNC: 156 MG/DL
PLATELET # BLD AUTO: 234 K/UL (ref 150–350)
PMV BLD AUTO: 9.5 FL (ref 9.2–12.9)
POTASSIUM SERPL-SCNC: 4.3 MMOL/L (ref 3.5–5.1)
PROT SERPL-MCNC: 7.2 G/DL (ref 6–8.4)
RBC # BLD AUTO: 4.15 M/UL (ref 4.6–6.2)
SODIUM SERPL-SCNC: 138 MMOL/L (ref 136–145)
TRIGL SERPL-MCNC: 117 MG/DL (ref 30–150)
TSH SERPL DL<=0.005 MIU/L-ACNC: 1.52 UIU/ML (ref 0.4–4)
WBC # BLD AUTO: 5.35 K/UL (ref 3.9–12.7)

## 2019-05-14 PROCEDURE — 36415 COLL VENOUS BLD VENIPUNCTURE: CPT | Mod: HCNC

## 2019-05-14 PROCEDURE — 80053 COMPREHEN METABOLIC PANEL: CPT | Mod: HCNC

## 2019-05-14 PROCEDURE — 83036 HEMOGLOBIN GLYCOSYLATED A1C: CPT | Mod: HCNC

## 2019-05-14 PROCEDURE — 84443 ASSAY THYROID STIM HORMONE: CPT | Mod: HCNC

## 2019-05-14 PROCEDURE — 80061 LIPID PANEL: CPT | Mod: HCNC

## 2019-05-14 PROCEDURE — 85025 COMPLETE CBC W/AUTO DIFF WBC: CPT | Mod: HCNC

## 2019-05-16 ENCOUNTER — OFFICE VISIT (OUTPATIENT)
Dept: INTERNAL MEDICINE | Facility: CLINIC | Age: 79
End: 2019-05-16
Attending: FAMILY MEDICINE
Payer: MEDICARE

## 2019-05-16 VITALS
DIASTOLIC BLOOD PRESSURE: 62 MMHG | BODY MASS INDEX: 23.94 KG/M2 | HEART RATE: 64 BPM | HEIGHT: 67 IN | SYSTOLIC BLOOD PRESSURE: 92 MMHG | WEIGHT: 152.56 LBS | OXYGEN SATURATION: 97 %

## 2019-05-16 DIAGNOSIS — G47.33 OSA (OBSTRUCTIVE SLEEP APNEA): ICD-10-CM

## 2019-05-16 DIAGNOSIS — M79.604 PAIN IN BOTH LOWER EXTREMITIES: ICD-10-CM

## 2019-05-16 DIAGNOSIS — Z00.00 ANNUAL PHYSICAL EXAM: Primary | ICD-10-CM

## 2019-05-16 DIAGNOSIS — G60.8 HEREDITARY SENSORY NEUROPATHY: ICD-10-CM

## 2019-05-16 DIAGNOSIS — M79.605 PAIN IN BOTH LOWER EXTREMITIES: ICD-10-CM

## 2019-05-16 DIAGNOSIS — H35.30 MACULAR DEGENERATION OF RIGHT EYE, UNSPECIFIED TYPE: ICD-10-CM

## 2019-05-16 PROCEDURE — 99999 PR PBB SHADOW E&M-EST. PATIENT-LVL III: CPT | Mod: PBBFAC,HCNC,, | Performed by: FAMILY MEDICINE

## 2019-05-16 PROCEDURE — 99999 PR PBB SHADOW E&M-EST. PATIENT-LVL III: ICD-10-PCS | Mod: PBBFAC,HCNC,, | Performed by: FAMILY MEDICINE

## 2019-05-16 PROCEDURE — 99397 PER PM REEVAL EST PAT 65+ YR: CPT | Mod: HCNC,S$GLB,, | Performed by: FAMILY MEDICINE

## 2019-05-16 PROCEDURE — 99397 PR PREVENTIVE VISIT,EST,65 & OVER: ICD-10-PCS | Mod: HCNC,S$GLB,, | Performed by: FAMILY MEDICINE

## 2019-05-16 RX ORDER — SILDENAFIL 100 MG/1
100 TABLET, FILM COATED ORAL DAILY PRN
Qty: 30 TABLET | Refills: 11 | Status: SHIPPED | OUTPATIENT
Start: 2019-05-16 | End: 2019-06-15

## 2019-05-16 NOTE — PROGRESS NOTES
"CHIEF COMPLAINT:  Annual exam in pt w/ macular degeneration and sleep apnea    HISTORY OF PRESENT ILLNESS: The patient is a generally healthy 78 year-old WM.      The patient has continuing neuropathic pain for which he had a complete workup in the past. He has tried multiple medications and is intolerant of all of them due to various side effects.    He was previously diagnosed with macular degeneration.     He does have CPAP for obstructive sleep apnea.  He has had very good results in terms of daytime somnolence and MD is improved as well.    The patient used to see Dr. Bass.     REVIEW OF SYSTEMS:  GENERAL: No fever, chills, fatigability or weight loss.  SKIN: No rashes, itching or changes in color or texture of skin.  HEAD: No headaches or recent head trauma.  EYES:  No photophobia, ocular pain or diplopia.  EARS: Denies ear pain, discharge or vertigo.  NOSE: No loss of smell, no epistaxis or postnasal drip.  MOUTH & THROAT: No hoarseness or change in voice. No excessive gum bleeding.  NODES: Denies swollen glands.  CHEST: Denies SANCHES, cyanosis, wheezing, cough and sputum production.  CARDIOVASCULAR: Denies chest pain, PND, orthopnea or reduced exercise tolerance.  ABDOMEN: Appetite fine. No weight loss. Denies diarrhea, abdominal pain, hematemesis or blood in stool.  URINARY: No flank pain, dysuria or hematuria.  PERIPHERAL VASCULAR: No claudication or cyanosis.  MUSCULOSKELETAL: No joint stiffness or swelling. Denies back pain.  NEUROLOGIC: No history of seizures, paralysis, alteration of gait or coordination.    SOCIAL HISTORY: The patient does not smoke.  The patient consumes alcohol socially.  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    PHYSICAL EXAMINATION:     Blood pressure 92/62, pulse 64, height 5' 7" (1.702 m), weight 69.2 kg (152 lb 8.9 oz), SpO2 97 %.    APPEARANCE: Well nourished, well developed, in no acute distress.    HEAD: Normocephalic, " atraumatic.  EYES: PERRL. EOMI.  Conjunctivae without injection and  anicteric  EARS: TM's intact. Light reflex normal. No retraction or perforation.    NOSE: Mucosa pink. Airway clear.  MOUTH & THROAT: No tonsillar enlargement. No pharyngeal erythema or exudate. No stridor.  NECK: Supple.   NODES: No cervical, axillary or inguinal lymph node enlargement.  CHEST: Lungs clear to auscultation.  No retractions are noted.  No rales or rhonchi are present.  CARDIOVASCULAR: Normal S1, S2. No rubs, murmurs or gallops.  ABDOMEN: Bowel sounds normal. Not distended. Soft. No tenderness or masses.  No ascites is noted.  MUSCULOSKELETAL:  There is no clubbing, cyanosis, or edema of the extremities x4.  There is full range of motion of the lumbar spine.  There is full range of motion of the extremities x4.  There is no deformity noted.    NEUROLOGIC:       Normal speech development.      Hearing normal.      Normal gait.      Motor and sensory exams grossly normal.  PSYCHIATRIC: Patient is alert and oriented x3.  Thought processes are all normal.  There is no homicidality.  There is no suicidality.  There is no evidence of psychosis.    LABORATORY/RADIOLOGY:   Chart reviewed.      ASSESSMENT:   Annual  Macular degeneration  Snoring due to sleep study proven severe sleep apnea  Idiopathic peripheral neuropathy    PLAN:  Recent blood work looked very good.  He is aware that treating his KARMA is helpful to minimize his risk for progression of the macular degeneration.  Follow up sleep for KARMA  Over-the-counter supplements as recommended by retinal specialist  Return to clinic in one year.

## 2019-05-28 ENCOUNTER — TELEPHONE (OUTPATIENT)
Dept: INTERNAL MEDICINE | Facility: CLINIC | Age: 79
End: 2019-05-28

## 2019-05-28 NOTE — TELEPHONE ENCOUNTER
----- Message from Dalila Gregory sent at 5/28/2019  2:35 PM CDT -----  Contact: pt  Name of Who is Calling: MALACHI GOULD [[id number]]    What is the request in detail: Patient is requesting a call back to discuss if he should see ........ Please contact to further discuss and advise      Can the clinic reply by [facility]: Yes     What Number to Call Back if not in [facility]: [phone]

## 2019-06-04 ENCOUNTER — OFFICE VISIT (OUTPATIENT)
Dept: UROLOGY | Facility: CLINIC | Age: 79
End: 2019-06-04
Payer: MEDICARE

## 2019-06-04 VITALS
HEIGHT: 67 IN | DIASTOLIC BLOOD PRESSURE: 73 MMHG | BODY MASS INDEX: 24.05 KG/M2 | WEIGHT: 153.25 LBS | HEART RATE: 62 BPM | SYSTOLIC BLOOD PRESSURE: 120 MMHG

## 2019-06-04 DIAGNOSIS — N40.1 BENIGN PROSTATIC HYPERPLASIA WITH URINARY OBSTRUCTION: Primary | ICD-10-CM

## 2019-06-04 DIAGNOSIS — Z80.42 FAMILY HISTORY OF PROSTATE CANCER: ICD-10-CM

## 2019-06-04 DIAGNOSIS — N13.8 BENIGN PROSTATIC HYPERPLASIA WITH URINARY OBSTRUCTION: Primary | ICD-10-CM

## 2019-06-04 PROCEDURE — 1101F PR PT FALLS ASSESS DOC 0-1 FALLS W/OUT INJ PAST YR: ICD-10-PCS | Mod: HCNC,CPTII,S$GLB, | Performed by: NURSE PRACTITIONER

## 2019-06-04 PROCEDURE — 99214 OFFICE O/P EST MOD 30 MIN: CPT | Mod: 25,HCNC,S$GLB, | Performed by: NURSE PRACTITIONER

## 2019-06-04 PROCEDURE — 51798 US URINE CAPACITY MEASURE: CPT | Mod: HCNC,S$GLB,, | Performed by: NURSE PRACTITIONER

## 2019-06-04 PROCEDURE — 99999 PR PBB SHADOW E&M-EST. PATIENT-LVL IV: ICD-10-PCS | Mod: PBBFAC,HCNC,, | Performed by: NURSE PRACTITIONER

## 2019-06-04 PROCEDURE — 99214 PR OFFICE/OUTPT VISIT, EST, LEVL IV, 30-39 MIN: ICD-10-PCS | Mod: 25,HCNC,S$GLB, | Performed by: NURSE PRACTITIONER

## 2019-06-04 PROCEDURE — 1101F PT FALLS ASSESS-DOCD LE1/YR: CPT | Mod: HCNC,CPTII,S$GLB, | Performed by: NURSE PRACTITIONER

## 2019-06-04 PROCEDURE — 99999 PR PBB SHADOW E&M-EST. PATIENT-LVL IV: CPT | Mod: PBBFAC,HCNC,, | Performed by: NURSE PRACTITIONER

## 2019-06-04 PROCEDURE — 51798 PR MEAS,POST-VOID RES,US,NON-IMAGING: ICD-10-PCS | Mod: HCNC,S$GLB,, | Performed by: NURSE PRACTITIONER

## 2019-06-04 NOTE — PATIENT INSTRUCTIONS
BPH (Enlarged Prostate)  The prostate is a gland at the base of the bladder. As some men get older, the prostate may get bigger in size. This problem is called benign prostatic hyperplasia (BPH). BPH puts pressure on the urethra. This is the tube that carries urine from the bladder to the penis. It may interfere with the flow of urine. It may also keep the bladder from emptying fully.    Symptoms of BPH include trouble starting urination and feeling as though the bladder isnt emptying all the way. It also includes a weak urine stream, dribbling and leaking of urine, and frequent and urgent urination (especially at night). BPH can increase the risk of urinary infections. It can also block off urine flow completely. If this occurs, a thin tube (catheter) may be passed into the bladder to help drain urine.  If symptoms are mild, no treatment may be needed right now. If symptoms are more severe, treatment is likely needed. The goal of treatment is to improve urine flow and reduce symptoms. Treatments can include medicine and procedures. Your healthcare provider will discuss treatment options with you as needed.  Home care  The following guidelines will help you care for yourself at home:  · Urinate as soon as you feel the urge. Don't try to hold your urine.  · Don't limit your fluid intake during the day. Drink 6 to 8 glasses of water or liquids a day. This prevents bacteria from building up in the bladder.  · Avoid drinking fluids after dinner to help reduce urination during the night.  · Avoid medicines that can worsen your symptoms. These include certain cold and allergy medicines and antidepressants. Diuretics used for high blood pressure can also worsen symptoms. Talk to your doctor about the medicines you take. Other choices may work better for you.  Prostate cancer screening  BPH does not increase the risk of prostate cancer. But because prostate cancer is a common cancer in men, screening is sometimes  recommended. This may help detect the cancer in its early stages when treatment is most effective. Factors that can increase the risk of prostate cancer include being -American or having a father or brother who had prostate cancer. A high-fat diet may also increase the risk of prostate cancer. Talk to your healthcare provider to see whether you should be screened for prostate cancer.  Follow-up care  Follow up with your healthcare provider, or as advised  To learn more, go to:  · National Kidney & Urologic Diseases Information Clearinghouse  kidney.niddk.nih.gov, 864.263.3782  When to seek medical advice  Call your healthcare provider right away if any of these occur:  · Fever of 100.4°F (38.0°C) or higher, or as advised  · Unable to pass urine for 8 hours  · Increasing pressure or pain in your bladder (lower abdomen)  · Blood in the urine  · Increasing low back pain, not related to injury  · Symptoms of urinary infection (increased urge to urinate, burning when passing urine, foul-smelling urine)  Date Last Reviewed: 7/1/2016 © 2000-2017 [employer]. [address] [place] [address], [place] [address]. All rights reserved. This information is not intended as a substitute for professional medical care. Always follow your healthcare professional's instructions.

## 2019-06-04 NOTE — PROGRESS NOTES
CHIEF COMPLAINT:    Mr. Mcdonnell is a 78 y.o. male presenting for f/u BPH.    PRESENTING ILLNESS:    Cricket Mcdonnell is a 78 y.o. male new patient to me (records of past medical, family and social history personally reviewed by me), w/ h/o KARMA, BPH, and ED.    Last seen in clinic 17 w/ Dr. Choudhary.    Today pt returns to clinic for f/u BPH. He reports feeling well since last visit. He denies dysuria, GH, irritative/obstructive urinary sxs, f/c, n/v, abd/pelvic/flank. Nocturia 0-1. FOS moderate to strong. Satisfied w/ viagra 100mg PRN, denies adv effects.    UA dip in clinic today revealed trace protein.    PVR in clinic today: 61mL.    REVIEW OF SYSTEMS:    Cricket Mcdonnell denies headache, blurred vision, fever, nausea, vomiting, chills, abdominal pain, bleeding per rectum, cough, SOB, recent loss of consciousness, recent mental status changes, seizures, dizziness, or upper or lower extremity weakness.    PATIENT HISTORY:    Past Medical History:   Diagnosis Date    Hereditary sensory neuropathy        Past Surgical History:   Procedure Laterality Date    CATARACT EXTRACTION      COLONOSCOPY N/A 1/3/2019    Performed by [facility] at [facility] ([facility])    galucoma surgery      HERNIA REPAIR      tonsillectomy         Family History   Problem Relation Age of Onset    COPD Sister     Lung cancer Sister        Social History     Socioeconomic History    Marital status:      Spouse name: Not on file    Number of children: Not on file    Years of education: Not on file    Highest education level: Not on file   Occupational History     Employer: [employer]   Social Needs    Financial resource strain: Not on file    Food insecurity:     Worry: Not on file     Inability: Not on file    Transportation needs:     Medical: Not on file     Non-medical: Not on file   Tobacco Use    Smoking status: Former Smoker     Last attempt to quit: 2001     Years since quittin.8     Smokeless tobacco: Never Used   Substance and Sexual Activity    Alcohol use: No     Alcohol/week: 0.0 oz    Drug use: No    Sexual activity: Yes     Partners: Female   Lifestyle    Physical activity:     Days per week: Not on file     Minutes per session: Not on file    Stress: Not on file   Relationships    Social connections:     Talks on phone: Not on file     Gets together: Not on file     Attends Latter-day service: Not on file     Active member of club or organization: Not on file     Attends meetings of clubs or organizations: Not on file     Relationship status: Not on file   Other Topics Concern    Not on file   Social History Narrative    Not on file       Allergies:  Amitriptyline; Duloxetine; Gabapentin; Lortab [hydrocodone-acetaminophen]; Cooper; Poison ivy extract; and Vioxx [rofecoxib]    Medications:    Current Outpatient Medications:     acetaZOLAMIDE (DIAMOX) 250 MG tablet, Take 250 mg by mouth 3 (three) times daily., Disp: , Rfl:     ALPHA LIPOIC ACID ORAL, Take by mouth once daily. , Disp: , Rfl:     ascorbic acid (VITAMIN C) 500 MG tablet, Take 500 mg by mouth once daily., Disp: , Rfl:     brimonidine 0.2% (ALPHAGAN) 0.2 % Drop, , Disp: , Rfl:     DUEXIS 800-26.6 mg Tab, Take 1 tablet by mouth 3 (three) times daily., Disp: , Rfl: 0    escitalopram oxalate (LEXAPRO) 10 MG tablet, Take 10 mg by mouth once daily., Disp: , Rfl:     fluticasone (FLONASE) 50 mcg/actuation nasal spray, 2 sprays by Each Nare route once daily., Disp: 1 Bottle, Rfl: 2    JUBLIA 10 % Betty, APPLY ONE DROP TO ALL SMALLER TOES AND 2 DROPS TO GREATER TOES DAILY, Disp: , Rfl: 6    latanoprost 0.005 % ophthalmic solution, , Disp: , Rfl:     LOTEMAX 0.5 % ophthalmic suspension, INT 1 GTT IN OD BID FOR 4 WEEKS, Disp: , Rfl: 1    MULTIVIT-IRON-MIN-FOLIC ACID 3,500-18-0.4 UNIT-MG-MG ORAL CHEW, Take by mouth once daily. , Disp: , Rfl:     neomycin-polymyxin-dexamethasone (DEXACINE) 3.5 mg/g-10,000 unit/g-0.1 %  Oint, , Disp: , Rfl:     ofloxacin (OCUFLOX) 0.3 % ophthalmic solution, INSTILL 1 DROP IN LEFT EYE TID - USE ONLY 3 DAYS AFTER EACH INJECTION, Disp: , Rfl: 0    PREVIDENT 5000 BOOSTER PLUS 1.1 % Pste, once daily. , Disp: , Rfl: 0    sildenafil (VIAGRA) 100 MG tablet, TAKE 1 TABLET BY MOUTH AS NEEDED, Disp: 6 tablet, Rfl: 11    sildenafil (VIAGRA) 100 MG tablet, Take 1 tablet (100 mg total) by mouth daily as needed for Erectile Dysfunction., Disp: 30 tablet, Rfl: 11    timolol maleate 0.25% (TIMOPTIC) 0.25 % Drop, , Disp: , Rfl:     PHYSICAL EXAMINATION:    The patient generally appears in good health, is appropriately interactive, and is in no apparent distress.     Eyes: anicteric sclerae, moist conjunctivae; no lid-lag; PERRLA     HENT: Atraumatic; oropharynx clear with moist mucous membranes and no mucosal ulcerations;normal hard and soft palate.  No evidence of lymphadenopathy.    Neck: Trachea midline.  No thyromegaly.    Musculoskeletal: No abnormal gait.    Skin: No lesions.    Mental: Cooperative with normal affect.  Is oriented to time, place, and person.    Neuro: Grossly intact.    Chest: Normal inspiratory effort.   No accessory muscles.  No audible wheezes.  Respirations symmetric on inspiration and expiration.    Heart: Regular rhythm.      Abdomen:  Soft, non-tender. No masses or organomegaly. Bladder is not palpable. No evidence of flank discomfort. No evidence of inguinal hernia.    Genitourinary: The penis is circumcised with no evidence of plaques or induration. The urethral meatus is normal. The testes, epididymides, and cord structures are normal in size and contour bilaterally. The scrotum is normal in size and contour.    The prostate is smooth, symmetrical, without nodule or induration. The seminal vesicles were normal and nonpalpable. The prostate was not tender or boggy. Rectal tone was normal no rectal mass was palpable.    Extremities: No clubbing, cyanosis, or edema.    LABS:    Lab  Results   Component Value Date    CREATININE 0.7 05/14/2019     Lab Results   Component Value Date    PSA 2.1 05/21/2018    PSA 1.8 03/27/2017    PSA 1.82 05/31/2013    PSADIAG 1.7 07/07/2016    PSADIAG 1.9 07/17/2014    PSATOTAL 2.1 07/06/2015    PSAFREE 0.32 07/06/2015    PSAFREEPCT 15.24 07/06/2015     Hemoglobin A1C   Date Value Ref Range Status   05/14/2019 5.3 4.0 - 5.6 % Final     Comment:     ADA Screening Guidelines:  5.7-6.4%  Consistent with prediabetes  >or=6.5%  Consistent with diabetes  High levels of fetal hemoglobin interfere with the HbA1C  assay. Heterozygous hemoglobin variants (HbS, HgC, etc)do  not significantly interfere with this assay.   However, presence of multiple variants may affect accuracy.     05/21/2018 5.0 4.0 - 5.6 % Final     Comment:     According to ADA guidelines, hemoglobin A1c <7.0% represents  optimal control in non-pregnant diabetic patients. Different  metrics may apply to specific patient populations.   Standards of Medical Care in Diabetes-2016.  For the purpose of screening for the presence of diabetes:  <5.7%     Consistent with the absence of diabetes  5.7-6.4%  Consistent with increasing risk for diabetes   (prediabetes)  >or=6.5%  Consistent with diabetes  Currently, no consensus exists for use of hemoglobin A1c  for diagnosis of diabetes for children.  This Hemoglobin A1c assay has significant interference with fetal   hemoglobin   (HbF). The results are invalid for patients with abnormal amounts of   HbF,   including those with known Hereditary Persistence   of Fetal Hemoglobin. Heterozygous hemoglobin variants (HbAS, HbAC,   HbAD, HbAE, HbA2) do not significantly interfere with this assay;   however, presence of multiple variants in a sample may impact the %   interference.     03/27/2017 5.5 4.5 - 6.2 % Final     Comment:     According to ADA guidelines, hemoglobin A1C <7.0% represents  optimal control in non-pregnant diabetic patients.  Different  metrics may  apply to specific populations.   Standards of Medical Care in Diabetes - 2016.  For the purpose of screening for the presence of diabetes:  <5.7%     Consistent with the absence of diabetes  5.7-6.4%  Consistent with increasing risk for diabetes   (prediabetes)  >or=6.5%  Consistent with diabetes  Currently no consensus exists for use of hemoglobin A1C  for diagnosis of diabetes for children.       IMPRESSION:    Encounter Diagnoses   Name Primary?    Benign prostatic hyperplasia with urinary obstruction Yes    Family history of prostate cancer      PLAN:    I spent 25 minutes with the patient of which more than half was spent in direct consultation with the patient in regards to our treatment and plan.    Discussed and reviewed BPH, pt is asymptomatic at this time, will continue to monitor.    Continue w/ viagra as rx'd PRN.    Education sheets provided.    F/u 1 yr for BPH, sooner if needed.     Patient verbalized and expressed understanding, and agree w/ plan.

## 2019-06-11 ENCOUNTER — CLINICAL SUPPORT (OUTPATIENT)
Dept: REHABILITATION | Facility: HOSPITAL | Age: 79
End: 2019-06-11
Attending: FAMILY MEDICINE
Payer: MEDICARE

## 2019-06-11 DIAGNOSIS — M79.605 PAIN IN BOTH LOWER EXTREMITIES: ICD-10-CM

## 2019-06-11 DIAGNOSIS — M79.604 PAIN IN BOTH LOWER EXTREMITIES: ICD-10-CM

## 2019-06-11 PROCEDURE — 97110 THERAPEUTIC EXERCISES: CPT | Mod: HCNC

## 2019-06-11 PROCEDURE — G8978 MOBILITY CURRENT STATUS: HCPCS | Mod: CJ,HCNC

## 2019-06-11 PROCEDURE — G8979 MOBILITY GOAL STATUS: HCPCS | Mod: CJ,HCNC

## 2019-06-11 PROCEDURE — 97161 PT EVAL LOW COMPLEX 20 MIN: CPT | Mod: HCNC

## 2019-06-11 NOTE — PLAN OF CARE
[facility]  Physical Therapy Initial Evaluation    Name: Cricket Mcdonnell  Clinic Number: [id number]    Therapy Diagnosis:   Encounter Diagnosis   Name Primary?    Pain in both lower extremities      Physician: Neo Sneed*    Physician Orders: PT Eval and Treat   Medical Diagnosis from Referral: M79.604,M79.605 (ICD-10-CM) - Pain in both lower extremities  Evaluation Date: 6/11/2019  Authorization Period Expiration: 12/31/19  Plan of Care Expiration: 7/26/19  Visit # / Visits authorized: 1/ 20    Time In: 3:07 pm   Time Out: 3:50 pm   Total Billable Time: 43 minutes    Precautions: Standard    Subjective   Date of onset: chronic  History of current condition - Cricket reports: that he did he PT in the past for his ankles and his feet and it really seemed to help. Pt stated that he has had neuropathy in (B) feet for over five years. Pt stated that he has been having pain in (B) calves for a couple of years. Pt stated that he just had a complete physical and was told everything looked good. Pt stated that he if does his yoga exercises regularly then does not experience back pain. Pt denies particular injury to (B) LE.      Medical History:   Past Medical History:   Diagnosis Date    Hereditary sensory neuropathy        Surgical History:   Cricket Mcdonnell  has a past surgical history that includes Hernia repair; tonsillectomy; Cataract extraction; galucoma surgery; and Colonoscopy (N/A, 1/3/2019).    Medications:   Cricket Morrow has a current medication list which includes the following prescription(s): acetazolamide, alpha lipoic acid, ascorbic acid (vitamin c), brimonidine 0.2%, duexis, escitalopram oxalate, fluticasone propionate, jublia, latanoprost, lotemax, multivit-iron-min-folic acid, neomycin-polymyxin-dexamethasone, ofloxacin, prevident 5000 booster plus, sildenafil, sildenafil, and timolol maleate 0.25%.    Allergies:   Review of patient's allergies indicates:   Allergen  "Reactions    Amitriptyline Other (See Comments)     Sleepy    Duloxetine Other (See Comments)     Sleepy    Gabapentin Other (See Comments)     Sexual side effects    Lortab [hydrocodone-acetaminophen]     Sheridan     Poison ivy extract     Vioxx [rofecoxib] Other (See Comments)     Abdominal pain        Imaging: n/a    Prior Therapy: yes   Social History: Pt stated that he lives alone.   Occupation: Pt stated that he is retired.   Prior Level of Function: independent   Current Level of Function: independent with c/o increased pain in (B) calf region when sitting for prolonged period of time    Pain:  Current 2/10, worst 8/10, best 2/10   Location: bilateral calf region    Description: Aching and Sharp  Aggravating Factors: sitting for prolonged period of time  Easing Factors: walking, stretching, moving, swimming, Tylenol     Pts goals: to learn some exercises to help him with the pain    Objective     Lumbar AROM: Pain/Dysfunction with Movement:   Flexion 85 degrees "pulling" in back and (B) LE   Extension 5 degrees    Right side bending 25 degrees    Left side bending 25 degrees        Hip Right  Left  Pain/Dysfunction with Movement    AROM MMT AROM MMT != pain   Flexion WFL 5/5 WFL 5/5    Extension WFL! 4-/5 WFL! 4-/5! C/o pain in back    Abduction WFL 4+/5 WFL 4+/5    External rotation WFL 4+/5 WFL 4+/5      Knee Right  Left  Pain/Dysfunction with Movement    AROM MMT AROM MMT ! = pain   Flexion WFL 5/5 WFL 5/5    Extension WFL 5/5 WFL! 5/5 (L) : c/o pain in (L) Thigh with extension AROM     Ankle Right  Left  Pain/Dysfunction with Movement    AROM MMT AROM MMT    Plantarflexion WFL 4+/5 WFL 4+/5    Dorsiflexion WFL 5/5 WFL 5/5      Palpation: pt did not c/o tenderness to palpation along (B) calf region     Gait: no significant gait deviations noted    CMS Impairment/Limitation/Restriction for FOTO lumbar Survey    Therapist reviewed FOTO scores for Cricket Mcdonnell on 6/11/2019.   FOTO documents entered " "into EPIC - see Media section.    Limitation Score: 33%  Category: Mobility    Current : CJ = at least 20% but < 40% impaired, limited or restricted  Goal: CJ = at least 20% but < 40% impaired, limited or restricted           TREATMENT   Treatment Time In: 3:37 pm   Treatment Time Out: 3:50 pm   Total Treatment time separate from Evaluation: 13 minutes    Cricket received therapeutic exercises to develop strength, ROM and flexibility for 13 minutes including:  Standing gastroc stretch 3x30" B  Standing soleus stretch 3x30" B  Standing heel raises 2x15  Standing hip abduction 2x10 B  Standing hip extension 2x10 B      Home Exercises and Patient Education Provided    Education provided:   - HEP    Written Home Exercises Provided: yes.  Exercises were reviewed and Cricket was able to demonstrate them prior to the end of the session.  Cricket demonstrated good  understanding of the education provided.     See EMR under Patient Instructions for exercises provided 6/11/2019.    Assessment   Cricket Morrow is a 78 y.o. male referred to outpatient Physical Therapy with a medical diagnosis of pain in both lower extremities. Pt presents with c/o chronic pain in (B) calf region, decreased (B) LE strength, and decreased functional mobility. Pt was able to perform lumbar AROM in all directions noted above without reproduction or worsening of (B) calf pain. Pt will benefit from skilled PT to address the limitations listed above in order to return pt to her highest level of function with decreased pain and limitation.     Pt prognosis is Good.   Pt will benefit from skilled outpatient Physical Therapy to address the deficits stated above and in the chart below, provide pt/family education, and to maximize pt's level of independence.     Plan of care discussed with patient: Yes  Pt's spiritual, cultural and educational needs considered and patient is agreeable to the plan of care and goals as stated below:     Anticipated Barriers for " therapy: chronicity of pain    Medical Necessity is demonstrated by the following  History  Co-morbidities and personal factors that may impact the plan of care Co-morbidities:   neuropathy    Personal Factors:   no deficits     moderate   Examination  Body Structures and Functions, activity limitations and participation restrictions that may impact the plan of care Body Regions:   back    Body Systems:    ROM  strength  gait  transfers    Participation Restrictions:   None mentioned    Activity limitations:   Learning and applying knowledge  no deficits    General Tasks and Commands  no deficits    Communication  no deficits    Mobility  sitting for prolonged period of time    Self care  no deficits    Domestic Life  no deficits    Interactions/Relationships  no deficits    Life Areas  no deficits    Community and Social Life  no deficits         high   Clinical Presentation stable and uncomplicated low   Decision Making/ Complexity Score: low     Goals:    Long Term Goals: 6 weeks   1. Pt will be independent with HEP to supplement PT in decreasing pain and improving mobility  2. Pt will improve impaired (B) LE MMT to 5/5 in order to improve strength for functional tasks  3. Pt will improve FOTO score to </= 25% limited in order to demo improved functional mobility  4. Pt will report pain in (B) calf region </= 4/10 at worst when performing ADLs in order to be able to perform ADLs with less difficulty.     Plan   Plan of care Certification: 6/11/2019 to 7/26/19.    Outpatient Physical Therapy 1 times weekly for 6 weeks to include the following interventions: Gait Training, Manual Therapy, Moist Heat/ Ice, Neuromuscular Re-ed, Patient Education, Self Care, Therapeutic Activites, Therapeutic Exercise and modalities prn.     Sue Cabrera, PT

## 2019-08-26 ENCOUNTER — DOCUMENTATION ONLY (OUTPATIENT)
Dept: REHABILITATION | Facility: HOSPITAL | Age: 79
End: 2019-08-26

## 2019-08-26 DIAGNOSIS — M79.605 PAIN IN BOTH LOWER EXTREMITIES: ICD-10-CM

## 2019-08-26 DIAGNOSIS — M79.604 PAIN IN BOTH LOWER EXTREMITIES: ICD-10-CM

## 2019-08-26 NOTE — PROGRESS NOTES
Outpatient Therapy Discharge Summary     Name: Cricket Mcdonnell  [facility] Number: [id number]    Therapy Diagnosis:   Encounter Diagnosis   Name Primary?    Pain in both lower extremities      Physician: No ref. provider found    Physician Orders: PT Eval and Treat   Medical Diagnosis from Referral: M79.604,M79.605 (ICD-10-CM) - Pain in both lower extremities  Evaluation Date: 6/11/2019    Date of Last visit: 6/11/19  Total Visits Received: 1      Assessment    Goals: unable to determine due to pt not attending PT since initial evaluation    Discharge reason: Patient has not attended therapy since 6/11/19    Plan   This patient is discharged from Physical Therapy

## 2019-12-06 PROBLEM — H35.30 AGE-RELATED MACULAR DEGENERATION: Status: ACTIVE | Noted: 2018-09-05

## 2019-12-06 PROBLEM — F41.9 ANXIETY DISORDER: Status: ACTIVE | Noted: 2018-09-05

## 2019-12-06 PROBLEM — G47.30 SLEEP APNEA: Status: ACTIVE | Noted: 2018-09-05

## 2020-01-15 ENCOUNTER — PATIENT OUTREACH (OUTPATIENT)
Dept: ADMINISTRATIVE | Facility: OTHER | Age: 80
End: 2020-01-15

## 2020-01-16 ENCOUNTER — OFFICE VISIT (OUTPATIENT)
Dept: UROLOGY | Facility: CLINIC | Age: 80
End: 2020-01-16
Payer: MEDICARE

## 2020-01-16 ENCOUNTER — TELEPHONE (OUTPATIENT)
Dept: UROLOGY | Facility: CLINIC | Age: 80
End: 2020-01-16

## 2020-01-16 ENCOUNTER — LAB VISIT (OUTPATIENT)
Dept: LAB | Facility: HOSPITAL | Age: 80
End: 2020-01-16
Payer: MEDICARE

## 2020-01-16 VITALS
HEART RATE: 63 BPM | HEIGHT: 67 IN | SYSTOLIC BLOOD PRESSURE: 107 MMHG | BODY MASS INDEX: 24.48 KG/M2 | DIASTOLIC BLOOD PRESSURE: 63 MMHG | WEIGHT: 156 LBS

## 2020-01-16 DIAGNOSIS — N52.9 INABILITY TO MAINTAIN ERECTION: ICD-10-CM

## 2020-01-16 DIAGNOSIS — R79.89 LOW TESTOSTERONE: Primary | ICD-10-CM

## 2020-01-16 DIAGNOSIS — N13.8 BENIGN PROSTATIC HYPERPLASIA WITH URINARY OBSTRUCTION: Primary | ICD-10-CM

## 2020-01-16 DIAGNOSIS — N40.1 BENIGN PROSTATIC HYPERPLASIA WITH URINARY OBSTRUCTION: Primary | ICD-10-CM

## 2020-01-16 LAB — TESTOST SERPL-MCNC: 126 NG/DL (ref 304–1227)

## 2020-01-16 PROCEDURE — 81002 PR URINALYSIS NONAUTO W/O SCOPE: ICD-10-PCS | Mod: HCNC,S$GLB,, | Performed by: NURSE PRACTITIONER

## 2020-01-16 PROCEDURE — 1159F PR MEDICATION LIST DOCUMENTED IN MEDICAL RECORD: ICD-10-PCS | Mod: HCNC,S$GLB,, | Performed by: NURSE PRACTITIONER

## 2020-01-16 PROCEDURE — 99214 OFFICE O/P EST MOD 30 MIN: CPT | Mod: HCNC,25,S$GLB, | Performed by: NURSE PRACTITIONER

## 2020-01-16 PROCEDURE — 1159F MED LIST DOCD IN RCRD: CPT | Mod: HCNC,S$GLB,, | Performed by: NURSE PRACTITIONER

## 2020-01-16 PROCEDURE — 36415 COLL VENOUS BLD VENIPUNCTURE: CPT | Mod: HCNC

## 2020-01-16 PROCEDURE — 84403 ASSAY OF TOTAL TESTOSTERONE: CPT | Mod: HCNC

## 2020-01-16 PROCEDURE — 99999 PR PBB SHADOW E&M-EST. PATIENT-LVL IV: CPT | Mod: PBBFAC,HCNC,, | Performed by: NURSE PRACTITIONER

## 2020-01-16 PROCEDURE — 1126F AMNT PAIN NOTED NONE PRSNT: CPT | Mod: HCNC,S$GLB,, | Performed by: NURSE PRACTITIONER

## 2020-01-16 PROCEDURE — 81002 URINALYSIS NONAUTO W/O SCOPE: CPT | Mod: HCNC,S$GLB,, | Performed by: NURSE PRACTITIONER

## 2020-01-16 PROCEDURE — 99999 PR PBB SHADOW E&M-EST. PATIENT-LVL IV: ICD-10-PCS | Mod: PBBFAC,HCNC,, | Performed by: NURSE PRACTITIONER

## 2020-01-16 PROCEDURE — 1126F PR PAIN SEVERITY QUANTIFIED, NO PAIN PRESENT: ICD-10-PCS | Mod: HCNC,S$GLB,, | Performed by: NURSE PRACTITIONER

## 2020-01-16 PROCEDURE — 99214 PR OFFICE/OUTPT VISIT, EST, LEVL IV, 30-39 MIN: ICD-10-PCS | Mod: HCNC,25,S$GLB, | Performed by: NURSE PRACTITIONER

## 2020-01-16 PROCEDURE — 1101F PT FALLS ASSESS-DOCD LE1/YR: CPT | Mod: HCNC,CPTII,S$GLB, | Performed by: NURSE PRACTITIONER

## 2020-01-16 PROCEDURE — 1101F PR PT FALLS ASSESS DOC 0-1 FALLS W/OUT INJ PAST YR: ICD-10-PCS | Mod: HCNC,CPTII,S$GLB, | Performed by: NURSE PRACTITIONER

## 2020-01-16 RX ORDER — GENTAMICIN SULFATE 3 MG/ML
SOLUTION/ DROPS OPHTHALMIC
COMMUNITY
Start: 2020-01-14 | End: 2020-06-12

## 2020-01-16 RX ORDER — TADALAFIL 20 MG/1
20 TABLET ORAL DAILY
Qty: 4 TABLET | Refills: 11 | Status: SHIPPED | OUTPATIENT
Start: 2020-01-16 | End: 2020-06-12

## 2020-01-16 NOTE — PATIENT INSTRUCTIONS
Oral Medicines for Erectile Dysfunction  You can use prescription oral medicine for ED. They often work well. But, like all medicines, they can have side effects. Also, you cant use them if you have certain health problems or take certain other medicines. Talk with your doctor about oral ED medicine. Ask whether it is right for you.  Types of oral ED medicines  There are three types of prescription oral ED medicines. Each one increases blood flow to the penis. When the penis is stimulated, an erection results. The types are:  · Sildenafil citrate   · Tadalafil   · Vardenafil  · Avanfil  What oral ED medicines dont do  There are some things ED medicines cant do.  · They dont cure the cause of your ED. Without the medicine, youll still have trouble getting an erection.  · They cant produce an erection without sexual stimulation.  · They wont increase sexual desire. They also wont solve any other sexual issues. Psychological, emotional, or relationship issues will not be fixed.  Taking oral ED medicines safely  · Do not combine ED medicines with other treatments unless your doctor tells you to.  · Dont take ED medicines more often or in larger doses than prescribed.  · Tell your doctor your health history. Mention all medicines you take. This includes over-the-counter drugs, supplements, and herbs.  · Ask your doctor about whether you can drink alcohol while taking ED medication.  Possible side effects of oral ED medicines  · Headache  · Facial flushing  · Runny or stuffy nose  · Indigestion  · Distortion of your color vision for a short time  · Sudden vision loss or hearing loss (rare)  · Dizziness  Risks of oral ED medicines  · Do not take ED medicines if you take medicines containing nitrates. The combination may drop your blood pressure to a dangerous level. Nitrates include nitroglycerin (a drug for angina or chest pain). They are also in poppers, an inhaled recreational drug. If youre not sure  whether youre taking nitrates, ask your doctor or pharmacist.  · Medicines called alpha-blockers can interact with ED medicines. They can cause a sudden drop in blood pressure. Alpha-blockers are a common treatment for prostate problems. They also treat high blood pressure. Be sure your doctor knows if you take these medicines.  · If youve had a heart attack or have heart disease and you have not had sex for a while, talk to your doctor. Having sex again can put extra strain on your heart. Your doctor can confirm that your heart is healthy enough for sex.  · It is rare, but some men taking ED medicines have had sudden vision loss. This may be more likely if other health problems are present. These include high blood pressure and diabetes. Ask your doctor if you are at risk for this type of vision loss.  · In rare cases, an erection may last too long. This can badly damage the blood vessels in your penis. If an erection lasts longer than 4 hours, go to the emergency room right away.   Date Last Reviewed: 1/1/2017 © 2000-2017 [employer]. [address] [place] [address]. All rights reserved. This information is not intended as a substitute for professional medical care. Always follow your healthcare professional's instructions.        Understanding Erectile Dysfunction    Erectile dysfunction (ED) is a problem getting an erection firm enough or keeping it long enough for intercourse. The problem can happen to any man at any age. But health problems that can lead to ED become more common as a man ages. Up to half of men over age 40 experience ED at some point.  Causes of ED  ED can have many causes. Most are physical. Some are emotional issues. Often, a combination of causes is involved. Causes of ED may include:  · Medical conditions such as diabetes or depression  · Smoking tobacco or marijuana  · Drinking too much alcohol  · Side effects of medications  · Injury to nerves or blood  vessels  · Emotional issues such as stress or relationship problems  ED can be treated  Prescription medications for ED are available. They help many men who try them. Depending upon the cause of the ED, though, medications may not be enough. In these cases, other treatment options are available. These include erectile aids and surgery. Your health care provider can tell you more about the treatment that is right for you. And new treatments for ED are being studied. No matter what the treatment you decide on, stay in touch with your doctor. If your symptoms persist, he or she may be able to adjust your current treatment or try something new.  Date Last Reviewed: 1/1/2017  © 8490-8731 [employer], [employer]. [address] [place] [address], [place] [address]. All rights reserved. This information is not intended as a substitute for professional medical care. Always follow your healthcare professional's instructions.

## 2020-01-16 NOTE — TELEPHONE ENCOUNTER
Discussed and reviewed low T (126). Recommend repeating AM T for definitive dx. Discussed other labs ordered as well.  F/u 1 wk to discuss potential tx options.  Pt verbalized understanding.

## 2020-01-16 NOTE — PROGRESS NOTES
CHIEF COMPLAINT:    Mr. Mcdonnell is a 79 y.o. male presenting for f/u ED.    PRESENTING ILLNESS:    Cricket Mcdonnell is a 79 y.o. male w/ h/o KARMA, BPH, and ED.    Pt last seen in clinic 6/4/19 for f/u BPH, and ED. Pt elected to continue w/ sildenafil, as he was satisfied w/ effectiveness.    Today pt returns to clinic reporting having increased difficulty maintaining erections, despite taking sildenafil 100mg on empty stomach. Reports achieving >75% rigidity, only for masturbation. Notes sildenafil is from 2 yrs ago, may be old. Has worked for him in the past. Also, reports anejaculation, and decreased volume. Denies issues w/ orgasm. Denies GH, dysuria, irritative/obstructive urinary sxs.    UA dip in clinic today is unremarkable.    REVIEW OF SYSTEMS:    Cricket Mcdonnell denies headache, blurred vision, fever, nausea, vomiting, chills, abdominal pain, pelvic pain, flank pain, bleeding per rectum, cough, SOB, recent loss of consciousness, recent mental status changes, seizures, dizziness, or upper or lower extremity weakness.    PATIENT HISTORY:    Past Medical History:   Diagnosis Date    Hereditary sensory neuropathy        Past Surgical History:   Procedure Laterality Date    CATARACT EXTRACTION      COLONOSCOPY N/A 1/3/2019    Procedure: COLONOSCOPY;  Surgeon: Cholo Yates MD;  Location: [facility] ([address] [place] [address]);  Service: Endoscopy;  Laterality: N/A;    galucoma surgery      HERNIA REPAIR      tonsillectomy         Family History   Problem Relation Age of Onset    COPD Sister     Lung cancer Sister        Social History     Socioeconomic History    Marital status:      Spouse name: Not on file    Number of children: Not on file    Years of education: Not on file    Highest education level: Not on file   Occupational History     Employer: [employer]   Social Needs    Financial resource strain: Not on file    Food insecurity:     Worry: Not on file     Inability: Not on file     Transportation needs:     Medical: Not on file     Non-medical: Not on file   Tobacco Use    Smoking status: Former Smoker     Last attempt to quit: 2001     Years since quittin.5    Smokeless tobacco: Never Used   Substance and Sexual Activity    Alcohol use: No     Alcohol/week: 0.0 standard drinks    Drug use: No    Sexual activity: Yes     Partners: Female   Lifestyle    Physical activity:     Days per week: Not on file     Minutes per session: Not on file    Stress: Not on file   Relationships    Social connections:     Talks on phone: Not on file     Gets together: Not on file     Attends [employer] service: Not on file     Active member of club or organization: Not on file     Attends meetings of clubs or organizations: Not on file     Relationship status: Not on file   Other Topics Concern    Not on file   Social History Narrative    Not on file       Allergies:  Amitriptyline; Duloxetine; Gabapentin; Lortab [hydrocodone-acetaminophen]; [place]; Poison ivy extract; and Vioxx [rofecoxib]    Medications:    Current Outpatient Medications:     brimonidine 0.2% (ALPHAGAN) 0.2 % Drop, , Disp: , Rfl:     timolol maleate 0.25% (TIMOPTIC) 0.25 % Drop, , Disp: , Rfl:     acetaZOLAMIDE (DIAMOX) 250 MG tablet, Take 250 mg by mouth 3 (three) times daily., Disp: , Rfl:     ALPHA LIPOIC ACID ORAL, Take by mouth once daily. , Disp: , Rfl:     ascorbic acid (VITAMIN C) 500 MG tablet, Take 500 mg by mouth once daily., Disp: , Rfl:     DUEXIS 800-26.6 mg Tab, Take 1 tablet by mouth 3 (three) times daily., Disp: , Rfl: 0    escitalopram oxalate (LEXAPRO) 10 MG tablet, Take 10 mg by mouth once daily., Disp: , Rfl:     fluticasone (FLONASE) 50 mcg/actuation nasal spray, 2 sprays by Each Nare route once daily. (Patient not taking: Reported on 2020), Disp: 1 Bottle, Rfl: 2    gentamicin (GARAMYCIN) 0.3 % ophthalmic solution, INT 1 GTT IN OD QID, Disp: , Rfl:     JUBLIA 10 % Betty, APPLY ONE DROP TO  ALL SMALLER TOES AND 2 DROPS TO GREATER TOES DAILY, Disp: , Rfl: 6    latanoprost 0.005 % ophthalmic solution, , Disp: , Rfl:     LOTEMAX 0.5 % ophthalmic suspension, INT 1 GTT IN OD BID FOR 4 WEEKS, Disp: , Rfl: 1    MULTIVIT-IRON-MIN-FOLIC ACID 3,500-18-0.4 UNIT-MG-MG ORAL CHEW, Take by mouth once daily. , Disp: , Rfl:     neomycin-polymyxin-dexamethasone (DEXACINE) 3.5 mg/g-10,000 unit/g-0.1 % Oint, , Disp: , Rfl:     ofloxacin (OCUFLOX) 0.3 % ophthalmic solution, INSTILL 1 DROP IN LEFT EYE TID - USE ONLY 3 DAYS AFTER EACH INJECTION, Disp: , Rfl: 0    PREVIDENT 5000 BOOSTER PLUS 1.1 % Pste, once daily. , Disp: , Rfl: 0    tadalafil (CIALIS) 20 MG Tab, Take 1 tablet (20 mg total) by mouth once daily., Disp: 4 tablet, Rfl: 11    PHYSICAL EXAMINATION:    The patient generally appears in good health, is appropriately interactive, and is in no apparent distress.     Eyes: anicteric sclerae, moist conjunctivae; no lid-lag; PERRLA     HENT: Atraumatic; oropharynx clear with moist mucous membranes and no mucosal ulcerations;normal hard and soft palate. No evidence of lymphadenopathy.    Neck: Trachea midline.  No thyromegaly.    Musculoskeletal: No abnormal gait.    Skin: No lesions.    Mental: Cooperative with normal affect.  Is oriented to time, place, and person.    Neuro: Grossly intact.    Chest: Normal inspiratory effort.   No accessory muscles.  No audible wheezes.  Respirations symmetric on inspiration and expiration.    Heart: Regular rhythm.    Extremities: No clubbing, cyanosis, or edema.    LABS:    Lab Results   Component Value Date    CREATININE 0.7 05/14/2019    CREATININE 0.7 05/21/2018    CREATININE 0.8 03/27/2017       Lab Results   Component Value Date    PSA 2.1 05/21/2018    PSA 1.8 03/27/2017    PSA 1.82 05/31/2013    PSA 1.62 07/03/2012    PSA 1.55 05/31/2012    PSA 1.7 07/01/2011    PSA 1.9 06/09/2010    PSA 1.8 05/13/2009    PSA 1.0 06/17/2008    PSA 0.8 06/05/2007    PSADIAG 1.7  07/07/2016    PSADIAG 1.9 07/17/2014    PSATOTAL 2.1 07/06/2015    PSAFREE 0.32 07/06/2015    PSAFREEPCT 15.24 07/06/2015       Hemoglobin A1C   Date Value Ref Range Status   05/14/2019 5.3 4.0 - 5.6 % Final     Comment:     ADA Screening Guidelines:  5.7-6.4%  Consistent with prediabetes  >or=6.5%  Consistent with diabetes  High levels of fetal hemoglobin interfere with the HbA1C  assay. Heterozygous hemoglobin variants (HbS, HgC, etc)do  not significantly interfere with this assay.   However, presence of multiple variants may affect accuracy.     05/21/2018 5.0 4.0 - 5.6 % Final     Comment:     According to ADA guidelines, hemoglobin A1c <7.0% represents  optimal control in non-pregnant diabetic patients. Different  metrics may apply to specific patient populations.   Standards of Medical Care in Diabetes-2016.  For the purpose of screening for the presence of diabetes:  <5.7%     Consistent with the absence of diabetes  5.7-6.4%  Consistent with increasing risk for diabetes   (prediabetes)  >or=6.5%  Consistent with diabetes  Currently, no consensus exists for use of hemoglobin A1c  for diagnosis of diabetes for children.  This Hemoglobin A1c assay has significant interference with fetal   hemoglobin   (HbF). The results are invalid for patients with abnormal amounts of   HbF,   including those with known Hereditary Persistence   of Fetal Hemoglobin. Heterozygous hemoglobin variants (HbAS, HbAC,   HbAD, HbAE, HbA2) do not significantly interfere with this assay;   however, presence of multiple variants in a sample may impact the %   interference.     03/27/2017 5.5 4.5 - 6.2 % Final     Comment:     According to ADA guidelines, hemoglobin A1C <7.0% represents  optimal control in non-pregnant diabetic patients.  Different  metrics may apply to specific populations.   Standards of Medical Care in Diabetes - 2016.  For the purpose of screening for the presence of diabetes:  <5.7%     Consistent with the absence of  diabetes  5.7-6.4%  Consistent with increasing risk for diabetes   (prediabetes)  >or=6.5%  Consistent with diabetes  Currently no consensus exists for use of hemoglobin A1C  for diagnosis of diabetes for children.         Lab Results   Component Value Date    TOTALTESTOST 302 2013        IMPRESSION:    Inability to maintain erection  -     Testosterone; Future; Expected date: 2020  -     tadalafil (CIALIS) 20 MG Tab; Take 1 tablet (20 mg total) by mouth once daily.  Dispense: 4 tablet; Refill: 11    Benign prostatic hyperplasia with urinary obstruction      PLAN:    I spent 25 minutes with the patient of which more than half was spent in direct consultation with the patient in regards to our treatment and plan.      Discussed and reviewed ED, maintaining erections, aging medication. Discussed ED tx options, benefits/burdens/potential adv effects. Pt elects trial w/ tadalafil through retail pharmacy, if too expensive discussed use of compounding pharmacy. Or if decides to return to sildenafil, w/ updated medication that is not  pt will contact clinic.    Discussed anejaculation/low volume ejaculate, potential etiologies. Pt would like to update his T today.    Education sheets provided.    Follow up in about 6 months (around 2020) for bph as scheduled.    Pt expressed understanding and agree w/ plan.

## 2020-01-17 ENCOUNTER — TELEPHONE (OUTPATIENT)
Dept: UROLOGY | Facility: CLINIC | Age: 80
End: 2020-01-17

## 2020-01-17 NOTE — TELEPHONE ENCOUNTER
----- Message from Cristhian Veras, DNP sent at 1/16/2020 12:32 PM CST -----  Regarding: pls schedule AM labs w/ 1 wk f/u to discuss results and potential tx options  pls schedule AM labs w/ 1 wk f/u to discuss results and potential tx options

## 2020-01-20 ENCOUNTER — LAB VISIT (OUTPATIENT)
Dept: LAB | Facility: HOSPITAL | Age: 80
End: 2020-01-20
Payer: MEDICARE

## 2020-01-20 DIAGNOSIS — R79.89 LOW TESTOSTERONE: ICD-10-CM

## 2020-01-20 LAB
ALBUMIN SERPL BCP-MCNC: 3.8 G/DL (ref 3.5–5.2)
ALP SERPL-CCNC: 66 U/L (ref 55–135)
ALT SERPL W/O P-5'-P-CCNC: 19 U/L (ref 10–44)
ANION GAP SERPL CALC-SCNC: 9 MMOL/L (ref 8–16)
AST SERPL-CCNC: 23 U/L (ref 10–40)
BASOPHILS # BLD AUTO: 0.03 K/UL (ref 0–0.2)
BASOPHILS NFR BLD: 0.4 % (ref 0–1.9)
BILIRUB SERPL-MCNC: 0.3 MG/DL (ref 0.1–1)
BUN SERPL-MCNC: 16 MG/DL (ref 8–23)
CALCIUM SERPL-MCNC: 9.2 MG/DL (ref 8.7–10.5)
CHLORIDE SERPL-SCNC: 106 MMOL/L (ref 95–110)
CO2 SERPL-SCNC: 26 MMOL/L (ref 23–29)
CREAT SERPL-MCNC: 0.7 MG/DL (ref 0.5–1.4)
DIFFERENTIAL METHOD: ABNORMAL
EOSINOPHIL # BLD AUTO: 0.1 K/UL (ref 0–0.5)
EOSINOPHIL NFR BLD: 1.2 % (ref 0–8)
ERYTHROCYTE [DISTWIDTH] IN BLOOD BY AUTOMATED COUNT: 12.4 % (ref 11.5–14.5)
EST. GFR  (AFRICAN AMERICAN): >60 ML/MIN/1.73 M^2
EST. GFR  (NON AFRICAN AMERICAN): >60 ML/MIN/1.73 M^2
GLUCOSE SERPL-MCNC: 64 MG/DL (ref 70–110)
HCT VFR BLD AUTO: 41.4 % (ref 40–54)
HGB BLD-MCNC: 13.4 G/DL (ref 14–18)
IMM GRANULOCYTES # BLD AUTO: 0.03 K/UL (ref 0–0.04)
IMM GRANULOCYTES NFR BLD AUTO: 0.4 % (ref 0–0.5)
LYMPHOCYTES # BLD AUTO: 1.8 K/UL (ref 1–4.8)
LYMPHOCYTES NFR BLD: 26.4 % (ref 18–48)
MCH RBC QN AUTO: 32.3 PG (ref 27–31)
MCHC RBC AUTO-ENTMCNC: 32.4 G/DL (ref 32–36)
MCV RBC AUTO: 100 FL (ref 82–98)
MONOCYTES # BLD AUTO: 0.7 K/UL (ref 0.3–1)
MONOCYTES NFR BLD: 9.9 % (ref 4–15)
NEUTROPHILS # BLD AUTO: 4.2 K/UL (ref 1.8–7.7)
NEUTROPHILS NFR BLD: 61.7 % (ref 38–73)
NRBC BLD-RTO: 0 /100 WBC
PLATELET # BLD AUTO: 243 K/UL (ref 150–350)
PMV BLD AUTO: 9.6 FL (ref 9.2–12.9)
POTASSIUM SERPL-SCNC: 4.1 MMOL/L (ref 3.5–5.1)
PROLACTIN SERPL IA-MCNC: 8.2 NG/ML (ref 3.5–19.4)
PROT SERPL-MCNC: 6.9 G/DL (ref 6–8.4)
RBC # BLD AUTO: 4.15 M/UL (ref 4.6–6.2)
SODIUM SERPL-SCNC: 141 MMOL/L (ref 136–145)
TESTOST SERPL-MCNC: 105 NG/DL (ref 304–1227)
WBC # BLD AUTO: 6.75 K/UL (ref 3.9–12.7)

## 2020-01-20 PROCEDURE — 36415 COLL VENOUS BLD VENIPUNCTURE: CPT | Mod: HCNC

## 2020-01-20 PROCEDURE — 85025 COMPLETE CBC W/AUTO DIFF WBC: CPT | Mod: HCNC

## 2020-01-20 PROCEDURE — 80053 COMPREHEN METABOLIC PANEL: CPT | Mod: HCNC

## 2020-01-20 PROCEDURE — 84146 ASSAY OF PROLACTIN: CPT | Mod: HCNC

## 2020-01-20 PROCEDURE — 84403 ASSAY OF TOTAL TESTOSTERONE: CPT | Mod: HCNC

## 2020-01-21 ENCOUNTER — PATIENT OUTREACH (OUTPATIENT)
Dept: ADMINISTRATIVE | Facility: OTHER | Age: 80
End: 2020-01-21

## 2020-01-21 ENCOUNTER — TELEPHONE (OUTPATIENT)
Dept: UROLOGY | Facility: CLINIC | Age: 80
End: 2020-01-21

## 2020-01-21 NOTE — TELEPHONE ENCOUNTER
----- Message from Radha Coffey sent at 1/21/2020  2:39 PM CST -----  Contact: Pt   Reason: Pt calling to speak with someone regard to his options to pay for visit on tomorrow he stated some how he was thrown out [employer] and cant use it until feb but he has a [facility] health plan.    Communication: [phone]

## 2020-01-22 ENCOUNTER — OFFICE VISIT (OUTPATIENT)
Dept: UROLOGY | Facility: CLINIC | Age: 80
End: 2020-01-22
Payer: MEDICARE

## 2020-01-22 VITALS
SYSTOLIC BLOOD PRESSURE: 119 MMHG | DIASTOLIC BLOOD PRESSURE: 65 MMHG | BODY MASS INDEX: 24.86 KG/M2 | WEIGHT: 158.75 LBS | HEART RATE: 65 BPM

## 2020-01-22 DIAGNOSIS — N13.8 BENIGN PROSTATIC HYPERPLASIA WITH URINARY OBSTRUCTION: ICD-10-CM

## 2020-01-22 DIAGNOSIS — N40.1 BENIGN PROSTATIC HYPERPLASIA WITH URINARY OBSTRUCTION: ICD-10-CM

## 2020-01-22 DIAGNOSIS — E29.1 TESTOSTERONE DEFICIENCY IN MALE: Primary | ICD-10-CM

## 2020-01-22 DIAGNOSIS — R79.89 LOW TESTOSTERONE IN MALE: ICD-10-CM

## 2020-01-22 PROCEDURE — 99999 PR PBB SHADOW E&M-EST. PATIENT-LVL III: ICD-10-PCS | Mod: PBBFAC,HCNC,, | Performed by: NURSE PRACTITIONER

## 2020-01-22 PROCEDURE — 99214 OFFICE O/P EST MOD 30 MIN: CPT | Mod: HCNC,S$GLB,, | Performed by: NURSE PRACTITIONER

## 2020-01-22 PROCEDURE — 1101F PR PT FALLS ASSESS DOC 0-1 FALLS W/OUT INJ PAST YR: ICD-10-PCS | Mod: HCNC,CPTII,S$GLB, | Performed by: NURSE PRACTITIONER

## 2020-01-22 PROCEDURE — 1126F PR PAIN SEVERITY QUANTIFIED, NO PAIN PRESENT: ICD-10-PCS | Mod: HCNC,S$GLB,, | Performed by: NURSE PRACTITIONER

## 2020-01-22 PROCEDURE — 1159F PR MEDICATION LIST DOCUMENTED IN MEDICAL RECORD: ICD-10-PCS | Mod: HCNC,S$GLB,, | Performed by: NURSE PRACTITIONER

## 2020-01-22 PROCEDURE — 1159F MED LIST DOCD IN RCRD: CPT | Mod: HCNC,S$GLB,, | Performed by: NURSE PRACTITIONER

## 2020-01-22 PROCEDURE — 1101F PT FALLS ASSESS-DOCD LE1/YR: CPT | Mod: HCNC,CPTII,S$GLB, | Performed by: NURSE PRACTITIONER

## 2020-01-22 PROCEDURE — 1126F AMNT PAIN NOTED NONE PRSNT: CPT | Mod: HCNC,S$GLB,, | Performed by: NURSE PRACTITIONER

## 2020-01-22 PROCEDURE — 99214 PR OFFICE/OUTPT VISIT, EST, LEVL IV, 30-39 MIN: ICD-10-PCS | Mod: HCNC,S$GLB,, | Performed by: NURSE PRACTITIONER

## 2020-01-22 PROCEDURE — 99999 PR PBB SHADOW E&M-EST. PATIENT-LVL III: CPT | Mod: PBBFAC,HCNC,, | Performed by: NURSE PRACTITIONER

## 2020-01-22 RX ORDER — TESTOSTERONE 20.25 MG/1.25G
2.5 GEL TOPICAL DAILY
Qty: 60 PACKET | Refills: 5 | Status: SHIPPED | OUTPATIENT
Start: 2020-01-22 | End: 2020-02-21

## 2020-01-22 RX ORDER — TESTOSTERONE GEL, 1% 10 MG/G
GEL TRANSDERMAL DAILY
Refills: 0 | Status: CANCELLED | OUTPATIENT
Start: 2020-01-22 | End: 2020-07-22

## 2020-01-22 NOTE — PATIENT INSTRUCTIONS
Total Testosterone  Does this test have other names?  Testosterone (total), serum testosterone  What is this test?  This test measures the level of the hormone testosterone in your blood. Testosterone is a male sex hormone (androgen) that helps male features develop. Testosterone is made in the testes and the adrenal glands. It causes the changes that occur in boys during puberty. Testosterone helps hair and muscles to grow. It also helps the penis and testes to grow. Testosterone also causes a boy's voice to deepen. Men continue to make testosterone. In adults it boosts sex drive and helps make sperm.  Women's ovaries also make small amounts of testosterone. It helps many organs and body processes in women.  The pituitary gland in your brain regulates the amount of testosterone your body makes.   Most of the testosterone in your blood attaches to two proteins: albumin and sex hormone binding globulin (SHBG). Some testosterone is not attached to proteins, or free. Free testosterone and albumin-bound testosterone are also referred to as bioavailable testosterone. This is the testosterone that is easily used by your body.  If your healthcare provider suspects that you have low or high testosterone, he or she will first test total testosterone levels. This looks at all three parts of testosterone. The free testosterone can help give more information when total testosterone is low.  Both men and women can have health problems because of low or high levels of testosterone. Women with high levels of testosterone may have polycystic ovary syndrome (PCOS). This condition marked by infertility, lack of menstruation, acne, obesity, blood sugar problems, and extra hair growth, especially on the face.  Testosterone levels in men drop as they age, but this is not considered to be hypogonadism. The FDA currently recommends against treating men with low testosterone caused only by aging.  Why do I need this test?  You may need  this test if you have symptoms of low testosterone.  Symptoms of low testosterone in men include:  · Large breasts  · Low sex drive or lack of interest in sex  · Difficulty getting an erection  · Low sperm count and other fertility problems  · Changes in the testicles  · Weak bones  · Irritability  · Difficulty concentrating  · Loss of muscle mass  · Hair loss  · Depression  · Fatigue  · Anemia  Symptoms of low testosterone in women include:  · Fertility problems  · Missed or irregular menstrual periods  · Osteoporosis  · Low sex drive  · Changes in breast tissue  · Vaginal dryness   What other tests might I have along with this test?  Your healthcare provider may order other blood tests to check hormone levels. These include:  · Follicle-stimulating hormone (FSH) test  · Luteinizing hormone (LH) test  · Thyroid stimulating hormone (TSH) test  You may also need to have:  · Biopsy of the testicles  · Imaging test, such as an MRI  · Semen analysis  · Tests of the pituitary gland   What do my test results mean?  Many things may affect your lab test results. These include the method each lab uses to do the test. Even if your test results are different from the normal value, you may not have a problem. To learn what the results mean for you, talk with your healthcare provider.  The results of this test are given in nanograms per deciliter (ng/dL). Normal test results show total testosterone levels of:  · 280 to 1,100 ng/dL for men  · 15 to 70 ng/dL for women  If your testosterone levels are lower than normal, you may have a condition that affects your testosterone production. If your testosterone levels are higher than normal, you may have a tumor on the testes or ovaries that affects your testosterone production.   How is this test done?  The test requires a blood sample, which is drawn through a needle from a vein in your arm. This test is usually done in the morning, because testosterone levels tend to be highest at  that time. But you may need to have this test more than once, and at different times of the day, to confirm low testosterone levels. This is because your testosterone level can change from morning to evening and from day to day.  Does this test pose any risks?  Taking a blood sample with a needle carries risks that include bleeding, infection, bruising, or feeling dizzy. When the needle pricks your arm, you may feel a slight stinging sensation or pain. Afterward, the site may be slightly sore.   What might affect my test results?  Some medicines may affect your test results. These include antifungal medicines such as ketoconazole and hormone medicines. Having the test done late in the day may show that your testosterone level is lower than it really is.  How do I get ready for this test?  You don't need to prepare for this test. But be sure your healthcare provider knows about all medicines, herbs, vitamins, and supplements you are taking. This includes medicines that don't need a prescription and any illicit drugs you may use.   © 8411-4173 [employer]. [address] [place] [address], [place] [address]. All rights reserved. This information is not intended as a substitute for professional medical care. Always follow your healthcare professional's instructions.

## 2020-01-22 NOTE — PROGRESS NOTES
CHIEF COMPLAINT:    Mr. Mcdonnell is a 79 y.o. male presenting for f/u low T.    PRESENTING ILLNESS:    Cricket Mcdonnell is a 79 y.o. male w/ h/o AKRMA, BPH, and ED.    Pt last seen in clinic 1/16/20 for f/u ED, and low T.    Today pt returns to clinic for low T, and discuss potential tx options. Interested in TRT w/ gel. Reports cont having increased difficulty maintaining erections, despite taking sildenafil 100mg on empty stomach. Reports achieving >75% rigidity, only for masturbation. Notes sildenafil is from 2 yrs ago, may be old. Has worked for him in the past. Also, reports anejaculation, and decreased volume. Denies issues w/ orgasm. Denies GH, dysuria, irritative/obstructive urinary sxs.    REVIEW OF SYSTEMS:    Cricket Mcdonnell denies headache, blurred vision, fever, nausea, vomiting, chills, abdominal pain, pelvic pain, flank pain, bleeding per rectum, cough, SOB, recent loss of consciousness, recent mental status changes, seizures, dizziness, or upper or lower extremity weakness.    PATIENT HISTORY:    Past Medical History:   Diagnosis Date    Hereditary sensory neuropathy        Past Surgical History:   Procedure Laterality Date    CATARACT EXTRACTION      COLONOSCOPY N/A 1/3/2019    Procedure: COLONOSCOPY;  Surgeon: Cholo Yates MD;  Location: [address]);  Service: Endoscopy;  Laterality: N/A;    galucoma surgery      HERNIA REPAIR      tonsillectomy         Family History   Problem Relation Age of Onset    COPD Sister     Lung cancer Sister        Social History     Socioeconomic History    Marital status:      Spouse name: Not on file    Number of children: Not on file    Years of education: Not on file    Highest education level: Not on file   Occupational History     Employer: [employer]   Social Needs    Financial resource strain: Not on file    Food insecurity:     Worry: Not on file     Inability: Not on file    Transportation needs:     Medical: Not  on file     Non-medical: Not on file   Tobacco Use    Smoking status: Former Smoker     Last attempt to quit: 2001     Years since quittin.5    Smokeless tobacco: Never Used   Substance and Sexual Activity    Alcohol use: No     Alcohol/week: 0.0 standard drinks    Drug use: No    Sexual activity: Yes     Partners: Female   Lifestyle    Physical activity:     Days per week: Not on file     Minutes per session: Not on file    Stress: Not on file   Relationships    Social connections:     Talks on phone: Not on file     Gets together: Not on file     Attends Holiness service: Not on file     Active member of club or organization: Not on file     Attends meetings of clubs or organizations: Not on file     Relationship status: Not on file   Other Topics Concern    Not on file   Social History Narrative    Not on file       Allergies:  Amitriptyline; Duloxetine; Gabapentin; Lortab [hydrocodone-acetaminophen]; Cooper; Poison ivy extract; and Vioxx [rofecoxib]    Medications:    Current Outpatient Medications:     acetaZOLAMIDE (DIAMOX) 250 MG tablet, Take 250 mg by mouth 3 (three) times daily., Disp: , Rfl:     ALPHA LIPOIC ACID ORAL, Take by mouth once daily. , Disp: , Rfl:     ascorbic acid (VITAMIN C) 500 MG tablet, Take 500 mg by mouth once daily., Disp: , Rfl:     brimonidine 0.2% (ALPHAGAN) 0.2 % Drop, , Disp: , Rfl:     DUEXIS 800-26.6 mg Tab, Take 1 tablet by mouth 3 (three) times daily., Disp: , Rfl: 0    escitalopram oxalate (LEXAPRO) 10 MG tablet, Take 10 mg by mouth once daily., Disp: , Rfl:     fluticasone (FLONASE) 50 mcg/actuation nasal spray, 2 sprays by Each Nare route once daily., Disp: 1 Bottle, Rfl: 2    gentamicin (GARAMYCIN) 0.3 % ophthalmic solution, INT 1 GTT IN OD QID, Disp: , Rfl:     JUBLIA 10 % Betty, APPLY ONE DROP TO ALL SMALLER TOES AND 2 DROPS TO GREATER TOES DAILY, Disp: , Rfl: 6    latanoprost 0.005 % ophthalmic solution, , Disp: , Rfl:     LOTEMAX 0.5 %  ophthalmic suspension, INT 1 GTT IN OD BID FOR 4 WEEKS, Disp: , Rfl: 1    MULTIVIT-IRON-MIN-FOLIC ACID 3,500-18-0.4 UNIT-MG-MG ORAL CHEW, Take by mouth once daily. , Disp: , Rfl:     neomycin-polymyxin-dexamethasone (DEXACINE) 3.5 mg/g-10,000 unit/g-0.1 % Oint, , Disp: , Rfl:     ofloxacin (OCUFLOX) 0.3 % ophthalmic solution, INSTILL 1 DROP IN LEFT EYE TID - USE ONLY 3 DAYS AFTER EACH INJECTION, Disp: , Rfl: 0    PREVIDENT 5000 BOOSTER PLUS 1.1 % Pste, once daily. , Disp: , Rfl: 0    tadalafil (CIALIS) 20 MG Tab, Take 1 tablet (20 mg total) by mouth once daily., Disp: 4 tablet, Rfl: 11    timolol maleate 0.25% (TIMOPTIC) 0.25 % Drop, , Disp: , Rfl:     flu vacc ep2733-11,65yr up,PF (FLUZONE HIGH-DOSE 2019-20, PF,) 180 mcg/0.5 mL Syrg, Inject 0.5 mLs into the muscle once., Disp: 0.5 mL, Rfl: 0    testosterone (ANDROGEL) 1.62 % (20.25 mg/1.25 gram) GlPk, Place 2.5 g onto the skin once daily., Disp: 60 packet, Rfl: 5    PHYSICAL EXAMINATION:    The patient generally appears in good health, is appropriately interactive, and is in no apparent distress.     Eyes: anicteric sclerae, moist conjunctivae; no lid-lag; PERRLA     HENT: Atraumatic; oropharynx clear with moist mucous membranes and no mucosal ulcerations;normal hard and soft palate. No evidence of lymphadenopathy.    Neck: Trachea midline.  No thyromegaly.    Musculoskeletal: No abnormal gait.    Skin: No lesions.    Mental: Cooperative with normal affect.  Is oriented to time, place, and person.    Neuro: Grossly intact.    Chest: Normal inspiratory effort.   No accessory muscles.  No audible wheezes.  Respirations symmetric on inspiration and expiration.    Heart: Regular rhythm.    Extremities: No clubbing, cyanosis, or edema.    LABS:    Lab Results   Component Value Date    CREATININE 0.7 01/20/2020    CREATININE 0.7 05/14/2019    CREATININE 0.7 05/21/2018       Lab Results   Component Value Date    PSA 2.1 05/21/2018    PSA 1.8 03/27/2017    PSA 1.82  05/31/2013    PSA 1.62 07/03/2012    PSA 1.55 05/31/2012    PSA 1.7 07/01/2011    PSA 1.9 06/09/2010    PSA 1.8 05/13/2009    PSA 1.0 06/17/2008    PSA 0.8 06/05/2007    PSADIAG 1.7 07/07/2016    PSADIAG 1.9 07/17/2014    PSATOTAL 2.1 07/06/2015    PSAFREE 0.32 07/06/2015    PSAFREEPCT 15.24 07/06/2015       Hemoglobin A1C   Date Value Ref Range Status   05/14/2019 5.3 4.0 - 5.6 % Final     Comment:     ADA Screening Guidelines:  5.7-6.4%  Consistent with prediabetes  >or=6.5%  Consistent with diabetes  High levels of fetal hemoglobin interfere with the HbA1C  assay. Heterozygous hemoglobin variants (HbS, HgC, etc)do  not significantly interfere with this assay.   However, presence of multiple variants may affect accuracy.     05/21/2018 5.0 4.0 - 5.6 % Final     Comment:     According to ADA guidelines, hemoglobin A1c <7.0% represents  optimal control in non-pregnant diabetic patients. Different  metrics may apply to specific patient populations.   Standards of Medical Care in Diabetes-2016.  For the purpose of screening for the presence of diabetes:  <5.7%     Consistent with the absence of diabetes  5.7-6.4%  Consistent with increasing risk for diabetes   (prediabetes)  >or=6.5%  Consistent with diabetes  Currently, no consensus exists for use of hemoglobin A1c  for diagnosis of diabetes for children.  This Hemoglobin A1c assay has significant interference with fetal   hemoglobin   (HbF). The results are invalid for patients with abnormal amounts of   HbF,   including those with known Hereditary Persistence   of Fetal Hemoglobin. Heterozygous hemoglobin variants (HbAS, HbAC,   HbAD, HbAE, HbA2) do not significantly interfere with this assay;   however, presence of multiple variants in a sample may impact the %   interference.     03/27/2017 5.5 4.5 - 6.2 % Final     Comment:     According to ADA guidelines, hemoglobin A1C <7.0% represents  optimal control in non-pregnant diabetic patients.  Different  metrics may  apply to specific populations.   Standards of Medical Care in Diabetes - 2016.  For the purpose of screening for the presence of diabetes:  <5.7%     Consistent with the absence of diabetes  5.7-6.4%  Consistent with increasing risk for diabetes   (prediabetes)  >or=6.5%  Consistent with diabetes  Currently no consensus exists for use of hemoglobin A1C  for diagnosis of diabetes for children.         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  Dispense: 60 packet; Refill: 5      PLAN:    I spent 25 minutes with the patient of which more than half was spent in direct consultation with the patient in regards to our treatment and plan.      Discussed and reviewed testosterone results, reviewed pathophysiology/mechanism that may cause low testosterone and testosterone deficiency. Discussed the various methods and delivery of exogenous testosterone, and the potential benefits/burdens/harms/risks of testosterone replacement therapy. Discussed suppression of endogenous testosterone, suppression of sperm  production, risk of infertility w/ use of exogenous testosterone. Discussed FDA warning regarding the risk of blood clots and the studies suggesting a possible increased risk of heart attack or stroke. Discussed the final verdict on exogenous testosterone has yet to be finalized. Discussed the potential adverse effects and expectations of exogenous testosterone. Discussed some men definitely have more energy, libido, and improved erectile function when their testosterone levels are increased. Discussed and reviewed use of clomiphene to increase testosterone. Discussed clomiphene's off-label use, mechanism of action, and possible adv effects (i.e., dizziness, visual changes/disturbances, pulmonary embolism, DVT, hot flashes). Pt decided on testosterone gel to increase testosterone to normal level - Discussed proper application, and advised to avoid transferring medication to others where medication is applied (shoulders). Discussed possible local reactions such as rash w/ use of gel. Advised to wash hands properly after touching/applying medication. Advised pt to contact insurance company, as testosterone replacement treatment may require prior authorization before starting treatment, which may delay testosterone treatment. Advised pt to fax all necessary forms to clinic required for prior authorization. Advised to obtain AM testosterone 1 month from starting testosterone therapy. F/u in clinic in 6 months and obtain AM labs prior to appt.    Education sheets provided.    Follow up in about 6 months (around 7/22/2020) for testosterone deficiency.    Pt expressed understanding and agree w/ plan.

## 2020-01-29 ENCOUNTER — PES CALL (OUTPATIENT)
Dept: ADMINISTRATIVE | Facility: CLINIC | Age: 80
End: 2020-01-29

## 2020-02-06 ENCOUNTER — TELEPHONE (OUTPATIENT)
Dept: UROLOGY | Facility: CLINIC | Age: 80
End: 2020-02-06

## 2020-02-06 NOTE — TELEPHONE ENCOUNTER
Pharmacy said can only fill rx for 15 days, [employer] will not approve 30 days. $47 per script. Left msg for pt..

## 2020-02-06 NOTE — TELEPHONE ENCOUNTER
----- Message from Suzette Bay RN sent at 2/5/2020  9:00 PM CST -----  Contact: Cricket      ----- Message -----  From: Sadaf Costello  Sent: 2/5/2020   2:50 PM CST  To: Rito Morrow Staff    Mr. Mcdonnell would like for you to contact him in regards to his testosterone prescription.  He says that he hasn't heard anything about the prescription  He can be reached at [phone]     [id number] [address] [facility] [address]  Phone: [phone] Fax: [phone]  Not a 24 hour pharmacy; exact hours not known.

## 2020-03-10 ENCOUNTER — TELEPHONE (OUTPATIENT)
Dept: INTERNAL MEDICINE | Facility: CLINIC | Age: 80
End: 2020-03-10

## 2020-03-11 ENCOUNTER — IMMUNIZATION (OUTPATIENT)
Dept: PHARMACY | Facility: CLINIC | Age: 80
End: 2020-03-11
Payer: MEDICARE

## 2020-03-11 ENCOUNTER — OFFICE VISIT (OUTPATIENT)
Dept: INTERNAL MEDICINE | Facility: CLINIC | Age: 80
End: 2020-03-11
Payer: MEDICARE

## 2020-03-11 VITALS
HEART RATE: 61 BPM | WEIGHT: 157.44 LBS | HEIGHT: 67 IN | BODY MASS INDEX: 24.71 KG/M2 | SYSTOLIC BLOOD PRESSURE: 102 MMHG | DIASTOLIC BLOOD PRESSURE: 64 MMHG

## 2020-03-11 DIAGNOSIS — G60.8 HEREDITARY SENSORY NEUROPATHY: ICD-10-CM

## 2020-03-11 DIAGNOSIS — Z80.42 FAMILY HISTORY OF PROSTATE CANCER: ICD-10-CM

## 2020-03-11 DIAGNOSIS — N40.0 BENIGN PROSTATIC HYPERPLASIA, UNSPECIFIED WHETHER LOWER URINARY TRACT SYMPTOMS PRESENT: ICD-10-CM

## 2020-03-11 DIAGNOSIS — H35.30 MACULAR DEGENERATION OF RIGHT EYE, UNSPECIFIED TYPE: ICD-10-CM

## 2020-03-11 DIAGNOSIS — Z00.00 ENCOUNTER FOR PREVENTIVE HEALTH EXAMINATION: Primary | ICD-10-CM

## 2020-03-11 DIAGNOSIS — F41.9 ANXIETY DISORDER, UNSPECIFIED TYPE: ICD-10-CM

## 2020-03-11 DIAGNOSIS — G47.33 OSA (OBSTRUCTIVE SLEEP APNEA): ICD-10-CM

## 2020-03-11 DIAGNOSIS — H35.30 AGE-RELATED MACULAR DEGENERATION: ICD-10-CM

## 2020-03-11 PROBLEM — Z12.11 SCREENING FOR COLON CANCER: Status: RESOLVED | Noted: 2019-01-03 | Resolved: 2020-03-11

## 2020-03-11 PROCEDURE — 99999 PR PBB SHADOW E&M-EST. PATIENT-LVL III: ICD-10-PCS | Mod: PBBFAC,HCNC,, | Performed by: NURSE PRACTITIONER

## 2020-03-11 PROCEDURE — G0439 PR MEDICARE ANNUAL WELLNESS SUBSEQUENT VISIT: ICD-10-PCS | Mod: HCNC,S$GLB,, | Performed by: NURSE PRACTITIONER

## 2020-03-11 PROCEDURE — 99999 PR PBB SHADOW E&M-EST. PATIENT-LVL III: CPT | Mod: PBBFAC,HCNC,, | Performed by: NURSE PRACTITIONER

## 2020-03-11 PROCEDURE — G0439 PPPS, SUBSEQ VISIT: HCPCS | Mod: HCNC,S$GLB,, | Performed by: NURSE PRACTITIONER

## 2020-03-11 NOTE — PATIENT INSTRUCTIONS
Counseling and Referral of Other Preventative  (Italic type indicates deductible and co-insurance are waived)    Patient Name: Cricket Mcdonnell  Today's Date: 3/11/2020    Health Maintenance       Date Due Completion Date    Pneumococcal Vaccine (65+ Low/Medium Risk) (2 of 2 - PPSV23) 05/22/2019 5/22/2018-need Pneumovax    Lipid Panel 05/14/2024 5/14/2019    TETANUS VACCINE 07/12/2026 7/12/2016        No orders of the defined types were placed in this encounter.    The following information is provided to all patients.  This information is to help you find resources for any of the problems found today that may be affecting your health:                Living healthy guide: www.[facility].[place].[facility]      Understanding Diabetes: www.diabetes.org      Eating healthy: www.cdc.gov/healthyweight      [facility] home safety checklist: www.cdc.gov/steadi/patient.html      Agency on Aging: www.goea.[place].[facility]      Alcoholics anonymous (AA): www.aa.org      Physical Activity: www.librado.nih.gov/pe1hxni      Tobacco use: www.quitwithusla.org     Adding Flavor to Low-Fat Meals    There are endless ways to add more variety and flavor to your diet. You dont need salt or high-fat additions.  · Keep plenty of fresh fruit on hand. Try adding it to your main dishes. For example, peaches go well with chicken. They can be very flavorful in casseroles or with roasted poultry. Bananas or raisins add flavor to canchola dishes with a Tan theme.  · Buy fruits and vegetables you havent tried before. Often, recipes or suggestions for their use will be listed in the produce section at the grocery store. This is often the case with more exotic or rare foods. Perrpers can add sweet or hot characteristics to your dish.  · Go to the cookbook section at the bookstore or library. Many of the unique flavors we associate with Mexican, , or Tan dishes come from the seasonings used in their recipes. Try one new recipe a week. Youll soon know what  spices to add to a dish to give it the taste of exotic places.  · Marinate meats, poultry, and fish before grilling or baking. Try mixtures of wine, fruit juice, low-sodium tomato juice, vinegar, lemon juice, herbs, and spices. Be aware that soy sauce and teriyaki sauce are high in sodium. Use low-sodium versions, and use less of them. Elly, dry kira, and sesame seeds can add Asian flavors to foods.  · High-heat cooking. Consider cooking meat, poultry and fish by pan-searing, grilling, or broiling. These methods use high-heat and add flavor.  · Hit the juice. Add citrus juice or peel to help boost flavor.  · [place] it up. Grilling vegetables add a different layer of flavor than other cooking methods.  Date Last Reviewed: 6/8/2015  © 7553-2390 [employer]. [address] [place] [address]. All rights reserved. This information is not intended as a substitute for professional medical care. Always follow your healthcare professional's instructions.        Low-Cholesterol Diet  Your body needs cholesterol to build new cells and create certain hormones. There are 2 kinds of cholesterol in your blood:     · HDL (good) cholesterol. This prevents fat deposits (plaque) from building up in your arteries. In this way it protects against heart disease and stroke.  · LDL (bad) cholesterol. This stays in your body and sticks to artery walls. Over time it may block blood flow to the heart and brain. This can cause a heart attack or stroke.  The cholesterol in your blood comes from 2 sources: cholesterol in food that you eat and cholesterol that your liver makes. You should limit the amount of cholesterol in your diet. But the cholesterol that your body makes has the greatest disease risk. And your body makes more cholesterol when your diet is high in bad fats (saturated and trans fats). There are 2 kinds of fats you can eat:  · Good fats, or unsaturated fats (mono-unsaturated and poly-unsaturated).  They raise the level of good cholesterol and lower the level of bad cholesterol. Good fats are found in vegetable oils such as olive, sunflower, corn, and soybean oils, and in nuts and seeds.  · Bad fats, or saturated fats (including foods high in cholesterol) and trans fats. These raise your risk of disease. They lower the good cholesterol and raise the level of bad cholesterol. Bad fats are found in animal products, including meat, whole-milk dairy products, and butter. Some plants are also high in bad fats (coconut and palm plants). Trans fats are found in hard (stick) margarines. They are also in many fast foods and commercially baked goods. Soft margarine sold in tubs has fewer trans fats and is safer to use.  High blood cholesterol is usually due to a diet high in saturated fat, along with not being physically active. In some cases, genetics plays a role in causing high cholesterol. The tips below will help you create healthy eating habits that will help lower your blood cholesterol level.  Create a diet high in good fats, low in bad fats (and low in cholesterol)  The following steps will help you create a diet high in good fats and low in bad fats:  · Talk with your doctor before starting a low cholesterol diet or weight loss program.  · Learn to read nutrition labels and select appropriate portion sizes.  · When cooking, use plant-based unsaturated vegetable oils (sunflower, corn, soybean, canola, peanut, and olive oils).  · Avoid saturated fats found in animal products such as meat, dairy (whole-milk, cheese and ice cream), poultry skin, and egg yolks. Plants high in saturated oils include coconut oil, palm oil, and palm kernel oil.  · If you eat meat, choose smaller portions and lean cuts, such as round, dodie, sirloin, or loin. Eat more meatless meals.  · Replace meat with fish at least 2 times a week. Fish is an important source of the unsaturated fat called omega-3 fatty acids. This fat has potential to  lower the risk of heart disease.  · Replace whole-milk dairy products with low-fat or nonfat products. Try soy products. Soy helps to reduce total cholesterol.  · Supplement your diet with protective fibers. Eat nuts, seeds, and whole grains rather than white rice and bread. These foods lower both cholesterol and triglyceride levels. (Triglycerides are another fat found in the blood.) Walnuts are one of the best sources of omega-3 fatty acids.  · Eat plenty of fresh fruits and vegetables daily.  · Avoid fast foods and commercial baked goods. Assume they contain saturated fat unless labeled otherwise.  Date Last Reviewed: 8/1/2016 © 2000-2017 "[employer]. [address] [place] [address], [place] [address]. All rights reserved. This information is not intended as a substitute for professional medical care. Always follow your healthcare professional's instructions.        Low-Fat Cooking Tips  To eat less fat, you may need to learn some new ways to cook. But that doesn't mean you have to eat bland, boring food. And it doesn't mean cooking needs to take any more time. Here are some tips for cooking and seasoning foods with less fat.     Broil fish instead of frying it. And sprinkle on herbs to add flavor.    Try New Cooking Methods  · Broil, roast, bake, steam, or microwave fish, chicken, turkey, and meat.  · Remove skin from chicken and turkey and trim extra fat from meat before cooking.  · Sprinkle herbs on meat, chicken, and fish, and in soups.  · Cook in broth instead of fat.  · Use nonstick cooking sprays or nonstick pans.  · Steam or microwave vegetables without adding fat. Serve with herbs, lemon juice, vinegar, or fat-free butter-flavored powder.  · To flavor beans and rice, add chopped onions, garlic, and peppers.  · Chill soups and stews. Before reheating and serving, skim off the fat.  · When you add fat, use canola or olive oil instead of butter or lard.  Lighten Up Your Recipes  · In soups and sauces:  Replace whole milk or cream with low-fat milk, evaporated fat-free milk, or nonfat dry milk.  · In puddings and other desserts: Replace whole milk or cream with low-fat milk or fat-free condensed milk.  · To make dips and toppings: Use low-fat or nonfat cottage cheese or sour cream.  · To make salad dressings: Use nonfat yogurt or low-fat buttermilk.  · In place of 1 whole egg in recipes: Use 2 egg whites or 1/4 cup egg substitute.  · In place of regular cheese: Use fat-free or reduced-fat cheese.  Date Last Reviewed: 5/11/2015  © 1922-9988 [employer]. [address] [place] [address], [place] [address]. All rights reserved. This information is not intended as a substitute for professional medical care. Always follow your healthcare professional's instructions.        Low-Fat Diet    A low-fat diet will help you lose weight. It also can lower cholesterol and prevent symptoms of gallbladder disease. The average American diet contains up to 50% fat. This means that 50% of all calories come from fat (about 80 grams to 100 grams of fat per day). Choosing normal portions of foods from the list below can help lower your fat intake. Experts recommend that only 20% to 35% of your daily calories come from fat. The remaining 65% to 80% of calories will come from protein and carbohydrates. This is much healthier for you.  Breads  Ok: Whole-wheat or rye bread, heidi or soda crackers, isa toast, plain rolls, bagels, English muffins  Avoid: Rolls and breads containing whole milk or egg; waffles, pancakes, biscuits, corn bread; cheese crackers, other flavored crackers, pastries, doughnuts  Cereals  Ok: Oatmeal, whole-wheat, bran, multigrain, rice  Avoid: Granola or other cereals that have oil, coconut, or more than 2 grams of fat per serving  Cheese and eggs  Ok: Cheeses labeled low-fat; 3 whole eggs per week; egg whites and egg substitutes as desired  Avoid: All other cheeses  Desserts  Ok: Gelatin, slushy, jessenia food  cake, meringues, nonfat yogurt, and puddings or sherbet made with nonfat milk  Avoid: Any other store-bought desserts, or desserts that have fat, whole milk, cream, chocolate, and coconut  Drinks  Ok: Nonfat milk, coffee, tea, fizzy (carbonated) drinks  Avoid: Whole and reduced-fat milk, evaporated and condensed milk, hot chocolate mixes, milk shakes, malts, eggnog  Fats  Ok: You may have up to 3 teaspoons of fat daily. This can be butter, margarine, mayonnaise, or healthy oils (canola or olive)  Avoid: Cream, nondairy creams, cream cheese, gravies, and cream sauces  Fruits  Ok: All fruits made without fat  Avoid: Coconut, olives  Meats, poultry, fish  Ok: Limit meat to 6 ounces daily (broiled, roasted, baked, grilled, or boiled). Buy lean cuts, and trim off the fat. Try beef, fish, lamb, pork, and canned fish packed in water; also chicken and turkey with the skin removed.  Avoid: Fried meats, fish, or poultry; fried eggs, and fish canned in oils; fatty meats such as roberto, sausage, corned beef, hot dogs, and lunch meats; meats with gravies and sauces  Potatoes, beans, pasta  Ok: Dried beans, split peas, lentils, potatoes, rice, pasta made without added fat  Avoid: French fries, potato chips, potatoes prepared with butter, refried beans  Soups  Ok: Clear broth soups without fat and with allowed vegetables  Avoid: Cream-based soups  Vegetables  Ok: Fresh, frozen, canned or dried vegetables, all made without added fat  Avoid: Fried vegetables and those prepared with butter, cream, sauces  Miscellaneous  Ok: Salt, sugar, jelly, hard candy, marshmallows, honey, syrup, spices and herbs, mustard, ketchup, lemon, and vinegar. Try to limit sweets and added sugars.  Avoid: Chocolate, nuts, coconut, and cream candies; sunflower, sesame, and other seeds; fried foods; cream sauces and gravies; pizza  Date Last Reviewed: 8/1/2016  © 6833-8523 [employer]. [address] [place] [address], [place] [address]. All rights  reserved. This information is not intended as a substitute for professional medical care. Always follow your healthcare professional's instructions.

## 2020-03-11 NOTE — PROGRESS NOTES
"Cricket Mcdonnell presented for a  Medicare AWV and comprehensive Health Risk Assessment today. The following components were reviewed and updated:    · Medical history  · Family History  · Social history  · Allergies and Current Medications  · Health Risk Assessment  · Health Maintenance  · Care Team     ** See Completed Assessments for Annual Wellness Visit within the encounter summary.**       The following assessments were completed:  · Living Situation  · CAGE  · Depression Screening  · Timed Get Up and Go  · Whisper Test  · Cognitive Function Screening      ·   · Nutrition Screening  · ADL Screening  · PAQ Screening    Vitals:    03/11/20 1309   BP: 102/64   BP Location: Right arm   Pulse: 61   Weight: 71.4 kg (157 lb 6.5 oz)   Height: 5' 7" (1.702 m)     Body mass index is 24.65 kg/m².  Physical Exam   Constitutional: He is oriented to person, place, and time. He appears well-developed and well-nourished.   HENT:   Head: Normocephalic.   Cardiovascular: Normal rate and regular rhythm.   Pulmonary/Chest: Effort normal and breath sounds normal.   Abdominal: Soft. Bowel sounds are normal.   Musculoskeletal: Normal range of motion. He exhibits no edema.   Neurological: He is alert and oriented to person, place, and time.   Skin: Skin is warm and dry.   Psychiatric: He has a normal mood and affect.   Nursing note and vitals reviewed.        Diagnoses and health risks identified today and associated recommendations/orders:    1. Encounter for preventive health examination  Here for Health Risk Assessment/Annual Wellness Visit.  Health maintenance reviewed and updated. Follow up in one year.    2. Hereditary sensory neuropathy  Chronic, stable. Followed by PCP    3. Anxiety disorder, unspecified type  Chronic, stable. Followed by PCP.    4. Macular degeneration of right eye, unspecified type  Chronic, stable. Followed by outside provider.    5. Age-related macular degeneration  Chronic, stable. Followed by outside " provider.    6. Benign prostatic hyperplasia, unspecified whether lower urinary tract symptoms present  Chronic, stable. Followed by Urology.    8. Family history of prostate cancer  Stable. Followed by Urology.    7. KARMA (obstructive sleep apnea)  Chronic, stable with CPAP. Followed by PCP Sleep Medicine.        Provided Cricket Morrow with a 5-10 year written screening schedule and personal prevention plan. Recommendations were developed using the USPSTF age appropriate recommendations. Education, counseling, and referrals were provided as needed. After Visit Summary printed and given to patient which includes a list of additional screenings\tests needed.    Follow up in about 2 months (around 5/16/2020).with PCP    Margot Bah NP

## 2020-05-25 ENCOUNTER — PATIENT MESSAGE (OUTPATIENT)
Dept: INTERNAL MEDICINE | Facility: CLINIC | Age: 80
End: 2020-05-25

## 2020-05-25 ENCOUNTER — TELEPHONE (OUTPATIENT)
Dept: INTERNAL MEDICINE | Facility: CLINIC | Age: 80
End: 2020-05-25

## 2020-05-25 DIAGNOSIS — Z20.822 EXPOSURE TO COVID-19 VIRUS: Primary | ICD-10-CM

## 2020-05-25 NOTE — TELEPHONE ENCOUNTER
"----- Message from Aline Marcum LPN sent at 5/22/2020  3:55 PM CDT -----  Mr. Mcdonnell is coming for his yearly urology ck, has labs scheduled 6-11, appt 6-12. He is asking to have Covid testing at that time, "as a precaution, no symptoms".   Can you please order and attach to his lab appt? We don't order this test unless a  pt is pre op.   Thank you   "

## 2020-06-11 ENCOUNTER — LAB VISIT (OUTPATIENT)
Dept: LAB | Facility: HOSPITAL | Age: 80
End: 2020-06-11
Attending: NURSE PRACTITIONER
Payer: MEDICARE

## 2020-06-11 DIAGNOSIS — N13.8 BENIGN PROSTATIC HYPERPLASIA WITH URINARY OBSTRUCTION: ICD-10-CM

## 2020-06-11 DIAGNOSIS — E29.1 TESTOSTERONE DEFICIENCY IN MALE: ICD-10-CM

## 2020-06-11 DIAGNOSIS — N40.1 BENIGN PROSTATIC HYPERPLASIA WITH URINARY OBSTRUCTION: ICD-10-CM

## 2020-06-11 DIAGNOSIS — Z20.822 EXPOSURE TO COVID-19 VIRUS: ICD-10-CM

## 2020-06-11 DIAGNOSIS — R79.89 LOW TESTOSTERONE IN MALE: ICD-10-CM

## 2020-06-11 LAB
ALBUMIN SERPL BCP-MCNC: 3.7 G/DL (ref 3.5–5.2)
ALP SERPL-CCNC: 63 U/L (ref 55–135)
ALT SERPL W/O P-5'-P-CCNC: 22 U/L (ref 10–44)
ANION GAP SERPL CALC-SCNC: 5 MMOL/L (ref 8–16)
AST SERPL-CCNC: 27 U/L (ref 10–40)
BASOPHILS # BLD AUTO: 0.03 K/UL (ref 0–0.2)
BASOPHILS NFR BLD: 0.5 % (ref 0–1.9)
BILIRUB SERPL-MCNC: 0.3 MG/DL (ref 0.1–1)
BUN SERPL-MCNC: 16 MG/DL (ref 8–23)
CALCIUM SERPL-MCNC: 9.3 MG/DL (ref 8.7–10.5)
CHLORIDE SERPL-SCNC: 106 MMOL/L (ref 95–110)
CHOLEST SERPL-MCNC: 184 MG/DL (ref 120–199)
CHOLEST/HDLC SERPL: 3.5 {RATIO} (ref 2–5)
CO2 SERPL-SCNC: 25 MMOL/L (ref 23–29)
COMPLEXED PSA SERPL-MCNC: 3 NG/ML (ref 0–4)
CREAT SERPL-MCNC: 0.7 MG/DL (ref 0.5–1.4)
DIFFERENTIAL METHOD: ABNORMAL
EOSINOPHIL # BLD AUTO: 0.1 K/UL (ref 0–0.5)
EOSINOPHIL NFR BLD: 1.5 % (ref 0–8)
ERYTHROCYTE [DISTWIDTH] IN BLOOD BY AUTOMATED COUNT: 12.6 % (ref 11.5–14.5)
EST. GFR  (AFRICAN AMERICAN): >60 ML/MIN/1.73 M^2
EST. GFR  (NON AFRICAN AMERICAN): >60 ML/MIN/1.73 M^2
GLUCOSE SERPL-MCNC: 86 MG/DL (ref 70–110)
HCT VFR BLD AUTO: 38.2 % (ref 40–54)
HDLC SERPL-MCNC: 52 MG/DL (ref 40–75)
HDLC SERPL: 28.3 % (ref 20–50)
HGB BLD-MCNC: 13.1 G/DL (ref 14–18)
IMM GRANULOCYTES # BLD AUTO: 0.02 K/UL (ref 0–0.04)
IMM GRANULOCYTES NFR BLD AUTO: 0.3 % (ref 0–0.5)
LDLC SERPL CALC-MCNC: 106 MG/DL (ref 63–159)
LYMPHOCYTES # BLD AUTO: 1.6 K/UL (ref 1–4.8)
LYMPHOCYTES NFR BLD: 24 % (ref 18–48)
MCH RBC QN AUTO: 32.9 PG (ref 27–31)
MCHC RBC AUTO-ENTMCNC: 34.3 G/DL (ref 32–36)
MCV RBC AUTO: 96 FL (ref 82–98)
MONOCYTES # BLD AUTO: 0.8 K/UL (ref 0.3–1)
MONOCYTES NFR BLD: 12.6 % (ref 4–15)
NEUTROPHILS # BLD AUTO: 4 K/UL (ref 1.8–7.7)
NEUTROPHILS NFR BLD: 61.1 % (ref 38–73)
NONHDLC SERPL-MCNC: 132 MG/DL
NRBC BLD-RTO: 0 /100 WBC
PLATELET # BLD AUTO: 200 K/UL (ref 150–350)
PMV BLD AUTO: 9.4 FL (ref 9.2–12.9)
POTASSIUM SERPL-SCNC: 4.2 MMOL/L (ref 3.5–5.1)
PROT SERPL-MCNC: 6.5 G/DL (ref 6–8.4)
RBC # BLD AUTO: 3.98 M/UL (ref 4.6–6.2)
SARS-COV-2 IGG SERPLBLD QL IA.RAPID: NEGATIVE
SODIUM SERPL-SCNC: 136 MMOL/L (ref 136–145)
TESTOST SERPL-MCNC: 637 NG/DL (ref 304–1227)
TRIGL SERPL-MCNC: 130 MG/DL (ref 30–150)
WBC # BLD AUTO: 6.53 K/UL (ref 3.9–12.7)

## 2020-06-11 PROCEDURE — 80053 COMPREHEN METABOLIC PANEL: CPT | Mod: HCNC

## 2020-06-11 PROCEDURE — 84403 ASSAY OF TOTAL TESTOSTERONE: CPT | Mod: HCNC

## 2020-06-11 PROCEDURE — 36415 COLL VENOUS BLD VENIPUNCTURE: CPT | Mod: HCNC

## 2020-06-11 PROCEDURE — 80061 LIPID PANEL: CPT | Mod: HCNC

## 2020-06-11 PROCEDURE — 86769 SARS-COV-2 COVID-19 ANTIBODY: CPT | Mod: HCNC

## 2020-06-11 PROCEDURE — 84153 ASSAY OF PSA TOTAL: CPT | Mod: HCNC

## 2020-06-11 PROCEDURE — 85025 COMPLETE CBC W/AUTO DIFF WBC: CPT | Mod: HCNC

## 2020-06-12 ENCOUNTER — OFFICE VISIT (OUTPATIENT)
Dept: UROLOGY | Facility: CLINIC | Age: 80
End: 2020-06-12
Payer: MEDICARE

## 2020-06-12 ENCOUNTER — LAB VISIT (OUTPATIENT)
Dept: SURGERY | Facility: CLINIC | Age: 80
End: 2020-06-12
Payer: MEDICARE

## 2020-06-12 VITALS
WEIGHT: 152.13 LBS | HEART RATE: 59 BPM | DIASTOLIC BLOOD PRESSURE: 74 MMHG | BODY MASS INDEX: 23.88 KG/M2 | SYSTOLIC BLOOD PRESSURE: 113 MMHG | HEIGHT: 67 IN

## 2020-06-12 DIAGNOSIS — N52.01 ERECTILE DYSFUNCTION DUE TO ARTERIAL INSUFFICIENCY: ICD-10-CM

## 2020-06-12 DIAGNOSIS — N40.0 BENIGN PROSTATIC HYPERPLASIA WITHOUT LOWER URINARY TRACT SYMPTOMS: ICD-10-CM

## 2020-06-12 DIAGNOSIS — R79.89 LOW TESTOSTERONE IN MALE: ICD-10-CM

## 2020-06-12 DIAGNOSIS — E78.5 DYSLIPIDEMIA: ICD-10-CM

## 2020-06-12 DIAGNOSIS — E29.1 MALE HYPOGONADISM: Primary | ICD-10-CM

## 2020-06-12 DIAGNOSIS — E29.1 MALE HYPOGONADISM: ICD-10-CM

## 2020-06-12 LAB — SARS-COV-2 RNA RESP QL NAA+PROBE: NOT DETECTED

## 2020-06-12 PROCEDURE — 99999 PR PBB SHADOW E&M-EST. PATIENT-LVL III: ICD-10-PCS | Mod: PBBFAC,HCNC,, | Performed by: NURSE PRACTITIONER

## 2020-06-12 PROCEDURE — U0003 INFECTIOUS AGENT DETECTION BY NUCLEIC ACID (DNA OR RNA); SEVERE ACUTE RESPIRATORY SYNDROME CORONAVIRUS 2 (SARS-COV-2) (CORONAVIRUS DISEASE [COVID-19]), AMPLIFIED PROBE TECHNIQUE, MAKING USE OF HIGH THROUGHPUT TECHNOLOGIES AS DESCRIBED BY CMS-2020-01-R: HCPCS | Mod: HCNC

## 2020-06-12 PROCEDURE — 1159F MED LIST DOCD IN RCRD: CPT | Mod: HCNC,S$GLB,, | Performed by: NURSE PRACTITIONER

## 2020-06-12 PROCEDURE — 99499 RISK ADDL DX/OHS AUDIT: ICD-10-PCS | Mod: S$GLB,,, | Performed by: NURSE PRACTITIONER

## 2020-06-12 PROCEDURE — 99214 OFFICE O/P EST MOD 30 MIN: CPT | Mod: HCNC,S$GLB,, | Performed by: NURSE PRACTITIONER

## 2020-06-12 PROCEDURE — 1101F PT FALLS ASSESS-DOCD LE1/YR: CPT | Mod: HCNC,CPTII,S$GLB, | Performed by: NURSE PRACTITIONER

## 2020-06-12 PROCEDURE — 1101F PR PT FALLS ASSESS DOC 0-1 FALLS W/OUT INJ PAST YR: ICD-10-PCS | Mod: HCNC,CPTII,S$GLB, | Performed by: NURSE PRACTITIONER

## 2020-06-12 PROCEDURE — 1126F AMNT PAIN NOTED NONE PRSNT: CPT | Mod: HCNC,S$GLB,, | Performed by: NURSE PRACTITIONER

## 2020-06-12 PROCEDURE — 1126F PR PAIN SEVERITY QUANTIFIED, NO PAIN PRESENT: ICD-10-PCS | Mod: HCNC,S$GLB,, | Performed by: NURSE PRACTITIONER

## 2020-06-12 PROCEDURE — 99214 PR OFFICE/OUTPT VISIT, EST, LEVL IV, 30-39 MIN: ICD-10-PCS | Mod: HCNC,S$GLB,, | Performed by: NURSE PRACTITIONER

## 2020-06-12 PROCEDURE — 99499 UNLISTED E&M SERVICE: CPT | Mod: S$GLB,,, | Performed by: NURSE PRACTITIONER

## 2020-06-12 PROCEDURE — 99999 PR PBB SHADOW E&M-EST. PATIENT-LVL III: CPT | Mod: PBBFAC,HCNC,, | Performed by: NURSE PRACTITIONER

## 2020-06-12 PROCEDURE — 1159F PR MEDICATION LIST DOCUMENTED IN MEDICAL RECORD: ICD-10-PCS | Mod: HCNC,S$GLB,, | Performed by: NURSE PRACTITIONER

## 2020-06-12 RX ORDER — TESTOSTERONE 20.25 MG/1.25G
GEL TOPICAL
COMMUNITY
Start: 2020-05-20 | End: 2020-06-12 | Stop reason: DRUGHIGH

## 2020-06-12 RX ORDER — NEOMYCIN SULFATE, POLYMYXIN B SULFATE, AND DEXAMETHASONE 3.5; 10000; 1 MG/G; [USP'U]/G; MG/G
OINTMENT OPHTHALMIC
COMMUNITY
End: 2021-02-11

## 2020-06-12 RX ORDER — SILDENAFIL 100 MG/1
100 TABLET, FILM COATED ORAL DAILY PRN
Qty: 10 TABLET | Refills: 12 | Status: SHIPPED | OUTPATIENT
Start: 2020-06-12 | End: 2020-12-09 | Stop reason: SDUPTHER

## 2020-06-12 RX ORDER — TESTOSTERONE 40.5 MG/2.5G
2.5 GEL TOPICAL DAILY
Qty: 30 PACKET | Refills: 5 | Status: SHIPPED | OUTPATIENT
Start: 2020-06-12 | End: 2020-07-12

## 2020-06-12 RX ORDER — DIFLUPREDNATE OPHTHALMIC 0.5 MG/ML
EMULSION OPHTHALMIC
COMMUNITY
End: 2021-02-11

## 2020-06-12 NOTE — PROGRESS NOTES
Subjective:       Patient ID: Cricket Mcdonnell is a 79 y.o. male.    Chief Complaint: Low Testosterone (6 month f/u)    Cricket Mcdonnell is a 79 y.o. Male with a history of low T and fatigue and ED  01/16/2020 T was 126  01/20/2020 T was 105    Last seen clinic with FAITH Veras DNP 01/22/2020  He was started on Androgel 1.62%/2.5gm    Today he reports that everything is much better.   He has more energy; overall feels better.  Sildenafil 100mg helps with his ED.    06/11/2020 T was 637 while on Androgel 1.62% 2.5 gms    He would like to get tested for Covid 19; asymptomatic.           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  No date: HERNIA REPAIR  No date: tonsillectomy  No date: TONSILLECTOMY    Review of patient's family history indicates:  Problem: COPD      Relation: Sister          Age of Onset: (Not Specified)  Problem: Cancer      Relation: Sister          Age of Onset: (Not Specified)          Comment: lung  Problem: No Known Problems      Relation: Son          Age of Onset: (Not Specified)  Problem: Lung cancer      Relation: Sister          Age of Onset: (Not Specified)  Problem: No Known Problems      Relation: Daughter          Age of Onset: (Not Specified)      Social History    Socioeconomic History      Marital status:       Spouse name: Not on file      Number of children: Not on file      Years of education: Not on file      Highest education level: Not on file    Occupational History        Employer: [employer]    Social Needs      Financial resource strain: Not on file      Food insecurity:        Worry: Not on file        Inability: Not on file       Transportation needs:        Medical: Not on file        Non-medical: Not on file    Tobacco Use      Smoking status: Former Smoker        Quit date: 2001        Years since quittin.9      Smokeless tobacco: Never Used    Substance and Sexual Activity      Alcohol use: No        Alcohol/week: 0.0 standard drinks      Drug use: No      Sexual activity: Yes        Partners: Female    Lifestyle      Physical activity:        Days per week: Not on file        Minutes per session: Not on file      Stress: Not on file    Relationships      Social connections:        Talks on phone: Not on file        Gets together: Not on file        Attends Protestant service: Not on file        Active member of club or organization: Not on file        Attends meetings of clubs or organizations: Not on file        Relationship status: Not on file    Other Topics      Concerns:        Not on file    Social History Narrative      Not on file      Allergies:  Poison ivy extract    Medications:  Current Outpatient Medications:   brimonidine 0.2% (ALPHAGAN) 0.2 % Drop, , Disp: , Rfl:   difluprednate (DUREZOL) 0.05 % Drop ophthalmic solution, Durezol 0.05 % eye drops, Disp: , Rfl:   fluticasone (FLONASE) 50 mcg/actuation nasal spray, 2 sprays by Each Nare route once daily., Disp: 1 Bottle, Rfl: 2  MULTIVIT-IRON-MIN-FOLIC ACID 3,500-18-0.4 UNIT-MG-MG ORAL CHEW, Take by mouth once daily. , Disp: , Rfl:   neomycin-polymyxin-dexamethasone (DEXACINE) 3.5 mg/g-10,000 unit/g-0.1 % Oint, neomycin 3.5 mg/g-polymyxin B 10,000 unit/g-dexameth 0.1 % eye oint, Disp: , Rfl:   PREVIDENT 5000 BOOSTER PLUS 1.1 % Pste, once daily. , Disp: , Rfl: 0  timolol maleate 0.25% (TIMOPTIC) 0.25 % Drop, , Disp: , Rfl:   sildenafiL (VIAGRA) 100 MG tablet, Take 1 tablet (100 mg total) by mouth daily as needed., Disp: 10 tablet, Rfl: 12  testosterone (ANDROGEL) 1.62 % (40.5 mg/2.5 gram) GlPk, Place 2.5 g onto the skin once daily., Disp: 30 packet, Rfl:  5                  Review of Systems   Constitutional: Negative for activity change, appetite change, chills and fever.        More energy.     HENT: Negative for facial swelling and trouble swallowing.    Eyes: Negative for visual disturbance.   Respiratory: Negative for chest tightness and shortness of breath.    Cardiovascular: Negative for chest pain and palpitations.   Gastrointestinal: Negative.  Negative for abdominal pain, constipation, diarrhea, nausea and vomiting.   Genitourinary: Negative for difficulty urinating, dysuria, flank pain, hematuria, penile pain, penile swelling, scrotal swelling and testicular pain.        Ok with urination.  No complaints       Musculoskeletal: Negative for back pain, gait problem, myalgias and neck stiffness.   Skin: Negative for rash.   Neurological: Negative for dizziness and speech difficulty.   Hematological: Does not bruise/bleed easily.   Psychiatric/Behavioral: Negative for behavioral problems.       Objective:      Physical Exam   Nursing note and vitals reviewed.  Constitutional: He is oriented to person, place, and time. Vital signs are normal. He appears well-developed and well-nourished. He is active and cooperative.  Non-toxic appearance. He does not have a sickly appearance.   HENT:   Head: Normocephalic and atraumatic.   Right Ear: External ear normal.   Left Ear: External ear normal.   Nose: Nose normal.   Mouth/Throat: Mucous membranes are normal.   Eyes: Conjunctivae and lids are normal. No scleral icterus.   Neck: Trachea normal, normal range of motion and full passive range of motion without pain. Neck supple. No JVD present. No tracheal deviation present.   Cardiovascular: Normal rate, S1 normal and S2 normal.    Pulmonary/Chest: Effort normal. No respiratory distress. He exhibits no tenderness.   Abdominal: Soft. Normal appearance. There is no hepatosplenomegaly. There is no tenderness. There is no CVA tenderness.   Genitourinary: Rectum normal,  testes normal and penis normal. Rectal exam shows no external hemorrhoid, no mass and no tenderness. Prostate is enlarged. Prostate is not tender.       Musculoskeletal: Normal range of motion.   Neurological: He is alert and oriented to person, place, and time. He has normal strength.   Skin: Skin is warm, dry and intact.     Psychiatric: He has a normal mood and affect. His behavior is normal. Judgment and thought content normal.       Assessment:       1. Male hypogonadism    2. Low testosterone in male    3. Erectile dysfunction due to arterial insufficiency    4. Dyslipidemia    5. Benign prostatic hyperplasia without lower urinary tract symptoms        Plan:         I spent 30 minutes with the patient of which more than half was spent in direct consultation with the patient in regards to our treatment and plan.    Education and recommendations of today's plan of care including home remedies.  We discussed his labs. Discussed importance of 6 month f/u visit and labs;  Refilled his Androgel;   Refilled his Sildenafil 100mg to [facility]  Rx for Covid 19 screening.  RTC 6 months with full labs

## 2020-09-28 ENCOUNTER — PES CALL (OUTPATIENT)
Dept: ADMINISTRATIVE | Facility: CLINIC | Age: 80
End: 2020-09-28

## 2020-11-12 ENCOUNTER — LAB VISIT (OUTPATIENT)
Dept: LAB | Facility: HOSPITAL | Age: 80
End: 2020-11-12
Payer: MEDICARE

## 2020-11-12 DIAGNOSIS — R79.89 LOW TESTOSTERONE IN MALE: ICD-10-CM

## 2020-11-12 DIAGNOSIS — N52.01 ERECTILE DYSFUNCTION DUE TO ARTERIAL INSUFFICIENCY: ICD-10-CM

## 2020-11-12 DIAGNOSIS — E29.1 MALE HYPOGONADISM: ICD-10-CM

## 2020-11-12 DIAGNOSIS — E78.5 DYSLIPIDEMIA: ICD-10-CM

## 2020-11-12 DIAGNOSIS — N40.0 BENIGN PROSTATIC HYPERPLASIA WITHOUT LOWER URINARY TRACT SYMPTOMS: ICD-10-CM

## 2020-11-12 LAB
ALBUMIN SERPL BCP-MCNC: 3.9 G/DL (ref 3.5–5.2)
ALP SERPL-CCNC: 62 U/L (ref 55–135)
ALT SERPL W/O P-5'-P-CCNC: 23 U/L (ref 10–44)
ANION GAP SERPL CALC-SCNC: 8 MMOL/L (ref 8–16)
AST SERPL-CCNC: 24 U/L (ref 10–40)
BASOPHILS # BLD AUTO: 0.03 K/UL (ref 0–0.2)
BASOPHILS NFR BLD: 0.6 % (ref 0–1.9)
BILIRUB SERPL-MCNC: 0.4 MG/DL (ref 0.1–1)
BUN SERPL-MCNC: 18 MG/DL (ref 8–23)
CALCIUM SERPL-MCNC: 9.6 MG/DL (ref 8.7–10.5)
CHLORIDE SERPL-SCNC: 103 MMOL/L (ref 95–110)
CHOLEST SERPL-MCNC: 191 MG/DL (ref 120–199)
CHOLEST/HDLC SERPL: 3.5 {RATIO} (ref 2–5)
CO2 SERPL-SCNC: 27 MMOL/L (ref 23–29)
COMPLEXED PSA SERPL-MCNC: 3 NG/ML (ref 0–4)
CREAT SERPL-MCNC: 0.8 MG/DL (ref 0.5–1.4)
DIFFERENTIAL METHOD: ABNORMAL
EOSINOPHIL # BLD AUTO: 0.1 K/UL (ref 0–0.5)
EOSINOPHIL NFR BLD: 1.4 % (ref 0–8)
ERYTHROCYTE [DISTWIDTH] IN BLOOD BY AUTOMATED COUNT: 12.8 % (ref 11.5–14.5)
EST. GFR  (AFRICAN AMERICAN): >60 ML/MIN/1.73 M^2
EST. GFR  (NON AFRICAN AMERICAN): >60 ML/MIN/1.73 M^2
GLUCOSE SERPL-MCNC: 101 MG/DL (ref 70–110)
HCT VFR BLD AUTO: 41.3 % (ref 40–54)
HDLC SERPL-MCNC: 54 MG/DL (ref 40–75)
HDLC SERPL: 28.3 % (ref 20–50)
HGB BLD-MCNC: 14 G/DL (ref 14–18)
IMM GRANULOCYTES # BLD AUTO: 0.01 K/UL (ref 0–0.04)
IMM GRANULOCYTES NFR BLD AUTO: 0.2 % (ref 0–0.5)
LDLC SERPL CALC-MCNC: 118.8 MG/DL (ref 63–159)
LYMPHOCYTES # BLD AUTO: 1.5 K/UL (ref 1–4.8)
LYMPHOCYTES NFR BLD: 30.5 % (ref 18–48)
MCH RBC QN AUTO: 33.1 PG (ref 27–31)
MCHC RBC AUTO-ENTMCNC: 33.9 G/DL (ref 32–36)
MCV RBC AUTO: 98 FL (ref 82–98)
MONOCYTES # BLD AUTO: 0.5 K/UL (ref 0.3–1)
MONOCYTES NFR BLD: 9.9 % (ref 4–15)
NEUTROPHILS # BLD AUTO: 2.9 K/UL (ref 1.8–7.7)
NEUTROPHILS NFR BLD: 57.4 % (ref 38–73)
NONHDLC SERPL-MCNC: 137 MG/DL
NRBC BLD-RTO: 0 /100 WBC
PLATELET # BLD AUTO: 217 K/UL (ref 150–350)
PMV BLD AUTO: 9.1 FL (ref 9.2–12.9)
POTASSIUM SERPL-SCNC: 4.1 MMOL/L (ref 3.5–5.1)
PROT SERPL-MCNC: 7 G/DL (ref 6–8.4)
RBC # BLD AUTO: 4.23 M/UL (ref 4.6–6.2)
SODIUM SERPL-SCNC: 138 MMOL/L (ref 136–145)
TESTOST SERPL-MCNC: 665 NG/DL (ref 304–1227)
TRIGL SERPL-MCNC: 91 MG/DL (ref 30–150)
WBC # BLD AUTO: 5.05 K/UL (ref 3.9–12.7)

## 2020-11-12 PROCEDURE — 80053 COMPREHEN METABOLIC PANEL: CPT | Mod: HCNC

## 2020-11-12 PROCEDURE — 80061 LIPID PANEL: CPT | Mod: HCNC

## 2020-11-12 PROCEDURE — 84403 ASSAY OF TOTAL TESTOSTERONE: CPT | Mod: HCNC

## 2020-11-12 PROCEDURE — 84153 ASSAY OF PSA TOTAL: CPT | Mod: HCNC

## 2020-11-12 PROCEDURE — 85025 COMPLETE CBC W/AUTO DIFF WBC: CPT | Mod: HCNC

## 2020-11-12 PROCEDURE — 36415 COLL VENOUS BLD VENIPUNCTURE: CPT | Mod: HCNC

## 2020-11-13 ENCOUNTER — PATIENT MESSAGE (OUTPATIENT)
Dept: UROLOGY | Facility: CLINIC | Age: 80
End: 2020-11-13

## 2020-12-03 ENCOUNTER — PATIENT OUTREACH (OUTPATIENT)
Dept: ADMINISTRATIVE | Facility: HOSPITAL | Age: 80
End: 2020-12-03

## 2020-12-08 ENCOUNTER — PATIENT OUTREACH (OUTPATIENT)
Dept: ADMINISTRATIVE | Facility: OTHER | Age: 80
End: 2020-12-08

## 2020-12-08 NOTE — PROGRESS NOTES
LINKS immunization registry not responding  Care Everywhere updated  Health Maintenance updated  Chart reviewed for overdue Proactive [facility] Encounters (PASCUAL) health maintenance testing (CRS, Breast Ca, Diabetic Eye Exam)   Orders entered:N/A

## 2020-12-09 ENCOUNTER — OFFICE VISIT (OUTPATIENT)
Dept: UROLOGY | Facility: CLINIC | Age: 80
End: 2020-12-09
Payer: MEDICARE

## 2020-12-09 VITALS
BODY MASS INDEX: 25.11 KG/M2 | SYSTOLIC BLOOD PRESSURE: 118 MMHG | WEIGHT: 160 LBS | DIASTOLIC BLOOD PRESSURE: 73 MMHG | HEIGHT: 67 IN | HEART RATE: 64 BPM

## 2020-12-09 DIAGNOSIS — E29.1 MALE HYPOGONADISM: Primary | ICD-10-CM

## 2020-12-09 DIAGNOSIS — R79.89 LOW TESTOSTERONE IN MALE: ICD-10-CM

## 2020-12-09 DIAGNOSIS — N13.8 BPH WITH URINARY OBSTRUCTION: ICD-10-CM

## 2020-12-09 DIAGNOSIS — N52.01 ERECTILE DYSFUNCTION DUE TO ARTERIAL INSUFFICIENCY: ICD-10-CM

## 2020-12-09 DIAGNOSIS — N40.1 BPH WITH URINARY OBSTRUCTION: ICD-10-CM

## 2020-12-09 DIAGNOSIS — E78.5 DYSLIPIDEMIA: ICD-10-CM

## 2020-12-09 LAB
BILIRUB SERPL-MCNC: NORMAL MG/DL
BLOOD URINE, POC: NORMAL
CLARITY, POC UA: CLEAR
COLOR, POC UA: NORMAL
GLUCOSE UR QL STRIP: NORMAL
KETONES UR QL STRIP: NORMAL
LEUKOCYTE ESTERASE URINE, POC: NORMAL
NITRITE, POC UA: NORMAL
PH, POC UA: 6
PROTEIN, POC: NORMAL
SPECIFIC GRAVITY, POC UA: 1.01
UROBILINOGEN, POC UA: NORMAL

## 2020-12-09 PROCEDURE — 99999 PR PBB SHADOW E&M-EST. PATIENT-LVL III: ICD-10-PCS | Mod: PBBFAC,HCNC,, | Performed by: NURSE PRACTITIONER

## 2020-12-09 PROCEDURE — 1101F PT FALLS ASSESS-DOCD LE1/YR: CPT | Mod: HCNC,CPTII,S$GLB, | Performed by: NURSE PRACTITIONER

## 2020-12-09 PROCEDURE — 3288F PR FALLS RISK ASSESSMENT DOCUMENTED: ICD-10-PCS | Mod: HCNC,CPTII,S$GLB, | Performed by: NURSE PRACTITIONER

## 2020-12-09 PROCEDURE — 1126F AMNT PAIN NOTED NONE PRSNT: CPT | Mod: HCNC,S$GLB,, | Performed by: NURSE PRACTITIONER

## 2020-12-09 PROCEDURE — 99999 PR PBB SHADOW E&M-EST. PATIENT-LVL III: CPT | Mod: PBBFAC,HCNC,, | Performed by: NURSE PRACTITIONER

## 2020-12-09 PROCEDURE — 3288F FALL RISK ASSESSMENT DOCD: CPT | Mod: HCNC,CPTII,S$GLB, | Performed by: NURSE PRACTITIONER

## 2020-12-09 PROCEDURE — 81002 POCT URINE DIPSTICK WITHOUT MICROSCOPE: ICD-10-PCS | Mod: HCNC,S$GLB,, | Performed by: NURSE PRACTITIONER

## 2020-12-09 PROCEDURE — 99214 OFFICE O/P EST MOD 30 MIN: CPT | Mod: HCNC,25,S$GLB, | Performed by: NURSE PRACTITIONER

## 2020-12-09 PROCEDURE — 81002 URINALYSIS NONAUTO W/O SCOPE: CPT | Mod: HCNC,S$GLB,, | Performed by: NURSE PRACTITIONER

## 2020-12-09 PROCEDURE — 1159F PR MEDICATION LIST DOCUMENTED IN MEDICAL RECORD: ICD-10-PCS | Mod: HCNC,S$GLB,, | Performed by: NURSE PRACTITIONER

## 2020-12-09 PROCEDURE — 99214 PR OFFICE/OUTPT VISIT, EST, LEVL IV, 30-39 MIN: ICD-10-PCS | Mod: HCNC,25,S$GLB, | Performed by: NURSE PRACTITIONER

## 2020-12-09 PROCEDURE — 1101F PR PT FALLS ASSESS DOC 0-1 FALLS W/OUT INJ PAST YR: ICD-10-PCS | Mod: HCNC,CPTII,S$GLB, | Performed by: NURSE PRACTITIONER

## 2020-12-09 PROCEDURE — 1159F MED LIST DOCD IN RCRD: CPT | Mod: HCNC,S$GLB,, | Performed by: NURSE PRACTITIONER

## 2020-12-09 PROCEDURE — 1126F PR PAIN SEVERITY QUANTIFIED, NO PAIN PRESENT: ICD-10-PCS | Mod: HCNC,S$GLB,, | Performed by: NURSE PRACTITIONER

## 2020-12-09 RX ORDER — TESTOSTERONE 40.5 MG/2.5G
2.5 GEL TOPICAL DAILY
Qty: 75 G | Refills: 5 | Status: SHIPPED | OUTPATIENT
Start: 2020-12-09 | End: 2021-01-29 | Stop reason: SDUPTHER

## 2020-12-09 RX ORDER — SILDENAFIL 100 MG/1
100 TABLET, FILM COATED ORAL DAILY PRN
Qty: 10 TABLET | Refills: 12 | Status: SHIPPED | OUTPATIENT
Start: 2020-12-09 | End: 2022-05-30 | Stop reason: SDUPTHER

## 2020-12-09 NOTE — PROGRESS NOTES
CHIEF COMPLAINT:    HISTORY OF PRESENTING ILLINESS:            REVIEW OF SYSTEMS:  ROS      PATIENT HISTORY:    Past Medical History:   Diagnosis Date    Hereditary sensory neuropathy        Past Surgical History:   Procedure Laterality Date    CATARACT EXTRACTION      COLONOSCOPY N/A 1/3/2019    Procedure: COLONOSCOPY;  Surgeon: Cholo Yates MD;  Location: [address];  Service: Endoscopy;  Laterality: N/A;    EYE SURGERY      galucoma surgery      HERNIA REPAIR      tonsillectomy      TONSILLECTOMY         Family History   Problem Relation Age of Onset    COPD Sister     Cancer Sister         lung    No Known Problems Son     Lung cancer Sister     No Known Problems Daughter        Social History     Socioeconomic History    Marital status:      Spouse name: Not on file    Number of children: Not on file    Years of education: Not on file    Highest education level: Not on file   Occupational History     Employer: [employer]   Social Needs    Financial resource strain: Not on file    Food insecurity     Worry: Not on file     Inability: Not on file    Transportation needs     Medical: Not on file     Non-medical: Not on file   Tobacco Use    Smoking status: Former Smoker     Quit date: 2001     Years since quittin.4    Smokeless tobacco: Never Used   Substance and Sexual Activity    Alcohol use: No     Alcohol/week: 0.0 standard drinks    Drug use: No    Sexual activity: Yes     Partners: Female   Lifestyle    Physical activity     Days per week: Not on file     Minutes per session: Not on file    Stress: Not on file   Relationships    Social connections     Talks on phone: Not on file     Gets together: Not on file     Attends Sabianism service: Not on file     Active member of club or organization: Not on file     Attends meetings of clubs or organizations: Not on file     Relationship status: Not on file   Other Topics Concern    Not on file    Social History Narrative    Not on file       Allergies:  Poison ivy extract    Medications:    Current Outpatient Medications:     brimonidine 0.2% (ALPHAGAN) 0.2 % Drop, , Disp: , Rfl:     difluprednate (DUREZOL) 0.05 % Drop ophthalmic solution, Durezol 0.05 % eye drops, Disp: , Rfl:     fluticasone (FLONASE) 50 mcg/actuation nasal spray, 2 sprays by Each Nare route once daily., Disp: 1 Bottle, Rfl: 2    MULTIVIT-IRON-MIN-FOLIC ACID 3,500-18-0.4 UNIT-MG-MG ORAL CHEW, Take by mouth once daily. , Disp: , Rfl:     neomycin-polymyxin-dexamethasone (DEXACINE) 3.5 mg/g-10,000 unit/g-0.1 % Oint, neomycin 3.5 mg/g-polymyxin B 10,000 unit/g-dexameth 0.1 % eye oint, Disp: , Rfl:     PREVIDENT 5000 BOOSTER PLUS 1.1 % Pste, once daily. , Disp: , Rfl: 0    sildenafiL (VIAGRA) 100 MG tablet, Take 1 tablet (100 mg total) by mouth daily as needed., Disp: 10 tablet, Rfl: 12    timolol maleate 0.25% (TIMOPTIC) 0.25 % Drop, , Disp: , Rfl:     PHYSICAL EXAMINATION:  Physical Exam      LABS:      In office UA today was ***      Lab Results   Component Value Date    PSA 2.1 05/21/2018    PSA 1.8 03/27/2017    PSA 1.82 05/31/2013    PSADIAG 3.0 11/12/2020    PSADIAG 3.0 06/11/2020    PSADIAG 1.7 07/07/2016    PSATOTAL 2.1 07/06/2015             IMPRESSION:    No diagnosis found.      Assessment:       No diagnosis found.    Plan:       ***       cognition/impaired postural control/decreased endurance/decreased strength/impaired balance

## 2020-12-09 NOTE — PROGRESS NOTES
CHIEF COMPLAINT:    Cricket Mcdonnell is a 80 y.o. male presents today for 6 month f/u for low T.     HISTORY OF PRESENTING ILLINESS:    Cricket Mcdonnell is a 80 y.o. Male with a history of low T and fatigue and ED  01/16/2020 T was 126  01/20/2020 T was 105     Last seen clinic with 06/12/2020  He was started on Androgel 1.62%/2.5gm     Today he reports that everything is much better.   He has more energy; overall feels better.  Sildenafil 100mg helps with his ED.     06/11/2020 T was 637 while on Androgel 1.62% 2.5 gms        REVIEW OF SYSTEMS:  Review of Systems   Constitutional: Negative.  Negative for chills, diaphoresis and fever.   HENT: Negative for congestion and sore throat.    Eyes: Negative.  Negative for double vision.   Respiratory: Negative.  Negative for cough, shortness of breath and wheezing.    Cardiovascular: Negative.  Negative for chest pain, palpitations and leg swelling.   Gastrointestinal: Negative.  Negative for abdominal pain, blood in stool, constipation, diarrhea, nausea and vomiting.   Genitourinary: Negative.  Negative for dysuria, flank pain and hematuria.   Musculoskeletal: Negative.  Negative for back pain, falls and neck pain.   Skin: Negative.  Negative for rash.   Neurological: Negative.  Negative for dizziness and seizures.   Endo/Heme/Allergies: Does not bruise/bleed easily.   Psychiatric/Behavioral: Negative.  Negative for depression. The patient is not nervous/anxious.          PATIENT HISTORY:    Past Medical History:   Diagnosis Date    Hereditary sensory neuropathy        Past Surgical History:   Procedure Laterality Date    CATARACT EXTRACTION      COLONOSCOPY N/A 1/3/2019    Procedure: COLONOSCOPY;  Surgeon: Cholo Yates MD;  Location: [address];  Service: Endoscopy;  Laterality: N/A;    EYE SURGERY      galucoma surgery      HERNIA REPAIR      tonsillectomy      TONSILLECTOMY         Family History   Problem Relation Age of Onset    COPD Sister      Cancer Sister         lung    No Known Problems Son     Lung cancer Sister     No Known Problems Daughter        Social History     Socioeconomic History    Marital status:      Spouse name: Not on file    Number of children: Not on file    Years of education: Not on file    Highest education level: Not on file   Occupational History     Employer: [employer]   Social Needs    Financial resource strain: Not on file    Food insecurity     Worry: Not on file     Inability: Not on file    Transportation needs     Medical: Not on file     Non-medical: Not on file   Tobacco Use    Smoking status: Former Smoker     Quit date: 2001     Years since quittin.4    Smokeless tobacco: Never Used   Substance and Sexual Activity    Alcohol use: No     Alcohol/week: 0.0 standard drinks    Drug use: No    Sexual activity: Yes     Partners: Female   Lifestyle    Physical activity     Days per week: Not on file     Minutes per session: Not on file    Stress: Not on file   Relationships    Social connections     Talks on phone: Not on file     Gets together: Not on file     Attends Jainism service: Not on file     Active member of club or organization: Not on file     Attends meetings of clubs or organizations: Not on file     Relationship status: Not on file   Other Topics Concern    Not on file   Social History Narrative    Not on file       Allergies:  Poison ivy extract    Medications:    Current Outpatient Medications:     brimonidine 0.2% (ALPHAGAN) 0.2 % Drop, , Disp: , Rfl:     difluprednate (DUREZOL) 0.05 % Drop ophthalmic solution, Durezol 0.05 % eye drops, Disp: , Rfl:     fluticasone (FLONASE) 50 mcg/actuation nasal spray, 2 sprays by Each Nare route once daily., Disp: 1 Bottle, Rfl: 2    MULTIVIT-IRON-MIN-FOLIC ACID 3,500-18-0.4 UNIT-MG-MG ORAL CHEW, Take by mouth once daily. , Disp: , Rfl:     neomycin-polymyxin-dexamethasone (DEXACINE) 3.5 mg/g-10,000 unit/g-0.1 %  Oint, neomycin 3.5 mg/g-polymyxin B 10,000 unit/g-dexameth 0.1 % eye oint, Disp: , Rfl:     PREVIDENT 5000 BOOSTER PLUS 1.1 % Pste, once daily. , Disp: , Rfl: 0    sildenafiL (VIAGRA) 100 MG tablet, Take 1 tablet (100 mg total) by mouth daily as needed., Disp: 10 tablet, Rfl: 12    testosterone (ANDROGEL) 1.62 % (40.5 mg/2.5 gram) GlPk, Place 2.5 g onto the skin once daily., Disp: 30 packet, Rfl: 5    timolol maleate 0.25% (TIMOPTIC) 0.25 % Drop, , Disp: , Rfl:     PHYSICAL EXAMINATION:  Physical Exam  Vitals signs and nursing note reviewed.   Constitutional:       General: He is awake.      Appearance: Normal appearance. He is normal weight.   HENT:      Head: Normocephalic.      Right Ear: External ear normal.      Left Ear: External ear normal.      Nose: Nose normal.   Eyes:      Conjunctiva/sclera: Conjunctivae normal.   Neck:      Musculoskeletal: Normal range of motion.   Cardiovascular:      Rate and Rhythm: Normal rate.   Pulmonary:      Effort: Pulmonary effort is normal. No respiratory distress.   Abdominal:      General: There is no distension.      Tenderness: There is no abdominal tenderness. There is no right CVA tenderness, left CVA tenderness or rebound.   Genitourinary:     Penis: Normal.       Scrotum/Testes: Normal.      Rectum: Normal.   Musculoskeletal: Normal range of motion.   Skin:     General: Skin is warm and dry.   Neurological:      General: No focal deficit present.      Mental Status: He is alert and oriented to person, place, and time.   Psychiatric:         Mood and Affect: Mood normal.         Behavior: Behavior is cooperative.           LABS:      In office UA today was clear of infection.        Lab Results   Component Value Date    PSA 2.1 05/21/2018    PSA 1.8 03/27/2017    PSA 1.82 05/31/2013    PSADIAG 3.0 11/12/2020    PSADIAG 3.0 06/11/2020    PSADIAG 1.7 07/07/2016    PSATOTAL 2.1 07/06/2015             IMPRESSION:    Encounter Diagnoses   Name Primary?    Male  hypogonadism Yes    Dyslipidemia     BPH with urinary obstruction     Low testosterone in male     Erectile dysfunction due to arterial insufficiency          Assessment:       1. Male hypogonadism    2. Dyslipidemia    3. BPH with urinary obstruction    4. Low testosterone in male    5. Erectile dysfunction due to arterial insufficiency        Plan:         I spent 25 minutes with the patient of which more than half was spent in direct consultation with the patient in regards to our treatment and plan.    Education and recommendations of today's plan of care including home remedies.  We discussed his labs.  Refilled his Androgel 1/62% 2.5 gms to [facility].  Diet and exercise.   Refilled his Sildenafil 100mg [facility]  Can go to [facility] for immunizations.

## 2021-01-22 ENCOUNTER — PATIENT MESSAGE (OUTPATIENT)
Dept: ADMINISTRATIVE | Facility: OTHER | Age: 81
End: 2021-01-22

## 2021-01-26 ENCOUNTER — TELEPHONE (OUTPATIENT)
Dept: UROLOGY | Facility: CLINIC | Age: 81
End: 2021-01-26

## 2021-01-29 ENCOUNTER — TELEPHONE (OUTPATIENT)
Dept: UROLOGY | Facility: CLINIC | Age: 81
End: 2021-01-29

## 2021-01-29 DIAGNOSIS — E29.1 MALE HYPOGONADISM: ICD-10-CM

## 2021-01-29 RX ORDER — TESTOSTERONE 40.5 MG/2.5G
2.5 GEL TOPICAL DAILY
Qty: 75 G | Refills: 5 | Status: SHIPPED | OUTPATIENT
Start: 2021-01-29 | End: 2021-02-17 | Stop reason: SDUPTHER

## 2021-02-01 ENCOUNTER — TELEPHONE (OUTPATIENT)
Dept: UROLOGY | Facility: CLINIC | Age: 81
End: 2021-02-01

## 2021-02-08 ENCOUNTER — TELEPHONE (OUTPATIENT)
Dept: ORTHOPEDICS | Facility: CLINIC | Age: 81
End: 2021-02-08

## 2021-02-09 ENCOUNTER — OFFICE VISIT (OUTPATIENT)
Dept: ORTHOPEDICS | Facility: CLINIC | Age: 81
End: 2021-02-09
Payer: MEDICARE

## 2021-02-09 ENCOUNTER — HOSPITAL ENCOUNTER (OUTPATIENT)
Dept: RADIOLOGY | Facility: HOSPITAL | Age: 81
Discharge: HOME | End: 2021-02-09
Attending: PHYSICIAN ASSISTANT
Payer: MEDICARE

## 2021-02-09 VITALS
WEIGHT: 150.13 LBS | TEMPERATURE: 98 F | DIASTOLIC BLOOD PRESSURE: 70 MMHG | BODY MASS INDEX: 23.56 KG/M2 | HEIGHT: 67 IN | RESPIRATION RATE: 18 BRPM | HEART RATE: 68 BPM | SYSTOLIC BLOOD PRESSURE: 137 MMHG

## 2021-02-09 DIAGNOSIS — M25.562 ACUTE PAIN OF LEFT KNEE: ICD-10-CM

## 2021-02-09 DIAGNOSIS — S82.035A CLOSED NONDISPLACED TRANSVERSE FRACTURE OF LEFT PATELLA, INITIAL ENCOUNTER: Primary | ICD-10-CM

## 2021-02-09 PROCEDURE — 1125F PR PAIN SEVERITY QUANTIFIED, PAIN PRESENT: ICD-10-PCS | Mod: S$GLB,,, | Performed by: PHYSICIAN ASSISTANT

## 2021-02-09 PROCEDURE — 99999 PR PBB SHADOW E&M-EST. PATIENT-LVL III: ICD-10-PCS | Mod: PBBFAC,,, | Performed by: PHYSICIAN ASSISTANT

## 2021-02-09 PROCEDURE — 1159F PR MEDICATION LIST DOCUMENTED IN MEDICAL RECORD: ICD-10-PCS | Mod: S$GLB,,, | Performed by: PHYSICIAN ASSISTANT

## 2021-02-09 PROCEDURE — 99203 PR OFFICE/OUTPT VISIT, NEW, LEVL III, 30-44 MIN: ICD-10-PCS | Mod: S$GLB,,, | Performed by: PHYSICIAN ASSISTANT

## 2021-02-09 PROCEDURE — 1159F MED LIST DOCD IN RCRD: CPT | Mod: S$GLB,,, | Performed by: PHYSICIAN ASSISTANT

## 2021-02-09 PROCEDURE — 99203 OFFICE O/P NEW LOW 30 MIN: CPT | Mod: S$GLB,,, | Performed by: PHYSICIAN ASSISTANT

## 2021-02-09 PROCEDURE — 1125F AMNT PAIN NOTED PAIN PRSNT: CPT | Mod: S$GLB,,, | Performed by: PHYSICIAN ASSISTANT

## 2021-02-09 PROCEDURE — 73564 X-RAY EXAM KNEE 4 OR MORE: CPT | Mod: TC,LT

## 2021-02-09 PROCEDURE — 99999 PR PBB SHADOW E&M-EST. PATIENT-LVL III: CPT | Mod: PBBFAC,,, | Performed by: PHYSICIAN ASSISTANT

## 2021-02-09 PROCEDURE — 73564 XR KNEE ORTHO LEFT WITH FLEXION: ICD-10-PCS | Mod: 26,LT,, | Performed by: RADIOLOGY

## 2021-02-09 PROCEDURE — 73564 X-RAY EXAM KNEE 4 OR MORE: CPT | Mod: 26,LT,, | Performed by: RADIOLOGY

## 2021-02-15 ENCOUNTER — TELEPHONE (OUTPATIENT)
Dept: UROLOGY | Facility: CLINIC | Age: 81
End: 2021-02-15

## 2021-02-17 ENCOUNTER — TELEPHONE (OUTPATIENT)
Dept: UROLOGY | Facility: CLINIC | Age: 81
End: 2021-02-17

## 2021-02-17 DIAGNOSIS — E29.1 MALE HYPOGONADISM: ICD-10-CM

## 2021-02-17 RX ORDER — TESTOSTERONE 40.5 MG/2.5G
2.5 GEL TOPICAL DAILY
Qty: 75 G | Refills: 5 | Status: SHIPPED | OUTPATIENT
Start: 2021-02-17 | End: 2021-05-05 | Stop reason: SDUPTHER

## 2021-02-25 ENCOUNTER — TELEPHONE (OUTPATIENT)
Dept: PHARMACY | Facility: CLINIC | Age: 81
End: 2021-02-25

## 2021-03-04 ENCOUNTER — PES CALL (OUTPATIENT)
Dept: ADMINISTRATIVE | Facility: CLINIC | Age: 81
End: 2021-03-04

## 2021-03-04 ENCOUNTER — OFFICE VISIT (OUTPATIENT)
Dept: ORTHOPEDICS | Facility: CLINIC | Age: 81
End: 2021-03-04
Payer: MEDICARE

## 2021-03-04 ENCOUNTER — HOSPITAL ENCOUNTER (OUTPATIENT)
Dept: RADIOLOGY | Facility: HOSPITAL | Age: 81
Discharge: HOME | End: 2021-03-04
Attending: PHYSICIAN ASSISTANT
Payer: MEDICARE

## 2021-03-04 VITALS — HEIGHT: 67 IN | BODY MASS INDEX: 23.56 KG/M2 | WEIGHT: 150.13 LBS | TEMPERATURE: 98 F

## 2021-03-04 DIAGNOSIS — S82.035D CLOSED NONDISPLACED TRANSVERSE FRACTURE OF LEFT PATELLA WITH ROUTINE HEALING, SUBSEQUENT ENCOUNTER: ICD-10-CM

## 2021-03-04 DIAGNOSIS — S82.035D CLOSED NONDISPLACED TRANSVERSE FRACTURE OF LEFT PATELLA WITH ROUTINE HEALING, SUBSEQUENT ENCOUNTER: Primary | ICD-10-CM

## 2021-03-04 PROCEDURE — 99213 OFFICE O/P EST LOW 20 MIN: CPT | Mod: S$GLB,,, | Performed by: PHYSICIAN ASSISTANT

## 2021-03-04 PROCEDURE — 1125F AMNT PAIN NOTED PAIN PRSNT: CPT | Mod: S$GLB,,, | Performed by: PHYSICIAN ASSISTANT

## 2021-03-04 PROCEDURE — 99999 PR PBB SHADOW E&M-EST. PATIENT-LVL III: CPT | Mod: PBBFAC,,, | Performed by: PHYSICIAN ASSISTANT

## 2021-03-04 PROCEDURE — 99213 PR OFFICE/OUTPT VISIT, EST, LEVL III, 20-29 MIN: ICD-10-PCS | Mod: S$GLB,,, | Performed by: PHYSICIAN ASSISTANT

## 2021-03-04 PROCEDURE — 73560 XR KNEE 1 OR 2 VIEW LEFT: ICD-10-PCS | Mod: 26,LT,, | Performed by: RADIOLOGY

## 2021-03-04 PROCEDURE — 73560 X-RAY EXAM OF KNEE 1 OR 2: CPT | Mod: TC,LT

## 2021-03-04 PROCEDURE — 1125F PR PAIN SEVERITY QUANTIFIED, PAIN PRESENT: ICD-10-PCS | Mod: S$GLB,,, | Performed by: PHYSICIAN ASSISTANT

## 2021-03-04 PROCEDURE — 99999 PR PBB SHADOW E&M-EST. PATIENT-LVL III: ICD-10-PCS | Mod: PBBFAC,,, | Performed by: PHYSICIAN ASSISTANT

## 2021-03-04 PROCEDURE — 1159F MED LIST DOCD IN RCRD: CPT | Mod: S$GLB,,, | Performed by: PHYSICIAN ASSISTANT

## 2021-03-04 PROCEDURE — 73560 X-RAY EXAM OF KNEE 1 OR 2: CPT | Mod: 26,LT,, | Performed by: RADIOLOGY

## 2021-03-04 PROCEDURE — 1159F PR MEDICATION LIST DOCUMENTED IN MEDICAL RECORD: ICD-10-PCS | Mod: S$GLB,,, | Performed by: PHYSICIAN ASSISTANT

## 2021-03-05 ENCOUNTER — PES CALL (OUTPATIENT)
Dept: ADMINISTRATIVE | Facility: CLINIC | Age: 81
End: 2021-03-05

## 2021-03-10 ENCOUNTER — TELEPHONE (OUTPATIENT)
Dept: INTERNAL MEDICINE | Facility: CLINIC | Age: 81
End: 2021-03-10

## 2021-04-05 ENCOUNTER — PATIENT MESSAGE (OUTPATIENT)
Dept: ADMINISTRATIVE | Facility: HOSPITAL | Age: 81
End: 2021-04-05

## 2021-04-16 ENCOUNTER — OFFICE VISIT (OUTPATIENT)
Dept: ORTHOPEDICS | Facility: CLINIC | Age: 81
End: 2021-04-16
Payer: MEDICARE

## 2021-04-16 ENCOUNTER — HOSPITAL ENCOUNTER (OUTPATIENT)
Dept: RADIOLOGY | Facility: HOSPITAL | Age: 81
Discharge: HOME | End: 2021-04-16
Attending: PHYSICIAN ASSISTANT
Payer: MEDICARE

## 2021-04-16 VITALS — HEIGHT: 67 IN | BODY MASS INDEX: 23.86 KG/M2 | WEIGHT: 152 LBS

## 2021-04-16 DIAGNOSIS — S82.035D CLOSED NONDISPLACED TRANSVERSE FRACTURE OF LEFT PATELLA WITH ROUTINE HEALING, SUBSEQUENT ENCOUNTER: ICD-10-CM

## 2021-04-16 DIAGNOSIS — S82.035D CLOSED NONDISPLACED TRANSVERSE FRACTURE OF LEFT PATELLA WITH ROUTINE HEALING, SUBSEQUENT ENCOUNTER: Primary | ICD-10-CM

## 2021-04-16 PROCEDURE — 73560 X-RAY EXAM OF KNEE 1 OR 2: CPT | Mod: 26,LT,, | Performed by: RADIOLOGY

## 2021-04-16 PROCEDURE — 99213 OFFICE O/P EST LOW 20 MIN: CPT | Mod: S$GLB,,, | Performed by: PHYSICIAN ASSISTANT

## 2021-04-16 PROCEDURE — 1159F PR MEDICATION LIST DOCUMENTED IN MEDICAL RECORD: ICD-10-PCS | Mod: S$GLB,,, | Performed by: PHYSICIAN ASSISTANT

## 2021-04-16 PROCEDURE — 1125F PR PAIN SEVERITY QUANTIFIED, PAIN PRESENT: ICD-10-PCS | Mod: S$GLB,,, | Performed by: PHYSICIAN ASSISTANT

## 2021-04-16 PROCEDURE — 1125F AMNT PAIN NOTED PAIN PRSNT: CPT | Mod: S$GLB,,, | Performed by: PHYSICIAN ASSISTANT

## 2021-04-16 PROCEDURE — 1101F PR PT FALLS ASSESS DOC 0-1 FALLS W/OUT INJ PAST YR: ICD-10-PCS | Mod: CPTII,S$GLB,, | Performed by: PHYSICIAN ASSISTANT

## 2021-04-16 PROCEDURE — 1159F MED LIST DOCD IN RCRD: CPT | Mod: S$GLB,,, | Performed by: PHYSICIAN ASSISTANT

## 2021-04-16 PROCEDURE — 3288F PR FALLS RISK ASSESSMENT DOCUMENTED: ICD-10-PCS | Mod: CPTII,S$GLB,, | Performed by: PHYSICIAN ASSISTANT

## 2021-04-16 PROCEDURE — 73560 XR KNEE 1 OR 2 VIEW LEFT: ICD-10-PCS | Mod: 26,LT,, | Performed by: RADIOLOGY

## 2021-04-16 PROCEDURE — 1101F PT FALLS ASSESS-DOCD LE1/YR: CPT | Mod: CPTII,S$GLB,, | Performed by: PHYSICIAN ASSISTANT

## 2021-04-16 PROCEDURE — 99213 PR OFFICE/OUTPT VISIT, EST, LEVL III, 20-29 MIN: ICD-10-PCS | Mod: S$GLB,,, | Performed by: PHYSICIAN ASSISTANT

## 2021-04-16 PROCEDURE — 73560 X-RAY EXAM OF KNEE 1 OR 2: CPT | Mod: TC,LT

## 2021-04-16 PROCEDURE — 3288F FALL RISK ASSESSMENT DOCD: CPT | Mod: CPTII,S$GLB,, | Performed by: PHYSICIAN ASSISTANT

## 2021-04-16 PROCEDURE — 99999 PR PBB SHADOW E&M-EST. PATIENT-LVL III: ICD-10-PCS | Mod: PBBFAC,,, | Performed by: PHYSICIAN ASSISTANT

## 2021-04-16 PROCEDURE — 99999 PR PBB SHADOW E&M-EST. PATIENT-LVL III: CPT | Mod: PBBFAC,,, | Performed by: PHYSICIAN ASSISTANT

## 2021-04-20 ENCOUNTER — TELEPHONE (OUTPATIENT)
Dept: INTERNAL MEDICINE | Facility: CLINIC | Age: 81
End: 2021-04-20

## 2021-04-21 ENCOUNTER — OFFICE VISIT (OUTPATIENT)
Dept: INTERNAL MEDICINE | Facility: CLINIC | Age: 81
End: 2021-04-21
Payer: MEDICARE

## 2021-04-21 VITALS
WEIGHT: 149.94 LBS | HEART RATE: 75 BPM | HEIGHT: 67 IN | BODY MASS INDEX: 23.53 KG/M2 | DIASTOLIC BLOOD PRESSURE: 62 MMHG | OXYGEN SATURATION: 97 % | SYSTOLIC BLOOD PRESSURE: 138 MMHG

## 2021-04-21 DIAGNOSIS — H35.3211 EXUDATIVE AGE-RELATED MACULAR DEGENERATION OF RIGHT EYE WITH ACTIVE CHOROIDAL NEOVASCULARIZATION: ICD-10-CM

## 2021-04-21 DIAGNOSIS — S82.035D CLOSED NONDISPLACED TRANSVERSE FRACTURE OF LEFT PATELLA WITH ROUTINE HEALING: ICD-10-CM

## 2021-04-21 DIAGNOSIS — F41.9 ANXIETY DISORDER, UNSPECIFIED TYPE: ICD-10-CM

## 2021-04-21 DIAGNOSIS — R53.83 FATIGUE, UNSPECIFIED TYPE: ICD-10-CM

## 2021-04-21 DIAGNOSIS — Z00.00 ENCOUNTER FOR PREVENTIVE HEALTH EXAMINATION: Primary | ICD-10-CM

## 2021-04-21 DIAGNOSIS — N40.0 BENIGN PROSTATIC HYPERPLASIA, UNSPECIFIED WHETHER LOWER URINARY TRACT SYMPTOMS PRESENT: ICD-10-CM

## 2021-04-21 DIAGNOSIS — G60.8 HEREDITARY SENSORY NEUROPATHY: ICD-10-CM

## 2021-04-21 DIAGNOSIS — G47.33 OSA (OBSTRUCTIVE SLEEP APNEA): ICD-10-CM

## 2021-04-21 PROBLEM — H35.3210 EXUDATIVE AGE-RELATED MACULAR DEGENERATION OF RIGHT EYE: Status: ACTIVE | Noted: 2017-07-27

## 2021-04-21 PROBLEM — S82.009A CLOSED FRACTURE OF PATELLA: Status: ACTIVE | Noted: 2021-04-21

## 2021-04-21 PROCEDURE — 99499 UNLISTED E&M SERVICE: CPT | Mod: S$GLB,,, | Performed by: NURSE PRACTITIONER

## 2021-04-21 PROCEDURE — 99999 PR PBB SHADOW E&M-EST. PATIENT-LVL IV: CPT | Mod: PBBFAC,,, | Performed by: NURSE PRACTITIONER

## 2021-04-21 PROCEDURE — 99999 PR PBB SHADOW E&M-EST. PATIENT-LVL IV: ICD-10-PCS | Mod: PBBFAC,,, | Performed by: NURSE PRACTITIONER

## 2021-04-21 PROCEDURE — G0439 PPPS, SUBSEQ VISIT: HCPCS | Mod: S$GLB,,, | Performed by: NURSE PRACTITIONER

## 2021-04-21 PROCEDURE — 1125F PR PAIN SEVERITY QUANTIFIED, PAIN PRESENT: ICD-10-PCS | Mod: S$GLB,,, | Performed by: NURSE PRACTITIONER

## 2021-04-21 PROCEDURE — 1158F ADVNC CARE PLAN TLK DOCD: CPT | Mod: S$GLB,,, | Performed by: NURSE PRACTITIONER

## 2021-04-21 PROCEDURE — 99499 RISK ADDL DX/OHS AUDIT: ICD-10-PCS | Mod: S$GLB,,, | Performed by: NURSE PRACTITIONER

## 2021-04-21 PROCEDURE — 3288F FALL RISK ASSESSMENT DOCD: CPT | Mod: CPTII,S$GLB,, | Performed by: NURSE PRACTITIONER

## 2021-04-21 PROCEDURE — 3288F PR FALLS RISK ASSESSMENT DOCUMENTED: ICD-10-PCS | Mod: CPTII,S$GLB,, | Performed by: NURSE PRACTITIONER

## 2021-04-21 PROCEDURE — 1100F PTFALLS ASSESS-DOCD GE2>/YR: CPT | Mod: CPTII,S$GLB,, | Performed by: NURSE PRACTITIONER

## 2021-04-21 PROCEDURE — 1100F PR PT FALLS ASSESS DOC 2+ FALLS/FALL W/INJURY/YR: ICD-10-PCS | Mod: CPTII,S$GLB,, | Performed by: NURSE PRACTITIONER

## 2021-04-21 PROCEDURE — 1125F AMNT PAIN NOTED PAIN PRSNT: CPT | Mod: S$GLB,,, | Performed by: NURSE PRACTITIONER

## 2021-04-21 PROCEDURE — 1158F PR ADVANCE CARE PLANNING DISCUSS DOCUMENTED IN MEDICAL RECORD: ICD-10-PCS | Mod: S$GLB,,, | Performed by: NURSE PRACTITIONER

## 2021-04-21 PROCEDURE — G0439 PR MEDICARE ANNUAL WELLNESS SUBSEQUENT VISIT: ICD-10-PCS | Mod: S$GLB,,, | Performed by: NURSE PRACTITIONER

## 2021-04-21 RX ORDER — FERROUS SULFATE 325(65) MG
325 TABLET, DELAYED RELEASE (ENTERIC COATED) ORAL DAILY
Status: ON HOLD | COMMUNITY
End: 2023-08-09 | Stop reason: HOSPADM

## 2021-05-05 ENCOUNTER — OFFICE VISIT (OUTPATIENT)
Dept: UROLOGY | Facility: CLINIC | Age: 81
End: 2021-05-05
Payer: MEDICARE

## 2021-05-05 VITALS
HEART RATE: 69 BPM | DIASTOLIC BLOOD PRESSURE: 72 MMHG | SYSTOLIC BLOOD PRESSURE: 119 MMHG | WEIGHT: 152 LBS | BODY MASS INDEX: 23.86 KG/M2 | HEIGHT: 67 IN

## 2021-05-05 DIAGNOSIS — N52.01 ERECTILE DYSFUNCTION DUE TO ARTERIAL INSUFFICIENCY: ICD-10-CM

## 2021-05-05 DIAGNOSIS — N40.1 BPH WITH URINARY OBSTRUCTION: ICD-10-CM

## 2021-05-05 DIAGNOSIS — N13.8 BPH WITH URINARY OBSTRUCTION: ICD-10-CM

## 2021-05-05 DIAGNOSIS — R53.83 FATIGUE, UNSPECIFIED TYPE: ICD-10-CM

## 2021-05-05 DIAGNOSIS — E29.1 MALE HYPOGONADISM: Primary | ICD-10-CM

## 2021-05-05 DIAGNOSIS — E78.5 DYSLIPIDEMIA: ICD-10-CM

## 2021-05-05 PROCEDURE — 3288F PR FALLS RISK ASSESSMENT DOCUMENTED: ICD-10-PCS | Mod: CPTII,S$GLB,, | Performed by: NURSE PRACTITIONER

## 2021-05-05 PROCEDURE — 1159F MED LIST DOCD IN RCRD: CPT | Mod: S$GLB,,, | Performed by: NURSE PRACTITIONER

## 2021-05-05 PROCEDURE — 99214 OFFICE O/P EST MOD 30 MIN: CPT | Mod: S$GLB,,, | Performed by: NURSE PRACTITIONER

## 2021-05-05 PROCEDURE — 99999 PR PBB SHADOW E&M-EST. PATIENT-LVL III: CPT | Mod: PBBFAC,,, | Performed by: NURSE PRACTITIONER

## 2021-05-05 PROCEDURE — 99214 PR OFFICE/OUTPT VISIT, EST, LEVL IV, 30-39 MIN: ICD-10-PCS | Mod: S$GLB,,, | Performed by: NURSE PRACTITIONER

## 2021-05-05 PROCEDURE — 1159F PR MEDICATION LIST DOCUMENTED IN MEDICAL RECORD: ICD-10-PCS | Mod: S$GLB,,, | Performed by: NURSE PRACTITIONER

## 2021-05-05 PROCEDURE — 1101F PR PT FALLS ASSESS DOC 0-1 FALLS W/OUT INJ PAST YR: ICD-10-PCS | Mod: CPTII,S$GLB,, | Performed by: NURSE PRACTITIONER

## 2021-05-05 PROCEDURE — 1126F PR PAIN SEVERITY QUANTIFIED, NO PAIN PRESENT: ICD-10-PCS | Mod: S$GLB,,, | Performed by: NURSE PRACTITIONER

## 2021-05-05 PROCEDURE — 99999 PR PBB SHADOW E&M-EST. PATIENT-LVL III: ICD-10-PCS | Mod: PBBFAC,,, | Performed by: NURSE PRACTITIONER

## 2021-05-05 PROCEDURE — 1126F AMNT PAIN NOTED NONE PRSNT: CPT | Mod: S$GLB,,, | Performed by: NURSE PRACTITIONER

## 2021-05-05 PROCEDURE — 3288F FALL RISK ASSESSMENT DOCD: CPT | Mod: CPTII,S$GLB,, | Performed by: NURSE PRACTITIONER

## 2021-05-05 PROCEDURE — 1101F PT FALLS ASSESS-DOCD LE1/YR: CPT | Mod: CPTII,S$GLB,, | Performed by: NURSE PRACTITIONER

## 2021-05-05 RX ORDER — TESTOSTERONE 40.5 MG/2.5G
2.5 GEL TOPICAL DAILY
Qty: 75 G | Refills: 5 | Status: SHIPPED | OUTPATIENT
Start: 2021-05-05 | End: 2021-11-06 | Stop reason: SDUPTHER

## 2021-05-07 ENCOUNTER — LAB VISIT (OUTPATIENT)
Dept: LAB | Facility: HOSPITAL | Age: 81
End: 2021-05-07
Payer: MEDICARE

## 2021-05-07 DIAGNOSIS — N40.1 BPH WITH URINARY OBSTRUCTION: ICD-10-CM

## 2021-05-07 DIAGNOSIS — N13.8 BPH WITH URINARY OBSTRUCTION: ICD-10-CM

## 2021-05-07 DIAGNOSIS — N52.01 ERECTILE DYSFUNCTION DUE TO ARTERIAL INSUFFICIENCY: ICD-10-CM

## 2021-05-07 DIAGNOSIS — R53.83 FATIGUE, UNSPECIFIED TYPE: ICD-10-CM

## 2021-05-07 DIAGNOSIS — E29.1 MALE HYPOGONADISM: ICD-10-CM

## 2021-05-07 DIAGNOSIS — E78.5 DYSLIPIDEMIA: ICD-10-CM

## 2021-05-07 LAB
ALBUMIN SERPL BCP-MCNC: 3.8 G/DL (ref 3.5–5.2)
ALP SERPL-CCNC: 61 U/L (ref 55–135)
ALT SERPL W/O P-5'-P-CCNC: 23 U/L (ref 10–44)
ANION GAP SERPL CALC-SCNC: 8 MMOL/L (ref 8–16)
AST SERPL-CCNC: 23 U/L (ref 10–40)
BASOPHILS # BLD AUTO: 0.02 K/UL (ref 0–0.2)
BASOPHILS NFR BLD: 0.2 % (ref 0–1.9)
BILIRUB SERPL-MCNC: 0.5 MG/DL (ref 0.1–1)
BUN SERPL-MCNC: 13 MG/DL (ref 8–23)
CALCIUM SERPL-MCNC: 9.5 MG/DL (ref 8.7–10.5)
CHLORIDE SERPL-SCNC: 104 MMOL/L (ref 95–110)
CHOLEST SERPL-MCNC: 188 MG/DL (ref 120–199)
CHOLEST/HDLC SERPL: 2.9 {RATIO} (ref 2–5)
CO2 SERPL-SCNC: 25 MMOL/L (ref 23–29)
COMPLEXED PSA SERPL-MCNC: 4.1 NG/ML (ref 0–4)
CREAT SERPL-MCNC: 0.7 MG/DL (ref 0.5–1.4)
DIFFERENTIAL METHOD: ABNORMAL
EOSINOPHIL # BLD AUTO: 0 K/UL (ref 0–0.5)
EOSINOPHIL NFR BLD: 0 % (ref 0–8)
ERYTHROCYTE [DISTWIDTH] IN BLOOD BY AUTOMATED COUNT: 12.2 % (ref 11.5–14.5)
EST. GFR  (AFRICAN AMERICAN): >60 ML/MIN/1.73 M^2
EST. GFR  (NON AFRICAN AMERICAN): >60 ML/MIN/1.73 M^2
GLUCOSE SERPL-MCNC: 115 MG/DL (ref 70–110)
HCT VFR BLD AUTO: 41 % (ref 40–54)
HDLC SERPL-MCNC: 65 MG/DL (ref 40–75)
HDLC SERPL: 34.6 % (ref 20–50)
HGB BLD-MCNC: 13.8 G/DL (ref 14–18)
IMM GRANULOCYTES # BLD AUTO: 0.05 K/UL (ref 0–0.04)
IMM GRANULOCYTES NFR BLD AUTO: 0.4 % (ref 0–0.5)
IRON SERPL-MCNC: 133 UG/DL (ref 45–160)
LDLC SERPL CALC-MCNC: 108.2 MG/DL (ref 63–159)
LYMPHOCYTES # BLD AUTO: 1.3 K/UL (ref 1–4.8)
LYMPHOCYTES NFR BLD: 10.7 % (ref 18–48)
MCH RBC QN AUTO: 33 PG (ref 27–31)
MCHC RBC AUTO-ENTMCNC: 33.7 G/DL (ref 32–36)
MCV RBC AUTO: 98 FL (ref 82–98)
MONOCYTES # BLD AUTO: 0.8 K/UL (ref 0.3–1)
MONOCYTES NFR BLD: 6.8 % (ref 4–15)
NEUTROPHILS # BLD AUTO: 9.8 K/UL (ref 1.8–7.7)
NEUTROPHILS NFR BLD: 81.9 % (ref 38–73)
NONHDLC SERPL-MCNC: 123 MG/DL
NRBC BLD-RTO: 0 /100 WBC
PLATELET # BLD AUTO: 229 K/UL (ref 150–450)
PMV BLD AUTO: 9.2 FL (ref 9.2–12.9)
POTASSIUM SERPL-SCNC: 4.1 MMOL/L (ref 3.5–5.1)
PROT SERPL-MCNC: 7.2 G/DL (ref 6–8.4)
RBC # BLD AUTO: 4.18 M/UL (ref 4.6–6.2)
SATURATED IRON: 39 % (ref 20–50)
SODIUM SERPL-SCNC: 137 MMOL/L (ref 136–145)
TESTOST SERPL-MCNC: 160 NG/DL (ref 304–1227)
TOTAL IRON BINDING CAPACITY: 337 UG/DL (ref 250–450)
TRANSFERRIN SERPL-MCNC: 228 MG/DL (ref 200–375)
TRIGL SERPL-MCNC: 74 MG/DL (ref 30–150)
WBC # BLD AUTO: 11.97 K/UL (ref 3.9–12.7)

## 2021-05-07 PROCEDURE — 85025 COMPLETE CBC W/AUTO DIFF WBC: CPT | Performed by: NURSE PRACTITIONER

## 2021-05-07 PROCEDURE — 80061 LIPID PANEL: CPT | Performed by: NURSE PRACTITIONER

## 2021-05-07 PROCEDURE — 80053 COMPREHEN METABOLIC PANEL: CPT | Performed by: NURSE PRACTITIONER

## 2021-05-07 PROCEDURE — 84403 ASSAY OF TOTAL TESTOSTERONE: CPT | Performed by: NURSE PRACTITIONER

## 2021-05-07 PROCEDURE — 83540 ASSAY OF IRON: CPT | Performed by: NURSE PRACTITIONER

## 2021-05-07 PROCEDURE — 36415 COLL VENOUS BLD VENIPUNCTURE: CPT | Performed by: NURSE PRACTITIONER

## 2021-05-07 PROCEDURE — 84153 ASSAY OF PSA TOTAL: CPT | Performed by: NURSE PRACTITIONER

## 2021-05-25 ENCOUNTER — OFFICE VISIT (OUTPATIENT)
Dept: INTERNAL MEDICINE | Facility: CLINIC | Age: 81
End: 2021-05-25
Attending: FAMILY MEDICINE
Payer: MEDICARE

## 2021-05-25 VITALS
BODY MASS INDEX: 24.19 KG/M2 | DIASTOLIC BLOOD PRESSURE: 65 MMHG | HEART RATE: 65 BPM | WEIGHT: 154.13 LBS | OXYGEN SATURATION: 96 % | SYSTOLIC BLOOD PRESSURE: 123 MMHG | HEIGHT: 67 IN

## 2021-05-25 DIAGNOSIS — G47.33 OSA (OBSTRUCTIVE SLEEP APNEA): ICD-10-CM

## 2021-05-25 DIAGNOSIS — G60.8 HEREDITARY SENSORY NEUROPATHY: ICD-10-CM

## 2021-05-25 DIAGNOSIS — N40.0 BENIGN PROSTATIC HYPERPLASIA, UNSPECIFIED WHETHER LOWER URINARY TRACT SYMPTOMS PRESENT: Primary | ICD-10-CM

## 2021-05-25 DIAGNOSIS — H35.3211 EXUDATIVE AGE-RELATED MACULAR DEGENERATION OF RIGHT EYE WITH ACTIVE CHOROIDAL NEOVASCULARIZATION: ICD-10-CM

## 2021-05-25 PROCEDURE — 1125F AMNT PAIN NOTED PAIN PRSNT: CPT | Mod: S$GLB,,, | Performed by: FAMILY MEDICINE

## 2021-05-25 PROCEDURE — 1159F PR MEDICATION LIST DOCUMENTED IN MEDICAL RECORD: ICD-10-PCS | Mod: S$GLB,,, | Performed by: FAMILY MEDICINE

## 2021-05-25 PROCEDURE — 1101F PR PT FALLS ASSESS DOC 0-1 FALLS W/OUT INJ PAST YR: ICD-10-PCS | Mod: CPTII,S$GLB,, | Performed by: FAMILY MEDICINE

## 2021-05-25 PROCEDURE — 99214 PR OFFICE/OUTPT VISIT, EST, LEVL IV, 30-39 MIN: ICD-10-PCS | Mod: S$GLB,,, | Performed by: FAMILY MEDICINE

## 2021-05-25 PROCEDURE — 1125F PR PAIN SEVERITY QUANTIFIED, PAIN PRESENT: ICD-10-PCS | Mod: S$GLB,,, | Performed by: FAMILY MEDICINE

## 2021-05-25 PROCEDURE — 3288F PR FALLS RISK ASSESSMENT DOCUMENTED: ICD-10-PCS | Mod: CPTII,S$GLB,, | Performed by: FAMILY MEDICINE

## 2021-05-25 PROCEDURE — 3288F FALL RISK ASSESSMENT DOCD: CPT | Mod: CPTII,S$GLB,, | Performed by: FAMILY MEDICINE

## 2021-05-25 PROCEDURE — 99214 OFFICE O/P EST MOD 30 MIN: CPT | Mod: S$GLB,,, | Performed by: FAMILY MEDICINE

## 2021-05-25 PROCEDURE — 1101F PT FALLS ASSESS-DOCD LE1/YR: CPT | Mod: CPTII,S$GLB,, | Performed by: FAMILY MEDICINE

## 2021-05-25 PROCEDURE — 99999 PR PBB SHADOW E&M-EST. PATIENT-LVL IV: CPT | Mod: PBBFAC,,, | Performed by: FAMILY MEDICINE

## 2021-05-25 PROCEDURE — 99999 PR PBB SHADOW E&M-EST. PATIENT-LVL IV: ICD-10-PCS | Mod: PBBFAC,,, | Performed by: FAMILY MEDICINE

## 2021-05-25 PROCEDURE — 1159F MED LIST DOCD IN RCRD: CPT | Mod: S$GLB,,, | Performed by: FAMILY MEDICINE

## 2021-06-02 ENCOUNTER — OFFICE VISIT (OUTPATIENT)
Dept: SLEEP MEDICINE | Facility: CLINIC | Age: 81
End: 2021-06-02
Payer: MEDICARE

## 2021-06-02 VITALS
HEART RATE: 71 BPM | WEIGHT: 154.13 LBS | DIASTOLIC BLOOD PRESSURE: 67 MMHG | SYSTOLIC BLOOD PRESSURE: 120 MMHG | HEIGHT: 67 IN | BODY MASS INDEX: 24.19 KG/M2

## 2021-06-02 DIAGNOSIS — F51.09 OTHER INSOMNIA NOT DUE TO A SUBSTANCE OR KNOWN PHYSIOLOGICAL CONDITION: ICD-10-CM

## 2021-06-02 DIAGNOSIS — R35.1 NOCTURIA: ICD-10-CM

## 2021-06-02 DIAGNOSIS — G47.33 OSA (OBSTRUCTIVE SLEEP APNEA): Primary | ICD-10-CM

## 2021-06-02 PROCEDURE — 1126F PR PAIN SEVERITY QUANTIFIED, NO PAIN PRESENT: ICD-10-PCS | Mod: S$GLB,,, | Performed by: INTERNAL MEDICINE

## 2021-06-02 PROCEDURE — 1159F MED LIST DOCD IN RCRD: CPT | Mod: S$GLB,,, | Performed by: INTERNAL MEDICINE

## 2021-06-02 PROCEDURE — 99214 OFFICE O/P EST MOD 30 MIN: CPT | Mod: S$GLB,,, | Performed by: INTERNAL MEDICINE

## 2021-06-02 PROCEDURE — 99999 PR PBB SHADOW E&M-EST. PATIENT-LVL III: CPT | Mod: PBBFAC,,, | Performed by: INTERNAL MEDICINE

## 2021-06-02 PROCEDURE — 99214 PR OFFICE/OUTPT VISIT, EST, LEVL IV, 30-39 MIN: ICD-10-PCS | Mod: S$GLB,,, | Performed by: INTERNAL MEDICINE

## 2021-06-02 PROCEDURE — 3288F PR FALLS RISK ASSESSMENT DOCUMENTED: ICD-10-PCS | Mod: CPTII,S$GLB,, | Performed by: INTERNAL MEDICINE

## 2021-06-02 PROCEDURE — 1159F PR MEDICATION LIST DOCUMENTED IN MEDICAL RECORD: ICD-10-PCS | Mod: S$GLB,,, | Performed by: INTERNAL MEDICINE

## 2021-06-02 PROCEDURE — 1101F PT FALLS ASSESS-DOCD LE1/YR: CPT | Mod: CPTII,S$GLB,, | Performed by: INTERNAL MEDICINE

## 2021-06-02 PROCEDURE — 1126F AMNT PAIN NOTED NONE PRSNT: CPT | Mod: S$GLB,,, | Performed by: INTERNAL MEDICINE

## 2021-06-02 PROCEDURE — 3288F FALL RISK ASSESSMENT DOCD: CPT | Mod: CPTII,S$GLB,, | Performed by: INTERNAL MEDICINE

## 2021-06-02 PROCEDURE — 99999 PR PBB SHADOW E&M-EST. PATIENT-LVL III: ICD-10-PCS | Mod: PBBFAC,,, | Performed by: INTERNAL MEDICINE

## 2021-06-02 PROCEDURE — 1101F PR PT FALLS ASSESS DOC 0-1 FALLS W/OUT INJ PAST YR: ICD-10-PCS | Mod: CPTII,S$GLB,, | Performed by: INTERNAL MEDICINE

## 2021-06-03 ENCOUNTER — TELEPHONE (OUTPATIENT)
Dept: SLEEP MEDICINE | Facility: OTHER | Age: 81
End: 2021-06-03

## 2021-06-03 DIAGNOSIS — G47.33 OSA (OBSTRUCTIVE SLEEP APNEA): Primary | ICD-10-CM

## 2021-06-04 ENCOUNTER — TELEPHONE (OUTPATIENT)
Dept: SLEEP MEDICINE | Facility: CLINIC | Age: 81
End: 2021-06-04

## 2021-06-08 ENCOUNTER — TELEPHONE (OUTPATIENT)
Dept: SLEEP MEDICINE | Facility: OTHER | Age: 81
End: 2021-06-08

## 2021-06-11 ENCOUNTER — TELEPHONE (OUTPATIENT)
Dept: SLEEP MEDICINE | Facility: OTHER | Age: 81
End: 2021-06-11

## 2021-06-11 ENCOUNTER — OFFICE VISIT (OUTPATIENT)
Dept: ORTHOPEDICS | Facility: CLINIC | Age: 81
End: 2021-06-11
Payer: MEDICARE

## 2021-06-11 ENCOUNTER — HOSPITAL ENCOUNTER (OUTPATIENT)
Dept: RADIOLOGY | Facility: HOSPITAL | Age: 81
Discharge: HOME | End: 2021-06-11
Attending: PHYSICIAN ASSISTANT
Payer: MEDICARE

## 2021-06-11 VITALS — BODY MASS INDEX: 24.17 KG/M2 | HEIGHT: 67 IN | RESPIRATION RATE: 18 BRPM | TEMPERATURE: 98 F | WEIGHT: 154 LBS

## 2021-06-11 DIAGNOSIS — S82.035D CLOSED NONDISPLACED TRANSVERSE FRACTURE OF LEFT PATELLA WITH ROUTINE HEALING, SUBSEQUENT ENCOUNTER: Primary | ICD-10-CM

## 2021-06-11 DIAGNOSIS — S82.035D CLOSED NONDISPLACED TRANSVERSE FRACTURE OF LEFT PATELLA WITH ROUTINE HEALING, SUBSEQUENT ENCOUNTER: ICD-10-CM

## 2021-06-11 PROCEDURE — 1125F AMNT PAIN NOTED PAIN PRSNT: CPT | Mod: S$GLB,,, | Performed by: PHYSICIAN ASSISTANT

## 2021-06-11 PROCEDURE — 73560 X-RAY EXAM OF KNEE 1 OR 2: CPT | Mod: TC,LT

## 2021-06-11 PROCEDURE — 99999 PR PBB SHADOW E&M-EST. PATIENT-LVL III: CPT | Mod: PBBFAC,,, | Performed by: PHYSICIAN ASSISTANT

## 2021-06-11 PROCEDURE — 73560 X-RAY EXAM OF KNEE 1 OR 2: CPT | Mod: 26,LT,, | Performed by: RADIOLOGY

## 2021-06-11 PROCEDURE — 1101F PR PT FALLS ASSESS DOC 0-1 FALLS W/OUT INJ PAST YR: ICD-10-PCS | Mod: CPTII,S$GLB,, | Performed by: PHYSICIAN ASSISTANT

## 2021-06-11 PROCEDURE — 3288F PR FALLS RISK ASSESSMENT DOCUMENTED: ICD-10-PCS | Mod: CPTII,S$GLB,, | Performed by: PHYSICIAN ASSISTANT

## 2021-06-11 PROCEDURE — 3288F FALL RISK ASSESSMENT DOCD: CPT | Mod: CPTII,S$GLB,, | Performed by: PHYSICIAN ASSISTANT

## 2021-06-11 PROCEDURE — 99213 PR OFFICE/OUTPT VISIT, EST, LEVL III, 20-29 MIN: ICD-10-PCS | Mod: S$GLB,,, | Performed by: PHYSICIAN ASSISTANT

## 2021-06-11 PROCEDURE — 1159F MED LIST DOCD IN RCRD: CPT | Mod: S$GLB,,, | Performed by: PHYSICIAN ASSISTANT

## 2021-06-11 PROCEDURE — 1159F PR MEDICATION LIST DOCUMENTED IN MEDICAL RECORD: ICD-10-PCS | Mod: S$GLB,,, | Performed by: PHYSICIAN ASSISTANT

## 2021-06-11 PROCEDURE — 1101F PT FALLS ASSESS-DOCD LE1/YR: CPT | Mod: CPTII,S$GLB,, | Performed by: PHYSICIAN ASSISTANT

## 2021-06-11 PROCEDURE — 73560 XR KNEE 1 OR 2 VIEW LEFT: ICD-10-PCS | Mod: 26,LT,, | Performed by: RADIOLOGY

## 2021-06-11 PROCEDURE — 1125F PR PAIN SEVERITY QUANTIFIED, PAIN PRESENT: ICD-10-PCS | Mod: S$GLB,,, | Performed by: PHYSICIAN ASSISTANT

## 2021-06-11 PROCEDURE — 99999 PR PBB SHADOW E&M-EST. PATIENT-LVL III: ICD-10-PCS | Mod: PBBFAC,,, | Performed by: PHYSICIAN ASSISTANT

## 2021-06-11 PROCEDURE — 99213 OFFICE O/P EST LOW 20 MIN: CPT | Mod: S$GLB,,, | Performed by: PHYSICIAN ASSISTANT

## 2021-06-22 ENCOUNTER — TELEPHONE (OUTPATIENT)
Dept: SLEEP MEDICINE | Facility: OTHER | Age: 81
End: 2021-06-22

## 2021-07-02 ENCOUNTER — PATIENT MESSAGE (OUTPATIENT)
Dept: SLEEP MEDICINE | Facility: CLINIC | Age: 81
End: 2021-07-02

## 2021-07-06 ENCOUNTER — PATIENT MESSAGE (OUTPATIENT)
Dept: SLEEP MEDICINE | Facility: CLINIC | Age: 81
End: 2021-07-06

## 2021-07-07 DIAGNOSIS — G47.33 OSA (OBSTRUCTIVE SLEEP APNEA): Primary | ICD-10-CM

## 2021-07-12 ENCOUNTER — OFFICE VISIT (OUTPATIENT)
Dept: INTERNAL MEDICINE | Facility: CLINIC | Age: 81
End: 2021-07-12
Attending: FAMILY MEDICINE
Payer: MEDICARE

## 2021-07-12 VITALS
SYSTOLIC BLOOD PRESSURE: 120 MMHG | OXYGEN SATURATION: 95 % | HEART RATE: 72 BPM | BODY MASS INDEX: 24.07 KG/M2 | WEIGHT: 153.69 LBS | DIASTOLIC BLOOD PRESSURE: 80 MMHG

## 2021-07-12 DIAGNOSIS — F51.01 PRIMARY INSOMNIA: Primary | ICD-10-CM

## 2021-07-12 DIAGNOSIS — G47.33 OSA (OBSTRUCTIVE SLEEP APNEA): ICD-10-CM

## 2021-07-12 DIAGNOSIS — H35.30 MACULAR DEGENERATION OF RIGHT EYE, UNSPECIFIED TYPE: ICD-10-CM

## 2021-07-12 PROCEDURE — 99999 PR PBB SHADOW E&M-EST. PATIENT-LVL III: ICD-10-PCS | Mod: PBBFAC,,, | Performed by: FAMILY MEDICINE

## 2021-07-12 PROCEDURE — 1159F PR MEDICATION LIST DOCUMENTED IN MEDICAL RECORD: ICD-10-PCS | Mod: S$GLB,,, | Performed by: FAMILY MEDICINE

## 2021-07-12 PROCEDURE — 3288F FALL RISK ASSESSMENT DOCD: CPT | Mod: CPTII,S$GLB,, | Performed by: FAMILY MEDICINE

## 2021-07-12 PROCEDURE — 99214 OFFICE O/P EST MOD 30 MIN: CPT | Mod: S$GLB,,, | Performed by: FAMILY MEDICINE

## 2021-07-12 PROCEDURE — 99499 UNLISTED E&M SERVICE: CPT | Mod: S$GLB,,, | Performed by: FAMILY MEDICINE

## 2021-07-12 PROCEDURE — 1101F PR PT FALLS ASSESS DOC 0-1 FALLS W/OUT INJ PAST YR: ICD-10-PCS | Mod: CPTII,S$GLB,, | Performed by: FAMILY MEDICINE

## 2021-07-12 PROCEDURE — 99499 RISK ADDL DX/OHS AUDIT: ICD-10-PCS | Mod: S$GLB,,, | Performed by: FAMILY MEDICINE

## 2021-07-12 PROCEDURE — 99214 PR OFFICE/OUTPT VISIT, EST, LEVL IV, 30-39 MIN: ICD-10-PCS | Mod: S$GLB,,, | Performed by: FAMILY MEDICINE

## 2021-07-12 PROCEDURE — 1159F MED LIST DOCD IN RCRD: CPT | Mod: S$GLB,,, | Performed by: FAMILY MEDICINE

## 2021-07-12 PROCEDURE — 99999 PR PBB SHADOW E&M-EST. PATIENT-LVL III: CPT | Mod: PBBFAC,,, | Performed by: FAMILY MEDICINE

## 2021-07-12 PROCEDURE — 1101F PT FALLS ASSESS-DOCD LE1/YR: CPT | Mod: CPTII,S$GLB,, | Performed by: FAMILY MEDICINE

## 2021-07-12 PROCEDURE — 3288F PR FALLS RISK ASSESSMENT DOCUMENTED: ICD-10-PCS | Mod: CPTII,S$GLB,, | Performed by: FAMILY MEDICINE

## 2021-07-12 RX ORDER — AMITRIPTYLINE HYDROCHLORIDE 10 MG/1
10 TABLET, FILM COATED ORAL NIGHTLY PRN
Qty: 30 TABLET | Refills: 5 | Status: SHIPPED | OUTPATIENT
Start: 2021-07-12 | End: 2022-05-11

## 2021-07-13 ENCOUNTER — TELEPHONE (OUTPATIENT)
Dept: SLEEP MEDICINE | Facility: CLINIC | Age: 81
End: 2021-07-13

## 2021-08-26 ENCOUNTER — TELEPHONE (OUTPATIENT)
Dept: INTERNAL MEDICINE | Facility: CLINIC | Age: 81
End: 2021-08-26

## 2021-09-27 ENCOUNTER — TELEPHONE (OUTPATIENT)
Dept: SLEEP MEDICINE | Facility: OTHER | Age: 81
End: 2021-09-27

## 2021-09-27 DIAGNOSIS — Z01.818 PRE-OP TESTING: ICD-10-CM

## 2021-09-27 DIAGNOSIS — G47.33 OSA (OBSTRUCTIVE SLEEP APNEA): Primary | ICD-10-CM

## 2021-11-04 ENCOUNTER — TELEPHONE (OUTPATIENT)
Dept: SLEEP MEDICINE | Facility: OTHER | Age: 81
End: 2021-11-04
Payer: MEDICARE

## 2021-11-04 DIAGNOSIS — E29.1 MALE HYPOGONADISM: ICD-10-CM

## 2021-11-05 DIAGNOSIS — E29.1 MALE HYPOGONADISM: ICD-10-CM

## 2021-11-05 RX ORDER — TESTOSTERONE 40.5 MG/2.5G
2.5 GEL TOPICAL DAILY
Qty: 75 G | Refills: 5 | OUTPATIENT
Start: 2021-11-05 | End: 2022-05-05

## 2021-11-06 DIAGNOSIS — E29.1 MALE HYPOGONADISM: ICD-10-CM

## 2021-11-08 ENCOUNTER — LAB VISIT (OUTPATIENT)
Dept: LAB | Facility: HOSPITAL | Age: 81
End: 2021-11-08
Payer: MEDICARE

## 2021-11-08 DIAGNOSIS — N40.1 BPH WITH URINARY OBSTRUCTION: ICD-10-CM

## 2021-11-08 DIAGNOSIS — N52.01 ERECTILE DYSFUNCTION DUE TO ARTERIAL INSUFFICIENCY: ICD-10-CM

## 2021-11-08 DIAGNOSIS — E29.1 MALE HYPOGONADISM: ICD-10-CM

## 2021-11-08 DIAGNOSIS — E78.5 DYSLIPIDEMIA: ICD-10-CM

## 2021-11-08 DIAGNOSIS — R53.83 FATIGUE, UNSPECIFIED TYPE: ICD-10-CM

## 2021-11-08 DIAGNOSIS — N13.8 BPH WITH URINARY OBSTRUCTION: ICD-10-CM

## 2021-11-08 LAB
ALBUMIN SERPL BCP-MCNC: 4 G/DL (ref 3.5–5.2)
ALP SERPL-CCNC: 51 U/L (ref 55–135)
ALT SERPL W/O P-5'-P-CCNC: 25 U/L (ref 10–44)
AST SERPL-CCNC: 23 U/L (ref 10–40)
BASOPHILS # BLD AUTO: 0.03 K/UL (ref 0–0.2)
BASOPHILS NFR BLD: 0.4 % (ref 0–1.9)
BILIRUB DIRECT SERPL-MCNC: 0.2 MG/DL (ref 0.1–0.3)
BILIRUB SERPL-MCNC: 0.6 MG/DL (ref 0.1–1)
CHOLEST SERPL-MCNC: 184 MG/DL (ref 120–199)
CHOLEST/HDLC SERPL: 3.1 {RATIO} (ref 2–5)
COMPLEXED PSA SERPL-MCNC: 3.9 NG/ML (ref 0–4)
DIFFERENTIAL METHOD: ABNORMAL
EOSINOPHIL # BLD AUTO: 0.1 K/UL (ref 0–0.5)
EOSINOPHIL NFR BLD: 1.6 % (ref 0–8)
ERYTHROCYTE [DISTWIDTH] IN BLOOD BY AUTOMATED COUNT: 12.9 % (ref 11.5–14.5)
HCT VFR BLD AUTO: 42.1 % (ref 40–54)
HDLC SERPL-MCNC: 60 MG/DL (ref 40–75)
HDLC SERPL: 32.6 % (ref 20–50)
HGB BLD-MCNC: 14.3 G/DL (ref 14–18)
IMM GRANULOCYTES # BLD AUTO: 0.02 K/UL (ref 0–0.04)
IMM GRANULOCYTES NFR BLD AUTO: 0.3 % (ref 0–0.5)
LDLC SERPL CALC-MCNC: 109.4 MG/DL (ref 63–159)
LYMPHOCYTES # BLD AUTO: 1.8 K/UL (ref 1–4.8)
LYMPHOCYTES NFR BLD: 25.6 % (ref 18–48)
MCH RBC QN AUTO: 33.2 PG (ref 27–31)
MCHC RBC AUTO-ENTMCNC: 34 G/DL (ref 32–36)
MCV RBC AUTO: 98 FL (ref 82–98)
MONOCYTES # BLD AUTO: 0.7 K/UL (ref 0.3–1)
MONOCYTES NFR BLD: 10.5 % (ref 4–15)
NEUTROPHILS # BLD AUTO: 4.2 K/UL (ref 1.8–7.7)
NEUTROPHILS NFR BLD: 61.6 % (ref 38–73)
NONHDLC SERPL-MCNC: 124 MG/DL
NRBC BLD-RTO: 0 /100 WBC
PLATELET # BLD AUTO: 226 K/UL (ref 150–450)
PMV BLD AUTO: 9.4 FL (ref 9.2–12.9)
PROT SERPL-MCNC: 6.9 G/DL (ref 6–8.4)
RBC # BLD AUTO: 4.31 M/UL (ref 4.6–6.2)
TESTOST SERPL-MCNC: 144 NG/DL (ref 304–1227)
TRIGL SERPL-MCNC: 73 MG/DL (ref 30–150)
WBC # BLD AUTO: 6.87 K/UL (ref 3.9–12.7)

## 2021-11-08 PROCEDURE — 84403 ASSAY OF TOTAL TESTOSTERONE: CPT | Performed by: NURSE PRACTITIONER

## 2021-11-08 PROCEDURE — 80076 HEPATIC FUNCTION PANEL: CPT | Performed by: NURSE PRACTITIONER

## 2021-11-08 PROCEDURE — 84153 ASSAY OF PSA TOTAL: CPT | Performed by: NURSE PRACTITIONER

## 2021-11-08 PROCEDURE — 85025 COMPLETE CBC W/AUTO DIFF WBC: CPT | Performed by: NURSE PRACTITIONER

## 2021-11-08 PROCEDURE — 36415 COLL VENOUS BLD VENIPUNCTURE: CPT | Performed by: NURSE PRACTITIONER

## 2021-11-08 PROCEDURE — 80061 LIPID PANEL: CPT | Performed by: NURSE PRACTITIONER

## 2021-11-08 RX ORDER — TESTOSTERONE 40.5 MG/2.5G
2.5 GEL TOPICAL DAILY
Qty: 75 G | Refills: 5 | Status: SHIPPED | OUTPATIENT
Start: 2021-11-08 | End: 2022-05-12 | Stop reason: SDUPTHER

## 2021-11-11 ENCOUNTER — OFFICE VISIT (OUTPATIENT)
Dept: INTERNAL MEDICINE | Facility: CLINIC | Age: 81
End: 2021-11-11
Attending: INTERNAL MEDICINE
Payer: MEDICARE

## 2021-11-11 ENCOUNTER — OFFICE VISIT (OUTPATIENT)
Dept: UROLOGY | Facility: CLINIC | Age: 81
End: 2021-11-11
Payer: MEDICARE

## 2021-11-11 VITALS
SYSTOLIC BLOOD PRESSURE: 122 MMHG | WEIGHT: 153.69 LBS | OXYGEN SATURATION: 94 % | HEIGHT: 67 IN | HEART RATE: 73 BPM | DIASTOLIC BLOOD PRESSURE: 74 MMHG | BODY MASS INDEX: 24.12 KG/M2

## 2021-11-11 VITALS
WEIGHT: 152.25 LBS | DIASTOLIC BLOOD PRESSURE: 70 MMHG | HEIGHT: 67 IN | SYSTOLIC BLOOD PRESSURE: 119 MMHG | BODY MASS INDEX: 23.9 KG/M2 | HEART RATE: 53 BPM

## 2021-11-11 DIAGNOSIS — N13.8 BPH WITH URINARY OBSTRUCTION: ICD-10-CM

## 2021-11-11 DIAGNOSIS — E78.5 DYSLIPIDEMIA: ICD-10-CM

## 2021-11-11 DIAGNOSIS — N40.1 BPH WITH URINARY OBSTRUCTION: ICD-10-CM

## 2021-11-11 DIAGNOSIS — R53.83 FATIGUE, UNSPECIFIED TYPE: ICD-10-CM

## 2021-11-11 DIAGNOSIS — G62.9 PERIPHERAL POLYNEUROPATHY: ICD-10-CM

## 2021-11-11 DIAGNOSIS — H40.1130 PRIMARY OPEN ANGLE GLAUCOMA OF BOTH EYES, UNSPECIFIED GLAUCOMA STAGE: Primary | ICD-10-CM

## 2021-11-11 DIAGNOSIS — Z01.818 PRE-OP EVALUATION: ICD-10-CM

## 2021-11-11 DIAGNOSIS — E29.1 PRIMARY MALE HYPOGONADISM: Primary | ICD-10-CM

## 2021-11-11 LAB
BILIRUB SERPL-MCNC: NORMAL MG/DL
BLOOD URINE, POC: NORMAL
CLARITY, POC UA: CLEAR
COLOR, POC UA: YELLOW
GLUCOSE UR QL STRIP: NORMAL
KETONES UR QL STRIP: NORMAL
LEUKOCYTE ESTERASE URINE, POC: NORMAL
NITRITE, POC UA: NORMAL
PH, POC UA: 6
PROTEIN, POC: NORMAL
SPECIFIC GRAVITY, POC UA: 1.01
UROBILINOGEN, POC UA: NORMAL

## 2021-11-11 PROCEDURE — 99213 PR OFFICE/OUTPT VISIT, EST, LEVL III, 20-29 MIN: ICD-10-PCS | Mod: S$GLB,,, | Performed by: INTERNAL MEDICINE

## 2021-11-11 PROCEDURE — 1160F RVW MEDS BY RX/DR IN RCRD: CPT | Mod: CPTII,S$GLB,, | Performed by: NURSE PRACTITIONER

## 2021-11-11 PROCEDURE — 1159F PR MEDICATION LIST DOCUMENTED IN MEDICAL RECORD: ICD-10-PCS | Mod: CPTII,S$GLB,, | Performed by: INTERNAL MEDICINE

## 2021-11-11 PROCEDURE — 1160F PR REVIEW ALL MEDS BY PRESCRIBER/CLIN PHARMACIST DOCUMENTED: ICD-10-PCS | Mod: CPTII,S$GLB,, | Performed by: NURSE PRACTITIONER

## 2021-11-11 PROCEDURE — 99999 PR PBB SHADOW E&M-EST. PATIENT-LVL IV: CPT | Mod: PBBFAC,,, | Performed by: NURSE PRACTITIONER

## 2021-11-11 PROCEDURE — 1101F PT FALLS ASSESS-DOCD LE1/YR: CPT | Mod: CPTII,S$GLB,, | Performed by: NURSE PRACTITIONER

## 2021-11-11 PROCEDURE — 99499 UNLISTED E&M SERVICE: CPT | Mod: S$GLB,,, | Performed by: NURSE PRACTITIONER

## 2021-11-11 PROCEDURE — 1160F RVW MEDS BY RX/DR IN RCRD: CPT | Mod: CPTII,S$GLB,, | Performed by: INTERNAL MEDICINE

## 2021-11-11 PROCEDURE — 1126F PR PAIN SEVERITY QUANTIFIED, NO PAIN PRESENT: ICD-10-PCS | Mod: CPTII,S$GLB,, | Performed by: NURSE PRACTITIONER

## 2021-11-11 PROCEDURE — 3074F PR MOST RECENT SYSTOLIC BLOOD PRESSURE < 130 MM HG: ICD-10-PCS | Mod: CPTII,S$GLB,, | Performed by: NURSE PRACTITIONER

## 2021-11-11 PROCEDURE — 99214 PR OFFICE/OUTPT VISIT, EST, LEVL IV, 30-39 MIN: ICD-10-PCS | Mod: S$GLB,,, | Performed by: NURSE PRACTITIONER

## 2021-11-11 PROCEDURE — 1160F PR REVIEW ALL MEDS BY PRESCRIBER/CLIN PHARMACIST DOCUMENTED: ICD-10-PCS | Mod: CPTII,S$GLB,, | Performed by: INTERNAL MEDICINE

## 2021-11-11 PROCEDURE — 1159F MED LIST DOCD IN RCRD: CPT | Mod: CPTII,S$GLB,, | Performed by: NURSE PRACTITIONER

## 2021-11-11 PROCEDURE — 99213 OFFICE O/P EST LOW 20 MIN: CPT | Mod: S$GLB,,, | Performed by: INTERNAL MEDICINE

## 2021-11-11 PROCEDURE — 3288F PR FALLS RISK ASSESSMENT DOCUMENTED: ICD-10-PCS | Mod: CPTII,S$GLB,, | Performed by: INTERNAL MEDICINE

## 2021-11-11 PROCEDURE — 3078F PR MOST RECENT DIASTOLIC BLOOD PRESSURE < 80 MM HG: ICD-10-PCS | Mod: CPTII,S$GLB,, | Performed by: INTERNAL MEDICINE

## 2021-11-11 PROCEDURE — 1159F PR MEDICATION LIST DOCUMENTED IN MEDICAL RECORD: ICD-10-PCS | Mod: CPTII,S$GLB,, | Performed by: NURSE PRACTITIONER

## 2021-11-11 PROCEDURE — 1101F PT FALLS ASSESS-DOCD LE1/YR: CPT | Mod: CPTII,S$GLB,, | Performed by: INTERNAL MEDICINE

## 2021-11-11 PROCEDURE — 99999 PR PBB SHADOW E&M-EST. PATIENT-LVL IV: ICD-10-PCS | Mod: PBBFAC,,, | Performed by: NURSE PRACTITIONER

## 2021-11-11 PROCEDURE — 1101F PR PT FALLS ASSESS DOC 0-1 FALLS W/OUT INJ PAST YR: ICD-10-PCS | Mod: CPTII,S$GLB,, | Performed by: NURSE PRACTITIONER

## 2021-11-11 PROCEDURE — 99999 PR PBB SHADOW E&M-EST. PATIENT-LVL III: CPT | Mod: PBBFAC,,, | Performed by: INTERNAL MEDICINE

## 2021-11-11 PROCEDURE — 3288F FALL RISK ASSESSMENT DOCD: CPT | Mod: CPTII,S$GLB,, | Performed by: NURSE PRACTITIONER

## 2021-11-11 PROCEDURE — 3074F PR MOST RECENT SYSTOLIC BLOOD PRESSURE < 130 MM HG: ICD-10-PCS | Mod: CPTII,S$GLB,, | Performed by: INTERNAL MEDICINE

## 2021-11-11 PROCEDURE — 99214 OFFICE O/P EST MOD 30 MIN: CPT | Mod: S$GLB,,, | Performed by: NURSE PRACTITIONER

## 2021-11-11 PROCEDURE — 99999 PR PBB SHADOW E&M-EST. PATIENT-LVL III: ICD-10-PCS | Mod: PBBFAC,,, | Performed by: INTERNAL MEDICINE

## 2021-11-11 PROCEDURE — 81002 URINALYSIS NONAUTO W/O SCOPE: CPT | Mod: S$GLB,,, | Performed by: NURSE PRACTITIONER

## 2021-11-11 PROCEDURE — 3078F DIAST BP <80 MM HG: CPT | Mod: CPTII,S$GLB,, | Performed by: NURSE PRACTITIONER

## 2021-11-11 PROCEDURE — 3288F FALL RISK ASSESSMENT DOCD: CPT | Mod: CPTII,S$GLB,, | Performed by: INTERNAL MEDICINE

## 2021-11-11 PROCEDURE — 3288F PR FALLS RISK ASSESSMENT DOCUMENTED: ICD-10-PCS | Mod: CPTII,S$GLB,, | Performed by: NURSE PRACTITIONER

## 2021-11-11 PROCEDURE — 3078F PR MOST RECENT DIASTOLIC BLOOD PRESSURE < 80 MM HG: ICD-10-PCS | Mod: CPTII,S$GLB,, | Performed by: NURSE PRACTITIONER

## 2021-11-11 PROCEDURE — 99499 RISK ADDL DX/OHS AUDIT: ICD-10-PCS | Mod: S$GLB,,, | Performed by: NURSE PRACTITIONER

## 2021-11-11 PROCEDURE — 3078F DIAST BP <80 MM HG: CPT | Mod: CPTII,S$GLB,, | Performed by: INTERNAL MEDICINE

## 2021-11-11 PROCEDURE — 3074F SYST BP LT 130 MM HG: CPT | Mod: CPTII,S$GLB,, | Performed by: INTERNAL MEDICINE

## 2021-11-11 PROCEDURE — 1159F MED LIST DOCD IN RCRD: CPT | Mod: CPTII,S$GLB,, | Performed by: INTERNAL MEDICINE

## 2021-11-11 PROCEDURE — 1101F PR PT FALLS ASSESS DOC 0-1 FALLS W/OUT INJ PAST YR: ICD-10-PCS | Mod: CPTII,S$GLB,, | Performed by: INTERNAL MEDICINE

## 2021-11-11 PROCEDURE — 3074F SYST BP LT 130 MM HG: CPT | Mod: CPTII,S$GLB,, | Performed by: NURSE PRACTITIONER

## 2021-11-11 PROCEDURE — 81002 POCT URINE DIPSTICK WITHOUT MICROSCOPE: ICD-10-PCS | Mod: S$GLB,,, | Performed by: NURSE PRACTITIONER

## 2021-11-11 PROCEDURE — 1126F AMNT PAIN NOTED NONE PRSNT: CPT | Mod: CPTII,S$GLB,, | Performed by: NURSE PRACTITIONER

## 2021-11-22 ENCOUNTER — TELEPHONE (OUTPATIENT)
Dept: INTERNAL MEDICINE | Facility: CLINIC | Age: 81
End: 2021-11-22
Payer: MEDICARE

## 2021-11-22 NOTE — TELEPHONE ENCOUNTER
----- Message from Opal Frances MD sent at 11/21/2021  8:18 PM CST -----  Please schedule pt's labs asap so that I can clear him for surgery once completed, thanks!

## 2021-11-29 ENCOUNTER — HOSPITAL ENCOUNTER (OUTPATIENT)
Dept: CARDIOLOGY | Facility: CLINIC | Age: 81
Discharge: HOME | End: 2021-11-29
Attending: INTERNAL MEDICINE
Payer: MEDICARE

## 2021-11-29 DIAGNOSIS — Z01.818 PRE-OP EVALUATION: ICD-10-CM

## 2021-11-29 PROCEDURE — 93005 EKG 12-LEAD: ICD-10-PCS | Mod: S$GLB,,, | Performed by: INTERNAL MEDICINE

## 2021-11-29 PROCEDURE — 93010 EKG 12-LEAD: ICD-10-PCS | Mod: S$GLB,,, | Performed by: INTERNAL MEDICINE

## 2021-11-29 PROCEDURE — 93010 ELECTROCARDIOGRAM REPORT: CPT | Mod: S$GLB,,, | Performed by: INTERNAL MEDICINE

## 2021-11-29 PROCEDURE — 93005 ELECTROCARDIOGRAM TRACING: CPT | Mod: S$GLB,,, | Performed by: INTERNAL MEDICINE

## 2022-01-04 ENCOUNTER — TELEPHONE (OUTPATIENT)
Dept: SLEEP MEDICINE | Facility: OTHER | Age: 82
End: 2022-01-04
Payer: MEDICARE

## 2022-01-13 ENCOUNTER — TELEPHONE (OUTPATIENT)
Dept: PHARMACY | Facility: CLINIC | Age: 82
End: 2022-01-13
Payer: MEDICARE

## 2022-01-13 NOTE — TELEPHONE ENCOUNTER
DOCUMENTATION ONLY  Testosterone 40.5mg/2.5gm (1.62%) gel  Approval date: 1/11/2022 to 12/31/2022   Case ID# [id number]

## 2022-02-10 ENCOUNTER — OFFICE VISIT (OUTPATIENT)
Dept: INTERNAL MEDICINE | Facility: CLINIC | Age: 82
End: 2022-02-10
Attending: FAMILY MEDICINE
Payer: MEDICARE

## 2022-02-10 VITALS
OXYGEN SATURATION: 95 % | WEIGHT: 154.56 LBS | DIASTOLIC BLOOD PRESSURE: 60 MMHG | BODY MASS INDEX: 24.26 KG/M2 | SYSTOLIC BLOOD PRESSURE: 118 MMHG | HEIGHT: 67 IN | HEART RATE: 60 BPM

## 2022-02-10 DIAGNOSIS — G47.33 OSA (OBSTRUCTIVE SLEEP APNEA): ICD-10-CM

## 2022-02-10 DIAGNOSIS — H40.1130 PRIMARY OPEN ANGLE GLAUCOMA OF BOTH EYES, UNSPECIFIED GLAUCOMA STAGE: Primary | ICD-10-CM

## 2022-02-10 DIAGNOSIS — F51.01 PRIMARY INSOMNIA: ICD-10-CM

## 2022-02-10 PROCEDURE — 99999 PR PBB SHADOW E&M-EST. PATIENT-LVL IV: ICD-10-PCS | Mod: PBBFAC,HCNC,, | Performed by: FAMILY MEDICINE

## 2022-02-10 PROCEDURE — 1101F PR PT FALLS ASSESS DOC 0-1 FALLS W/OUT INJ PAST YR: ICD-10-PCS | Mod: HCNC,CPTII,S$GLB, | Performed by: FAMILY MEDICINE

## 2022-02-10 PROCEDURE — 1101F PT FALLS ASSESS-DOCD LE1/YR: CPT | Mod: HCNC,CPTII,S$GLB, | Performed by: FAMILY MEDICINE

## 2022-02-10 PROCEDURE — 3078F PR MOST RECENT DIASTOLIC BLOOD PRESSURE < 80 MM HG: ICD-10-PCS | Mod: HCNC,CPTII,S$GLB, | Performed by: FAMILY MEDICINE

## 2022-02-10 PROCEDURE — 1160F PR REVIEW ALL MEDS BY PRESCRIBER/CLIN PHARMACIST DOCUMENTED: ICD-10-PCS | Mod: HCNC,CPTII,S$GLB, | Performed by: FAMILY MEDICINE

## 2022-02-10 PROCEDURE — 3074F SYST BP LT 130 MM HG: CPT | Mod: HCNC,CPTII,S$GLB, | Performed by: FAMILY MEDICINE

## 2022-02-10 PROCEDURE — 1125F AMNT PAIN NOTED PAIN PRSNT: CPT | Mod: HCNC,CPTII,S$GLB, | Performed by: FAMILY MEDICINE

## 2022-02-10 PROCEDURE — 3078F DIAST BP <80 MM HG: CPT | Mod: HCNC,CPTII,S$GLB, | Performed by: FAMILY MEDICINE

## 2022-02-10 PROCEDURE — 1159F MED LIST DOCD IN RCRD: CPT | Mod: HCNC,CPTII,S$GLB, | Performed by: FAMILY MEDICINE

## 2022-02-10 PROCEDURE — 3288F PR FALLS RISK ASSESSMENT DOCUMENTED: ICD-10-PCS | Mod: HCNC,CPTII,S$GLB, | Performed by: FAMILY MEDICINE

## 2022-02-10 PROCEDURE — 1159F PR MEDICATION LIST DOCUMENTED IN MEDICAL RECORD: ICD-10-PCS | Mod: HCNC,CPTII,S$GLB, | Performed by: FAMILY MEDICINE

## 2022-02-10 PROCEDURE — 99214 PR OFFICE/OUTPT VISIT, EST, LEVL IV, 30-39 MIN: ICD-10-PCS | Mod: HCNC,S$GLB,, | Performed by: FAMILY MEDICINE

## 2022-02-10 PROCEDURE — 1160F RVW MEDS BY RX/DR IN RCRD: CPT | Mod: HCNC,CPTII,S$GLB, | Performed by: FAMILY MEDICINE

## 2022-02-10 PROCEDURE — 3288F FALL RISK ASSESSMENT DOCD: CPT | Mod: HCNC,CPTII,S$GLB, | Performed by: FAMILY MEDICINE

## 2022-02-10 PROCEDURE — 99999 PR PBB SHADOW E&M-EST. PATIENT-LVL IV: CPT | Mod: PBBFAC,HCNC,, | Performed by: FAMILY MEDICINE

## 2022-02-10 PROCEDURE — 3074F PR MOST RECENT SYSTOLIC BLOOD PRESSURE < 130 MM HG: ICD-10-PCS | Mod: HCNC,CPTII,S$GLB, | Performed by: FAMILY MEDICINE

## 2022-02-10 PROCEDURE — 99214 OFFICE O/P EST MOD 30 MIN: CPT | Mod: HCNC,S$GLB,, | Performed by: FAMILY MEDICINE

## 2022-02-10 PROCEDURE — 1125F PR PAIN SEVERITY QUANTIFIED, PAIN PRESENT: ICD-10-PCS | Mod: HCNC,CPTII,S$GLB, | Performed by: FAMILY MEDICINE

## 2022-02-10 RX ORDER — BIMATOPROST 0.1 MG/ML
SOLUTION/ DROPS OPHTHALMIC NIGHTLY
Status: ON HOLD | COMMUNITY
Start: 2022-01-03 | End: 2023-08-09 | Stop reason: HOSPADM

## 2022-02-10 RX ORDER — DORZOLAMIDE HYDROCHLORIDE AND TIMOLOL MALEATE 20; 5 MG/ML; MG/ML
SOLUTION/ DROPS OPHTHALMIC 2 TIMES DAILY
COMMUNITY
Start: 2021-10-25 | End: 2022-09-06 | Stop reason: SDUPTHER

## 2022-02-10 RX ORDER — MUPIROCIN 20 MG/G
OINTMENT TOPICAL NIGHTLY
Status: ON HOLD | COMMUNITY
End: 2023-08-09 | Stop reason: HOSPADM

## 2022-02-10 RX ORDER — IBUPROFEN 800 MG/1
TABLET ORAL
Status: ON HOLD | COMMUNITY
End: 2023-08-09 | Stop reason: HOSPADM

## 2022-02-10 NOTE — PROGRESS NOTES
"CHIEF COMPLAINT:  insomnia in pt w/ macular degeneration and sleep apnea    HISTORY OF PRESENT ILLNESS: The patient is a generally healthy 81 year-old WM.  Now with insomnia felt too much sedation due to Elavil.  He is scheduled for glaucoma surgery soon    He is c/o his BP.    The patient has continuing neuropathic pain for which he had a complete workup in the past. He has tried multiple medications and is intolerant of all of them due to various side effects.  Currently pain stable and tolerable.    He was previously diagnosed with macular degeneration.     He does have CPAP for obstructive sleep apnea.  He has seen sleep clinic recently.  Also having leg cramps.    REVIEW OF SYSTEMS:  GENERAL: No fever, chills, fatigability or weight loss.  SKIN: No rashes, itching or changes in color or texture of skin.  HEAD: No headaches or recent head trauma.  EYES:  No photophobia, ocular pain or diplopia.  EARS: Denies ear pain, discharge or vertigo.  NOSE: No loss of smell, no epistaxis or postnasal drip.  MOUTH & THROAT: No hoarseness or change in voice. No excessive gum bleeding.  NODES: Denies swollen glands.  CHEST: Denies SANCHES, cyanosis, wheezing, cough and sputum production.  CARDIOVASCULAR: Denies chest pain, PND, orthopnea or reduced exercise tolerance.  ABDOMEN: Appetite fine. No weight loss. Denies diarrhea, abdominal pain, hematemesis or blood in stool.  URINARY: No flank pain, dysuria or hematuria.  PERIPHERAL VASCULAR: No claudication or cyanosis.  MUSCULOSKELETAL: No joint stiffness or swelling. Denies back pain.  NEUROLOGIC: No history of seizures, paralysis, alteration of gait or coordination.    SOCIAL HISTORY: The patient does not smoke.  The patient consumes alcohol socially.  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    PHYSICAL EXAMINATION:     Blood pressure 118/60, pulse 60, height 5' 7" (1.702 m), weight 70.1 kg (154 lb 8.7 oz), SpO2 95 %.    APPEARANCE: " Well nourished, well developed, in no acute distress.    HEAD: Normocephalic, atraumatic.  EYES: PERRL. EOMI.  Conjunctivae without injection and  Anicteric  NOSE: Mucosa pink. Airway clear.  MOUTH & THROAT: No tonsillar enlargement. No pharyngeal erythema or exudate. No stridor.  NECK: Supple.   NODES: No cervical, axillary or inguinal lymph node enlargement.  CHEST: Lungs clear to auscultation.  No retractions are noted.  No rales or rhonchi are present.  CARDIOVASCULAR: Normal S1, S2. No rubs, murmurs or gallops.  ABDOMEN: Bowel sounds normal. Not distended. Soft. No tenderness or masses.  No ascites is noted.  MUSCULOSKELETAL:  There is no clubbing, cyanosis, or edema of the extremities x4.  There is full range of motion of the lumbar spine.  There is full range of motion of the extremities x4.  There is no deformity noted.    NEUROLOGIC:       Normal speech development.      Hearing normal.      Normal gait.      Motor and sensory exams grossly normal.  PSYCHIATRIC: Patient is alert and oriented x3.  Thought processes are all normal.  There is no homicidality.  There is no suicidality.  There is no evidence of psychosis.    LABORATORY/RADIOLOGY:   Chart reviewed.      ASSESSMENT:   Glaucoma  Concern over blood pressure  Insomnia  Macular degeneration  Snoring due to sleep study proven severe sleep apnea  Idiopathic peripheral neuropathy    PLAN:  Cleared for glaucoma surgery  Reviewed normal BP ranges  He is aware that treating his KARMA is helpful to minimize his risk for progression of the macular degeneration.  Follow up sleep for KARMA  Over-the-counter supplements as recommended by retinal specialist  Return to clinic in one year.

## 2022-03-01 DIAGNOSIS — G47.33 OSA (OBSTRUCTIVE SLEEP APNEA): Primary | ICD-10-CM

## 2022-04-11 ENCOUNTER — TELEPHONE (OUTPATIENT)
Dept: INTERNAL MEDICINE | Facility: CLINIC | Age: 82
End: 2022-04-11
Payer: MEDICARE

## 2022-04-11 NOTE — TELEPHONE ENCOUNTER
Patient scheduled for fasting labs on 5/25/22 at [facility] location per request.

## 2022-04-11 NOTE — TELEPHONE ENCOUNTER
----- Message from Jesus Muhammad sent at 4/11/2022  3:57 PM CDT -----  Regarding: Order  Contact: MALACHI GOULD [[id number]]  Name of Who is Calling: MALACHI GOULD [[id number]]    What is the request in detail: Would like to speak with staff in regards to scheduling annual blood work at [facility], May 27 afternoon.       Can the clinic reply by [facility]: no      What Number to Call Back if not in [facility]: [phone]

## 2022-04-20 ENCOUNTER — IMMUNIZATION (OUTPATIENT)
Dept: PHARMACY | Facility: CLINIC | Age: 82
End: 2022-04-20
Payer: MEDICARE

## 2022-04-20 DIAGNOSIS — Z23 NEED FOR VACCINATION: Primary | ICD-10-CM

## 2022-05-02 ENCOUNTER — TELEPHONE (OUTPATIENT)
Dept: SLEEP MEDICINE | Facility: OTHER | Age: 82
End: 2022-05-02
Payer: MEDICARE

## 2022-05-09 ENCOUNTER — TELEPHONE (OUTPATIENT)
Dept: SLEEP MEDICINE | Facility: OTHER | Age: 82
End: 2022-05-09
Payer: MEDICARE

## 2022-05-11 ENCOUNTER — TELEPHONE (OUTPATIENT)
Dept: SLEEP MEDICINE | Facility: OTHER | Age: 82
End: 2022-05-11
Payer: MEDICARE

## 2022-05-11 ENCOUNTER — OFFICE VISIT (OUTPATIENT)
Dept: UROLOGY | Facility: CLINIC | Age: 82
End: 2022-05-11
Payer: MEDICARE

## 2022-05-11 VITALS
HEIGHT: 67 IN | BODY MASS INDEX: 23.88 KG/M2 | HEART RATE: 65 BPM | SYSTOLIC BLOOD PRESSURE: 133 MMHG | DIASTOLIC BLOOD PRESSURE: 75 MMHG | WEIGHT: 152.13 LBS

## 2022-05-11 DIAGNOSIS — R53.83 FATIGUE, UNSPECIFIED TYPE: ICD-10-CM

## 2022-05-11 DIAGNOSIS — N13.8 BPH WITH URINARY OBSTRUCTION: ICD-10-CM

## 2022-05-11 DIAGNOSIS — E29.1 PRIMARY MALE HYPOGONADISM: Primary | ICD-10-CM

## 2022-05-11 DIAGNOSIS — G47.00 INSOMNIA, UNSPECIFIED TYPE: ICD-10-CM

## 2022-05-11 DIAGNOSIS — N40.1 BPH WITH URINARY OBSTRUCTION: ICD-10-CM

## 2022-05-11 DIAGNOSIS — E78.5 DYSLIPIDEMIA: ICD-10-CM

## 2022-05-11 PROCEDURE — 3288F FALL RISK ASSESSMENT DOCD: CPT | Mod: CPTII,S$GLB,, | Performed by: NURSE PRACTITIONER

## 2022-05-11 PROCEDURE — 99999 PR PBB SHADOW E&M-EST. PATIENT-LVL IV: ICD-10-PCS | Mod: PBBFAC,,, | Performed by: NURSE PRACTITIONER

## 2022-05-11 PROCEDURE — 1126F PR PAIN SEVERITY QUANTIFIED, NO PAIN PRESENT: ICD-10-PCS | Mod: CPTII,S$GLB,, | Performed by: NURSE PRACTITIONER

## 2022-05-11 PROCEDURE — 3288F PR FALLS RISK ASSESSMENT DOCUMENTED: ICD-10-PCS | Mod: CPTII,S$GLB,, | Performed by: NURSE PRACTITIONER

## 2022-05-11 PROCEDURE — 1160F PR REVIEW ALL MEDS BY PRESCRIBER/CLIN PHARMACIST DOCUMENTED: ICD-10-PCS | Mod: CPTII,S$GLB,, | Performed by: NURSE PRACTITIONER

## 2022-05-11 PROCEDURE — 1159F MED LIST DOCD IN RCRD: CPT | Mod: CPTII,S$GLB,, | Performed by: NURSE PRACTITIONER

## 2022-05-11 PROCEDURE — 1160F RVW MEDS BY RX/DR IN RCRD: CPT | Mod: CPTII,S$GLB,, | Performed by: NURSE PRACTITIONER

## 2022-05-11 PROCEDURE — 99215 OFFICE O/P EST HI 40 MIN: CPT | Mod: S$GLB,,, | Performed by: NURSE PRACTITIONER

## 2022-05-11 PROCEDURE — 1159F PR MEDICATION LIST DOCUMENTED IN MEDICAL RECORD: ICD-10-PCS | Mod: CPTII,S$GLB,, | Performed by: NURSE PRACTITIONER

## 2022-05-11 PROCEDURE — 99999 PR PBB SHADOW E&M-EST. PATIENT-LVL IV: CPT | Mod: PBBFAC,,, | Performed by: NURSE PRACTITIONER

## 2022-05-11 PROCEDURE — 1101F PR PT FALLS ASSESS DOC 0-1 FALLS W/OUT INJ PAST YR: ICD-10-PCS | Mod: CPTII,S$GLB,, | Performed by: NURSE PRACTITIONER

## 2022-05-11 PROCEDURE — 3078F DIAST BP <80 MM HG: CPT | Mod: CPTII,S$GLB,, | Performed by: NURSE PRACTITIONER

## 2022-05-11 PROCEDURE — 99215 PR OFFICE/OUTPT VISIT, EST, LEVL V, 40-54 MIN: ICD-10-PCS | Mod: S$GLB,,, | Performed by: NURSE PRACTITIONER

## 2022-05-11 PROCEDURE — 3078F PR MOST RECENT DIASTOLIC BLOOD PRESSURE < 80 MM HG: ICD-10-PCS | Mod: CPTII,S$GLB,, | Performed by: NURSE PRACTITIONER

## 2022-05-11 PROCEDURE — 1126F AMNT PAIN NOTED NONE PRSNT: CPT | Mod: CPTII,S$GLB,, | Performed by: NURSE PRACTITIONER

## 2022-05-11 PROCEDURE — 1101F PT FALLS ASSESS-DOCD LE1/YR: CPT | Mod: CPTII,S$GLB,, | Performed by: NURSE PRACTITIONER

## 2022-05-11 PROCEDURE — 3075F PR MOST RECENT SYSTOLIC BLOOD PRESS GE 130-139MM HG: ICD-10-PCS | Mod: CPTII,S$GLB,, | Performed by: NURSE PRACTITIONER

## 2022-05-11 PROCEDURE — 3075F SYST BP GE 130 - 139MM HG: CPT | Mod: CPTII,S$GLB,, | Performed by: NURSE PRACTITIONER

## 2022-05-11 RX ORDER — BIMATOPROST 0.1 MG/ML
1 SOLUTION/ DROPS OPHTHALMIC
COMMUNITY
Start: 2022-01-03 | End: 2022-09-06 | Stop reason: ALTCHOICE

## 2022-05-11 RX ORDER — BUPIVACAINE HYDROCHLORIDE 2.5 MG/ML
INJECTION, SOLUTION INFILTRATION; PERINEURAL
Status: ON HOLD | COMMUNITY
Start: 2022-01-25 | End: 2023-08-09 | Stop reason: HOSPADM

## 2022-05-11 NOTE — PROGRESS NOTES
CHIEF COMPLAINT:    Cricket Mcdonnell is a 81 y.o. male presents today for Low Testosterone     HISTORY OF PRESENTING ILLINESS:    Cricket Mcdonnell is a 80 y.o. Male with a history of low T and fatigue and ED  01/16/2020 T was 126  01/20/2020 T was 105  He was started on Androgel 1.62%/2.5gm  Report feeling better; more energy with TRT.   Last seen clinic  11/11/2021.      Today he reports that everything is much better.   He has more energy; overall feels better.  Sildenafil 100mg helps with his ED.  Only issue is lack of sleep.   Daytime fatigue.  Having sleep study tomorrow  Coffee drinker in the afternoon  Melatonin not working for him  TRT labs scheduled            REVIEW OF SYSTEMS:  Review of Systems   Constitutional: Negative.  Negative for chills and fever.   Eyes: Negative for double vision.   Respiratory: Negative for cough and shortness of breath.    Cardiovascular: Negative for chest pain and palpitations.   Gastrointestinal: Negative for nausea and vomiting.   Genitourinary: Negative.  Negative for dysuria, flank pain and hematuria.   Neurological: Negative for dizziness.   Psychiatric/Behavioral: The patient has insomnia.         Issues with sleeping; sleep study tomorrow             PATIENT HISTORY:    Past Medical History:   Diagnosis Date    Hereditary sensory neuropathy     Sleep apnea     + CPAP       Past Surgical History:   Procedure Laterality Date    CATARACT EXTRACTION      COLONOSCOPY N/A 1/3/2019    Procedure: COLONOSCOPY;  Surgeon: Cholo Yates MD;  Location: [address];  Service: Endoscopy;  Laterality: N/A;    EYE SURGERY      galucoma surgery      HERNIA REPAIR      tonsillectomy      TONSILLECTOMY         Family History   Problem Relation Age of Onset    COPD Sister     Cancer Sister         lung cancer from smoking    No Known Problems Son     No Known Problems Daughter        Social History     Socioeconomic History    Marital status:     Occupational History     Employer: [employer]   Tobacco Use    Smoking status: Former Smoker     Packs/day: 0.25     Years: 30.00     Pack years: 7.50     Types: Cigarettes     Start date: 1971     Quit date: 2001     Years since quittin.8    Smokeless tobacco: Never Used    Tobacco comment: I was a light daily smoker   Substance and Sexual Activity    Alcohol use: Not Currently     Alcohol/week: 0.0 standard drinks     Comment: I gave up drinking 23 years ago    Drug use: No    Sexual activity: Yes     Partners: Female     Birth control/protection: Diaphragm       Allergies:  Poison ivy extract    Medications:    Current Outpatient Medications:     bimatoprost (LUMIGAN) 0.01 % Drop, Apply 1 drop to eye., Disp: , Rfl:     brimonidine 0.2% (ALPHAGAN) 0.2 % Drop, 1 drop into right eye twice a day, Disp: 10 mL, Rfl: 0    COSOPT, PF, 2-0.5 % Dpet ophthalmic solution, 2 (two) times a day., Disp: , Rfl:     dorzolamide-timolol 2-0.5% (COSOPT) 22.3-6.8 mg/mL ophthalmic solution, Instill 1 drop into both eyes twice a day, Disp: 10 mL, Rfl: 2    dorzolamide-timolol 2-0.5% (COSOPT) 22.3-6.8 mg/mL ophthalmic solution, Instill 1 drop into right eye twice a day, Disp: 10 mL, Rfl: 0    dorzolamide-timolol 2-0.5% (COSOPT) 22.3-6.8 mg/mL ophthalmic solution, Instill 1 drop into right eye twice a day, Disp: 10 mL, Rfl: 0    dorzolamide-timolol 2-0.5% (COSOPT) 22.3-6.8 mg/mL ophthalmic solution, Instill 1 drop Left Eye twice a day, Disp: 10 mL, Rfl: 0    ferrous sulfate 325 (65 FE) MG EC tablet, Take 325 mg by mouth once daily., Disp: , Rfl:     fluorometholone (FLAREX) 0.1 % DrpS, Instill 1 drop into left eye once a day, Disp: 5 mL, Rfl: 0    fluticasone (FLONASE) 50 mcg/actuation nasal spray, 2 sprays by Each Nare route once daily., Disp: 1 Bottle, Rfl: 2    ibuprofen (ADVIL,MOTRIN) 800 MG tablet, as needed., Disp: , Rfl:     latanoprost 0.005 % ophthalmic solution, Instill 1 drop into  right eye every evening, Disp: 2.5 mL, Rfl: 0    LUMIGAN 0.01 % Drop, every evening., Disp: , Rfl:     MARCAINE 0.25 % (2.5 mg/mL) Soln, , Disp: , Rfl:     MULTIVIT-IRON-MIN-FOLIC ACID 3,500-18-0.4 UNIT-MG-MG ORAL CHEW, Take by mouth once daily. , Disp: , Rfl:     mupirocin (BACTROBAN) 2 % ointment, every evening., Disp: , Rfl:     neomycin-polymyxin-dexamethasone (MAXITROL) 3.5 mg/g-10,000 unit/g-0.1 % Oint, Apply  a thin layer into right eye three times a day FOR THREE DAYS ONLY, Disp: 3.5 g, Rfl: 0    ofloxacin (OCUFLOX) 0.3 % ophthalmic solution, Instill 1 drop into right eye four times a day, Disp: 5 mL, Rfl: 0    prednisoLONE acetate (PRED FORTE) 1 % DrpS, Instill 1 drop into right eye once a day, Disp: 15 mL, Rfl: 0    PREVIDENT 5000 BOOSTER PLUS 1.1 % Pste, once daily. , Disp: , Rfl: 0    sildenafiL (VIAGRA) 100 MG tablet, Take 1 tablet (100 mg total) by mouth daily as needed., Disp: 10 tablet, Rfl: 12    testosterone (ANDROGEL) 1.62 % (40.5 mg/2.5 gram) GlPk, Place 2.5 g (1 packet) onto the skin once daily., Disp: 75 g, Rfl: 5    timolol maleate 0.25% (TIMOPTIC) 0.25 % Drop, 1 drop once daily., Disp: , Rfl:     PHYSICAL EXAMINATION:  Physical Exam  Vitals and nursing note reviewed.   Constitutional:       General: He is awake.      Appearance: Normal appearance.   HENT:      Head: Normocephalic.      Right Ear: External ear normal.      Left Ear: External ear normal.      Nose: Nose normal.   Cardiovascular:      Rate and Rhythm: Normal rate.   Pulmonary:      Effort: Pulmonary effort is normal. No respiratory distress.   Abdominal:      Tenderness: There is no abdominal tenderness. There is no right CVA tenderness or left CVA tenderness.   Genitourinary:     Penis: Normal and circumcised.       Testes: Normal.      Prostate: Enlarged. Not tender and no nodules present.      Rectum: Normal.      Comments: Prostate 35gms; smooth    Musculoskeletal:         General: Normal range of motion.       Cervical back: Normal range of motion.   Skin:     General: Skin is warm and dry.   Neurological:      General: No focal deficit present.      Mental Status: He is alert and oriented to person, place, and time.   Psychiatric:         Mood and Affect: Mood normal.         Behavior: Behavior is cooperative.           LABS:      Lab Results   Component Value Date    PSA 2.1 05/21/2018    PSA 1.8 03/27/2017    PSA 1.82 05/31/2013    PSADIAG 3.9 11/08/2021    PSADIAG 4.1 (H) 05/07/2021    PSADIAG 3.0 11/12/2020    PSATOTAL 2.1 07/06/2015             IMPRESSION:    Encounter Diagnoses   Name Primary?    Primary male hypogonadism Yes    BPH with urinary obstruction     Fatigue, unspecified type     Insomnia, unspecified type     Dyslipidemia          Assessment:       1. Primary male hypogonadism    2. BPH with urinary obstruction    3. Fatigue, unspecified type    4. Insomnia, unspecified type    5. Dyslipidemia        Plan:         I spent 40 minutes with the patient of which more than half was spent in direct consultation with the patient in regards to our treatment and plan.  We addressed the office findings and recent labs.   Education and recommendations of today's plan of care including home remedies and needed follow up with PCP.   Reviewed ways to relax/sleep better without medication;   We discussed his previous TRT   Diet and exercise  Refill his Androgel once I review his labs  Sildenafil PRN  RTC 6 months with labs

## 2022-05-12 ENCOUNTER — HOSPITAL ENCOUNTER (OUTPATIENT)
Dept: SLEEP MEDICINE | Facility: OTHER | Age: 82
Discharge: HOME | End: 2022-05-12
Attending: INTERNAL MEDICINE
Payer: MEDICARE

## 2022-05-12 DIAGNOSIS — E29.1 MALE HYPOGONADISM: ICD-10-CM

## 2022-05-12 DIAGNOSIS — G47.33 OSA (OBSTRUCTIVE SLEEP APNEA): ICD-10-CM

## 2022-05-12 PROCEDURE — 95811 POLYSOM 6/>YRS CPAP 4/> PARM: CPT

## 2022-05-12 PROCEDURE — 95811 POLYSOM 6/>YRS CPAP 4/> PARM: CPT | Mod: 26,,, | Performed by: INTERNAL MEDICINE

## 2022-05-12 PROCEDURE — 95811 PR POLYSOMNOGRAPHY W/CPAP: ICD-10-PCS | Mod: 26,,, | Performed by: INTERNAL MEDICINE

## 2022-05-12 RX ORDER — TESTOSTERONE 40.5 MG/2.5G
2.5 GEL TOPICAL DAILY
Qty: 75 G | Refills: 5 | Status: SHIPPED | OUTPATIENT
Start: 2022-05-12 | End: 2022-11-07 | Stop reason: SDUPTHER

## 2022-05-13 NOTE — PROGRESS NOTES
Patient set up, education, procedures explained prior to testing.  Mask/glove precaution taken.  Disposable leads/sensors used where applicable. CPAP/BIPAP titration performed using F[employer]P Simplus FFM large.  Pt cannot use nasal mask due to deviated septum which causes severe mouth leaks.  Post study follow up instructions given in the AM.

## 2022-05-16 ENCOUNTER — TELEPHONE (OUTPATIENT)
Dept: SLEEP MEDICINE | Facility: CLINIC | Age: 82
End: 2022-05-16
Payer: MEDICARE

## 2022-05-16 NOTE — TELEPHONE ENCOUNTER
Staff left a message for the pt to call back to discuss sending his sleep study to his eye doctor and PCP.

## 2022-05-23 ENCOUNTER — TELEPHONE (OUTPATIENT)
Dept: SLEEP MEDICINE | Facility: CLINIC | Age: 82
End: 2022-05-23
Payer: MEDICARE

## 2022-05-23 NOTE — TELEPHONE ENCOUNTER
Spoke to the pt and informed him that his results of his sleep study is not in his chart yet. Staff explained to him that once they are Dr. Bustos will upload it into his [employer] portal.

## 2022-05-25 ENCOUNTER — LAB VISIT (OUTPATIENT)
Dept: LAB | Facility: HOSPITAL | Age: 82
End: 2022-05-25
Payer: MEDICARE

## 2022-05-25 DIAGNOSIS — N13.8 BPH WITH URINARY OBSTRUCTION: ICD-10-CM

## 2022-05-25 DIAGNOSIS — R53.83 FATIGUE, UNSPECIFIED TYPE: ICD-10-CM

## 2022-05-25 DIAGNOSIS — E78.5 DYSLIPIDEMIA: ICD-10-CM

## 2022-05-25 DIAGNOSIS — E29.1 PRIMARY MALE HYPOGONADISM: ICD-10-CM

## 2022-05-25 DIAGNOSIS — N40.1 BPH WITH URINARY OBSTRUCTION: ICD-10-CM

## 2022-05-25 LAB
ALBUMIN SERPL BCP-MCNC: 3.7 G/DL (ref 3.5–5.2)
ALP SERPL-CCNC: 50 U/L (ref 55–135)
ALT SERPL W/O P-5'-P-CCNC: 23 U/L (ref 10–44)
AST SERPL-CCNC: 16 U/L (ref 10–40)
BASOPHILS # BLD AUTO: 0.02 K/UL (ref 0–0.2)
BASOPHILS NFR BLD: 0.3 % (ref 0–1.9)
BILIRUB DIRECT SERPL-MCNC: 0.2 MG/DL (ref 0.1–0.3)
BILIRUB SERPL-MCNC: 0.5 MG/DL (ref 0.1–1)
CHOLEST SERPL-MCNC: 195 MG/DL (ref 120–199)
CHOLEST/HDLC SERPL: 3.1 {RATIO} (ref 2–5)
COMPLEXED PSA SERPL-MCNC: 5.4 NG/ML (ref 0–4)
CREAT SERPL-MCNC: 0.7 MG/DL (ref 0.5–1.4)
DIFFERENTIAL METHOD: ABNORMAL
EOSINOPHIL # BLD AUTO: 0.1 K/UL (ref 0–0.5)
EOSINOPHIL NFR BLD: 1.2 % (ref 0–8)
ERYTHROCYTE [DISTWIDTH] IN BLOOD BY AUTOMATED COUNT: 12.3 % (ref 11.5–14.5)
EST. GFR  (AFRICAN AMERICAN): >60 ML/MIN/1.73 M^2
EST. GFR  (NON AFRICAN AMERICAN): >60 ML/MIN/1.73 M^2
HCT VFR BLD AUTO: 41.4 % (ref 40–54)
HDLC SERPL-MCNC: 63 MG/DL (ref 40–75)
HDLC SERPL: 32.3 % (ref 20–50)
HGB BLD-MCNC: 14.3 G/DL (ref 14–18)
IMM GRANULOCYTES # BLD AUTO: 0.02 K/UL (ref 0–0.04)
IMM GRANULOCYTES NFR BLD AUTO: 0.3 % (ref 0–0.5)
LDLC SERPL CALC-MCNC: 112.4 MG/DL (ref 63–159)
LYMPHOCYTES # BLD AUTO: 2.6 K/UL (ref 1–4.8)
LYMPHOCYTES NFR BLD: 33.3 % (ref 18–48)
MCH RBC QN AUTO: 33.2 PG (ref 27–31)
MCHC RBC AUTO-ENTMCNC: 34.5 G/DL (ref 32–36)
MCV RBC AUTO: 96 FL (ref 82–98)
MONOCYTES # BLD AUTO: 0.7 K/UL (ref 0.3–1)
MONOCYTES NFR BLD: 9.6 % (ref 4–15)
NEUTROPHILS # BLD AUTO: 4.3 K/UL (ref 1.8–7.7)
NEUTROPHILS NFR BLD: 55.3 % (ref 38–73)
NONHDLC SERPL-MCNC: 132 MG/DL
NRBC BLD-RTO: 0 /100 WBC
PLATELET # BLD AUTO: 246 K/UL (ref 150–450)
PMV BLD AUTO: 9 FL (ref 9.2–12.9)
PROT SERPL-MCNC: 6.8 G/DL (ref 6–8.4)
RBC # BLD AUTO: 4.31 M/UL (ref 4.6–6.2)
TESTOST SERPL-MCNC: 571 NG/DL (ref 304–1227)
TRIGL SERPL-MCNC: 98 MG/DL (ref 30–150)
WBC # BLD AUTO: 7.69 K/UL (ref 3.9–12.7)

## 2022-05-25 PROCEDURE — 85025 COMPLETE CBC W/AUTO DIFF WBC: CPT | Performed by: NURSE PRACTITIONER

## 2022-05-25 PROCEDURE — 80061 LIPID PANEL: CPT | Performed by: NURSE PRACTITIONER

## 2022-05-25 PROCEDURE — 36415 COLL VENOUS BLD VENIPUNCTURE: CPT | Performed by: NURSE PRACTITIONER

## 2022-05-25 PROCEDURE — 82565 ASSAY OF CREATININE: CPT | Performed by: NURSE PRACTITIONER

## 2022-05-25 PROCEDURE — 80076 HEPATIC FUNCTION PANEL: CPT | Performed by: NURSE PRACTITIONER

## 2022-05-25 PROCEDURE — 84403 ASSAY OF TOTAL TESTOSTERONE: CPT | Performed by: NURSE PRACTITIONER

## 2022-05-25 PROCEDURE — 84153 ASSAY OF PSA TOTAL: CPT | Performed by: NURSE PRACTITIONER

## 2022-05-30 ENCOUNTER — OFFICE VISIT (OUTPATIENT)
Dept: INTERNAL MEDICINE | Facility: CLINIC | Age: 82
End: 2022-05-30
Attending: FAMILY MEDICINE
Payer: MEDICARE

## 2022-05-30 VITALS
OXYGEN SATURATION: 97 % | DIASTOLIC BLOOD PRESSURE: 62 MMHG | HEIGHT: 67 IN | HEART RATE: 62 BPM | SYSTOLIC BLOOD PRESSURE: 114 MMHG | WEIGHT: 155.44 LBS | BODY MASS INDEX: 24.4 KG/M2

## 2022-05-30 DIAGNOSIS — N52.01 ERECTILE DYSFUNCTION DUE TO ARTERIAL INSUFFICIENCY: ICD-10-CM

## 2022-05-30 DIAGNOSIS — R79.89 LOW TESTOSTERONE IN MALE: ICD-10-CM

## 2022-05-30 DIAGNOSIS — G47.33 OSA (OBSTRUCTIVE SLEEP APNEA): Primary | ICD-10-CM

## 2022-05-30 DIAGNOSIS — H35.30 MACULAR DEGENERATION OF RIGHT EYE, UNSPECIFIED TYPE: ICD-10-CM

## 2022-05-30 DIAGNOSIS — H40.1130 PRIMARY OPEN ANGLE GLAUCOMA OF BOTH EYES, UNSPECIFIED GLAUCOMA STAGE: ICD-10-CM

## 2022-05-30 PROCEDURE — 99214 OFFICE O/P EST MOD 30 MIN: CPT | Mod: S$GLB,,, | Performed by: FAMILY MEDICINE

## 2022-05-30 PROCEDURE — 1160F RVW MEDS BY RX/DR IN RCRD: CPT | Mod: CPTII,S$GLB,, | Performed by: FAMILY MEDICINE

## 2022-05-30 PROCEDURE — 1125F AMNT PAIN NOTED PAIN PRSNT: CPT | Mod: CPTII,S$GLB,, | Performed by: FAMILY MEDICINE

## 2022-05-30 PROCEDURE — 3074F PR MOST RECENT SYSTOLIC BLOOD PRESSURE < 130 MM HG: ICD-10-PCS | Mod: CPTII,S$GLB,, | Performed by: FAMILY MEDICINE

## 2022-05-30 PROCEDURE — 1160F PR REVIEW ALL MEDS BY PRESCRIBER/CLIN PHARMACIST DOCUMENTED: ICD-10-PCS | Mod: CPTII,S$GLB,, | Performed by: FAMILY MEDICINE

## 2022-05-30 PROCEDURE — 3078F PR MOST RECENT DIASTOLIC BLOOD PRESSURE < 80 MM HG: ICD-10-PCS | Mod: CPTII,S$GLB,, | Performed by: FAMILY MEDICINE

## 2022-05-30 PROCEDURE — 99999 PR PBB SHADOW E&M-EST. PATIENT-LVL IV: CPT | Mod: PBBFAC,,, | Performed by: FAMILY MEDICINE

## 2022-05-30 PROCEDURE — 1101F PR PT FALLS ASSESS DOC 0-1 FALLS W/OUT INJ PAST YR: ICD-10-PCS | Mod: CPTII,S$GLB,, | Performed by: FAMILY MEDICINE

## 2022-05-30 PROCEDURE — 99214 PR OFFICE/OUTPT VISIT, EST, LEVL IV, 30-39 MIN: ICD-10-PCS | Mod: S$GLB,,, | Performed by: FAMILY MEDICINE

## 2022-05-30 PROCEDURE — 1159F MED LIST DOCD IN RCRD: CPT | Mod: CPTII,S$GLB,, | Performed by: FAMILY MEDICINE

## 2022-05-30 PROCEDURE — 3288F FALL RISK ASSESSMENT DOCD: CPT | Mod: CPTII,S$GLB,, | Performed by: FAMILY MEDICINE

## 2022-05-30 PROCEDURE — 1101F PT FALLS ASSESS-DOCD LE1/YR: CPT | Mod: CPTII,S$GLB,, | Performed by: FAMILY MEDICINE

## 2022-05-30 PROCEDURE — 3074F SYST BP LT 130 MM HG: CPT | Mod: CPTII,S$GLB,, | Performed by: FAMILY MEDICINE

## 2022-05-30 PROCEDURE — 3078F DIAST BP <80 MM HG: CPT | Mod: CPTII,S$GLB,, | Performed by: FAMILY MEDICINE

## 2022-05-30 PROCEDURE — 99999 PR PBB SHADOW E&M-EST. PATIENT-LVL IV: ICD-10-PCS | Mod: PBBFAC,,, | Performed by: FAMILY MEDICINE

## 2022-05-30 PROCEDURE — 3288F PR FALLS RISK ASSESSMENT DOCUMENTED: ICD-10-PCS | Mod: CPTII,S$GLB,, | Performed by: FAMILY MEDICINE

## 2022-05-30 PROCEDURE — 1159F PR MEDICATION LIST DOCUMENTED IN MEDICAL RECORD: ICD-10-PCS | Mod: CPTII,S$GLB,, | Performed by: FAMILY MEDICINE

## 2022-05-30 PROCEDURE — 1125F PR PAIN SEVERITY QUANTIFIED, PAIN PRESENT: ICD-10-PCS | Mod: CPTII,S$GLB,, | Performed by: FAMILY MEDICINE

## 2022-05-30 RX ORDER — SILDENAFIL 100 MG/1
100 TABLET, FILM COATED ORAL DAILY PRN
Qty: 10 TABLET | Refills: 12 | Status: SHIPPED | OUTPATIENT
Start: 2022-05-30 | End: 2023-07-23 | Stop reason: SDUPTHER

## 2022-05-30 NOTE — PROGRESS NOTES
"CHIEF COMPLAINT:  insomnia in pt w/ macular degeneration and sleep apnea    HISTORY OF PRESENT ILLNESS: The patient is a generally healthy 81 year-old WM.      He is c/o his BP.    The patient has continuing neuropathic pain for which he had a complete workup in the past. He has tried multiple medications and is intolerant of all of them due to various side effects.  Currently pain stable and tolerable.    He was previously diagnosed with macular degeneration.     He does have CPAP for obstructive sleep apnea.  He has seen sleep clinic recently.  Also having leg cramps.    REVIEW OF SYSTEMS:  GENERAL: No fever, chills, fatigability or weight loss.  SKIN: No rashes, itching or changes in color or texture of skin.  HEAD: No headaches or recent head trauma.  EYES:  No photophobia or diplopia.  EARS: Denies ear pain, discharge or vertigo.  NOSE: No loss of smell, no epistaxis or postnasal drip.  MOUTH & THROAT: No hoarseness or change in voice. No excessive gum bleeding.  NODES: Denies swollen glands.  CHEST: Denies SANCHES, cyanosis, wheezing, cough and sputum production.  CARDIOVASCULAR: Denies chest pain, PND, orthopnea or reduced exercise tolerance.  ABDOMEN: Appetite fine. No weight loss. Denies diarrhea, abdominal pain, hematemesis or blood in stool.  URINARY: No flank pain, dysuria or hematuria.  PERIPHERAL VASCULAR: No claudication or cyanosis.  MUSCULOSKELETAL: No joint stiffness or swelling. Denies back pain.  NEUROLOGIC: No history of seizures, paralysis, alteration of gait or coordination.    SOCIAL HISTORY: The patient does not smoke.  The patient consumes alcohol socially.  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.    PHYSICAL EXAMINATION:     Blood pressure 114/62, pulse 62, height 5' 7" (1.702 m), weight 70.5 kg (155 lb 6.8 oz), SpO2 97 %.    APPEARANCE: Well nourished, well developed, in no acute distress.    HEAD: Normocephalic, atraumatic.  EYES: PERRL. EOMI.  " Conjunctivae without injection and  Anicteric  NOSE: Mucosa pink. Airway clear.  MOUTH & THROAT: No tonsillar enlargement. No pharyngeal erythema or exudate. No stridor.  NECK: Supple.   NODES: No cervical, axillary or inguinal lymph node enlargement.  CHEST: Lungs clear to auscultation.  No retractions are noted.  No rales or rhonchi are present.  CARDIOVASCULAR: Normal S1, S2. No rubs, murmurs or gallops.  ABDOMEN: Bowel sounds normal. Not distended. Soft. No tenderness or masses.  No ascites is noted.  MUSCULOSKELETAL:  There is no clubbing, cyanosis, or edema of the extremities x4.  There is full range of motion of the lumbar spine.  There is full range of motion of the extremities x4.  There is no deformity noted.    NEUROLOGIC:       Normal speech development.      Hearing normal.      Normal gait.      Motor and sensory exams grossly normal.  PSYCHIATRIC: Patient is alert and oriented x3.  Thought processes are all normal.  There is no homicidality.  There is no suicidality.  There is no evidence of psychosis.    LABORATORY/RADIOLOGY:   Chart reviewed.      ASSESSMENT:   Glaucoma  Concern over blood pressure  Insomnia  Macular degeneration  Snoring due to sleep study proven severe sleep apnea  Idiopathic peripheral neuropathy    PLAN:  Reviewed normal BP ranges  He is aware that treating his KARMA is helpful to minimize his risk for progression of the macular degeneration.  Follow up sleep for KARMA  Over-the-counter supplements as recommended by retinal specialist  Return to clinic in one year.

## 2022-05-31 ENCOUNTER — PATIENT MESSAGE (OUTPATIENT)
Dept: SLEEP MEDICINE | Facility: CLINIC | Age: 82
End: 2022-05-31
Payer: MEDICARE

## 2022-05-31 DIAGNOSIS — G47.33 OSA (OBSTRUCTIVE SLEEP APNEA): Primary | ICD-10-CM

## 2022-05-31 NOTE — PROCEDURES
[facility]  Sleep Center  Tel: [phone]    CPAP TITRATION       Patient Name: MALACHI GOULD [facility] #: [id number]   Sex: Male Study Date: 2022   : 1940 Clinic #: [id number]   Age: 81 Referring Physician: Juan Bustos MD   Height: 67.0 in Referring Physician #    Weight: 154.0 lbs Sleep Specialist: Juan Bustos MD   B.M.I.: 24.1 Sleep Specialist #    Hypopnea rule: AASM 1B Scoring Tech: KALINA MastersGT   Total AHI: 15.5 Recording Tech TYSHAWN RinconSGT   Lowest O2 sat: 87.0% Recording Location: [facility]     Sleep architecture: This is a CPAP titration study. At lights out, the patient fell asleep in 4.0 minutes. Sleep efficiency was 62.1%. Total sleep time (TST) was 279.5 minutes. Slow wave sleep proportion of TST was reduced and REM stage sleep proportion of TST was reduced. REM latency was 49.0 minutes.    Motor movement / Parasomnia: The total limb movement index was 10.3 (0.6 with arousal).    Motor movement / Parasomnia:  Significant periodic limb movements were not evident.    Cardiac: single lead EKG revealed normal sinus rhythm    CPAP titration:  The mask used in the study was F+P Eson nasal mask, changed to full-face mask  The first significant improvement in sleep quality was first seen at 9 cwp in slow wave sleep.   CPAP = 20 cwp was partially effective in supine REM, though apneas persisted  The patient was then titrated on BiPAP due to residual events on CPAP = 20cwp (AHI= 11 )    Oxygenation:  At therapeutic levels of PAP therapy, there was no baseline hypoxemia.    Impression:    -Obstructive sleep apnea       Recommendations:    -auto-CPAP with CPAP min = 9  and CPAP max =  20cwp with full face mask is recommended  -attempts at side sleeping in addition to CPAP may be of benefit.  -Bipap titration can be considered  given the failure of CPAP in this study (residual AHI = 11 at  CPAP =20cwp)  -the patient has follow up with Sleep  Medicine        Juan Bustos MD    (This Sleep Study was interpreted by a Board Certified Sleep Specialist who conducted an epoch-by-epoch review of the entire raw data recording.)  (The indication for this sleep study was reviewed and deemed appropriate by AASM Practice Parameters or other reasons by a Board Certified Sleep Specialist.)        TABLES

## 2022-06-01 DIAGNOSIS — F51.09 OTHER INSOMNIA NOT DUE TO A SUBSTANCE OR KNOWN PHYSIOLOGICAL CONDITION: Primary | ICD-10-CM

## 2022-06-01 RX ORDER — TRAZODONE HYDROCHLORIDE 50 MG/1
50 TABLET ORAL NIGHTLY PRN
Qty: 30 TABLET | Refills: 1 | Status: SHIPPED | OUTPATIENT
Start: 2022-06-01 | End: 2022-09-06 | Stop reason: ALTCHOICE

## 2022-06-02 RX ORDER — TRAZODONE HYDROCHLORIDE 50 MG/1
50 TABLET ORAL NIGHTLY PRN
Qty: 30 TABLET | Refills: 1 | Status: CANCELLED | OUTPATIENT
Start: 2022-06-02 | End: 2023-06-02

## 2022-06-03 ENCOUNTER — PATIENT MESSAGE (OUTPATIENT)
Dept: UROLOGY | Facility: CLINIC | Age: 82
End: 2022-06-03
Payer: MEDICARE

## 2022-08-08 ENCOUNTER — PES CALL (OUTPATIENT)
Dept: ADMINISTRATIVE | Facility: CLINIC | Age: 82
End: 2022-08-08
Payer: MEDICARE

## 2022-08-22 NOTE — PROGRESS NOTES
"  ESTABLISHED PATIENT VISIT (EST LOV 12/5/2018: CLAY Claros: )    Cricket Mcdonnell  is a pleasant 81 y.o. male  with history of BPH, severe KARMA.    Here today for     PLAN last visit:     -trial of nasal mask due to irritation of his nostrils  -will adjust auto CPAP pressure  -CPAP titration recommended due to residual insomnia, fatigue, elevated AHI    Since last visit:     CPAP titration 5.12.22: CPAP =9 supine SWS, some sREM CPAP =20.  no lateral sleep.;  Rec CPAP =9 cwp + side sleeping + FFM.  June 22-wants sleeping pill-Trz 50mg    PAP history   Problems    Mask Dreamwear nasal mask (has been bothering his nose)  Tried nasal mask   Pressure APAP 10-20   Benefit    DME HME   Machine age 2022   Download 8.22.22: 28/30 x 7h 26min, 10-20 (12.7/18.5/19.8), Leak 5/27/87), AHI 14.6 (central 5.4)           SLEEP SCHEDULE   Environment    Bed Time 10:15-10:30P    Sleep Latency 20 min   Arousals 3-4   Nocturia 3   Back to sleep 5 min   Wake time 6 AM   Naps At 8-9A, sometimes in afternoon   Work        Vitals:    08/23/22 1440   BP: 139/71   Pulse: 67   Weight: 68.9 kg (152 lb)   Height: 5' 7" (1.702 m)       Physical Exam:    GEN:   Well-appearing  Psych:  Appropriate affect, demonstrates insight  SKIN:  No rash on the face or bridge of the nose      LABS:  Lab Results   Component Value Date    HGB 14.3 05/25/2022         RECORDS REVIEWED PREVIOUSLY:    Baseline Sleep Study: 06/09/2017 HST The overall AHI was 45 and overall RDI was 46. The oxygen lisa was 70.8% and % time < 90% SpO2 was 37.7%.     Download 6/1/2021: 19/30d x 6h 6min, auto 10-20 (11.7/15.5/17.5), AHI 9.1    ASSESSMENT  PROBLEM DESCRIPTION/ Sx on Presentation Interval Hx STATUS   Severe KARMA     HEENT: MP4, + mild micrognathia Using nightly  Residual AHI remains increased Partially controlled   Daytime Sx   occasional sleepiness when inactive   denies sleepiness when driving   ESS n/a/24 on intake Some sleepiness persists   Insomnia   Onset: "   Maintenance:waking up 3/4/5AM  Prior hypnotics:        Current hypnotics: trazodone 25mg   Waking up around 4 AM, hard to get back to sleep    Having some hangover   persists   Nocturia   x 2-3 per sleep period persists persists   Other issues:     PLAN     -discussed sleep positioner, night shift  -doxepin trial  -having trouble with nasal mask, would like to stick with dreamwear nasal  -recommend side sleeping  -using and benefitting from PAP therapy    RTC          The patient was given open opportunity to ask questions and/or express concerns about treatment plan.   All questions/concerns were discussed.     Two patient identifiers used prior to evaluation.

## 2022-08-23 ENCOUNTER — OFFICE VISIT (OUTPATIENT)
Dept: SLEEP MEDICINE | Facility: CLINIC | Age: 82
End: 2022-08-23
Payer: MEDICARE

## 2022-08-23 VITALS
HEART RATE: 67 BPM | HEIGHT: 67 IN | BODY MASS INDEX: 23.86 KG/M2 | WEIGHT: 152 LBS | SYSTOLIC BLOOD PRESSURE: 139 MMHG | DIASTOLIC BLOOD PRESSURE: 71 MMHG

## 2022-08-23 DIAGNOSIS — F51.09 OTHER INSOMNIA NOT DUE TO A SUBSTANCE OR KNOWN PHYSIOLOGICAL CONDITION: Primary | ICD-10-CM

## 2022-08-23 DIAGNOSIS — G47.33 OSA (OBSTRUCTIVE SLEEP APNEA): ICD-10-CM

## 2022-08-23 DIAGNOSIS — R35.1 NOCTURIA: ICD-10-CM

## 2022-08-23 PROCEDURE — 1125F AMNT PAIN NOTED PAIN PRSNT: CPT | Mod: CPTII,S$GLB,, | Performed by: INTERNAL MEDICINE

## 2022-08-23 PROCEDURE — 3075F PR MOST RECENT SYSTOLIC BLOOD PRESS GE 130-139MM HG: ICD-10-PCS | Mod: CPTII,S$GLB,, | Performed by: INTERNAL MEDICINE

## 2022-08-23 PROCEDURE — 1159F PR MEDICATION LIST DOCUMENTED IN MEDICAL RECORD: ICD-10-PCS | Mod: CPTII,S$GLB,, | Performed by: INTERNAL MEDICINE

## 2022-08-23 PROCEDURE — 3288F PR FALLS RISK ASSESSMENT DOCUMENTED: ICD-10-PCS | Mod: CPTII,S$GLB,, | Performed by: INTERNAL MEDICINE

## 2022-08-23 PROCEDURE — 99999 PR PBB SHADOW E&M-EST. PATIENT-LVL II: CPT | Mod: PBBFAC,,, | Performed by: INTERNAL MEDICINE

## 2022-08-23 PROCEDURE — 3288F FALL RISK ASSESSMENT DOCD: CPT | Mod: CPTII,S$GLB,, | Performed by: INTERNAL MEDICINE

## 2022-08-23 PROCEDURE — 1125F PR PAIN SEVERITY QUANTIFIED, PAIN PRESENT: ICD-10-PCS | Mod: CPTII,S$GLB,, | Performed by: INTERNAL MEDICINE

## 2022-08-23 PROCEDURE — 3075F SYST BP GE 130 - 139MM HG: CPT | Mod: CPTII,S$GLB,, | Performed by: INTERNAL MEDICINE

## 2022-08-23 PROCEDURE — 3078F DIAST BP <80 MM HG: CPT | Mod: CPTII,S$GLB,, | Performed by: INTERNAL MEDICINE

## 2022-08-23 PROCEDURE — 99999 PR PBB SHADOW E&M-EST. PATIENT-LVL II: ICD-10-PCS | Mod: PBBFAC,,, | Performed by: INTERNAL MEDICINE

## 2022-08-23 PROCEDURE — 1159F MED LIST DOCD IN RCRD: CPT | Mod: CPTII,S$GLB,, | Performed by: INTERNAL MEDICINE

## 2022-08-23 PROCEDURE — 3078F PR MOST RECENT DIASTOLIC BLOOD PRESSURE < 80 MM HG: ICD-10-PCS | Mod: CPTII,S$GLB,, | Performed by: INTERNAL MEDICINE

## 2022-08-23 PROCEDURE — 1101F PR PT FALLS ASSESS DOC 0-1 FALLS W/OUT INJ PAST YR: ICD-10-PCS | Mod: CPTII,S$GLB,, | Performed by: INTERNAL MEDICINE

## 2022-08-23 PROCEDURE — 99214 OFFICE O/P EST MOD 30 MIN: CPT | Mod: S$GLB,,, | Performed by: INTERNAL MEDICINE

## 2022-08-23 PROCEDURE — 1101F PT FALLS ASSESS-DOCD LE1/YR: CPT | Mod: CPTII,S$GLB,, | Performed by: INTERNAL MEDICINE

## 2022-08-23 PROCEDURE — 99214 PR OFFICE/OUTPT VISIT, EST, LEVL IV, 30-39 MIN: ICD-10-PCS | Mod: S$GLB,,, | Performed by: INTERNAL MEDICINE

## 2022-08-23 RX ORDER — DOXEPIN HYDROCHLORIDE 10 MG/1
10 CAPSULE ORAL NIGHTLY
Qty: 30 CAPSULE | Refills: 11 | Status: SHIPPED | OUTPATIENT
Start: 2022-08-23 | End: 2022-09-06

## 2022-09-02 ENCOUNTER — TELEPHONE (OUTPATIENT)
Dept: ADMINISTRATIVE | Facility: CLINIC | Age: 82
End: 2022-09-02
Payer: MEDICARE

## 2022-09-06 ENCOUNTER — OFFICE VISIT (OUTPATIENT)
Dept: INTERNAL MEDICINE | Facility: CLINIC | Age: 82
End: 2022-09-06
Payer: MEDICARE

## 2022-09-06 VITALS
BODY MASS INDEX: 24.58 KG/M2 | RESPIRATION RATE: 16 BRPM | OXYGEN SATURATION: 97 % | HEART RATE: 70 BPM | WEIGHT: 156.94 LBS | DIASTOLIC BLOOD PRESSURE: 78 MMHG | SYSTOLIC BLOOD PRESSURE: 130 MMHG

## 2022-09-06 DIAGNOSIS — Z80.42 FAMILY HISTORY OF PROSTATE CANCER: ICD-10-CM

## 2022-09-06 DIAGNOSIS — G62.89 OTHER POLYNEUROPATHY: ICD-10-CM

## 2022-09-06 DIAGNOSIS — E29.1 TESTOSTERONE DEFICIENCY IN MALE: ICD-10-CM

## 2022-09-06 DIAGNOSIS — Z00.00 ENCOUNTER FOR PREVENTIVE HEALTH EXAMINATION: Primary | ICD-10-CM

## 2022-09-06 DIAGNOSIS — G60.8 HEREDITARY SENSORY NEUROPATHY: ICD-10-CM

## 2022-09-06 DIAGNOSIS — G47.33 OSA (OBSTRUCTIVE SLEEP APNEA): ICD-10-CM

## 2022-09-06 DIAGNOSIS — H35.3210 EXUDATIVE AGE-RELATED MACULAR DEGENERATION OF RIGHT EYE, UNSPECIFIED STAGE: ICD-10-CM

## 2022-09-06 PROCEDURE — 1170F PR FUNCTIONAL STATUS ASSESSED: ICD-10-PCS | Mod: CPTII,S$GLB,, | Performed by: NURSE PRACTITIONER

## 2022-09-06 PROCEDURE — 1126F AMNT PAIN NOTED NONE PRSNT: CPT | Mod: CPTII,S$GLB,, | Performed by: NURSE PRACTITIONER

## 2022-09-06 PROCEDURE — 99999 PR PBB SHADOW E&M-EST. PATIENT-LVL IV: ICD-10-PCS | Mod: PBBFAC,,, | Performed by: NURSE PRACTITIONER

## 2022-09-06 PROCEDURE — 3288F PR FALLS RISK ASSESSMENT DOCUMENTED: ICD-10-PCS | Mod: CPTII,S$GLB,, | Performed by: NURSE PRACTITIONER

## 2022-09-06 PROCEDURE — 99999 PR PBB SHADOW E&M-EST. PATIENT-LVL IV: CPT | Mod: PBBFAC,,, | Performed by: NURSE PRACTITIONER

## 2022-09-06 PROCEDURE — 3075F SYST BP GE 130 - 139MM HG: CPT | Mod: CPTII,S$GLB,, | Performed by: NURSE PRACTITIONER

## 2022-09-06 PROCEDURE — 1101F PR PT FALLS ASSESS DOC 0-1 FALLS W/OUT INJ PAST YR: ICD-10-PCS | Mod: CPTII,S$GLB,, | Performed by: NURSE PRACTITIONER

## 2022-09-06 PROCEDURE — 99499 UNLISTED E&M SERVICE: CPT | Mod: HCNC,S$GLB,, | Performed by: NURSE PRACTITIONER

## 2022-09-06 PROCEDURE — 1159F MED LIST DOCD IN RCRD: CPT | Mod: CPTII,S$GLB,, | Performed by: NURSE PRACTITIONER

## 2022-09-06 PROCEDURE — 3288F FALL RISK ASSESSMENT DOCD: CPT | Mod: CPTII,S$GLB,, | Performed by: NURSE PRACTITIONER

## 2022-09-06 PROCEDURE — 3078F PR MOST RECENT DIASTOLIC BLOOD PRESSURE < 80 MM HG: ICD-10-PCS | Mod: CPTII,S$GLB,, | Performed by: NURSE PRACTITIONER

## 2022-09-06 PROCEDURE — 1170F FXNL STATUS ASSESSED: CPT | Mod: CPTII,S$GLB,, | Performed by: NURSE PRACTITIONER

## 2022-09-06 PROCEDURE — 3078F DIAST BP <80 MM HG: CPT | Mod: CPTII,S$GLB,, | Performed by: NURSE PRACTITIONER

## 2022-09-06 PROCEDURE — 1101F PT FALLS ASSESS-DOCD LE1/YR: CPT | Mod: CPTII,S$GLB,, | Performed by: NURSE PRACTITIONER

## 2022-09-06 PROCEDURE — 1126F PR PAIN SEVERITY QUANTIFIED, NO PAIN PRESENT: ICD-10-PCS | Mod: CPTII,S$GLB,, | Performed by: NURSE PRACTITIONER

## 2022-09-06 PROCEDURE — 1160F PR REVIEW ALL MEDS BY PRESCRIBER/CLIN PHARMACIST DOCUMENTED: ICD-10-PCS | Mod: CPTII,S$GLB,, | Performed by: NURSE PRACTITIONER

## 2022-09-06 PROCEDURE — 3075F PR MOST RECENT SYSTOLIC BLOOD PRESS GE 130-139MM HG: ICD-10-PCS | Mod: CPTII,S$GLB,, | Performed by: NURSE PRACTITIONER

## 2022-09-06 PROCEDURE — 1160F RVW MEDS BY RX/DR IN RCRD: CPT | Mod: CPTII,S$GLB,, | Performed by: NURSE PRACTITIONER

## 2022-09-06 PROCEDURE — G0439 PR MEDICARE ANNUAL WELLNESS SUBSEQUENT VISIT: ICD-10-PCS | Mod: S$GLB,,, | Performed by: NURSE PRACTITIONER

## 2022-09-06 PROCEDURE — G0439 PPPS, SUBSEQ VISIT: HCPCS | Mod: S$GLB,,, | Performed by: NURSE PRACTITIONER

## 2022-09-06 PROCEDURE — 1159F PR MEDICATION LIST DOCUMENTED IN MEDICAL RECORD: ICD-10-PCS | Mod: CPTII,S$GLB,, | Performed by: NURSE PRACTITIONER

## 2022-09-06 NOTE — PATIENT INSTRUCTIONS
Counseling and Referral of Other Preventative  (Italic type indicates deductible and co-insurance are waived)    Patient Name: Cricket Mcdonnell  Today's Date: 9/6/2022    Health Maintenance       Date Due Completion Date    Influenza Vaccine (1) 09/01/2022 1/8/2021    Colonoscopy 01/03/2026 1/3/2019    TETANUS VACCINE 07/12/2026 7/12/2016    Lipid Panel 05/25/2027 5/25/2022        No orders of the defined types were placed in this encounter.      The following information is provided to all patients.  This information is to help you find resources for any of the problems found today that may be affecting your health:                Living healthy guide: www.[facility].[place].gov      Understanding Diabetes: www.diabetes.org      Eating healthy: www.cdc.gov/healthyweight      CDC home safety checklist: www.cdc.gov/steadi/patient.html      Agency on Aging: www.goea.[place].[facility]      Alcoholics anonymous (AA): www.aa.org      Physical Activity: www.librado.nih.gov/yt3wqkh      Tobacco use: www.quitwithusla.org

## 2022-09-06 NOTE — PROGRESS NOTES
Cricket Cristhian Mcdonnell presented for a  Medicare AWV and comprehensive Health Risk Assessment today. The following components were reviewed and updated:    Medical history  Family History  Social history  Allergies and Current Medications  Health Risk Assessment  Health Maintenance  Care Team         ** See Completed Assessments for Annual Wellness Visit within the encounter summary.**         The following assessments were completed:  Living Situation  CAGE  Depression Screening  Timed Get Up and Go  Whisper Test  Cognitive Function Screening  Nutrition Screening  ADL Screening  PAQ Screening          Vitals:    09/06/22 1257   BP: 130/78   Pulse: 70   Resp: 16   SpO2: 97%   Weight: 71.2 kg (156 lb 15.5 oz)     Body mass index is 24.58 kg/m².  Physical Exam  Vitals and nursing note reviewed.   Constitutional:       Appearance: He is well-developed.   HENT:      Head: Normocephalic and atraumatic.      Right Ear: External ear normal.      Left Ear: External ear normal.   Eyes:      Conjunctiva/sclera: Conjunctivae normal.      Pupils: Pupils are equal, round, and reactive to light.   Cardiovascular:      Rate and Rhythm: Normal rate and regular rhythm.   Pulmonary:      Effort: Pulmonary effort is normal.      Breath sounds: Normal breath sounds.   Abdominal:      General: Bowel sounds are normal.      Palpations: Abdomen is soft.   Musculoskeletal:         General: Normal range of motion.      Cervical back: Normal range of motion and neck supple.   Skin:     General: Skin is warm and dry.   Neurological:      Mental Status: He is alert and oriented to person, place, and time.             Diagnoses and health risks identified today and associated recommendations/orders:    1. Encounter for preventive health examination  Health Maintenance updated   Records reviewed   Exam done     2. Hereditary sensory neuropathy  Stable and chronic. Continue current treatment plan. Followed by PCP.     3. Exudative age-related  macular degeneration of right eye, unspecified stage  Stable and chronic. Continue current medications. Followed by opthalmology.     4. Other polyneuropathy  Stable and chronic. Followed by PCP.     5. KARMA (obstructive sleep apnea)  Patient reports using CPAP nightly. Followed by PCP.     6. Family history of prostate cancer  Stable and chronic. Followed by PCP and urology.     7. 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 Followed by PCP and urology.   Counseling and Referral of Other Preventative  (Italic type indicates deductible and co-insurance are waived)    Patient Name: Cricket Mcdonnell  Today's Date: 9/6/2022    Health Maintenance         Date Due Completion Date    Influenza Vaccine (1) 09/01/2022 1/8/2021    Colonoscopy 01/03/2026 1/3/2019    TETANUS VACCINE 07/12/2026 7/12/2016    Lipid Panel 05/25/2027 5/25/2022          No orders of the defined types were placed in this encounter.      Provided Cricket Morrow with a 5-10 year written screening schedule and personal prevention plan. Recommendations were developed using the USPSTF age appropriate recommendations. Education, counseling, and referrals were provided as needed. After Visit Summary printed and given to patient which includes a list of additional screenings\tests needed.    Follow up in about 1 year (around 9/6/2023) for follow up for medicare annual wellness visit.    Lilliana Herrera, NP      I offered to discuss advanced care planning, including how to pick a person who would make decisions for you if you were unable to make them for yourself, called a health care power of , and what kind of decisions you might make such as use of life sustaining treatments such as ventilators and tube feeding when faced with a life limiting illness recorded on a living will that they will need to know. (How you want to be cared for as you near the end of your natural life)     X Patient is interested in learning more about how to make  advanced directives.  I provided them paperwork and offered to discuss this with them.    Review for Opioid Screening: Pt does not have Rx for Opioids     Review for Substance Use Disorders: Patient does not use substance

## 2022-11-04 ENCOUNTER — LAB VISIT (OUTPATIENT)
Dept: LAB | Facility: HOSPITAL | Age: 82
End: 2022-11-04
Payer: MEDICARE

## 2022-11-04 DIAGNOSIS — E78.5 DYSLIPIDEMIA: ICD-10-CM

## 2022-11-04 DIAGNOSIS — E29.1 PRIMARY MALE HYPOGONADISM: ICD-10-CM

## 2022-11-04 DIAGNOSIS — R53.83 FATIGUE, UNSPECIFIED TYPE: ICD-10-CM

## 2022-11-04 DIAGNOSIS — N13.8 BPH WITH URINARY OBSTRUCTION: ICD-10-CM

## 2022-11-04 DIAGNOSIS — N40.1 BPH WITH URINARY OBSTRUCTION: ICD-10-CM

## 2022-11-04 DIAGNOSIS — G47.00 INSOMNIA, UNSPECIFIED TYPE: ICD-10-CM

## 2022-11-04 LAB
ALBUMIN SERPL BCP-MCNC: 3.3 G/DL (ref 3.5–5.2)
ALP SERPL-CCNC: 61 U/L (ref 55–135)
ALT SERPL W/O P-5'-P-CCNC: 19 U/L (ref 10–44)
AST SERPL-CCNC: 19 U/L (ref 10–40)
BASOPHILS # BLD AUTO: 0.05 K/UL (ref 0–0.2)
BASOPHILS NFR BLD: 0.4 % (ref 0–1.9)
BILIRUB DIRECT SERPL-MCNC: 0.2 MG/DL (ref 0.1–0.3)
BILIRUB SERPL-MCNC: 0.4 MG/DL (ref 0.1–1)
CHOLEST SERPL-MCNC: 133 MG/DL (ref 120–199)
CHOLEST/HDLC SERPL: 3.3 {RATIO} (ref 2–5)
COMPLEXED PSA SERPL-MCNC: 6.6 NG/ML (ref 0–4)
CREAT SERPL-MCNC: 0.7 MG/DL (ref 0.5–1.4)
DIFFERENTIAL METHOD: ABNORMAL
EOSINOPHIL # BLD AUTO: 0.1 K/UL (ref 0–0.5)
EOSINOPHIL NFR BLD: 0.9 % (ref 0–8)
ERYTHROCYTE [DISTWIDTH] IN BLOOD BY AUTOMATED COUNT: 12.3 % (ref 11.5–14.5)
EST. GFR  (NO RACE VARIABLE): >60 ML/MIN/1.73 M^2
HCT VFR BLD AUTO: 38.5 % (ref 40–54)
HDLC SERPL-MCNC: 40 MG/DL (ref 40–75)
HDLC SERPL: 30.1 % (ref 20–50)
HGB BLD-MCNC: 13 G/DL (ref 14–18)
IMM GRANULOCYTES # BLD AUTO: 0.05 K/UL (ref 0–0.04)
IMM GRANULOCYTES NFR BLD AUTO: 0.4 % (ref 0–0.5)
LDLC SERPL CALC-MCNC: 80 MG/DL (ref 63–159)
LYMPHOCYTES # BLD AUTO: 1.5 K/UL (ref 1–4.8)
LYMPHOCYTES NFR BLD: 12.3 % (ref 18–48)
MCH RBC QN AUTO: 32.3 PG (ref 27–31)
MCHC RBC AUTO-ENTMCNC: 33.8 G/DL (ref 32–36)
MCV RBC AUTO: 96 FL (ref 82–98)
MONOCYTES # BLD AUTO: 1 K/UL (ref 0.3–1)
MONOCYTES NFR BLD: 8.8 % (ref 4–15)
NEUTROPHILS # BLD AUTO: 9.1 K/UL (ref 1.8–7.7)
NEUTROPHILS NFR BLD: 77.2 % (ref 38–73)
NONHDLC SERPL-MCNC: 93 MG/DL
NRBC BLD-RTO: 0 /100 WBC
PLATELET # BLD AUTO: 317 K/UL (ref 150–450)
PMV BLD AUTO: 8.9 FL (ref 9.2–12.9)
PROT SERPL-MCNC: 7.1 G/DL (ref 6–8.4)
RBC # BLD AUTO: 4.02 M/UL (ref 4.6–6.2)
TESTOST SERPL-MCNC: 520 NG/DL (ref 304–1227)
TRIGL SERPL-MCNC: 65 MG/DL (ref 30–150)
WBC # BLD AUTO: 11.75 K/UL (ref 3.9–12.7)

## 2022-11-04 PROCEDURE — 80061 LIPID PANEL: CPT | Performed by: NURSE PRACTITIONER

## 2022-11-04 PROCEDURE — 36415 COLL VENOUS BLD VENIPUNCTURE: CPT | Performed by: NURSE PRACTITIONER

## 2022-11-04 PROCEDURE — 84153 ASSAY OF PSA TOTAL: CPT | Performed by: NURSE PRACTITIONER

## 2022-11-04 PROCEDURE — 84403 ASSAY OF TOTAL TESTOSTERONE: CPT | Performed by: NURSE PRACTITIONER

## 2022-11-04 PROCEDURE — 85025 COMPLETE CBC W/AUTO DIFF WBC: CPT | Performed by: NURSE PRACTITIONER

## 2022-11-04 PROCEDURE — 82565 ASSAY OF CREATININE: CPT | Performed by: NURSE PRACTITIONER

## 2022-11-04 PROCEDURE — 80076 HEPATIC FUNCTION PANEL: CPT | Performed by: NURSE PRACTITIONER

## 2022-11-07 ENCOUNTER — OFFICE VISIT (OUTPATIENT)
Dept: UROLOGY | Facility: CLINIC | Age: 82
End: 2022-11-07
Payer: MEDICARE

## 2022-11-07 VITALS
HEIGHT: 67 IN | DIASTOLIC BLOOD PRESSURE: 76 MMHG | SYSTOLIC BLOOD PRESSURE: 136 MMHG | BODY MASS INDEX: 24.48 KG/M2 | HEART RATE: 73 BPM | WEIGHT: 156 LBS

## 2022-11-07 DIAGNOSIS — E29.1 PRIMARY MALE HYPOGONADISM: Primary | ICD-10-CM

## 2022-11-07 DIAGNOSIS — N40.1 BPH WITH URINARY OBSTRUCTION: ICD-10-CM

## 2022-11-07 DIAGNOSIS — N13.8 BPH WITH URINARY OBSTRUCTION: ICD-10-CM

## 2022-11-07 DIAGNOSIS — R53.83 FATIGUE, UNSPECIFIED TYPE: ICD-10-CM

## 2022-11-07 DIAGNOSIS — E29.1 MALE HYPOGONADISM: ICD-10-CM

## 2022-11-07 DIAGNOSIS — R97.20 ELEVATED PSA: ICD-10-CM

## 2022-11-07 LAB
BILIRUB SERPL-MCNC: NORMAL MG/DL
BLOOD URINE, POC: NORMAL
CLARITY, POC UA: CLEAR
COLOR, POC UA: YELLOW
GLUCOSE UR QL STRIP: NORMAL
KETONES UR QL STRIP: NORMAL
LEUKOCYTE ESTERASE URINE, POC: NORMAL
NITRITE, POC UA: NORMAL
PH, POC UA: 7
POC RESIDUAL URINE VOLUME: 6 ML (ref 0–100)
PROTEIN, POC: NORMAL
SPECIFIC GRAVITY, POC UA: 1.01
UROBILINOGEN, POC UA: NORMAL

## 2022-11-07 PROCEDURE — 81002 URINALYSIS NONAUTO W/O SCOPE: CPT | Mod: S$GLB,,, | Performed by: NURSE PRACTITIONER

## 2022-11-07 PROCEDURE — 1159F PR MEDICATION LIST DOCUMENTED IN MEDICAL RECORD: ICD-10-PCS | Mod: CPTII,S$GLB,, | Performed by: NURSE PRACTITIONER

## 2022-11-07 PROCEDURE — 3075F SYST BP GE 130 - 139MM HG: CPT | Mod: CPTII,S$GLB,, | Performed by: NURSE PRACTITIONER

## 2022-11-07 PROCEDURE — 3078F PR MOST RECENT DIASTOLIC BLOOD PRESSURE < 80 MM HG: ICD-10-PCS | Mod: CPTII,S$GLB,, | Performed by: NURSE PRACTITIONER

## 2022-11-07 PROCEDURE — 51798 POCT BLADDER SCAN: ICD-10-PCS | Mod: S$GLB,,, | Performed by: NURSE PRACTITIONER

## 2022-11-07 PROCEDURE — 1101F PR PT FALLS ASSESS DOC 0-1 FALLS W/OUT INJ PAST YR: ICD-10-PCS | Mod: CPTII,S$GLB,, | Performed by: NURSE PRACTITIONER

## 2022-11-07 PROCEDURE — 99999 PR PBB SHADOW E&M-EST. PATIENT-LVL V: ICD-10-PCS | Mod: PBBFAC,,, | Performed by: NURSE PRACTITIONER

## 2022-11-07 PROCEDURE — 81002 POCT URINE DIPSTICK WITHOUT MICROSCOPE: ICD-10-PCS | Mod: S$GLB,,, | Performed by: NURSE PRACTITIONER

## 2022-11-07 PROCEDURE — 3288F FALL RISK ASSESSMENT DOCD: CPT | Mod: CPTII,S$GLB,, | Performed by: NURSE PRACTITIONER

## 2022-11-07 PROCEDURE — 99215 OFFICE O/P EST HI 40 MIN: CPT | Mod: S$GLB,,, | Performed by: NURSE PRACTITIONER

## 2022-11-07 PROCEDURE — 3288F PR FALLS RISK ASSESSMENT DOCUMENTED: ICD-10-PCS | Mod: CPTII,S$GLB,, | Performed by: NURSE PRACTITIONER

## 2022-11-07 PROCEDURE — 1126F PR PAIN SEVERITY QUANTIFIED, NO PAIN PRESENT: ICD-10-PCS | Mod: CPTII,S$GLB,, | Performed by: NURSE PRACTITIONER

## 2022-11-07 PROCEDURE — 1159F MED LIST DOCD IN RCRD: CPT | Mod: CPTII,S$GLB,, | Performed by: NURSE PRACTITIONER

## 2022-11-07 PROCEDURE — 1126F AMNT PAIN NOTED NONE PRSNT: CPT | Mod: CPTII,S$GLB,, | Performed by: NURSE PRACTITIONER

## 2022-11-07 PROCEDURE — 99215 PR OFFICE/OUTPT VISIT, EST, LEVL V, 40-54 MIN: ICD-10-PCS | Mod: S$GLB,,, | Performed by: NURSE PRACTITIONER

## 2022-11-07 PROCEDURE — 3078F DIAST BP <80 MM HG: CPT | Mod: CPTII,S$GLB,, | Performed by: NURSE PRACTITIONER

## 2022-11-07 PROCEDURE — 3075F PR MOST RECENT SYSTOLIC BLOOD PRESS GE 130-139MM HG: ICD-10-PCS | Mod: CPTII,S$GLB,, | Performed by: NURSE PRACTITIONER

## 2022-11-07 PROCEDURE — 1101F PT FALLS ASSESS-DOCD LE1/YR: CPT | Mod: CPTII,S$GLB,, | Performed by: NURSE PRACTITIONER

## 2022-11-07 PROCEDURE — 99999 PR PBB SHADOW E&M-EST. PATIENT-LVL V: CPT | Mod: PBBFAC,,, | Performed by: NURSE PRACTITIONER

## 2022-11-07 PROCEDURE — 51798 US URINE CAPACITY MEASURE: CPT | Mod: S$GLB,,, | Performed by: NURSE PRACTITIONER

## 2022-11-07 RX ORDER — TESTOSTERONE 40.5 MG/2.5G
2.5 GEL TOPICAL DAILY
Qty: 75 G | Refills: 5 | Status: SHIPPED | OUTPATIENT
Start: 2022-11-07 | End: 2023-05-29 | Stop reason: SDUPTHER

## 2022-11-07 RX ORDER — DOXEPIN HYDROCHLORIDE 10 MG/1
CAPSULE ORAL
Status: ON HOLD | COMMUNITY
End: 2023-08-09 | Stop reason: HOSPADM

## 2022-11-07 NOTE — PROGRESS NOTES
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     HISTORY OF PRESENTING ILLINESS:    . Cricket Mcdonnell is a 81 y.o. Male with a history of low T and fatigue and ED  01/16/2020 T was 126  01/20/2020 T was 105  He was started on Androgel 1.62%/2.5gm  Report feeling better; more energy with TRT.   Last seen clinic  05/11/2022.      Today he reports that over the past month he has not had the energy as he did before.   Ok with urination.  Sildenafil 100mg PRN helps with the ED but he also have trouble achieving orgasm.     He has been getter sleep since using CPAP.   Coffee drinker in the afternoon  Melatonin not working for him  TRT labs completed showing a rising PSA.   No family history of Prostate Cancer. Father lived to .              REVIEW OF SYSTEMS:  Review of Systems   Constitutional:  Positive for malaise/fatigue. Negative for chills and fever.   Eyes:  Negative for double vision.   Respiratory:  Negative for cough and shortness of breath.         Sleep Apnea     Cardiovascular:  Negative for chest pain.   Gastrointestinal:  Negative for abdominal pain, constipation, diarrhea, nausea and vomiting.   Genitourinary:  Positive for frequency. Negative for dysuria, flank pain and hematuria.        Ok with urination     Neurological:  Negative for dizziness and seizures.   Endo/Heme/Allergies:  Negative for polydipsia.   Psychiatric/Behavioral:          Sleep has improved with CPAP.         PATIENT HISTORY:    Past Medical History:   Diagnosis Date    Hereditary sensory neuropathy     Sleep apnea     + CPAP       Past Surgical History:   Procedure Laterality Date    CATARACT EXTRACTION      COLONOSCOPY N/A 1/3/2019    Procedure: COLONOSCOPY;  Surgeon: Cholo Yates MD;  Location: [address];  Service: Endoscopy;  Laterality: N/A;    EYE SURGERY      galucoma surgery      HERNIA REPAIR      tonsillectomy      TONSILLECTOMY         Family History    Problem Relation Age of Onset    COPD Sister     Cancer Sister         lung cancer from smoking    No Known Problems Son     No Known Problems Daughter        Social History     Socioeconomic History    Marital status:    Occupational History     Employer: [employer]   Tobacco Use    Smoking status: Former     Packs/day: 0.25     Years: 30.00     Pack years: 7.50     Types: Cigarettes     Start date: 1971     Quit date: 2001     Years since quittin.3    Smokeless tobacco: Never    Tobacco comments:     I was a light daily smoker   Substance and Sexual Activity    Alcohol use: Not Currently     Alcohol/week: 0.0 standard drinks     Comment: I gave up drinking 23 years ago    Drug use: No    Sexual activity: Yes     Partners: Female     Birth control/protection: Diaphragm     Social Determinants of Health     Food Insecurity: No Food Insecurity    Worried About Running Out of Food in the Last Year: Never true    Ran Out of Food in the Last Year: Never true   Transportation Needs: No Transportation Needs    Lack of Transportation (Medical): No    Lack of Transportation (Non-Medical): No   Physical Activity: Sufficiently Active    Days of Exercise per Week: 7 days    Minutes of Exercise per Session: 40 min   Stress: No Stress Concern Present    Feeling of Stress : Only a little   Housing Stability: Low Risk     Unable to Pay for Housing in the Last Year: No    Number of Places Lived in the Last Year: 1    Unstable Housing in the Last Year: No       Allergies:  Poison ivy extract    Medications:    Current Outpatient Medications:     brimonidine 0.15 % OPTH DROP (ALPHAGAN) 0.15 % ophthalmic solution, instill 1 drop into Left Eye twice a day, Disp: 15 mL, Rfl: 3    brimonidine 0.15 % OPTH DROP (ALPHAGAN) 0.15 % ophthalmic solution, Instill 1 drop into the left eye 3 times a day, Disp: 15 mL, Rfl: 0    dorzolamide-timolol 2-0.5% (COSOPT) 22.3-6.8 mg/mL ophthalmic solution, Instill 1 drop  into both eyes twice a day, Disp: 10 mL, Rfl: 2    dorzolamide-timolol 2-0.5% (COSOPT) 22.3-6.8 mg/mL ophthalmic solution, Instill 1 drop into right eye twice a day, Disp: 10 mL, Rfl: 0    dorzolamide-timolol 2-0.5% (COSOPT) 22.3-6.8 mg/mL ophthalmic solution, Instill 1 drop into right eye twice a day, Disp: 10 mL, Rfl: 0    dorzolamide-timolol 2-0.5% (COSOPT) 22.3-6.8 mg/mL ophthalmic solution, Instill 1 drop Left Eye twice a day, Disp: 10 mL, Rfl: 0    dorzolamide-timolol 2-0.5% (COSOPT) 22.3-6.8 mg/mL ophthalmic solution, Place 1 drop into the left eye twice a day, Disp: 10 mL, Rfl: 0    dorzolamide-timolol 2-0.5% (COSOPT) 22.3-6.8 mg/mL ophthalmic solution, Instill 1 drop into the Left Eye twice a day, Disp: 10 mL, Rfl: 0    dorzolamide-timolol 2-0.5% (COSOPT) 22.3-6.8 mg/mL ophthalmic solution, Instill 1 drop into the Left Eye twice a day, Disp: 10 mL, Rfl: 3    dorzolamide-timolol 2-0.5% (COSOPT) 22.3-6.8 mg/mL ophthalmic solution, instill 1 drop into Left Eye three times a day, Disp: 10 mL, Rfl: 1    doxepin (SINEQUAN) 10 MG capsule, doxepin 10 mg capsule, Disp: , Rfl:     ferrous sulfate 325 (65 FE) MG EC tablet, Take 325 mg by mouth once daily., Disp: , Rfl:     fluorometholone (FLAREX) 0.1 % DrpS, Instill 1 drop into left eye once a day, Disp: 5 mL, Rfl: 0    fluorometholone 0.1% (FML) 0.1 % DrpS, Instill 1 drop into left eye once a day, Disp: 5 mL, Rfl: 1    fluticasone (FLONASE) 50 mcg/actuation nasal spray, 2 sprays by Each Nare route once daily., Disp: 1 Bottle, Rfl: 2    ibuprofen (ADVIL,MOTRIN) 800 MG tablet, as needed., Disp: , Rfl:     LUMIGAN 0.01 % Drop, every evening., Disp: , Rfl:     MARCAINE 0.25 % (2.5 mg/mL) Soln, , Disp: , Rfl:     MULTIVIT-IRON-MIN-FOLIC ACID 3,500-18-0.4 UNIT-MG-MG ORAL CHEW, Take by mouth once daily. , Disp: , Rfl:     mupirocin (BACTROBAN) 2 % ointment, every evening., Disp: , Rfl:     neomycin-polymyxin-dexamethasone (MAXITROL) 3.5 mg/g-10,000 unit/g-0.1 % Oint, Apply  a small amount into left eye three times a day, Disp: 3.5 g, Rfl: 1    netarsudiL-latanoprost (ROCKLATAN) 0.02-0.005 % Drop, Instill 1 drop into both eyes at bedtime, Disp: 2.5 mL, Rfl: 6    ofloxacin (OCUFLOX) 0.3 % ophthalmic solution, Place 1 drop into Left Eye 3 times a day (USE ONLY 3 DAYS AFTER EACH INJECTION), Disp: 10 mL, Rfl: 0    prednisoLONE acetate (PRED FORTE) 1 % DrpS, Instill 1 drop into right eye once a day, Disp: 15 mL, Rfl: 0    prednisoLONE acetate (PRED FORTE) 1 % DrpS, Instill 1 drop into the Right Eye once a day, Disp: 15 mL, Rfl: 0    prednisoLONE acetate (PRED FORTE) 1 % DrpS, Instill 1 drop into right eye every other day, Disp: 5 mL, Rfl: 4    PREVIDENT 5000 BOOSTER PLUS 1.1 % Pste, once daily. , Disp: , Rfl: 0    sildenafiL (VIAGRA) 100 MG tablet, Take 1 tablet (100 mg total) by mouth daily as needed., Disp: 10 tablet, Rfl: 12    testosterone (ANDROGEL) 1.62 % (40.5 mg/2.5 gram) GlPk, Place 2.5 g (1 packet) onto the skin once daily., Disp: 75 g, Rfl: 5    timolol maleate 0.25% (TIMOPTIC) 0.25 % Drop, 1 drop once daily., Disp: , Rfl:     PHYSICAL EXAMINATION:  Physical Exam  Vitals and nursing note reviewed.   Constitutional:       General: He is awake.      Appearance: Normal appearance.   HENT:      Head: Normocephalic.      Right Ear: External ear normal.      Left Ear: External ear normal.      Nose: Nose normal.   Cardiovascular:      Rate and Rhythm: Normal rate.   Pulmonary:      Effort: Pulmonary effort is normal. No respiratory distress.   Abdominal:      Tenderness: There is no abdominal tenderness. There is no right CVA tenderness or left CVA tenderness.   Genitourinary:     Penis: Normal.       Testes: Normal.      Prostate: Enlarged. Not tender and no nodules present.      Rectum: Normal.      Comments: Prostate ~35gms; smooth.    Musculoskeletal:         General: Normal range of motion.      Cervical back: Normal range of motion.   Skin:     General: Skin is warm and dry.    Neurological:      General: No focal deficit present.      Mental Status: He is alert and oriented to person, place, and time.   Psychiatric:         Mood and Affect: Mood normal.         Behavior: Behavior is cooperative.         LABS:      In office UA today was clear of active infection.     The PVR in the office today done immediately after urination by the nurse was 6.        Lab Results   Component Value Date    PSA 2.1 05/21/2018    PSA 1.8 03/27/2017    PSA 1.82 05/31/2013    PSADIAG 6.6 (H) 11/04/2022    PSADIAG 5.4 (H) 05/25/2022    PSADIAG 3.9 11/08/2021    PSATOTAL 2.1 07/06/2015             IMPRESSION:    Encounter Diagnoses   Name Primary?    Primary male hypogonadism Yes    Fatigue, unspecified type     BPH with urinary obstruction     Elevated PSA          Assessment:       1. Primary male hypogonadism    2. Fatigue, unspecified type    3. BPH with urinary obstruction    4. Elevated PSA        Plan:         I spent 40 minutes with the patient of which more than half was spent in direct consultation with the patient in regards to our treatment and plan.  We addressed the office findings and recent labs.   Education and recommendations of today's plan of care including home remedies and needed follow up with PCP.   We discussed the chief complaint/LUTS and the possible contributory factors  F/u with PCP for fatigue; not seeing urological cause.  Reviewed his PSA. If wants to continue TRT then I recommend MRI of the Prostate possible biopsy if warranted. Or we stop it.   Will recheck PSA prior to MRI.   Discussed difficulty reaching orgasms.  OTC aids.  Will review his meds.   Recommended lifestyle modifications with proper, healthy diet, good hydration if no fluid restrictions; reducing bladder irritants.   Benefits of regular exercise approved by PCP.

## 2022-11-17 ENCOUNTER — HOSPITAL ENCOUNTER (EMERGENCY)
Facility: HOSPITAL | Age: 82
Discharge: HOME | End: 2022-11-17
Attending: EMERGENCY MEDICINE
Payer: MEDICARE

## 2022-11-17 VITALS
TEMPERATURE: 99 F | OXYGEN SATURATION: 96 % | BODY MASS INDEX: 24.33 KG/M2 | DIASTOLIC BLOOD PRESSURE: 72 MMHG | WEIGHT: 155 LBS | SYSTOLIC BLOOD PRESSURE: 138 MMHG | RESPIRATION RATE: 18 BRPM | HEIGHT: 67 IN | HEART RATE: 89 BPM

## 2022-11-17 DIAGNOSIS — R53.83 FATIGUE: ICD-10-CM

## 2022-11-17 DIAGNOSIS — R05.9 COUGH: ICD-10-CM

## 2022-11-17 DIAGNOSIS — J18.9 PNEUMONIA OF RIGHT MIDDLE LOBE DUE TO INFECTIOUS ORGANISM: Primary | ICD-10-CM

## 2022-11-17 LAB
ALBUMIN SERPL BCP-MCNC: 2.7 G/DL (ref 3.5–5.2)
ALP SERPL-CCNC: 71 U/L (ref 55–135)
ALT SERPL W/O P-5'-P-CCNC: 16 U/L (ref 10–44)
ANION GAP SERPL CALC-SCNC: 9 MMOL/L (ref 8–16)
AST SERPL-CCNC: 17 U/L (ref 10–40)
BASOPHILS # BLD AUTO: 0.02 K/UL (ref 0–0.2)
BASOPHILS NFR BLD: 0.1 % (ref 0–1.9)
BILIRUB SERPL-MCNC: 0.5 MG/DL (ref 0.1–1)
BILIRUB UR QL STRIP: NEGATIVE
BNP SERPL-MCNC: 95 PG/ML (ref 0–99)
BUN SERPL-MCNC: 9 MG/DL (ref 8–23)
CALCIUM SERPL-MCNC: 9.2 MG/DL (ref 8.7–10.5)
CHLORIDE SERPL-SCNC: 102 MMOL/L (ref 95–110)
CLARITY UR REFRACT.AUTO: CLEAR
CO2 SERPL-SCNC: 21 MMOL/L (ref 23–29)
COLOR UR AUTO: YELLOW
CREAT SERPL-MCNC: 0.7 MG/DL (ref 0.5–1.4)
DIFFERENTIAL METHOD: ABNORMAL
EOSINOPHIL # BLD AUTO: 0.1 K/UL (ref 0–0.5)
EOSINOPHIL NFR BLD: 0.3 % (ref 0–8)
ERYTHROCYTE [DISTWIDTH] IN BLOOD BY AUTOMATED COUNT: 12.6 % (ref 11.5–14.5)
EST. GFR  (NO RACE VARIABLE): >60 ML/MIN/1.73 M^2
GLUCOSE SERPL-MCNC: 111 MG/DL (ref 70–110)
GLUCOSE UR QL STRIP: NEGATIVE
HCT VFR BLD AUTO: 32.7 % (ref 40–54)
HCV AB SERPL QL IA: NORMAL
HGB BLD-MCNC: 11.4 G/DL (ref 14–18)
HGB UR QL STRIP: NEGATIVE
HIV 1+2 AB+HIV1 P24 AG SERPL QL IA: NORMAL
IMM GRANULOCYTES # BLD AUTO: 0.06 K/UL (ref 0–0.04)
IMM GRANULOCYTES NFR BLD AUTO: 0.4 % (ref 0–0.5)
INFLUENZA A, MOLECULAR: NOT DETECTED
INFLUENZA B, MOLECULAR: NOT DETECTED
KETONES UR QL STRIP: NEGATIVE
LEUKOCYTE ESTERASE UR QL STRIP: NEGATIVE
LYMPHOCYTES # BLD AUTO: 1.3 K/UL (ref 1–4.8)
LYMPHOCYTES NFR BLD: 9 % (ref 18–48)
MCH RBC QN AUTO: 32.2 PG (ref 27–31)
MCHC RBC AUTO-ENTMCNC: 34.9 G/DL (ref 32–36)
MCV RBC AUTO: 92 FL (ref 82–98)
MONOCYTES # BLD AUTO: 1.8 K/UL (ref 0.3–1)
MONOCYTES NFR BLD: 12.1 % (ref 4–15)
NEUTROPHILS # BLD AUTO: 11.6 K/UL (ref 1.8–7.7)
NEUTROPHILS NFR BLD: 78.1 % (ref 38–73)
NITRITE UR QL STRIP: NEGATIVE
NRBC BLD-RTO: 0 /100 WBC
PH UR STRIP: 7 [PH] (ref 5–8)
PLATELET # BLD AUTO: 310 K/UL (ref 150–450)
PMV BLD AUTO: 8.5 FL (ref 9.2–12.9)
POTASSIUM SERPL-SCNC: 3.9 MMOL/L (ref 3.5–5.1)
PROT SERPL-MCNC: 6.7 G/DL (ref 6–8.4)
PROT UR QL STRIP: NEGATIVE
RBC # BLD AUTO: 3.54 M/UL (ref 4.6–6.2)
RSV AG BY MOLECULAR METHOD: NOT DETECTED
SARS-COV-2 RNA RESP QL NAA+PROBE: NOT DETECTED
SODIUM SERPL-SCNC: 132 MMOL/L (ref 136–145)
SP GR UR STRIP: 1.01 (ref 1–1.03)
TROPONIN I SERPL DL<=0.01 NG/ML-MCNC: 0.01 NG/ML (ref 0–0.03)
URN SPEC COLLECT METH UR: NORMAL
WBC # BLD AUTO: 14.84 K/UL (ref 3.9–12.7)

## 2022-11-17 PROCEDURE — 80053 COMPREHEN METABOLIC PANEL: CPT | Performed by: PHYSICIAN ASSISTANT

## 2022-11-17 PROCEDURE — 63600175 PHARM REV CODE 636 W HCPCS: Performed by: PHYSICIAN ASSISTANT

## 2022-11-17 PROCEDURE — 83880 ASSAY OF NATRIURETIC PEPTIDE: CPT | Performed by: PHYSICIAN ASSISTANT

## 2022-11-17 PROCEDURE — 96361 HYDRATE IV INFUSION ADD-ON: CPT

## 2022-11-17 PROCEDURE — 84484 ASSAY OF TROPONIN QUANT: CPT | Performed by: PHYSICIAN ASSISTANT

## 2022-11-17 PROCEDURE — 93005 ELECTROCARDIOGRAM TRACING: CPT

## 2022-11-17 PROCEDURE — 99284 EMERGENCY DEPT VISIT MOD MDM: CPT | Mod: CS,,, | Performed by: PHYSICIAN ASSISTANT

## 2022-11-17 PROCEDURE — 25000003 PHARM REV CODE 250: Performed by: PHYSICIAN ASSISTANT

## 2022-11-17 PROCEDURE — 96368 THER/DIAG CONCURRENT INF: CPT

## 2022-11-17 PROCEDURE — 86803 HEPATITIS C AB TEST: CPT | Performed by: EMERGENCY MEDICINE

## 2022-11-17 PROCEDURE — 87389 HIV-1 AG W/HIV-1&-2 AB AG IA: CPT | Performed by: EMERGENCY MEDICINE

## 2022-11-17 PROCEDURE — 96375 TX/PRO/DX INJ NEW DRUG ADDON: CPT

## 2022-11-17 PROCEDURE — 96365 THER/PROPH/DIAG IV INF INIT: CPT

## 2022-11-17 PROCEDURE — 93010 ELECTROCARDIOGRAM REPORT: CPT | Mod: ,,, | Performed by: INTERNAL MEDICINE

## 2022-11-17 PROCEDURE — 81003 URINALYSIS AUTO W/O SCOPE: CPT | Performed by: PHYSICIAN ASSISTANT

## 2022-11-17 PROCEDURE — 0241U SARS-COV2 (COVID) WITH FLU/RSV BY PCR: CPT | Performed by: PHYSICIAN ASSISTANT

## 2022-11-17 PROCEDURE — 99285 EMERGENCY DEPT VISIT HI MDM: CPT | Mod: 25

## 2022-11-17 PROCEDURE — 93010 EKG 12-LEAD: ICD-10-PCS | Mod: ,,, | Performed by: INTERNAL MEDICINE

## 2022-11-17 PROCEDURE — 85025 COMPLETE CBC W/AUTO DIFF WBC: CPT | Performed by: PHYSICIAN ASSISTANT

## 2022-11-17 PROCEDURE — 99284 PR EMERGENCY DEPT VISIT,LEVEL IV: ICD-10-PCS | Mod: CS,,, | Performed by: PHYSICIAN ASSISTANT

## 2022-11-17 RX ORDER — ONDANSETRON 4 MG/1
4 TABLET, ORALLY DISINTEGRATING ORAL EVERY 8 HOURS PRN
Qty: 12 TABLET | Refills: 0 | Status: ON HOLD | OUTPATIENT
Start: 2022-11-17 | End: 2023-08-09 | Stop reason: HOSPADM

## 2022-11-17 RX ORDER — ONDANSETRON 2 MG/ML
4 INJECTION INTRAMUSCULAR; INTRAVENOUS
Status: COMPLETED | OUTPATIENT
Start: 2022-11-17 | End: 2022-11-17

## 2022-11-17 RX ORDER — ACETAMINOPHEN 325 MG/1
650 TABLET ORAL
Status: COMPLETED | OUTPATIENT
Start: 2022-11-17 | End: 2022-11-17

## 2022-11-17 RX ORDER — AMOXICILLIN 500 MG/1
1000 CAPSULE ORAL 3 TIMES DAILY
Qty: 42 CAPSULE | Refills: 0 | Status: SHIPPED | OUTPATIENT
Start: 2022-11-17 | End: 2022-11-24

## 2022-11-17 RX ADMIN — ONDANSETRON 4 MG: 2 INJECTION INTRAMUSCULAR; INTRAVENOUS at 01:11

## 2022-11-17 RX ADMIN — AZITHROMYCIN 500 MG: 500 INJECTION, POWDER, LYOPHILIZED, FOR SOLUTION INTRAVENOUS at 02:11

## 2022-11-17 RX ADMIN — SODIUM CHLORIDE 1000 ML: 0.9 INJECTION, SOLUTION INTRAVENOUS at 01:11

## 2022-11-17 RX ADMIN — ACETAMINOPHEN 650 MG: 325 TABLET ORAL at 01:11

## 2022-11-17 RX ADMIN — CEFTRIAXONE 1 G: 1 INJECTION, SOLUTION INTRAVENOUS at 02:11

## 2022-11-17 NOTE — DISCHARGE INSTRUCTIONS
You have pneumonia.  Take all your antibiotics.  Get plenty of rest.  Stay hydrated.    Follow-up in primary clinic in the next 3-4 days if your symptoms are not improving.      Return to the ER immediately for any new or significantly worsening symptoms such as difficulty breathing, readings of 93% on lower on your oxygen meter or worsening symptoms.

## 2022-11-17 NOTE — ED PROVIDER NOTES
Encounter Date: 2022       History     Chief Complaint   Patient presents with    Fatigue     Pt reporting fatigue, sore throat, chest congestion, myalgias, and chills for a few days. Denies chest pain, SOB, V/D.     81-year-old male with hereditary sensory neuropathy, sleep apnea presents to the ED with a chief complaint of fatigue.  Patient reports fatigue, decreased appetite, chills, body aches, nausea without vomiting, productive cough, headache.  Symptoms have progressed over the past week.  Patient also reports an intermittent right low chest pain.  He has difficulty describing the pain.  Denies shortness of breath, hemoptysis, vomiting, diarrhea, fever, change in urination, abdominal pain.      Review of patient's allergies indicates:   Allergen Reactions    Poison ivy extract      Past Medical History:   Diagnosis Date    Hereditary sensory neuropathy     Sleep apnea     + CPAP     Past Surgical History:   Procedure Laterality Date    CATARACT EXTRACTION      COLONOSCOPY N/A 1/3/2019    Procedure: COLONOSCOPY;  Surgeon: Cholo Yates MD;  Location: [address]);  Service: Endoscopy;  Laterality: N/A;    EYE SURGERY      galucoma surgery      HERNIA REPAIR      tonsillectomy      TONSILLECTOMY       Family History   Problem Relation Age of Onset    COPD Sister     Cancer Sister         lung cancer from smoking    No Known Problems Son     No Known Problems Daughter      Social History     Tobacco Use    Smoking status: Former     Packs/day: 0.25     Years: 30.00     Pack years: 7.50     Types: Cigarettes     Start date: 1971     Quit date: 2001     Years since quittin.3    Smokeless tobacco: Never    Tobacco comments:     I was a light daily smoker   Substance Use Topics    Alcohol use: Not Currently     Alcohol/week: 0.0 standard drinks     Comment: I gave up drinking 23 years ago    Drug use: No     Review of Systems   Constitutional:  Positive for appetite change and chills.  Negative for fever.   HENT:  Negative for sore throat.    Respiratory:  Positive for cough. Negative for shortness of breath.    Cardiovascular:  Negative for chest pain.   Gastrointestinal:  Positive for nausea. Negative for abdominal pain, diarrhea and vomiting.   Genitourinary:  Negative for dysuria.   Musculoskeletal:  Positive for myalgias. Negative for back pain.   Skin:  Negative for rash.   Neurological:  Positive for headaches. Negative for weakness.   Hematological:  Does not bruise/bleed easily.     Physical Exam     Initial Vitals [11/17/22 1209]   BP Pulse Resp Temp SpO2   131/66 77 (!) 21 99.6 °F (37.6 °C) 96 %      MAP       --         Physical Exam    Nursing note and vitals reviewed.  Constitutional: He appears well-developed and well-nourished.  Non-toxic appearance. He does not appear ill. No distress.   HENT:   Head: Normocephalic and atraumatic.   Neck: Neck supple.   Normal range of motion.  Cardiovascular:  Normal rate and regular rhythm.     Exam reveals no gallop, no distant heart sounds and no friction rub.       No murmur heard.  Pulses:       Radial pulses are 2+ on the right side and 2+ on the left side.   Pulmonary/Chest: Effort normal and breath sounds normal. No accessory muscle usage. No tachypnea. No respiratory distress. He has no decreased breath sounds. He has no wheezes. He has no rhonchi. He has no rales.   Abdominal: He exhibits no distension.   Musculoskeletal:      Cervical back: Normal range of motion and neck supple.     Neurological: He is alert.   Skin: No rash noted.       ED Course   Procedures  Labs Reviewed   CBC W/ AUTO DIFFERENTIAL - Abnormal; Notable for the following components:       Result Value    WBC 14.84 (*)     RBC 3.54 (*)     Hemoglobin 11.4 (*)     Hematocrit 32.7 (*)     MCH 32.2 (*)     MPV 8.5 (*)     Gran # (ANC) 11.6 (*)     Immature Grans (Abs) 0.06 (*)     Mono # 1.8 (*)     Gran % 78.1 (*)     Lymph % 9.0 (*)     All other components within  normal limits    Narrative:     Release to patient->Immediate   COMPREHENSIVE METABOLIC PANEL - Abnormal; Notable for the following components:    Sodium 132 (*)     CO2 21 (*)     Glucose 111 (*)     Albumin 2.7 (*)     All other components within normal limits    Narrative:     Release to patient->Immediate   HIV 1 / 2 ANTIBODY    Narrative:     Release to patient->Immediate   HEPATITIS C ANTIBODY    Narrative:     Release to patient->Immediate   URINALYSIS, REFLEX TO URINE CULTURE    Narrative:     Specimen Source->Urine   TROPONIN I    Narrative:     Release to patient->Immediate   B-TYPE NATRIURETIC PEPTIDE    Narrative:     Release to patient->Immediate   SARS-COV2 (COVID) WITH FLU/RSV BY PCR        ECG Results              EKG 12-lead (Final result)  Result time 11/17/22 14:36:37      Final result by Interface, Lab In [facility] (11/17/22 14:36:37)                   Narrative:    Test Reason : R53.83,    Vent. Rate : 068 BPM     Atrial Rate : 068 BPM     P-R Int : 200 ms          QRS Dur : 100 ms      QT Int : 392 ms       P-R-T Axes : 022 -08 000 degrees     QTc Int : 416 ms    Sinus rhythm with Premature atrial complexes  Incomplete right bundle branch block  Voltage criteria for left ventricular hypertrophy  Abnormal ECG  When compared with ECG of 29-NOV-2021 13:57,  CO interval has decreased  Incomplete right bundle branch block is now Present    Confirmed by MARY GRACE MAC MD ([id number]) on 11/17/2022 2:36:29 PM    Referred By: [email or web address]   SELF           Confirmed By:MARY GRACE MAC MD                                  Imaging Results               CT Chest Without Contrast (Final result)  Result time 11/17/22 16:00:06      Final result by Leny Linton MD (11/17/22 16:00:06)                   Impression:      1. Right middle lobe medial segment dense consolidation, in the appropriate clinical setting consistent with pneumonia.  There is limited extension of disease into the right upper lobe anterior segment  via medially incomplete fissure.  Follow-up chest radiography to resolution is recommended.  2. Prominent precarinal lymph node, presumably reactive.  3. Other findings as above.  This report was flagged in Epic as abnormal.    Electronically signed by resident: Tanvir Joshi  Date:    11/17/2022  Time:    15:16    Electronically signed by: Leny Linton  Date:    11/17/2022  Time:    16:00               Narrative:    EXAMINATION:  CT CHEST WITHOUT CONTRAST    CLINICAL HISTORY:  Abnormal xray - lung nodule, >= 1 cm;    TECHNIQUE:  Low dose axial images, sagittal and coronal reformations were obtained from the thoracic inlet to the lung bases. Contrast was not administered.    All CT scans at this facility use dose modulation, iterative reconstruction and/or weight based dosing when appropriate to reduce radiation dose to as low as reasonably achievable.    COMPARISON:  Chest radiograph 11/17/2022.    FINDINGS:  No intravenous contrast was administered for this examination.  Therefore, it may have diminished sensitivity for detection of certain abnormalities.    Thyroid gland: Within normal limits.    Trachea: Within normal limits.    Esophagus: Not well distended for full evaluation.  Small sliding-type hiatal hernia.    Cardiovascular: Mild-moderate left atrial dilation.No pericardial effusion.Mild aortic calcific disease.  Moderate coronary artery calcific disease.    Lymph nodes: Precarinal lymph node measuring 1.1 cm in short axis, presumably reactive.    Lungs/pleura/airways:    Dense right middle lobe medial segment consolidation extending into the right upper lobe anterior segment via medially incomplete fissure.    Minimal biapical pleural and parenchymal scarring.  Bandlike subsegmental atelectasis and/or scarring in the left lower lobe.  Bibasilar subsegmental atelectasis.    Upper abdomen:    Partially imaged.  No significant abnormalities.    Bones: Degenerative changes to the spine.  No acute  fracture or osseous destructive process.    Other: N/A                                        X-Ray Chest PA And Lateral (Final result)  Result time 11/17/22 13:45:19      Final result by Tanvir Wang MD (11/17/22 13:45:19)                   Impression:      Abnormal right middle lobe opacity, likely pneumonia or aspiration.  Lung mass is thought less likely.  Close follow-up and or further evaluation with CT of the thorax recommended.    Findings discussed with RENATO Solano  in the ED by telephone at 13:42.    This report was flagged in Epic as abnormal.      Electronically signed by: Tanvir Wang MD  Date:    11/17/2022  Time:    13:45               Narrative:    EXAMINATION:  XR CHEST PA AND LATERAL    CLINICAL HISTORY:  Cough, unspecified    TECHNIQUE:  PA and lateral views of the chest were performed.    COMPARISON:  None.    FINDINGS:  Heart size is normal.  Mediastinal structures are midline with note of aortic tortuosity.  The lungs are expanded.  Left lung is relatively clear with some small lines of fibrosis or subsegmental atelectasis in the lower zone.  PA view best demonstrates several cm region of focal opacification just inferior to the right hilum, not well correlated on lateral view but likely in middle lobe.  With history of cough, sore throat, and myalgias, this could be consolidation from a pneumonia or aspiration.  A lung mass is also possible, so that close follow-up and/or further evaluation with CT of thorax is recommended.  Elsewhere, right lung is clear.  No pleural fluid is detected.  The skeletal structures are intact.                                       Medications   sodium chloride 0.9% bolus 1,000 mL (0 mLs Intravenous Stopped 11/17/22 1352)   acetaminophen tablet 650 mg (650 mg Oral Given 11/17/22 1302)   ondansetron injection 4 mg (4 mg Intravenous Given 11/17/22 1303)   azithromycin 500 mg in dextrose 5 % 250 mL IVPB (ready to mix system) (0 mg Intravenous Stopped  11/17/22 2243)   cefTRIAXone (ROCEPHIN) 1 g/50 mL D5W IVPB (0 g Intravenous Stopped 11/17/22 0458)     Medical Decision Making:   History:   Old Medical Records: I decided to obtain old medical records.  Initial Assessment:   81-year-old male presents to the ED with 1 week of fatigue, cough, poor appetite, body aches.  Afebrile.  No acute distress.  O2 sats 95-96% on room air.  Differential Diagnosis:   My differential diagnosis includes but is not limited to:  COVID, flu, pneumonia, dehydration, CLARA, electrolyte abnormality, anemia    Independently Interpreted Test(s):   I have ordered and independently interpreted EKG Reading(s) - see summary below  Clinical Tests:   Lab Tests: Ordered  Radiological Study: Ordered  Medical Tests: Ordered  ED Management:  Chest x-ray showed a right middle lobe opacity.  Labs showed a leukocytosis of 14.84. A CT chest was obtained to further assess opacity on chest x-ray though very likely represents pneumonia.  CT chest also favored pneumonia.  Viral panel negative for COVID, flu, RSV.  Patient is in no acute distress.  No hypoxia noted.  Offered admission/observation.  Patient prefers to go home.  He lives close by.  I will treat with amoxicillin.  Patient also provided with a pulse oximeter.  Advised close PCP follow-up for re-evaluation and advised patient that he will need a repeat chest x-ray or CT to confirm resolution of infection in the next couple of weeks.  Patient given strict ED return precautions.  Patient was understanding and is comfortable with discharge.  I have reviewed the patient's records and discussed this case with my supervising physician.       Other:   I discussed test(s) with the performing physician.       <> Summary of the Findings: Opacity in right middle lobe.  Likely pneumonia given clinical presentation, however mass could not be fully ruled out.  CT chest recommended for further assessment.           ED Course as of 11/17/22 2208   Thu Nov 17, 2022    1256 EKG:  Incomplete right bundle branch block.  New T-wave inversions noted in leads V1 through V4, III.  New from prior EKG.  Last EKG for comparison was in 2005.  [EH]   1336 WBC(!): 14.84 [EH]   1336 Gran # (ANC)(!): 11.6 [EH]   1336 Immature Grans (Abs)(!): 0.06 [EH]      ED Course User Index  [EH] Nathalie Titus PA-C                   Clinical Impression:   Final diagnoses:  [R05.9] Cough  [R53.83] Fatigue  [J18.9] Pneumonia of right middle lobe due to infectious organism (Primary)        ED Disposition Condition    Discharge Stable          ED Prescriptions       Medication Sig Dispense Start Date End Date Auth. Provider    amoxicillin (AMOXIL) 500 MG capsule Take 2 capsules (1,000 mg total) by mouth 3 (three) times daily. for 7 days 42 capsule 11/17/2022 11/24/2022 Ntahalie Titus PA-C    ondansetron (ZOFRAN-ODT) 4 MG TbDL Dissolve 1 tablet (4 mg total) by mouth every 8 (eight) hours as needed (nausea or vomiting). 12 tablet 11/17/2022 -- Nathalie Titus PA-C          Follow-up Information       Follow up With Specialties Details Why Contact Info Additional Information    [facility]   [address]  [facility] [id number] [phone] [facility] Please park in surface lot and check in at central registration desk             Nathalie Titus PA-C  11/17/22 6808

## 2022-11-17 NOTE — ED TRIAGE NOTES
"Pt states "I have symptoms of Covid but I could read the instructions to take a home test".  Pt reports extreme fatigue, body aches, sinus drip, sore throat and cough.  Onset of fatigue x 1 wk with gradual onset of other c/o.  Pt has not taken any OTC meds  "

## 2022-11-25 ENCOUNTER — TELEPHONE (OUTPATIENT)
Dept: INTERNAL MEDICINE | Facility: CLINIC | Age: 82
End: 2022-11-25
Payer: MEDICARE

## 2022-11-25 NOTE — TELEPHONE ENCOUNTER
----- Message from Ara Alexx sent at 11/25/2022  3:53 PM CST -----  Contact: Xiao(daughter) [phone]  Caller is requesting an earlier appointment then we can schedule.  Caller is requesting a message be sent to the provider.  If this is for urgent care symptoms, did you offer other providers at this location, providers at other locations, or [facility]? (yes, no, n/a):  no  If this is for the patients physical, did you offer to schedule next available and put on wait list, or to see NP or PA for their physical?  (yes, no, n/a):  no  When is the next available appointment with their provider:  12/12/22  Reason for the appointment:  ER follow up for pneumonia/was seen in the [facility] ER on 11/17/22  Patient preference of timeframe to be scheduled:  next week   Would the patient like a call back, or a response through their [facility] portal?:  call back  Comments:

## 2022-11-25 NOTE — TELEPHONE ENCOUNTER
----- Message from Enrico Cartwright sent at 11/25/2022  2:11 PM CST -----  Name of Who is Calling: Xiao Kahn           What is the request in detail:  Patient's daughter is requesting a call back to schedule a visit for Monday 11/28/22.  Patient was diagnosed with pneumonia.  Please assist.           Can the clinic reply by [facility]: No           What Number to Call Back if not in [facility]:  [phone]

## 2022-11-28 ENCOUNTER — OFFICE VISIT (OUTPATIENT)
Dept: INTERNAL MEDICINE | Facility: CLINIC | Age: 82
End: 2022-11-28
Attending: FAMILY MEDICINE
Payer: MEDICARE

## 2022-11-28 VITALS
DIASTOLIC BLOOD PRESSURE: 70 MMHG | HEART RATE: 74 BPM | HEIGHT: 67 IN | SYSTOLIC BLOOD PRESSURE: 128 MMHG | WEIGHT: 157.06 LBS | BODY MASS INDEX: 24.65 KG/M2 | OXYGEN SATURATION: 94 %

## 2022-11-28 DIAGNOSIS — G47.33 OSA (OBSTRUCTIVE SLEEP APNEA): ICD-10-CM

## 2022-11-28 DIAGNOSIS — J18.9 PNEUMONIA OF RIGHT MIDDLE LOBE DUE TO INFECTIOUS ORGANISM: Primary | ICD-10-CM

## 2022-11-28 PROCEDURE — 3288F FALL RISK ASSESSMENT DOCD: CPT | Mod: CPTII,S$GLB,, | Performed by: FAMILY MEDICINE

## 2022-11-28 PROCEDURE — 3288F PR FALLS RISK ASSESSMENT DOCUMENTED: ICD-10-PCS | Mod: CPTII,S$GLB,, | Performed by: FAMILY MEDICINE

## 2022-11-28 PROCEDURE — 99999 PR PBB SHADOW E&M-EST. PATIENT-LVL IV: CPT | Mod: PBBFAC,,, | Performed by: FAMILY MEDICINE

## 2022-11-28 PROCEDURE — 3078F PR MOST RECENT DIASTOLIC BLOOD PRESSURE < 80 MM HG: ICD-10-PCS | Mod: CPTII,S$GLB,, | Performed by: FAMILY MEDICINE

## 2022-11-28 PROCEDURE — 1101F PT FALLS ASSESS-DOCD LE1/YR: CPT | Mod: CPTII,S$GLB,, | Performed by: FAMILY MEDICINE

## 2022-11-28 PROCEDURE — 3078F DIAST BP <80 MM HG: CPT | Mod: CPTII,S$GLB,, | Performed by: FAMILY MEDICINE

## 2022-11-28 PROCEDURE — 1126F AMNT PAIN NOTED NONE PRSNT: CPT | Mod: CPTII,S$GLB,, | Performed by: FAMILY MEDICINE

## 2022-11-28 PROCEDURE — 1159F PR MEDICATION LIST DOCUMENTED IN MEDICAL RECORD: ICD-10-PCS | Mod: CPTII,S$GLB,, | Performed by: FAMILY MEDICINE

## 2022-11-28 PROCEDURE — 99214 PR OFFICE/OUTPT VISIT, EST, LEVL IV, 30-39 MIN: ICD-10-PCS | Mod: S$GLB,,, | Performed by: FAMILY MEDICINE

## 2022-11-28 PROCEDURE — 1160F PR REVIEW ALL MEDS BY PRESCRIBER/CLIN PHARMACIST DOCUMENTED: ICD-10-PCS | Mod: CPTII,S$GLB,, | Performed by: FAMILY MEDICINE

## 2022-11-28 PROCEDURE — 99214 OFFICE O/P EST MOD 30 MIN: CPT | Mod: S$GLB,,, | Performed by: FAMILY MEDICINE

## 2022-11-28 PROCEDURE — 3074F SYST BP LT 130 MM HG: CPT | Mod: CPTII,S$GLB,, | Performed by: FAMILY MEDICINE

## 2022-11-28 PROCEDURE — 99999 PR PBB SHADOW E&M-EST. PATIENT-LVL IV: ICD-10-PCS | Mod: PBBFAC,,, | Performed by: FAMILY MEDICINE

## 2022-11-28 PROCEDURE — 1160F RVW MEDS BY RX/DR IN RCRD: CPT | Mod: CPTII,S$GLB,, | Performed by: FAMILY MEDICINE

## 2022-11-28 PROCEDURE — 1159F MED LIST DOCD IN RCRD: CPT | Mod: CPTII,S$GLB,, | Performed by: FAMILY MEDICINE

## 2022-11-28 PROCEDURE — 3074F PR MOST RECENT SYSTOLIC BLOOD PRESSURE < 130 MM HG: ICD-10-PCS | Mod: CPTII,S$GLB,, | Performed by: FAMILY MEDICINE

## 2022-11-28 PROCEDURE — 1126F PR PAIN SEVERITY QUANTIFIED, NO PAIN PRESENT: ICD-10-PCS | Mod: CPTII,S$GLB,, | Performed by: FAMILY MEDICINE

## 2022-11-28 PROCEDURE — 1101F PR PT FALLS ASSESS DOC 0-1 FALLS W/OUT INJ PAST YR: ICD-10-PCS | Mod: CPTII,S$GLB,, | Performed by: FAMILY MEDICINE

## 2022-11-28 RX ORDER — BRIMONIDINE TARTRATE 2 MG/ML
1 SOLUTION/ DROPS OPHTHALMIC
Status: ON HOLD | COMMUNITY
Start: 2021-12-29 | End: 2023-08-09 | Stop reason: HOSPADM

## 2022-11-28 NOTE — PROGRESS NOTES
"CHIEF COMPLAINT:  RML PNA in pt w/ macular degeneration and sleep apnea    HISTORY OF PRESENT ILLNESS: The patient is a generally healthy 82 year-old WM.  CT proven RML PNA in emergency room recently.  Completed Abx.  Improving    He is c/o his BP.    The patient has continuing neuropathic pain for which he had a complete workup in the past. He has tried multiple medications and is intolerant of all of them due to various side effects.  Currently pain stable and tolerable.    He was previously diagnosed with macular degeneration.     He does have CPAP for obstructive sleep apnea.  He has seen sleep clinic recently.  Also having leg cramps.    REVIEW OF SYSTEMS:  GENERAL: No fever, chills, fatigability or weight loss.  SKIN: No rashes, itching or changes in color or texture of skin.  HEAD: No headaches or recent head trauma.  EYES:  No photophobia or diplopia.  EARS: Denies ear pain, discharge or vertigo.  NOSE: No loss of smell, no epistaxis or postnasal drip.  MOUTH & THROAT: No hoarseness or change in voice. No excessive gum bleeding.  NODES: Denies swollen glands.  CHEST: Denies SANCHES, cyanosis, wheezing, cough and sputum production.  CARDIOVASCULAR: Denies chest pain, PND, orthopnea or reduced exercise tolerance.  ABDOMEN: Appetite fine. No weight loss. Denies diarrhea, abdominal pain, hematemesis or blood in stool.  URINARY: No flank pain, dysuria or hematuria.  PERIPHERAL VASCULAR: No claudication or cyanosis.  MUSCULOSKELETAL: No joint stiffness or swelling. Denies back pain.  NEUROLOGIC: No history of seizures, paralysis, alteration of gait or coordination.    SOCIAL HISTORY: The patient does not smoke.  The patient consumes alcohol socially.  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    PHYSICAL EXAMINATION:     Blood pressure 128/70, pulse 74, height 5' 7" (1.702 m), weight 71.2 kg (157 lb 1.2 oz), SpO2 (!) 94 %.    APPEARANCE: Well nourished, well developed, in " no acute distress.    HEAD: Normocephalic, atraumatic.  EYES: PERRL. EOMI.  Conjunctivae without injection and  Anicteric  NOSE: Mucosa pink. Airway clear.  MOUTH & THROAT: No tonsillar enlargement. No pharyngeal erythema or exudate. No stridor.  NECK: Supple.   NODES: No cervical, axillary or inguinal lymph node enlargement.  CHEST: Lungs clear to auscultation.  No retractions are noted.  No rales or rhonchi are present.  CARDIOVASCULAR: Normal S1, S2. No rubs, murmurs or gallops.  ABDOMEN: Bowel sounds normal. Not distended. Soft. No tenderness or masses.  No ascites is noted.  MUSCULOSKELETAL:  There is no clubbing, cyanosis, or edema of the extremities x4.  There is full range of motion of the lumbar spine.  There is full range of motion of the extremities x4.  There is no deformity noted.    NEUROLOGIC:       Normal speech development.      Hearing normal.      Normal gait.      Motor and sensory exams grossly normal.  PSYCHIATRIC: Patient is alert and oriented x3.  Thought processes are all normal.  There is no homicidality.  There is no suicidality.  There is no evidence of psychosis.    LABORATORY/RADIOLOGY:   Chart reviewed.      ASSESSMENT:   CAP on CT  Glaucoma  Concern over blood pressure  Insomnia  Macular degeneration  Snoring due to sleep study proven severe sleep apnea  Idiopathic peripheral neuropathy    PLAN:  Follow-up 6 week chest x-ray ordered today  He is aware that treating his KARMA is helpful to minimize his risk for progression of the macular degeneration.  Follow up sleep for KARMA  Over-the-counter supplements as recommended by retinal specialist  Return to clinic in one year.

## 2022-11-30 ENCOUNTER — LAB VISIT (OUTPATIENT)
Dept: LAB | Facility: HOSPITAL | Age: 82
End: 2022-11-30
Attending: NURSE PRACTITIONER
Payer: MEDICARE

## 2022-11-30 DIAGNOSIS — R53.83 FATIGUE, UNSPECIFIED TYPE: ICD-10-CM

## 2022-11-30 DIAGNOSIS — E29.1 PRIMARY MALE HYPOGONADISM: ICD-10-CM

## 2022-11-30 DIAGNOSIS — N13.8 BPH WITH URINARY OBSTRUCTION: ICD-10-CM

## 2022-11-30 DIAGNOSIS — N40.1 BPH WITH URINARY OBSTRUCTION: ICD-10-CM

## 2022-11-30 DIAGNOSIS — R97.20 ELEVATED PSA: ICD-10-CM

## 2022-11-30 LAB — COMPLEXED PSA SERPL-MCNC: 7 NG/ML (ref 0–4)

## 2022-11-30 PROCEDURE — 84153 ASSAY OF PSA TOTAL: CPT | Performed by: NURSE PRACTITIONER

## 2022-11-30 PROCEDURE — 36415 COLL VENOUS BLD VENIPUNCTURE: CPT | Performed by: NURSE PRACTITIONER

## 2022-12-16 ENCOUNTER — HOSPITAL ENCOUNTER (OUTPATIENT)
Dept: RADIOLOGY | Facility: HOSPITAL | Age: 82
Discharge: HOME | End: 2022-12-16
Attending: NURSE PRACTITIONER
Payer: MEDICARE

## 2022-12-16 DIAGNOSIS — R97.20 ELEVATED PSA: ICD-10-CM

## 2022-12-16 PROCEDURE — 72197 MRI PELVIS W/O & W/DYE: CPT | Mod: 26,,, | Performed by: RADIOLOGY

## 2022-12-16 PROCEDURE — A9585 GADOBUTROL INJECTION: HCPCS | Performed by: NURSE PRACTITIONER

## 2022-12-16 PROCEDURE — 72197 MRI PROSTATE W W/O CONTRAST: ICD-10-PCS | Mod: 26,,, | Performed by: RADIOLOGY

## 2022-12-16 PROCEDURE — 25500020 PHARM REV CODE 255: Performed by: NURSE PRACTITIONER

## 2022-12-16 PROCEDURE — 72197 MRI PELVIS W/O & W/DYE: CPT | Mod: TC

## 2022-12-16 RX ORDER — GADOBUTROL 604.72 MG/ML
10 INJECTION INTRAVENOUS
Status: COMPLETED | OUTPATIENT
Start: 2022-12-16 | End: 2022-12-16

## 2022-12-16 RX ADMIN — GADOBUTROL 10 ML: 604.72 INJECTION INTRAVENOUS at 02:12

## 2022-12-23 ENCOUNTER — PATIENT MESSAGE (OUTPATIENT)
Dept: UROLOGY | Facility: CLINIC | Age: 82
End: 2022-12-23
Payer: MEDICARE

## 2022-12-23 DIAGNOSIS — E29.1 PRIMARY MALE HYPOGONADISM: Primary | ICD-10-CM

## 2022-12-23 DIAGNOSIS — R53.83 FATIGUE, UNSPECIFIED TYPE: ICD-10-CM

## 2022-12-23 DIAGNOSIS — E78.5 DYSLIPIDEMIA: ICD-10-CM

## 2022-12-23 DIAGNOSIS — N13.8 BPH WITH URINARY OBSTRUCTION: ICD-10-CM

## 2022-12-23 DIAGNOSIS — N40.1 BPH WITH URINARY OBSTRUCTION: ICD-10-CM

## 2023-01-03 ENCOUNTER — HOSPITAL ENCOUNTER (OUTPATIENT)
Dept: RADIOLOGY | Facility: HOSPITAL | Age: 83
Discharge: HOME | End: 2023-01-03
Attending: FAMILY MEDICINE
Payer: MEDICARE

## 2023-01-03 DIAGNOSIS — J18.9 PNEUMONIA OF RIGHT MIDDLE LOBE DUE TO INFECTIOUS ORGANISM: ICD-10-CM

## 2023-01-03 PROCEDURE — 71046 XR CHEST PA AND LATERAL: ICD-10-PCS | Mod: 26,,, | Performed by: RADIOLOGY

## 2023-01-03 PROCEDURE — 71046 X-RAY EXAM CHEST 2 VIEWS: CPT | Mod: TC,FY

## 2023-01-03 PROCEDURE — 71046 X-RAY EXAM CHEST 2 VIEWS: CPT | Mod: 26,,, | Performed by: RADIOLOGY

## 2023-01-19 ENCOUNTER — TELEPHONE (OUTPATIENT)
Dept: PHARMACY | Facility: CLINIC | Age: 83
End: 2023-01-19
Payer: MEDICARE

## 2023-01-25 ENCOUNTER — PES CALL (OUTPATIENT)
Dept: ADMINISTRATIVE | Facility: CLINIC | Age: 83
End: 2023-01-25
Payer: MEDICARE

## 2023-01-30 ENCOUNTER — TELEPHONE (OUTPATIENT)
Dept: SLEEP MEDICINE | Facility: CLINIC | Age: 83
End: 2023-01-30
Payer: MEDICARE

## 2023-01-30 DIAGNOSIS — F51.09 OTHER INSOMNIA NOT DUE TO A SUBSTANCE OR KNOWN PHYSIOLOGICAL CONDITION: Primary | ICD-10-CM

## 2023-01-30 RX ORDER — TRAZODONE HYDROCHLORIDE 50 MG/1
50 TABLET ORAL NIGHTLY PRN
Qty: 30 TABLET | Refills: 1 | Status: SHIPPED | OUTPATIENT
Start: 2023-01-30 | End: 2023-04-14 | Stop reason: SINTOL

## 2023-01-30 NOTE — TELEPHONE ENCOUNTER
Spoke to patient in regards to the message we received. He is requesting that Dr. Bustos call in Trazodone for him. He stated Dr. Bustos has called it in for him before. A message we sent to Dr. Bustos

## 2023-01-30 NOTE — TELEPHONE ENCOUNTER
"----- Message from Ellen Quintanilla sent at 1/30/2023 10:52 AM CST -----  Regarding: Rx conuslt  Contact: CRICKET GOULD [[id number]]  Name of Who is Calling: Cricket Gould            What is the request in detail: Pt states he is requesting " sleeping mediation " he states he was prescribed some before but it was discontinued . He is requesting it be sent to [facility]. Please advise             Can the clinic reply by [facility]: No           What Number [facility] Back [facility]: Home Phone      [phone]  Work Phone      Not on file.  Mobile          [phone]        "

## 2023-02-01 ENCOUNTER — TELEPHONE (OUTPATIENT)
Dept: SLEEP MEDICINE | Facility: CLINIC | Age: 83
End: 2023-02-01
Payer: MEDICARE

## 2023-02-01 NOTE — TELEPHONE ENCOUNTER
Spoke to patient in regards to the message we received. He is requesting that we send his medications to [facility]

## 2023-02-09 DIAGNOSIS — Z00.00 ENCOUNTER FOR MEDICARE ANNUAL WELLNESS EXAM: ICD-10-CM

## 2023-02-15 ENCOUNTER — OFFICE VISIT (OUTPATIENT)
Dept: INTERNAL MEDICINE | Facility: CLINIC | Age: 83
End: 2023-02-15
Payer: MEDICARE

## 2023-02-15 VITALS
BODY MASS INDEX: 25.01 KG/M2 | HEART RATE: 62 BPM | SYSTOLIC BLOOD PRESSURE: 116 MMHG | OXYGEN SATURATION: 98 % | DIASTOLIC BLOOD PRESSURE: 84 MMHG | HEIGHT: 67 IN | WEIGHT: 159.38 LBS

## 2023-02-15 DIAGNOSIS — H35.3210 EXUDATIVE AGE-RELATED MACULAR DEGENERATION OF RIGHT EYE, UNSPECIFIED STAGE: ICD-10-CM

## 2023-02-15 DIAGNOSIS — F41.9 ANXIETY DISORDER, UNSPECIFIED TYPE: ICD-10-CM

## 2023-02-15 DIAGNOSIS — G60.8 HEREDITARY SENSORY NEUROPATHY: ICD-10-CM

## 2023-02-15 DIAGNOSIS — N40.0 BENIGN PROSTATIC HYPERPLASIA, UNSPECIFIED WHETHER LOWER URINARY TRACT SYMPTOMS PRESENT: ICD-10-CM

## 2023-02-15 DIAGNOSIS — H91.90 HEARING LOSS, UNSPECIFIED HEARING LOSS TYPE, UNSPECIFIED LATERALITY: ICD-10-CM

## 2023-02-15 DIAGNOSIS — I70.0 AORTIC ATHEROSCLEROSIS: ICD-10-CM

## 2023-02-15 DIAGNOSIS — G47.33 OSA (OBSTRUCTIVE SLEEP APNEA): ICD-10-CM

## 2023-02-15 DIAGNOSIS — Z00.00 ENCOUNTER FOR PREVENTIVE HEALTH EXAMINATION: Primary | ICD-10-CM

## 2023-02-15 PROCEDURE — 99999 PR PBB SHADOW E&M-EST. PATIENT-LVL V: ICD-10-PCS | Mod: PBBFAC,HCNC,, | Performed by: NURSE PRACTITIONER

## 2023-02-15 PROCEDURE — 3074F PR MOST RECENT SYSTOLIC BLOOD PRESSURE < 130 MM HG: ICD-10-PCS | Mod: HCNC,CPTII,S$GLB, | Performed by: NURSE PRACTITIONER

## 2023-02-15 PROCEDURE — 3074F SYST BP LT 130 MM HG: CPT | Mod: HCNC,CPTII,S$GLB, | Performed by: NURSE PRACTITIONER

## 2023-02-15 PROCEDURE — 1160F PR REVIEW ALL MEDS BY PRESCRIBER/CLIN PHARMACIST DOCUMENTED: ICD-10-PCS | Mod: HCNC,CPTII,S$GLB, | Performed by: NURSE PRACTITIONER

## 2023-02-15 PROCEDURE — 1159F PR MEDICATION LIST DOCUMENTED IN MEDICAL RECORD: ICD-10-PCS | Mod: HCNC,CPTII,S$GLB, | Performed by: NURSE PRACTITIONER

## 2023-02-15 PROCEDURE — 1170F PR FUNCTIONAL STATUS ASSESSED: ICD-10-PCS | Mod: HCNC,CPTII,S$GLB, | Performed by: NURSE PRACTITIONER

## 2023-02-15 PROCEDURE — 1159F MED LIST DOCD IN RCRD: CPT | Mod: HCNC,CPTII,S$GLB, | Performed by: NURSE PRACTITIONER

## 2023-02-15 PROCEDURE — 1125F AMNT PAIN NOTED PAIN PRSNT: CPT | Mod: HCNC,CPTII,S$GLB, | Performed by: NURSE PRACTITIONER

## 2023-02-15 PROCEDURE — 3079F PR MOST RECENT DIASTOLIC BLOOD PRESSURE 80-89 MM HG: ICD-10-PCS | Mod: HCNC,CPTII,S$GLB, | Performed by: NURSE PRACTITIONER

## 2023-02-15 PROCEDURE — G0439 PR MEDICARE ANNUAL WELLNESS SUBSEQUENT VISIT: ICD-10-PCS | Mod: HCNC,S$GLB,, | Performed by: NURSE PRACTITIONER

## 2023-02-15 PROCEDURE — 3288F FALL RISK ASSESSMENT DOCD: CPT | Mod: HCNC,CPTII,S$GLB, | Performed by: NURSE PRACTITIONER

## 2023-02-15 PROCEDURE — G0439 PPPS, SUBSEQ VISIT: HCPCS | Mod: HCNC,S$GLB,, | Performed by: NURSE PRACTITIONER

## 2023-02-15 PROCEDURE — 1125F PR PAIN SEVERITY QUANTIFIED, PAIN PRESENT: ICD-10-PCS | Mod: HCNC,CPTII,S$GLB, | Performed by: NURSE PRACTITIONER

## 2023-02-15 PROCEDURE — 3288F PR FALLS RISK ASSESSMENT DOCUMENTED: ICD-10-PCS | Mod: HCNC,CPTII,S$GLB, | Performed by: NURSE PRACTITIONER

## 2023-02-15 PROCEDURE — 1160F RVW MEDS BY RX/DR IN RCRD: CPT | Mod: HCNC,CPTII,S$GLB, | Performed by: NURSE PRACTITIONER

## 2023-02-15 PROCEDURE — 1170F FXNL STATUS ASSESSED: CPT | Mod: HCNC,CPTII,S$GLB, | Performed by: NURSE PRACTITIONER

## 2023-02-15 PROCEDURE — 1101F PR PT FALLS ASSESS DOC 0-1 FALLS W/OUT INJ PAST YR: ICD-10-PCS | Mod: HCNC,CPTII,S$GLB, | Performed by: NURSE PRACTITIONER

## 2023-02-15 PROCEDURE — 1101F PT FALLS ASSESS-DOCD LE1/YR: CPT | Mod: HCNC,CPTII,S$GLB, | Performed by: NURSE PRACTITIONER

## 2023-02-15 PROCEDURE — 99999 PR PBB SHADOW E&M-EST. PATIENT-LVL V: CPT | Mod: PBBFAC,HCNC,, | Performed by: NURSE PRACTITIONER

## 2023-02-15 PROCEDURE — 3079F DIAST BP 80-89 MM HG: CPT | Mod: HCNC,CPTII,S$GLB, | Performed by: NURSE PRACTITIONER

## 2023-02-15 NOTE — PROGRESS NOTES
"  Cricket Mcdonnell presented for a  Medicare AWV and comprehensive Health Risk Assessment today. The following components were reviewed and updated:    Medical history  Family History  Social history  Allergies and Current Medications  Health Risk Assessment  Health Maintenance  Care Team         ** See Completed Assessments for Annual Wellness Visit within the encounter summary.**         The following assessments were completed:  Living Situation  CAGE  Depression Screening  Timed Get Up and Go  Whisper Test  Cognitive Function Screening      Nutrition Screening  ADL Screening  PAQ Screening  OPIOID Screening: Patient does not have a prescription for narcotics. Patient does not use substance         Vitals:    02/15/23 1336 02/15/23 1358   BP:  116/84   BP Location:  Right arm   Patient Position:  Sitting   Pulse:  62   SpO2:  98%   Weight: 72.3 kg (159 lb 6.3 oz)    Height: 5' 7" (1.702 m)      Body mass index is 24.96 kg/m².  Physical Exam  Vitals and nursing note reviewed.   Constitutional:       Appearance: He is well-developed.   HENT:      Head: Normocephalic.   Cardiovascular:      Rate and Rhythm: Normal rate and regular rhythm.   Pulmonary:      Effort: Pulmonary effort is normal.      Breath sounds: Normal breath sounds.   Abdominal:      General: Bowel sounds are normal.      Palpations: Abdomen is soft.   Musculoskeletal:         General: Normal range of motion.   Skin:     General: Skin is warm and dry.   Neurological:      Mental Status: He is alert and oriented to person, place, and time.      Motor: No abnormal muscle tone.   Psychiatric:         Mood and Affect: Mood normal.             Diagnoses and health risks identified today and associated recommendations/orders:    1. Encounter for preventive health examination  Here for Health Risk Assessment/Annual Wellness Visit.  Health maintenance reviewed and updated. Follow up in one year.     2. Aortic atherosclerosis  Chronic, stable, mild. Noted " CT Chest 11/17/22. Followed by PCP.    3. Hereditary sensory neuropathy  Chronic, stable on current medications. Followed by PCP, Podiatry    4. Exudative age-related macular degeneration of right eye, unspecified stage  Chronic, stable on current medications. Followed by Ophthalmology.    5. Anxiety disorder, unspecified type  Chronic, stable. PHQ-2 score 9. Followed by PCP.    6. Benign prostatic hyperplasia, unspecified whether lower urinary tract symptoms present  Chronic, stable. Followed by Urology, PCP.    7. KARMA (obstructive sleep apnea)  Chronic, stable with CPAP. Followed by PCP, Sleep Medicine.    8. Hearing loss, unspecified hearing loss type, unspecified laterality  Chronic, agreeable to evaluation.   - Ambulatory referral/consult to ENT; Future  - Ambulatory referral/consult to Audiology; Future      Provided Cricket Morrow with a 5-10 year written screening schedule and personal prevention plan. Recommendations were developed using the USPSTF age appropriate recommendations. Education, counseling, and referrals were provided as needed. After Visit Summary printed and given to patient which includes a list of additional screenings\tests needed.    Follow up in about 9 months (around 11/28/2023).with PCP    Margot Bah NP

## 2023-02-15 NOTE — PATIENT INSTRUCTIONS
Counseling and Referral of Other Preventative  (Italic type indicates deductible and co-insurance are waived)    Patient Name: Cricket Mcdonnell  Today's Date: 2/15/2023    Health Maintenance       Date Due Completion Date    Colonoscopy 01/03/2026 1/3/2019    TETANUS VACCINE 07/12/2026 7/12/2016    Lipid Panel 11/04/2027 11/4/2022        No orders of the defined types were placed in this encounter.      The following information is provided to all patients.  This information is to help you find resources for any of the problems found today that may be affecting your health:                Living healthy guide: www.[facility].[place].[facility]      Understanding Diabetes: www.diabetes.org      Eating healthy: www.cdc.gov/healthyweight      CDC home safety checklist: www.cdc.gov/steadi/patient.html      Agency on Aging: www.goea.[place].[facility]      Alcoholics anonymous (AA): www.aa.org      Physical Activity: www.librado.nih.gov/wh6wcsi      Tobacco use: www.quitwithusla.org

## 2023-03-09 ENCOUNTER — TELEPHONE (OUTPATIENT)
Dept: NEUROLOGY | Facility: CLINIC | Age: 83
End: 2023-03-09
Payer: MEDICARE

## 2023-03-09 NOTE — TELEPHONE ENCOUNTER
----- Message from Donnell Pa sent at 3/9/2023  1:13 PM CST -----  Regarding: CAll ABck  Name of Who is Calling: MALACHI GOULD [[id number]]              What is the request in detail: Patient requesting a call back patient denied the appointment for 06-. Requesting a appointment for tomorrow 03-. Please assist              Can the clinic reply by [facility]: No              What Number to Call Back if not [facility]: [phone]

## 2023-03-09 NOTE — TELEPHONE ENCOUNTER
Staff return patient call regarding his concerns of having terrible with his sleeping. Patient was informed physician only has virtual appointment available no in office visit until June he declined. Patient has requested a call from physician

## 2023-03-09 NOTE — TELEPHONE ENCOUNTER
----- Message from Cynthia Main sent at 3/9/2023  9:14 AM CST -----  Regarding: Please call pt  Name of Who is Calling:MALACHI GOULD [[id number]]          What is the request in detail: Pt is asking if you would please return his call. He is having trouble with sleeping and would like to talk about the sleep machine and discuss medication. Please c/b to assist           Can the clinic reply by [facility]:          What Number to Call Back [facility] not [facility]:[phone]

## 2023-03-09 NOTE — TELEPHONE ENCOUNTER
Staff return patient call leaving a vm in regards to his message of having terrible sleeping which he'd like to schedule an appointment

## 2023-03-10 ENCOUNTER — OFFICE VISIT (OUTPATIENT)
Dept: INTERNAL MEDICINE | Facility: CLINIC | Age: 83
End: 2023-03-10
Attending: FAMILY MEDICINE
Payer: MEDICARE

## 2023-03-10 ENCOUNTER — PATIENT MESSAGE (OUTPATIENT)
Dept: INTERNAL MEDICINE | Facility: CLINIC | Age: 83
End: 2023-03-10

## 2023-03-10 ENCOUNTER — LAB VISIT (OUTPATIENT)
Dept: LAB | Facility: OTHER | Age: 83
End: 2023-03-10
Attending: FAMILY MEDICINE
Payer: MEDICARE

## 2023-03-10 VITALS
DIASTOLIC BLOOD PRESSURE: 70 MMHG | OXYGEN SATURATION: 98 % | BODY MASS INDEX: 24.27 KG/M2 | HEART RATE: 77 BPM | HEIGHT: 67 IN | WEIGHT: 154.63 LBS | SYSTOLIC BLOOD PRESSURE: 110 MMHG

## 2023-03-10 DIAGNOSIS — F41.9 ANXIETY DISORDER, UNSPECIFIED TYPE: ICD-10-CM

## 2023-03-10 DIAGNOSIS — G62.89 OTHER POLYNEUROPATHY: ICD-10-CM

## 2023-03-10 DIAGNOSIS — R79.9 ABNORMAL FINDING OF BLOOD CHEMISTRY, UNSPECIFIED: ICD-10-CM

## 2023-03-10 DIAGNOSIS — G47.33 OSA (OBSTRUCTIVE SLEEP APNEA): ICD-10-CM

## 2023-03-10 DIAGNOSIS — R53.83 FATIGUE, UNSPECIFIED TYPE: ICD-10-CM

## 2023-03-10 DIAGNOSIS — R53.83 FATIGUE, UNSPECIFIED TYPE: Primary | ICD-10-CM

## 2023-03-10 LAB
ALBUMIN SERPL BCP-MCNC: 4.2 G/DL (ref 3.5–5.2)
ALP SERPL-CCNC: 54 U/L (ref 55–135)
ALT SERPL W/O P-5'-P-CCNC: 30 U/L (ref 10–44)
ANION GAP SERPL CALC-SCNC: 7 MMOL/L (ref 8–16)
AST SERPL-CCNC: 28 U/L (ref 10–40)
BASOPHILS # BLD AUTO: 0.04 K/UL (ref 0–0.2)
BASOPHILS NFR BLD: 0.7 % (ref 0–1.9)
BILIRUB SERPL-MCNC: 0.4 MG/DL (ref 0.1–1)
BUN SERPL-MCNC: 15 MG/DL (ref 8–23)
CALCIUM SERPL-MCNC: 10.3 MG/DL (ref 8.7–10.5)
CHLORIDE SERPL-SCNC: 104 MMOL/L (ref 95–110)
CO2 SERPL-SCNC: 28 MMOL/L (ref 23–29)
CREAT SERPL-MCNC: 0.8 MG/DL (ref 0.5–1.4)
DIFFERENTIAL METHOD: ABNORMAL
EOSINOPHIL # BLD AUTO: 0.1 K/UL (ref 0–0.5)
EOSINOPHIL NFR BLD: 2.3 % (ref 0–8)
ERYTHROCYTE [DISTWIDTH] IN BLOOD BY AUTOMATED COUNT: 12.9 % (ref 11.5–14.5)
EST. GFR  (NO RACE VARIABLE): >60 ML/MIN/1.73 M^2
ESTIMATED AVG GLUCOSE: 111 MG/DL (ref 68–131)
GLUCOSE SERPL-MCNC: 98 MG/DL (ref 70–110)
HBA1C MFR BLD: 5.5 % (ref 4–5.6)
HCT VFR BLD AUTO: 44.9 % (ref 40–54)
HGB BLD-MCNC: 15.1 G/DL (ref 14–18)
IMM GRANULOCYTES # BLD AUTO: 0.02 K/UL (ref 0–0.04)
IMM GRANULOCYTES NFR BLD AUTO: 0.3 % (ref 0–0.5)
LYMPHOCYTES # BLD AUTO: 1.7 K/UL (ref 1–4.8)
LYMPHOCYTES NFR BLD: 28 % (ref 18–48)
MCH RBC QN AUTO: 32.5 PG (ref 27–31)
MCHC RBC AUTO-ENTMCNC: 33.6 G/DL (ref 32–36)
MCV RBC AUTO: 97 FL (ref 82–98)
MONOCYTES # BLD AUTO: 0.6 K/UL (ref 0.3–1)
MONOCYTES NFR BLD: 9.8 % (ref 4–15)
NEUTROPHILS # BLD AUTO: 3.6 K/UL (ref 1.8–7.7)
NEUTROPHILS NFR BLD: 58.9 % (ref 38–73)
NRBC BLD-RTO: 0 /100 WBC
PLATELET # BLD AUTO: 266 K/UL (ref 150–450)
PMV BLD AUTO: 9.2 FL (ref 9.2–12.9)
POTASSIUM SERPL-SCNC: 4.2 MMOL/L (ref 3.5–5.1)
PROT SERPL-MCNC: 7.4 G/DL (ref 6–8.4)
RBC # BLD AUTO: 4.65 M/UL (ref 4.6–6.2)
SODIUM SERPL-SCNC: 139 MMOL/L (ref 136–145)
TSH SERPL DL<=0.005 MIU/L-ACNC: 1.61 UIU/ML (ref 0.4–4)
WBC # BLD AUTO: 6.11 K/UL (ref 3.9–12.7)

## 2023-03-10 PROCEDURE — 83036 HEMOGLOBIN GLYCOSYLATED A1C: CPT | Mod: HCNC | Performed by: FAMILY MEDICINE

## 2023-03-10 PROCEDURE — 85025 COMPLETE CBC W/AUTO DIFF WBC: CPT | Mod: HCNC | Performed by: FAMILY MEDICINE

## 2023-03-10 PROCEDURE — 3288F FALL RISK ASSESSMENT DOCD: CPT | Mod: HCNC,CPTII,S$GLB, | Performed by: FAMILY MEDICINE

## 2023-03-10 PROCEDURE — 99999 PR PBB SHADOW E&M-EST. PATIENT-LVL V: CPT | Mod: PBBFAC,HCNC,, | Performed by: FAMILY MEDICINE

## 2023-03-10 PROCEDURE — 1126F PR PAIN SEVERITY QUANTIFIED, NO PAIN PRESENT: ICD-10-PCS | Mod: HCNC,CPTII,S$GLB, | Performed by: FAMILY MEDICINE

## 2023-03-10 PROCEDURE — 36415 COLL VENOUS BLD VENIPUNCTURE: CPT | Mod: HCNC | Performed by: FAMILY MEDICINE

## 2023-03-10 PROCEDURE — 3078F PR MOST RECENT DIASTOLIC BLOOD PRESSURE < 80 MM HG: ICD-10-PCS | Mod: HCNC,CPTII,S$GLB, | Performed by: FAMILY MEDICINE

## 2023-03-10 PROCEDURE — 84443 ASSAY THYROID STIM HORMONE: CPT | Mod: HCNC | Performed by: FAMILY MEDICINE

## 2023-03-10 PROCEDURE — 80053 COMPREHEN METABOLIC PANEL: CPT | Mod: HCNC | Performed by: FAMILY MEDICINE

## 2023-03-10 PROCEDURE — 1101F PT FALLS ASSESS-DOCD LE1/YR: CPT | Mod: HCNC,CPTII,S$GLB, | Performed by: FAMILY MEDICINE

## 2023-03-10 PROCEDURE — 1160F PR REVIEW ALL MEDS BY PRESCRIBER/CLIN PHARMACIST DOCUMENTED: ICD-10-PCS | Mod: HCNC,CPTII,S$GLB, | Performed by: FAMILY MEDICINE

## 2023-03-10 PROCEDURE — 1160F RVW MEDS BY RX/DR IN RCRD: CPT | Mod: HCNC,CPTII,S$GLB, | Performed by: FAMILY MEDICINE

## 2023-03-10 PROCEDURE — 1159F MED LIST DOCD IN RCRD: CPT | Mod: HCNC,CPTII,S$GLB, | Performed by: FAMILY MEDICINE

## 2023-03-10 PROCEDURE — 1101F PR PT FALLS ASSESS DOC 0-1 FALLS W/OUT INJ PAST YR: ICD-10-PCS | Mod: HCNC,CPTII,S$GLB, | Performed by: FAMILY MEDICINE

## 2023-03-10 PROCEDURE — 99214 PR OFFICE/OUTPT VISIT, EST, LEVL IV, 30-39 MIN: ICD-10-PCS | Mod: HCNC,S$GLB,, | Performed by: FAMILY MEDICINE

## 2023-03-10 PROCEDURE — 3288F PR FALLS RISK ASSESSMENT DOCUMENTED: ICD-10-PCS | Mod: HCNC,CPTII,S$GLB, | Performed by: FAMILY MEDICINE

## 2023-03-10 PROCEDURE — 3074F SYST BP LT 130 MM HG: CPT | Mod: HCNC,CPTII,S$GLB, | Performed by: FAMILY MEDICINE

## 2023-03-10 PROCEDURE — 3074F PR MOST RECENT SYSTOLIC BLOOD PRESSURE < 130 MM HG: ICD-10-PCS | Mod: HCNC,CPTII,S$GLB, | Performed by: FAMILY MEDICINE

## 2023-03-10 PROCEDURE — 99999 PR PBB SHADOW E&M-EST. PATIENT-LVL V: ICD-10-PCS | Mod: PBBFAC,HCNC,, | Performed by: FAMILY MEDICINE

## 2023-03-10 PROCEDURE — 1126F AMNT PAIN NOTED NONE PRSNT: CPT | Mod: HCNC,CPTII,S$GLB, | Performed by: FAMILY MEDICINE

## 2023-03-10 PROCEDURE — 99214 OFFICE O/P EST MOD 30 MIN: CPT | Mod: HCNC,S$GLB,, | Performed by: FAMILY MEDICINE

## 2023-03-10 PROCEDURE — 3078F DIAST BP <80 MM HG: CPT | Mod: HCNC,CPTII,S$GLB, | Performed by: FAMILY MEDICINE

## 2023-03-10 PROCEDURE — 1159F PR MEDICATION LIST DOCUMENTED IN MEDICAL RECORD: ICD-10-PCS | Mod: HCNC,CPTII,S$GLB, | Performed by: FAMILY MEDICINE

## 2023-03-10 RX ORDER — MODERNA COVID-19 VACCINE, BIVALENT 25; 25 UG/.5ML; UG/.5ML
INJECTION, SUSPENSION INTRAMUSCULAR
Status: ON HOLD | COMMUNITY
Start: 2022-10-10 | End: 2023-08-09 | Stop reason: HOSPADM

## 2023-03-10 RX ORDER — INFLUENZA A VIRUS A/VICTORIA/2570/2019 IVR-215 (H1N1) ANTIGEN (FORMALDEHYDE INACTIVATED), INFLUENZA A VIRUS A/DARWIN/9/2021 SAN-010 (H3N2) ANTIGEN (FORMALDEHYDE INACTIVATED), INFLUENZA B VIRUS B/PHUKET/3073/2013 ANTIGEN (FORMALDEHYDE INACTIVATED), AND INFLUENZA B VIRUS B/MICHIGAN/01/2021 ANTIGEN (FORMALDEHYDE INACTIVATED) 60; 60; 60; 60 UG/.7ML; UG/.7ML; UG/.7ML; UG/.7ML
INJECTION, SUSPENSION INTRAMUSCULAR
Status: ON HOLD | COMMUNITY
Start: 2022-10-10 | End: 2023-08-09 | Stop reason: HOSPADM

## 2023-03-10 NOTE — PROGRESS NOTES
"CHIEF COMPLAINT:  fatigue in in pt w/ macular degeneration and sleep apnea    HISTORY OF PRESENT ILLNESS: The patient is a generally healthy 82 year-old WM.      He is c/o his BP.    The patient has continuing neuropathic pain for which he had a complete workup in the past. He has tried multiple medications and is intolerant of all of them due to various side effects.  Currently pain stable and tolerable.    He was previously diagnosed with macular degeneration.     He does have CPAP for obstructive sleep apnea.  He has seen sleep clinic recently.  Also having leg cramps.    REVIEW OF SYSTEMS:  GENERAL: No fever, chills, fatigability or weight loss.  SKIN: No rashes, itching or changes in color or texture of skin.  HEAD: No headaches or recent head trauma.  EYES:  No photophobia or diplopia.  EARS: Denies ear pain, discharge or vertigo.  NOSE: No loss of smell, no epistaxis or postnasal drip.  MOUTH & THROAT: No hoarseness or change in voice. No excessive gum bleeding.  NODES: Denies swollen glands.  CHEST: Denies SANCHES, cyanosis, wheezing, cough and sputum production.  CARDIOVASCULAR: Denies chest pain, PND, orthopnea or reduced exercise tolerance.  ABDOMEN: Appetite fine. No weight loss. Denies diarrhea, abdominal pain, hematemesis or blood in stool.  URINARY: No flank pain, dysuria or hematuria.  PERIPHERAL VASCULAR: No claudication or cyanosis.  MUSCULOSKELETAL: No joint stiffness or swelling. Denies back pain.  NEUROLOGIC: No history of seizures, paralysis, alteration of gait or coordination.    SOCIAL HISTORY: The patient does not smoke.  The patient consumes alcohol socially.  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    PHYSICAL EXAMINATION:     Blood pressure 110/70, pulse 77, height 5' 7" (1.702 m), weight 70.1 kg (154 lb 10.4 oz), SpO2 98 %.    APPEARANCE: Well nourished, well developed, in no acute distress.    HEAD: Normocephalic, atraumatic.  EYES: PERRL. " EOMI.  Conjunctivae without injection and  Anicteric  NOSE: Mucosa pink. Airway clear.  MOUTH & THROAT: No tonsillar enlargement. No pharyngeal erythema or exudate. No stridor.  NECK: Supple.   NODES: No cervical, axillary or inguinal lymph node enlargement.  CHEST: Lungs clear to auscultation.  No retractions are noted.  No rales or rhonchi are present.  CARDIOVASCULAR: Normal S1, S2. No rubs, murmurs or gallops.  ABDOMEN: Bowel sounds normal. Not distended. Soft. No tenderness or masses.  No ascites is noted.  MUSCULOSKELETAL:  There is no clubbing, cyanosis, or edema of the extremities x4.  There is full range of motion of the lumbar spine.  There is full range of motion of the extremities x4.  There is no deformity noted.    NEUROLOGIC:       Normal speech development.      Hearing normal.      Normal gait.      Motor and sensory exams grossly normal.  PSYCHIATRIC: Patient is alert and oriented x3.  Thought processes are all normal.  There is no homicidality.  There is no suicidality.  There is no evidence of psychosis.    LABORATORY/RADIOLOGY:   Chart reviewed.      ASSESSMENT:   Fatigue  Glaucoma  Concern over blood pressure  Insomnia  Macular degeneration  Snoring due to sleep study proven severe sleep apnea on CPAP  Idiopathic peripheral neuropathy    PLAN:  We will follow-up blood work which we expect to be normal.    reassured  Reviewed normal BP ranges  He is aware that treating his KARMA is helpful to minimize his risk for progression of the macular degeneration.  Follow up sleep for KARMA and insomnia  Over-the-counter supplements as recommended by retinal specialist  Return to clinic in one year.

## 2023-03-11 ENCOUNTER — PATIENT MESSAGE (OUTPATIENT)
Dept: INTERNAL MEDICINE | Facility: CLINIC | Age: 83
End: 2023-03-11
Payer: MEDICARE

## 2023-03-13 ENCOUNTER — PATIENT MESSAGE (OUTPATIENT)
Dept: INTERNAL MEDICINE | Facility: CLINIC | Age: 83
End: 2023-03-13
Payer: MEDICARE

## 2023-04-14 ENCOUNTER — TELEPHONE (OUTPATIENT)
Dept: SLEEP MEDICINE | Facility: CLINIC | Age: 83
End: 2023-04-14
Payer: MEDICARE

## 2023-04-14 RX ORDER — GABAPENTIN 100 MG/1
300 CAPSULE ORAL NIGHTLY
Qty: 15 CAPSULE | Refills: 0 | Status: SHIPPED | OUTPATIENT
Start: 2023-04-14 | End: 2023-04-19

## 2023-04-14 NOTE — TELEPHONE ENCOUNTER
Doxepin 50mg 1/2 tab still causing am sedation , suggested he could take ealier in evening but [facility] [facility] [facility] and he wants to trial this. So discussed about staying at lowest effective dose, individual responses vary to medication. RX 5 d worth 100-300mg qhs and notify Dr Bustos next week of effective dose.

## 2023-04-14 NOTE — TELEPHONE ENCOUNTER
----- Message from Nimo Archer sent at 4/14/2023  9:44 AM CDT -----  Contact: MALACHI GOULD [[id number]]  Type: RX Refill Request    Who Called: MALACHI GOULD [[id number]]     Refill or New Rx: New    RX Name and Strength: gabapentin      Is this a 30 day or 90 day RX:    Preferred Pharmacy with phone number:   [facility] [place] [id number] [facility] [address]  Phone: [phone] Fax: [phone]      Local or Mail Order: local       Would the patient rather a call back or a response via [facility]? Call back     Best Call Back Number: [phone] (home)         Additional Information:

## 2023-04-14 NOTE — TELEPHONE ENCOUNTER
----- Message from Christina Cartwright MA sent at 4/14/2023 10:09 AM CDT -----  Contact: MALACHI GOULD [[id number]]  Staff contacted patient whom inquired about this medication was told it helps for sleep. Staff explained to him he's PCP can prescribe for him but he wanted to asked sleep provider  ----- Message -----  From: Nimo Archer  Sent: 4/14/2023   9:46 AM CDT  To: Akash Martinez Staff    Type: RX Refill Request    Who Called: MALACHI GOULD [[id number]]     Refill or New Rx: New    RX Name and Strength: gabapentin      Is this a 30 day or 90 day RX:    Preferred Pharmacy with phone number:   [facility] [address] [address]  Phone: [phone] Fax: [phone]      Local or Mail Order: local       Would the patient rather a call back or a response via [facility]? Call back     Best Call Back Number: [phone] (home)         Additional Information:

## 2023-04-19 ENCOUNTER — PATIENT MESSAGE (OUTPATIENT)
Dept: SLEEP MEDICINE | Facility: CLINIC | Age: 83
End: 2023-04-19
Payer: MEDICARE

## 2023-04-19 RX ORDER — GABAPENTIN 100 MG/1
300 CAPSULE ORAL NIGHTLY
Qty: 15 CAPSULE | Refills: 0 | OUTPATIENT
Start: 2023-04-19 | End: 2023-04-24

## 2023-04-19 RX ORDER — GABAPENTIN 100 MG/1
100-300 CAPSULE ORAL NIGHTLY PRN
Qty: 90 CAPSULE | Refills: 1 | Status: SHIPPED | OUTPATIENT
Start: 2023-04-19 | End: 2023-06-26 | Stop reason: SDUPTHER

## 2023-04-20 RX ORDER — GABAPENTIN 100 MG/1
300 CAPSULE ORAL NIGHTLY
Qty: 15 CAPSULE | Refills: 0 | OUTPATIENT
Start: 2023-04-20 | End: 2023-04-25

## 2023-04-21 ENCOUNTER — TELEPHONE (OUTPATIENT)
Dept: INTERNAL MEDICINE | Facility: CLINIC | Age: 83
End: 2023-04-21
Payer: MEDICARE

## 2023-04-21 NOTE — TELEPHONE ENCOUNTER
----- Message from Sharron Arce sent at 4/21/2023  2:32 PM CDT -----  Regarding: postitive covid test  Name of caller: beulah       What is the requesting detail: Pt tested positive for COVID and was wondering if he could be prescribed paxlovid since he's over 65. Sent to the [facility] on [address]. He also wants to know if he Should he continue using the cpap machine?       Can the clinic reply by [facility]: no       What number to call back: [phone]

## 2023-05-08 ENCOUNTER — OFFICE VISIT (OUTPATIENT)
Dept: SLEEP MEDICINE | Facility: CLINIC | Age: 83
End: 2023-05-08
Payer: MEDICARE

## 2023-05-08 VITALS
SYSTOLIC BLOOD PRESSURE: 134 MMHG | DIASTOLIC BLOOD PRESSURE: 80 MMHG | HEIGHT: 67 IN | HEART RATE: 67 BPM | BODY MASS INDEX: 24.8 KG/M2 | WEIGHT: 158 LBS

## 2023-05-08 DIAGNOSIS — G47.33 OSA (OBSTRUCTIVE SLEEP APNEA): Primary | ICD-10-CM

## 2023-05-08 DIAGNOSIS — Z99.89 INSUFFICIENT TREATMENT WITH NASAL CPAP: ICD-10-CM

## 2023-05-08 DIAGNOSIS — F51.09 OTHER INSOMNIA NOT DUE TO A SUBSTANCE OR KNOWN PHYSIOLOGICAL CONDITION: ICD-10-CM

## 2023-05-08 PROCEDURE — 3075F PR MOST RECENT SYSTOLIC BLOOD PRESS GE 130-139MM HG: ICD-10-PCS | Mod: CPTII,S$GLB,, | Performed by: INTERNAL MEDICINE

## 2023-05-08 PROCEDURE — 3288F PR FALLS RISK ASSESSMENT DOCUMENTED: ICD-10-PCS | Mod: CPTII,S$GLB,, | Performed by: INTERNAL MEDICINE

## 2023-05-08 PROCEDURE — 3079F DIAST BP 80-89 MM HG: CPT | Mod: CPTII,S$GLB,, | Performed by: INTERNAL MEDICINE

## 2023-05-08 PROCEDURE — 3079F PR MOST RECENT DIASTOLIC BLOOD PRESSURE 80-89 MM HG: ICD-10-PCS | Mod: CPTII,S$GLB,, | Performed by: INTERNAL MEDICINE

## 2023-05-08 PROCEDURE — 3288F FALL RISK ASSESSMENT DOCD: CPT | Mod: CPTII,S$GLB,, | Performed by: INTERNAL MEDICINE

## 2023-05-08 PROCEDURE — 99999 PR PBB SHADOW E&M-EST. PATIENT-LVL IV: ICD-10-PCS | Mod: PBBFAC,,, | Performed by: INTERNAL MEDICINE

## 2023-05-08 PROCEDURE — 1126F AMNT PAIN NOTED NONE PRSNT: CPT | Mod: CPTII,S$GLB,, | Performed by: INTERNAL MEDICINE

## 2023-05-08 PROCEDURE — 1101F PR PT FALLS ASSESS DOC 0-1 FALLS W/OUT INJ PAST YR: ICD-10-PCS | Mod: CPTII,S$GLB,, | Performed by: INTERNAL MEDICINE

## 2023-05-08 PROCEDURE — 1101F PT FALLS ASSESS-DOCD LE1/YR: CPT | Mod: CPTII,S$GLB,, | Performed by: INTERNAL MEDICINE

## 2023-05-08 PROCEDURE — 99999 PR PBB SHADOW E&M-EST. PATIENT-LVL IV: CPT | Mod: PBBFAC,,, | Performed by: INTERNAL MEDICINE

## 2023-05-08 PROCEDURE — 1126F PR PAIN SEVERITY QUANTIFIED, NO PAIN PRESENT: ICD-10-PCS | Mod: CPTII,S$GLB,, | Performed by: INTERNAL MEDICINE

## 2023-05-08 PROCEDURE — 3075F SYST BP GE 130 - 139MM HG: CPT | Mod: CPTII,S$GLB,, | Performed by: INTERNAL MEDICINE

## 2023-05-08 PROCEDURE — 99214 OFFICE O/P EST MOD 30 MIN: CPT | Mod: S$GLB,,, | Performed by: INTERNAL MEDICINE

## 2023-05-08 PROCEDURE — 99214 PR OFFICE/OUTPT VISIT, EST, LEVL IV, 30-39 MIN: ICD-10-PCS | Mod: S$GLB,,, | Performed by: INTERNAL MEDICINE

## 2023-05-08 PROCEDURE — 1159F MED LIST DOCD IN RCRD: CPT | Mod: CPTII,S$GLB,, | Performed by: INTERNAL MEDICINE

## 2023-05-08 PROCEDURE — 1159F PR MEDICATION LIST DOCUMENTED IN MEDICAL RECORD: ICD-10-PCS | Mod: CPTII,S$GLB,, | Performed by: INTERNAL MEDICINE

## 2023-05-08 RX ORDER — ZOLPIDEM TARTRATE 10 MG/1
10 TABLET ORAL NIGHTLY PRN
Qty: 1 TABLET | Refills: 0 | Status: ON HOLD | OUTPATIENT
Start: 2023-05-08 | End: 2023-08-09 | Stop reason: HOSPADM

## 2023-05-08 NOTE — PROGRESS NOTES
"  ESTABLISHED PATIENT VISIT (EST LOV 12/5/2018: CLAY Claros: )    Cricket Mcdonnell  is a pleasant 82 y.o. male  with history of BPH, severe KARMA.    Here today for     PLAN last visit:   -discussed sleep positioner, night shift  -doxepin trial  -having trouble with nasal mask, would like to stick with dreamwear nasal  -recommend side sleeping    Since last visit:   Waking frequently at night  Wearing CPAP nightly  He has been sleeping on his side    PAP history   Problems    Mask Dreamwear nasal mask (has been bothering his nose)  Tried nasal mask   Pressure APAP 12-20   Benefit    DME HME   Machine age 2022   Download 5.7.23: 29/30 x 7hrs, 12-20 (15.4/19.8/19.9), Leak 11/38/94, AHI 25, (ob 16.4)           SLEEP SCHEDULE   Environment    Bed Time 10:15-10:30P    Sleep Latency 20 min   Arousals 3-4   Nocturia 3   Back to sleep 5 min   Wake time 6 AM   Naps At 8-9A, sometimes in afternoon   Work        Vitals:    05/08/23 1531   BP: 134/80   BP Location: Right arm   Patient Position: Sitting   BP Method: Small (Automatic)   Pulse: 67   Weight: 71.7 kg (158 lb)   Height: 5' 7" (1.702 m)       Physical Exam:    GEN:   Well-appearing  Psych:  Appropriate affect, demonstrates insight  SKIN:  No rash on the face or bridge of the nose      LABS:  Lab Results   Component Value Date    HGB 15.1 03/10/2023    CO2 28 03/10/2023         RECORDS REVIEWED PREVIOUSLY:    Baseline Sleep Study: 06/09/2017 HST The overall AHI was 45 and overall RDI was 46. The oxygen lisa was 70.8% and % time < 90% SpO2 was 37.7%.     CPAP titration 5.12.22: CPAP =9 supine SWS, some sREM CPAP =20.  no lateral sleep.;  Rec CPAP =9 cwp + side sleeping + FFM.  June 22-wants sleeping pill-Trz 50mg    Download   6/1/2021: 19/30d x 6h 6min, auto 10-20 (11.7/15.5/17.5), AHI 9.1  8.22.22: 28/30 x 7h 26min, 10-20 (12.7/18.5/19.8), Leak 5/27/87), AHI 14.6 (central 5.4)    ASSESSMENT  PROBLEM DESCRIPTION/ Sx on Presentation Interval Hx STATUS   Severe KARMA   "   HEENT: MP4, + mild micrognathia Using nightly  Waking frequently at night uncontrolled   Daytime Sx   occasional sleepiness when inactive   Needing short naps during the day  denies sleepiness when driving   ESS n/a/24 on intake Still quite sleepy persists   Insomnia   Onset:   Maintenance:waking up 3/4/5AM  Prior hypnotics:        Current hypnotics: gabapentin helping some   Waking frequently at night   persists   Nocturia   x 2-3 per sleep period persists persists   Other issues:     PLAN       -will proceed with a BiPAP titration due to ineffective PAP therapy with PAP up to 20cwp   -ambien 10mg for the night of the sleep study  -we discussed pursuing (he would consider inspire if BiPAP is ineffective)  -continue gabapentin 100-300mg  -using and benefitting from PAP therapy    RTC mid june         The patient was given open opportunity to ask questions and/or express concerns about treatment plan.   All questions/concerns were discussed.     Two patient identifiers used prior to evaluation.

## 2023-05-09 ENCOUNTER — TELEPHONE (OUTPATIENT)
Dept: SLEEP MEDICINE | Facility: CLINIC | Age: 83
End: 2023-05-09
Payer: MEDICARE

## 2023-05-15 ENCOUNTER — TELEPHONE (OUTPATIENT)
Dept: SLEEP MEDICINE | Facility: OTHER | Age: 83
End: 2023-05-15
Payer: MEDICARE

## 2023-05-19 ENCOUNTER — TELEPHONE (OUTPATIENT)
Dept: SLEEP MEDICINE | Facility: OTHER | Age: 83
End: 2023-05-19
Payer: MEDICARE

## 2023-05-20 ENCOUNTER — HOSPITAL ENCOUNTER (OUTPATIENT)
Dept: SLEEP MEDICINE | Facility: OTHER | Age: 83
Discharge: HOME | End: 2023-05-20
Attending: INTERNAL MEDICINE
Payer: MEDICARE

## 2023-05-20 DIAGNOSIS — Z99.89 INSUFFICIENT TREATMENT WITH NASAL CPAP: ICD-10-CM

## 2023-05-20 DIAGNOSIS — G47.33 OSA (OBSTRUCTIVE SLEEP APNEA): ICD-10-CM

## 2023-05-20 PROCEDURE — 95811 POLYSOM 6/>YRS CPAP 4/> PARM: CPT

## 2023-05-21 NOTE — PROGRESS NOTES
A titration study was performed on Cricket Mcdonnell. The following was explained to the pt prior to the study: the time to bed and wake time, the set-up process timeframe and the purpose of each sensor, the reason (if sensors fall off) the technician will need to enter the room during the night, the possibility of the tech fitting a PAP mask on pt for treatment in the middle of the night, and how to call out for assistance during the night. A post-study letter was handed to the pt in the morning.

## 2023-05-22 ENCOUNTER — LAB VISIT (OUTPATIENT)
Dept: LAB | Facility: HOSPITAL | Age: 83
End: 2023-05-22
Payer: MEDICARE

## 2023-05-22 DIAGNOSIS — N40.1 BPH WITH URINARY OBSTRUCTION: ICD-10-CM

## 2023-05-22 DIAGNOSIS — E29.1 PRIMARY MALE HYPOGONADISM: ICD-10-CM

## 2023-05-22 DIAGNOSIS — N13.8 BPH WITH URINARY OBSTRUCTION: ICD-10-CM

## 2023-05-22 DIAGNOSIS — E78.5 DYSLIPIDEMIA: ICD-10-CM

## 2023-05-22 DIAGNOSIS — R53.83 FATIGUE, UNSPECIFIED TYPE: ICD-10-CM

## 2023-05-22 LAB
ALBUMIN SERPL BCP-MCNC: 3.8 G/DL (ref 3.5–5.2)
ALP SERPL-CCNC: 63 U/L (ref 55–135)
ALT SERPL W/O P-5'-P-CCNC: 23 U/L (ref 10–44)
AST SERPL-CCNC: 29 U/L (ref 10–40)
BASOPHILS # BLD AUTO: 0.03 K/UL (ref 0–0.2)
BASOPHILS NFR BLD: 0.5 % (ref 0–1.9)
BILIRUB DIRECT SERPL-MCNC: 0.2 MG/DL (ref 0.1–0.3)
BILIRUB SERPL-MCNC: 0.4 MG/DL (ref 0.1–1)
CHOLEST SERPL-MCNC: 189 MG/DL (ref 120–199)
CHOLEST/HDLC SERPL: 3.7 {RATIO} (ref 2–5)
COMPLEXED PSA SERPL-MCNC: 6 NG/ML (ref 0–4)
CREAT SERPL-MCNC: 0.7 MG/DL (ref 0.5–1.4)
DIFFERENTIAL METHOD: ABNORMAL
EOSINOPHIL # BLD AUTO: 0.1 K/UL (ref 0–0.5)
EOSINOPHIL NFR BLD: 1.9 % (ref 0–8)
ERYTHROCYTE [DISTWIDTH] IN BLOOD BY AUTOMATED COUNT: 12.9 % (ref 11.5–14.5)
EST. GFR  (NO RACE VARIABLE): >60 ML/MIN/1.73 M^2
HCT VFR BLD AUTO: 41.8 % (ref 40–54)
HDLC SERPL-MCNC: 51 MG/DL (ref 40–75)
HDLC SERPL: 27 % (ref 20–50)
HGB BLD-MCNC: 14 G/DL (ref 14–18)
IMM GRANULOCYTES # BLD AUTO: 0.02 K/UL (ref 0–0.04)
IMM GRANULOCYTES NFR BLD AUTO: 0.3 % (ref 0–0.5)
LDLC SERPL CALC-MCNC: 115 MG/DL (ref 63–159)
LYMPHOCYTES # BLD AUTO: 1.4 K/UL (ref 1–4.8)
LYMPHOCYTES NFR BLD: 23 % (ref 18–48)
MCH RBC QN AUTO: 32.9 PG (ref 27–31)
MCHC RBC AUTO-ENTMCNC: 33.5 G/DL (ref 32–36)
MCV RBC AUTO: 98 FL (ref 82–98)
MONOCYTES # BLD AUTO: 0.6 K/UL (ref 0.3–1)
MONOCYTES NFR BLD: 9 % (ref 4–15)
NEUTROPHILS # BLD AUTO: 4.1 K/UL (ref 1.8–7.7)
NEUTROPHILS NFR BLD: 65.3 % (ref 38–73)
NONHDLC SERPL-MCNC: 138 MG/DL
NRBC BLD-RTO: 0 /100 WBC
PLATELET # BLD AUTO: 220 K/UL (ref 150–450)
PMV BLD AUTO: 9.7 FL (ref 9.2–12.9)
PROT SERPL-MCNC: 7 G/DL (ref 6–8.4)
RBC # BLD AUTO: 4.25 M/UL (ref 4.6–6.2)
TESTOST SERPL-MCNC: 488 NG/DL (ref 304–1227)
TRIGL SERPL-MCNC: 115 MG/DL (ref 30–150)
WBC # BLD AUTO: 6.25 K/UL (ref 3.9–12.7)

## 2023-05-22 PROCEDURE — 85025 COMPLETE CBC W/AUTO DIFF WBC: CPT | Performed by: NURSE PRACTITIONER

## 2023-05-22 PROCEDURE — 84403 ASSAY OF TOTAL TESTOSTERONE: CPT | Performed by: NURSE PRACTITIONER

## 2023-05-22 PROCEDURE — 80076 HEPATIC FUNCTION PANEL: CPT | Performed by: NURSE PRACTITIONER

## 2023-05-22 PROCEDURE — 84153 ASSAY OF PSA TOTAL: CPT | Performed by: NURSE PRACTITIONER

## 2023-05-22 PROCEDURE — 82565 ASSAY OF CREATININE: CPT | Performed by: NURSE PRACTITIONER

## 2023-05-22 PROCEDURE — 80061 LIPID PANEL: CPT | Performed by: NURSE PRACTITIONER

## 2023-05-22 PROCEDURE — 36415 COLL VENOUS BLD VENIPUNCTURE: CPT | Performed by: NURSE PRACTITIONER

## 2023-05-23 ENCOUNTER — OFFICE VISIT (OUTPATIENT)
Dept: UROLOGY | Facility: CLINIC | Age: 83
End: 2023-05-23
Payer: MEDICARE

## 2023-05-23 VITALS
HEART RATE: 62 BPM | BODY MASS INDEX: 24.48 KG/M2 | WEIGHT: 156 LBS | DIASTOLIC BLOOD PRESSURE: 66 MMHG | HEIGHT: 67 IN | SYSTOLIC BLOOD PRESSURE: 116 MMHG

## 2023-05-23 DIAGNOSIS — E29.1 PRIMARY MALE HYPOGONADISM: Primary | ICD-10-CM

## 2023-05-23 DIAGNOSIS — N40.1 BPH WITH URINARY OBSTRUCTION: ICD-10-CM

## 2023-05-23 DIAGNOSIS — R97.20 ELEVATED PSA: ICD-10-CM

## 2023-05-23 DIAGNOSIS — N13.8 BPH WITH URINARY OBSTRUCTION: ICD-10-CM

## 2023-05-23 DIAGNOSIS — E78.5 DYSLIPIDEMIA: ICD-10-CM

## 2023-05-23 DIAGNOSIS — E29.1 MALE HYPOGONADISM: ICD-10-CM

## 2023-05-23 DIAGNOSIS — G47.30 SLEEP APNEA, UNSPECIFIED TYPE: ICD-10-CM

## 2023-05-23 DIAGNOSIS — R53.83 FATIGUE, UNSPECIFIED TYPE: ICD-10-CM

## 2023-05-23 PROCEDURE — 3288F PR FALLS RISK ASSESSMENT DOCUMENTED: ICD-10-PCS | Mod: CPTII,S$GLB,, | Performed by: NURSE PRACTITIONER

## 2023-05-23 PROCEDURE — 1159F MED LIST DOCD IN RCRD: CPT | Mod: CPTII,S$GLB,, | Performed by: NURSE PRACTITIONER

## 2023-05-23 PROCEDURE — 99999 PR PBB SHADOW E&M-EST. PATIENT-LVL IV: ICD-10-PCS | Mod: PBBFAC,,, | Performed by: NURSE PRACTITIONER

## 2023-05-23 PROCEDURE — 99999 PR PBB SHADOW E&M-EST. PATIENT-LVL IV: CPT | Mod: PBBFAC,,, | Performed by: NURSE PRACTITIONER

## 2023-05-23 PROCEDURE — 1101F PT FALLS ASSESS-DOCD LE1/YR: CPT | Mod: CPTII,S$GLB,, | Performed by: NURSE PRACTITIONER

## 2023-05-23 PROCEDURE — 99214 PR OFFICE/OUTPT VISIT, EST, LEVL IV, 30-39 MIN: ICD-10-PCS | Mod: S$GLB,,, | Performed by: NURSE PRACTITIONER

## 2023-05-23 PROCEDURE — 3288F FALL RISK ASSESSMENT DOCD: CPT | Mod: CPTII,S$GLB,, | Performed by: NURSE PRACTITIONER

## 2023-05-23 PROCEDURE — 3078F PR MOST RECENT DIASTOLIC BLOOD PRESSURE < 80 MM HG: ICD-10-PCS | Mod: CPTII,S$GLB,, | Performed by: NURSE PRACTITIONER

## 2023-05-23 PROCEDURE — 1126F AMNT PAIN NOTED NONE PRSNT: CPT | Mod: CPTII,S$GLB,, | Performed by: NURSE PRACTITIONER

## 2023-05-23 PROCEDURE — 1126F PR PAIN SEVERITY QUANTIFIED, NO PAIN PRESENT: ICD-10-PCS | Mod: CPTII,S$GLB,, | Performed by: NURSE PRACTITIONER

## 2023-05-23 PROCEDURE — 3074F SYST BP LT 130 MM HG: CPT | Mod: CPTII,S$GLB,, | Performed by: NURSE PRACTITIONER

## 2023-05-23 PROCEDURE — 1101F PR PT FALLS ASSESS DOC 0-1 FALLS W/OUT INJ PAST YR: ICD-10-PCS | Mod: CPTII,S$GLB,, | Performed by: NURSE PRACTITIONER

## 2023-05-23 PROCEDURE — 1160F PR REVIEW ALL MEDS BY PRESCRIBER/CLIN PHARMACIST DOCUMENTED: ICD-10-PCS | Mod: CPTII,S$GLB,, | Performed by: NURSE PRACTITIONER

## 2023-05-23 PROCEDURE — 99214 OFFICE O/P EST MOD 30 MIN: CPT | Mod: S$GLB,,, | Performed by: NURSE PRACTITIONER

## 2023-05-23 PROCEDURE — 1160F RVW MEDS BY RX/DR IN RCRD: CPT | Mod: CPTII,S$GLB,, | Performed by: NURSE PRACTITIONER

## 2023-05-23 PROCEDURE — 3078F DIAST BP <80 MM HG: CPT | Mod: CPTII,S$GLB,, | Performed by: NURSE PRACTITIONER

## 2023-05-23 PROCEDURE — 1159F PR MEDICATION LIST DOCUMENTED IN MEDICAL RECORD: ICD-10-PCS | Mod: CPTII,S$GLB,, | Performed by: NURSE PRACTITIONER

## 2023-05-23 PROCEDURE — 3074F PR MOST RECENT SYSTOLIC BLOOD PRESSURE < 130 MM HG: ICD-10-PCS | Mod: CPTII,S$GLB,, | Performed by: NURSE PRACTITIONER

## 2023-05-23 RX ORDER — TESTOSTERONE 40.5 MG/2.5G
2.5 GEL TOPICAL DAILY
Qty: 75 G | Refills: 5 | Status: ON HOLD | OUTPATIENT
Start: 2023-05-23 | End: 2023-08-09 | Stop reason: HOSPADM

## 2023-05-23 NOTE — PROGRESS NOTES
CHIEF COMPLAINT:    Cricket Mcdonnell is a 82 y.o. male presents today for Low Testosterone    HISTORY OF PRESENTING ILLINESS:    Cricket Mcdonnell is a 82 y.o. male with a history of low T and fatigue and ED  01/16/2020 T was 126  01/20/2020 T was 105  He was started on Androgel 1.62%/2.5gm  Report feeling better; more energy with TRT.   Last seen clinic  11/07/2022.     PSA was 7.0.   11/07/2022 MRI of Prostate was clear of any areas of concern.     Today he reports doing well.   Ok with urination.  Sildenafil 100mg PRN helps with the ED but he also have trouble achieving orgasm.      Recently had another sleep study.   Bought a sleep mask; similar to the ones used during the study.  Getting use to it.     Coffee drinker in the afternoon  Melatonin not working for him    TRT labs completed 05/22/2023:  - T was 488  -PSA was 6.0 (stable)  -HCT was 41.8 (stable)  -normal lipids/LFT's    No family history of Prostate Cancer. Father & mother lived to .   His sister passed away from emphysema            REVIEW OF SYSTEMS:  Review of Systems   Constitutional: Negative.  Negative for chills and fever.   Eyes:  Negative for double vision.   Respiratory:  Negative for cough and shortness of breath.    Cardiovascular:  Negative for chest pain.   Gastrointestinal:  Negative for abdominal pain, constipation, diarrhea, nausea and vomiting.   Genitourinary: Negative.  Negative for dysuria, flank pain and hematuria.        Ok with urination     Neurological:  Negative for dizziness and seizures.   Endo/Heme/Allergies:  Negative for polydipsia.   Psychiatric/Behavioral:  The patient has insomnia.         Sleep apnea          PATIENT HISTORY:    Past Medical History:   Diagnosis Date    Hereditary sensory neuropathy     Sleep apnea     + CPAP       Past Surgical History:   Procedure Laterality Date    CATARACT EXTRACTION      COLONOSCOPY N/A 1/3/2019    Procedure: COLONOSCOPY;  Surgeon: Cholo Yates MD;  Location: [facility]  (4TH FLR);  Service: Endoscopy;  Laterality: N/A;    EYE SURGERY      galucoma surgery      HERNIA REPAIR      tonsillectomy      TONSILLECTOMY         Family History   Problem Relation Age of Onset    COPD Sister     Cancer Sister         lung cancer from smoking    No Known Problems Son     No Known Problems Daughter        Social History     Socioeconomic History    Marital status:    Occupational History     Employer: [employer]   Tobacco Use    Smoking status: Former     Packs/day: 0.25     Years: 30.00     Pack years: 7.50     Types: Cigarettes     Start date: 1971     Quit date: 2001     Years since quittin.8     Passive exposure: Past    Smokeless tobacco: Never    Tobacco comments:     I was a light daily smoker   Substance and Sexual Activity    Alcohol use: Not Currently     Alcohol/week: 0.0 standard drinks     Comment: I gave up drinking 23 years ago    Drug use: No    Sexual activity: Yes     Partners: Female     Birth control/protection: Diaphragm     Social Determinants of Health     Food Insecurity: No Food Insecurity    Worried About Running Out of Food in the Last Year: Never true    Ran Out of Food in the Last Year: Never true   Transportation Needs: No Transportation Needs    Lack of Transportation (Medical): No    Lack of Transportation (Non-Medical): No   Physical Activity: Sufficiently Active    Days of Exercise per Week: 7 days    Minutes of Exercise per Session: 40 min   Stress: No Stress Concern Present    Feeling of Stress : Only a little   Social Connections: Moderately Integrated    Frequency of Communication with Friends and Family: More than three times a week    Frequency of Social Gatherings with Friends and Family: Once a week    Attends Church Services: More than 4 times per year    Active Member of Clubs or Organizations: Yes    Attends Club or Organization Meetings: More than 4 times per year    Marital Status:    Housing Stability: Low  Risk     Unable to Pay for Housing in the Last Year: No    Number of Places Lived in the Last Year: 1    Unstable Housing in the Last Year: No       Allergies:  Poison ivy extract and Cooper    Medications:    Current Outpatient Medications:     brimonidine 0.15 % OPTH DROP (ALPHAGAN) 0.15 % ophthalmic solution, Instill 1 drop into the left eye 3 times a day (Patient taking differently: 2 (two) times a day.), Disp: 15 mL, Rfl: 0    brimonidine 0.15 % OPTH DROP (ALPHAGAN) 0.15 % ophthalmic solution, 1 drop Left Eye 3 times a day, Disp: 10 mL, Rfl: 1    brimonidine 0.2% (ALPHAGAN) 0.2 % Drop, Apply 1 drop to eye., Disp: , Rfl:     brimonidine 0.2% (ALPHAGAN) 0.2 % Drop, Instill 1 drop into left eye three times a day, Disp: 10 mL, Rfl: 6    brimonidine 0.2% (ALPHAGAN) 0.2 % Drop, instill 1 drop Left Eye 3 times a day, Disp: 15 mL, Rfl: 0    brimonidine 0.2% (ALPHAGAN) 0.2 % Drop, Instill 1 drop into Left Eye 3 times a day, Disp: 15 mL, Rfl: 1    dorzolamide-timolol 2-0.5% (COSOPT) 22.3-6.8 mg/mL ophthalmic solution, Instill 1 drop into both eyes twice a day, Disp: 10 mL, Rfl: 2    dorzolamide-timolol 2-0.5% (COSOPT) 22.3-6.8 mg/mL ophthalmic solution, Instill 1 drop into right eye twice a day, Disp: 10 mL, Rfl: 0    dorzolamide-timolol 2-0.5% (COSOPT) 22.3-6.8 mg/mL ophthalmic solution, Instill 1 drop Left Eye twice a day, Disp: 10 mL, Rfl: 0    dorzolamide-timolol 2-0.5% (COSOPT) 22.3-6.8 mg/mL ophthalmic solution, Place 1 drop into the left eye twice a day, Disp: 10 mL, Rfl: 0    dorzolamide-timolol 2-0.5% (COSOPT) 22.3-6.8 mg/mL ophthalmic solution, Instill 1 drop into the Left Eye twice a day, Disp: 10 mL, Rfl: 0    dorzolamide-timolol 2-0.5% (COSOPT) 22.3-6.8 mg/mL ophthalmic solution, Instill 1 drop into the Left Eye twice a day, Disp: 10 mL, Rfl: 3    dorzolamide-timolol 2-0.5% (COSOPT) 22.3-6.8 mg/mL ophthalmic solution, instill 1 drop into Left Eye three times a day, Disp: 10 mL, Rfl: 1    dorzolamide-timolol  2-0.5% (COSOPT) 22.3-6.8 mg/mL ophthalmic solution, Instill 1 drop into left eye twice a day, Disp: 10 mL, Rfl: 6    dorzolamide-timolol 2-0.5% (COSOPT) 22.3-6.8 mg/mL ophthalmic solution, Instill 1 drop Left Eye 3 times a day, Disp: 10 mL, Rfl: 1    dorzolamide-timolol 2-0.5% (COSOPT) 22.3-6.8 mg/mL ophthalmic solution, Instill 1 drop into the  Left Eye 3 times a day, Disp: 30 mL, Rfl: 1    doxepin (SINEQUAN) 10 MG capsule, doxepin 10 mg capsule, Disp: , Rfl:     ferrous sulfate 325 (65 FE) MG EC tablet, Take 325 mg by mouth once daily., Disp: , Rfl:     fluorometholone (FLAREX) 0.1 % DrpS, Instill 1 drop into left eye once a day, Disp: 5 mL, Rfl: 0    fluorometholone 0.1% (FML) 0.1 % DrpS, Instill 1 drop into left eye once a day, Disp: 5 mL, Rfl: 1    fluticasone (FLONASE) 50 mcg/actuation nasal spray, 2 sprays by Each Nare route once daily., Disp: 1 Bottle, Rfl: 2    FLUZONE HIGHDOSE QUAD 22-23  mcg/0.7 mL Syrg, , Disp: , Rfl:     gabapentin (NEURONTIN) 100 MG capsule, Take 1-3 capsules (100-300 mg total) by mouth nightly as needed (restless legs  (note: take 1-2 hours prior to usual onset of symptoms))., Disp: 90 capsule, Rfl: 1    ibuprofen (ADVIL,MOTRIN) 800 MG tablet, as needed., Disp: , Rfl:     LUMIGAN 0.01 % Drop, every evening., Disp: , Rfl:     MARCAINE 0.25 % (2.5 mg/mL) Soln, , Disp: , Rfl:     MODERNA COVID BIVAL,6Y UP,,PF, 50 mcg/0.5 mL injection, , Disp: , Rfl:     MULTIVIT-IRON-MIN-FOLIC ACID 3,500-18-0.4 UNIT-MG-MG ORAL CHEW, Take by mouth once daily. , Disp: , Rfl:     mupirocin (BACTROBAN) 2 % ointment, every evening., Disp: , Rfl:     neomycin-polymyxin-dexamethasone (MAXITROL) 3.5 mg/g-10,000 unit/g-0.1 % Oint, Apply a small amount into left eye three times a day, Disp: 3.5 g, Rfl: 1    neomycin-polymyxin-dexamethasone (MAXITROL) 3.5 mg/g-10,000 unit/g-0.1 % Oint, Apply a small amount into left eye three times a day, Disp: 3.5 g, Rfl: 0    neomycin-polymyxin-dexamethasone (MAXITROL) 3.5  mg/g-10,000 unit/g-0.1 % Oint, Apply 0.25 inch of ointment  into right eye 3 times a day, Disp: 3.5 g, Rfl: 0    neomycin-polymyxin-dexamethasone (MAXITROL) 3.5 mg/g-10,000 unit/g-0.1 % Oint, Apply a small amount into left eye three times a day, Disp: 3.5 g, Rfl: 0    neomycin-polymyxin-dexamethasone (MAXITROL) 3.5 mg/g-10,000 unit/g-0.1 % Oint, Apply 0.25 inch into right eye 3 times a day, Disp: 3.5 g, Rfl: 1    neomycin-polymyxin-dexamethasone (MAXITROL) 3.5 mg/g-10,000 unit/g-0.1 % Oint, Apply 0.25 inch Left Eye 3 times a day STOP AFTER 3 DAYS, Disp: 3.5 g, Rfl: 0    neomycin-polymyxin-dexamethasone (MAXITROL) 3.5 mg/g-10,000 unit/g-0.1 % Oint, 0.25 inch Left Eye 3 times a day, Disp: 3.5 g, Rfl: 0    neomycin-polymyxin-dexamethasone (MAXITROL) 3.5 mg/g-10,000 unit/g-0.1 % Oint, Apply 1 a small amount into left eye three times a day STOP AFTER 3 DAYS, Disp: 3.5 g, Rfl: 0    neomycin-polymyxin-dexamethasone (MAXITROL) 3.5 mg/g-10,000 unit/g-0.1 % Oint, Apply 0.25 inch ribbon into left eye 3 times a day, Disp: 3.5 g, Rfl: 0    netarsudiL-latanoprost (ROCKLATAN) 0.02-0.005 % Drop, Instill 1 drop into both eyes at bedtime, Disp: 2.5 mL, Rfl: 6    netarsudiL-latanoprost (ROCKLATAN) 0.02-0.005 % Drop, Instill 1 drop into both eyes at bedtime, Disp: 2.5 mL, Rfl: 6    netarsudiL-latanoprost (ROCKLATAN) 0.02-0.005 % Drop, Instill 1 drop into left eye every night, Disp: 2.5 mL, Rfl: 2    ofloxacin (OCUFLOX) 0.3 % ophthalmic solution, Place 1 drop into Left Eye 3 times a day (USE ONLY 3 DAYS AFTER EACH INJECTION), Disp: 10 mL, Rfl: 0    ondansetron (ZOFRAN-ODT) 4 MG TbDL, Dissolve 1 tablet (4 mg total) by mouth every 8 (eight) hours as needed (nausea or vomiting)., Disp: 12 tablet, Rfl: 0    prednisoLONE acetate (PRED FORTE) 1 % DrpS, Instill 1 drop into right eye once a day, Disp: 15 mL, Rfl: 0    prednisoLONE acetate (PRED FORTE) 1 % DrpS, Instill 1 drop into the Right Eye once a day, Disp: 15 mL, Rfl: 0    PREVIDENT 5000  BOOSTER PLUS 1.1 % Pste, once daily. , Disp: , Rfl: 0    sildenafiL (VIAGRA) 100 MG tablet, Take 1 tablet (100 mg total) by mouth daily as needed., Disp: 10 tablet, Rfl: 12    testosterone (ANDROGEL) 1.62 % (40.5 mg/2.5 gram) GlPk, Place 2.5 g (1 packet) onto the skin once daily., Disp: 75 g, Rfl: 5    timolol maleate 0.25% (TIMOPTIC) 0.25 % Drop, 1 drop once daily., Disp: , Rfl:     zolpidem (AMBIEN) 10 mg Tab, Take 1 tablet (10 mg total) by mouth nightly as needed (to be taken the night of the patient's sleep study)., Disp: 1 tablet, Rfl: 0    PHYSICAL EXAMINATION:  Physical Exam  Vitals and nursing note reviewed.   Constitutional:       General: He is awake.      Appearance: Normal appearance.   HENT:      Head: Normocephalic.      Right Ear: External ear normal.      Left Ear: External ear normal.      Nose: Nose normal.   Cardiovascular:      Rate and Rhythm: Normal rate.   Pulmonary:      Effort: Pulmonary effort is normal. No respiratory distress.   Abdominal:      Tenderness: There is no abdominal tenderness. There is no right CVA tenderness or left CVA tenderness.   Genitourinary:     Penis: Normal.       Testes: Normal.   Musculoskeletal:         General: Normal range of motion.      Cervical back: Normal range of motion.   Skin:     General: Skin is warm and dry.   Neurological:      General: No focal deficit present.      Mental Status: He is alert and oriented to person, place, and time.   Psychiatric:         Mood and Affect: Mood normal.         Behavior: Behavior is cooperative.         LABS:          Lab Results   Component Value Date    PSA 2.1 05/21/2018    PSA 1.8 03/27/2017    PSA 1.82 05/31/2013    PSADIAG 6.0 (H) 05/22/2023    PSADIAG 7.0 (H) 11/30/2022    PSADIAG 6.6 (H) 11/04/2022    PSATOTAL 2.1 07/06/2015       Lab Results   Component Value Date    CREATININE 0.7 05/22/2023    EGFRNORACEVR >60.0 05/22/2023             IMPRESSION:    Encounter Diagnoses   Name Primary?    Primary male  hypogonadism Yes    Fatigue, unspecified type     BPH with urinary obstruction     Dyslipidemia     Elevated PSA     Sleep apnea, unspecified type     Male hypogonadism          Assessment:       1. Primary male hypogonadism    2. Fatigue, unspecified type    3. BPH with urinary obstruction    4. Dyslipidemia    5. Elevated PSA    6. Sleep apnea, unspecified type    7. Male hypogonadism        Plan:          I spent 30 minutes with the patient of which more than half was spent in direct consultation with the patient in regards to our treatment and plan.  We addressed the office findings and recent labs.   Education and recommendations of today's plan of care including home remedies and needed follow up with PCP.   We discussed the chief complaint/LUTS and the possible contributory factors  F/u with PCP for fatigue; not seeing urological cause.  Reviewed his TRT labs.   Recommended lifestyle modifications with proper, healthy diet, good hydration if no fluid restrictions; reducing bladder irritants.   Benefits of regular exercise approved by PCP.  Refilled his AndroGel;   RTC 6 months with full labs

## 2023-05-25 DIAGNOSIS — E29.1 MALE HYPOGONADISM: ICD-10-CM

## 2023-05-25 RX ORDER — TESTOSTERONE 40.5 MG/2.5G
2.5 GEL TOPICAL DAILY
Qty: 75 G | Refills: 5 | Status: CANCELLED | OUTPATIENT
Start: 2023-05-25 | End: 2023-11-22

## 2023-05-26 DIAGNOSIS — E29.1 MALE HYPOGONADISM: ICD-10-CM

## 2023-05-26 RX ORDER — TESTOSTERONE 40.5 MG/2.5G
2.5 GEL TOPICAL DAILY
Qty: 75 G | Refills: 5 | OUTPATIENT
Start: 2023-05-26 | End: 2023-11-23

## 2023-05-29 ENCOUNTER — PATIENT MESSAGE (OUTPATIENT)
Dept: SLEEP MEDICINE | Facility: CLINIC | Age: 83
End: 2023-05-29

## 2023-05-29 DIAGNOSIS — G47.33 OSA (OBSTRUCTIVE SLEEP APNEA): Primary | ICD-10-CM

## 2023-05-29 DIAGNOSIS — E29.1 MALE HYPOGONADISM: ICD-10-CM

## 2023-05-29 DIAGNOSIS — Z99.89 INSUFFICIENT TREATMENT WITH NASAL CPAP: ICD-10-CM

## 2023-05-29 PROCEDURE — 95811 PR POLYSOMNOGRAPHY W/CPAP: ICD-10-PCS | Mod: 26,,, | Performed by: INTERNAL MEDICINE

## 2023-05-29 PROCEDURE — 95811 POLYSOM 6/>YRS CPAP 4/> PARM: CPT | Mod: 26,,, | Performed by: INTERNAL MEDICINE

## 2023-05-29 RX ORDER — TESTOSTERONE 40.5 MG/2.5G
2.5 GEL TOPICAL DAILY
Qty: 75 G | Refills: 5 | OUTPATIENT
Start: 2023-05-29 | End: 2023-11-26

## 2023-05-29 RX ORDER — TESTOSTERONE 40.5 MG/2.5G
2.5 GEL TOPICAL DAILY
Qty: 75 G | Refills: 5 | Status: ON HOLD | OUTPATIENT
Start: 2023-05-29 | End: 2023-11-29 | Stop reason: SDUPTHER

## 2023-05-29 NOTE — PROCEDURES
"[facility]    Titration Interpretation Report    Patient Name:  Cricket Mcdonnell  MRN#:  [id number]  :  1940  Study Date:  2023  Referring Provider:  Juan Bustos    The patient is a 82 year old Male who is 5' 7" and weighs 158.0 lbs.  His BMI equals 24.8.  A full night PAP titration was performed.    Polysomnogram Data  A full night polysomnogram recorded the standard physiologic parameters including EEG, EOG, EMG, EKG, nasal and oral airflow.  Respiratory parameters of chest and abdominal movements were recorded with [place]-Crystal motion transducers.  Oxygen saturation was recorded by pulse oximetry.    Titration Summary  The patient was titrated at pressures ranging from 11/7/0* cm/H20 with supplemental oxygen at - up to 16/12/0* cm/H20 with supplemental oxygen at -.  The last pressure used in the study was 13/9/0* cm/H20 with supplemental oxygen at -.    Sleep Architecture  The total recording time of the polysomnogram was 449.9 minutes.  The total sleep time was 228.5 minutes.  The patient spent 30.0% of total sleep time in Stage N1, 56.9% in Stage N2, 0.0% in Stages N3, and 13.1% in REM.  Sleep latency was 1.1 minutes.  REM latency was 66.5 minutes.  Sleep Efficiency was 50.8%.  Total wake time was 222.0 minutes for a total wake percentage of 33.4%.  Wake after Sleep Onset was 220.0 minutes.    Respiratory Summary  The polysomnogram revealed a presence of 52 obstructive, 6 central, and 13 mixed apneas resulting in Total Apnea index of 18.6 events per hour.  There were 4 hypopneas resulting in Total Hypopnea index of 1.1 events per hour.  The combined Apnea/Hypopnea index was 19.7 events per hour.  There were a total of 16 RERA events resulting in a Respiratory Disturbance Index (RDI) of 23.9 events per hour.     Mean oxygen saturation was 95.0%.  The lowest oxygen saturation during sleep was 89.0%.  Time spent ?88% oxygen saturation was - minutes (-).    Limb Movement " "Activity  There were 9 limb movements recorded.  Of this total, 9 were classified as PLMs.  Of the PLMs, 1 were associated with arousals.  The Limb Movement index was 2.4 per hour while the PLM index was 2.4 per hour and PLM with arousals index was 0.3 per hour.    Cardiac: single lead EKG revealed normal sinus rhythm     PAP titration:    Mask used in the study: Simplus FFM- medium size  PAP = 14/10 cwp was effective in lateral N2 sleep with residual apneas supine.    LIMITATIONS:   limited stage REM sleep  Frequent sleep onset obstructive apneas were seen in the supine position    Oxygenation:  At therapeutic levels of PAP therapy, there was no baseline hypoxemia      Impression:  -obstructive sleep apnea     Recommendations:      -initial BiPAP auto settings EPAP min = 10 cwp, IPAP max = 20 cwp, and PS = 4 cwp.  -the patient has follow up with Sleep Medicine        Juan Bustos MD    (This Sleep Study was interpreted by a Board Certified Sleep Specialist who conducted an epoch-by-epoch review of the entire raw data recording.)  (The indication for this sleep study was reviewed and deemed appropriate by AASM Practice Parameters or other reasons by a Board Certified Sleep Specialist.)    [facility]    Titration Report    Patient Name: Cricket Mcdonnell Study Date: 5/20/2023   YOB: 1940 MRN #: [id number]   Age: 82 year OLGA #: [id number]   Sex: Male Referring Provider: Juan Bustos   Height: 5' 7" Recording Tech: Jermaine Jarvis RPSGT   Weight: 158.0 lbs Scoring Tech: Yong García RRT RPSGT   BMI: 24.8 Interpreting Physician: -   ESS: - Neck Circumference: -     Study Overview    Lights Off: 09:46:59 PM  Count Index   Lights On: 05:16:53 AM Awakenings: 100 26.3   Time in Bed: 449.9 min. Arousals: 80 21.0   Total Sleep Time: 228.5 min. Apneas & Hypopneas: 75 19.7    Sleep Efficiency: 50.8% Limb Movements: 9 2.4   Sleep Latency: 1.1 min. Snores: - -   Wake After Sleep Onset: 220.0 " min. Desaturations: 68 17.9    REM Latency from Sleep Onset: 66.5 min. Minimum SpO2 TST: 89.0%      Sleep Architecture   % of Time in Bed  Stages Time (mins) % Sleep Time   Wake 222.0    Stage N1 68.5 30.0%   Stage N2 130.0 56.9%   Stage N3 0.0 0.0%   REM 30.0 13.1%         Arousal Summary     NREM REM Sleep Index   Respiratory Arousals 54 - 54 14.2   PLM Arousals 1 - 1 0.3   Isolated Limb Movement Arousals - - - -   Spontaneous Arousals 21 4 25 6.6   Total 76 4 80 21.0       Limb Movement Summary     Count Index   Isolated Limb Movements - -   Periodic Limb Movements (PLMs) 9 2.4   Total Limb Movements 9 2.4       Respiratory Summary     By Sleep Stage By Body Position Total    NREM REM Supine Non-Supine    Time (min) 198.5 30.0 145.5 83.0 228.5           Obstructive Apnea 52 - 41 11 52   Mixed Apnea 13 - 8 5 13   Central Apnea 6 - 4 2 6   Total Apneas 71 - 53 18 71   Total Apnea Index 21.5 - 21.9 13.0 18.6           Total Hypopnea 4 - 4 - 4   Total Hypopnea Index 1.2 - 1.6 - 1.1           Apnea & Hypopnea 75 - 57 18 75   Apnea & Hypopnea Index 22.7 - 23.5 13.0 19.7           RERAs 16 - 12 4 16   RERA Index 4.8 - 4.9 2.9 4.2           RDI 27.5 - 28.5 15.9 23.9     Scoring Criteria: Hypopneas scored at 4% desaturation criteria.    Respiratory Event Durations     Apnea Hypopnea    NREM REM NREM REM   Average (seconds) 34.6 - 26.6 -   Maximum (seconds) 59.1 - 38.1 -       Oxygen Saturation Summary     Wake NREM REM TST Total   Average SpO2 95.0% 94.9% 95.1% 95.0% 95.0%   Minimum SpO2 89.0% 89.0% 93.0% 89.0% 89.0%   Maximum SpO2 98.0% 98.0% 97.0% 98.0% 98.0%     Oxygen Saturation Distribution    Range (%) Time in range (min) Time in range (%)    90.0 - 100.0 440.6 99.3%   80.0 - 90.0 2.2 0.5%   70.0 - 80.0 - -   60.0 - 70.0 - -   50.0 - 60.0 - -   0.0 - 50.0 - -   Time Spent ?88% SpO2    Range (%) Time in range (min) Time in range (%)   0.0 - 88.0 - -          Count Index   Desaturations 68 17.9      Cardiac  Summary     Wake NREM REM Sleep Total   Average Pulse Rate (BPM) 52.2 49.9 49.6 49.9 51.0   Minimum Pulse Rate (BPM) 47.0 47.0 46.0 46.0 46.0   Maximum Pulse Rate (BPM) 129.0 64.0 61.0 64.0 129.0     Pulse Rate Distribution    Range (bpm) Time in range (min) Time in range (%)   0.0 - 40.0 - -   40.0 - 60.0 439.9 99.0%   60.0 - 80.0 4.3 1.0%   80.0 - 100.0 0.1 0.0%   100.0 - 120.0 - -   120.0 - 140.0 0.0 0.0%   140.0 - 200.0 - -     EtCO2 Summary    Stage Min (mmHg) Average (mmHg) Max (mmHg)   Wake - - -   NREM(1+2+3) - - -   REM - - -     Range (mmHg) Time in range (min) Time in range (%)   - - -   - - -   - - -   - - -   - - -     TcCO2 Summary    Stage Min (mmHg) Average (mmHg) Max (mmHg)   Wake - - -   NREM(1+2+3) - - -   REM - - -     Range (mmHg) Time in range (min) Time in range (%)   20.0 - 40.0 - -   40.0 - 50.0 - -   50.0 - 55.0 - -   55.0 - 100.0 - -   Excluded data <20.0 & >65.0 450.5 100.0%     Comments    -    Titration Summary    PAP Device PAP Level O2 Level Time (min) TST (min) NREM (min) REM (min) Wake (min) Sleep Eff% OA# CA# MA# Hyp# AHI RERA RDI Min SpO2 SpO2 ?88% (min) Ar. Index   - Off - 0.5 0.0 0.0 0.0 0.5 0.0%             BiLevel 11/7/0 - 1.0 0.5 0.5 0.0 0.5 50.0% 1 - - - 120.0 - 120.0 93.0  0.0 -   BiLevel 12/8/0 - 37.0 23.0 23.0 0.0 14.0 62.2% 10 - 3 3 41.7 1 44.3 89.0  0.0 39.1   BiLevel 13/9/0 - 85.0 9.0 9.0 0.0 76.0 10.6% 6 - 1 - 46.7 1 53.3 90.0  0.0 26.7   BiLevel 14/10/0 - 165.5 112.0 96.5 15.5 53.5 67.7% 16 2 4 - 11.8 6 15.0 90.0  0.0 15.0   BiLevel 15/11/0 - 144.0 72.5 58.0 14.5 71.5 50.3% 15 1 5 1 18.2 8 24.8 90.0  0.0 21.5   BiLevel 16/12/0 - 17.5 11.5 11.5 0.0 6.0 65.7% 4 3 - - 36.5 - 36.5 93.0  0.0 36.5

## 2023-05-31 ENCOUNTER — OFFICE VISIT (OUTPATIENT)
Dept: INTERNAL MEDICINE | Facility: CLINIC | Age: 83
End: 2023-05-31
Attending: FAMILY MEDICINE
Payer: MEDICARE

## 2023-05-31 VITALS
HEART RATE: 60 BPM | OXYGEN SATURATION: 96 % | WEIGHT: 159.38 LBS | DIASTOLIC BLOOD PRESSURE: 62 MMHG | BODY MASS INDEX: 25.01 KG/M2 | SYSTOLIC BLOOD PRESSURE: 106 MMHG | HEIGHT: 67 IN

## 2023-05-31 DIAGNOSIS — G47.33 OSA (OBSTRUCTIVE SLEEP APNEA): Primary | ICD-10-CM

## 2023-05-31 DIAGNOSIS — G60.8 HEREDITARY SENSORY NEUROPATHY: ICD-10-CM

## 2023-05-31 DIAGNOSIS — G62.89 OTHER POLYNEUROPATHY: ICD-10-CM

## 2023-05-31 PROCEDURE — 1159F PR MEDICATION LIST DOCUMENTED IN MEDICAL RECORD: ICD-10-PCS | Mod: CPTII,S$GLB,, | Performed by: FAMILY MEDICINE

## 2023-05-31 PROCEDURE — 99999 PR PBB SHADOW E&M-EST. PATIENT-LVL V: ICD-10-PCS | Mod: PBBFAC,,, | Performed by: FAMILY MEDICINE

## 2023-05-31 PROCEDURE — 3078F PR MOST RECENT DIASTOLIC BLOOD PRESSURE < 80 MM HG: ICD-10-PCS | Mod: CPTII,S$GLB,, | Performed by: FAMILY MEDICINE

## 2023-05-31 PROCEDURE — 1160F PR REVIEW ALL MEDS BY PRESCRIBER/CLIN PHARMACIST DOCUMENTED: ICD-10-PCS | Mod: CPTII,S$GLB,, | Performed by: FAMILY MEDICINE

## 2023-05-31 PROCEDURE — 1101F PT FALLS ASSESS-DOCD LE1/YR: CPT | Mod: CPTII,S$GLB,, | Performed by: FAMILY MEDICINE

## 2023-05-31 PROCEDURE — 1159F MED LIST DOCD IN RCRD: CPT | Mod: CPTII,S$GLB,, | Performed by: FAMILY MEDICINE

## 2023-05-31 PROCEDURE — 3288F PR FALLS RISK ASSESSMENT DOCUMENTED: ICD-10-PCS | Mod: CPTII,S$GLB,, | Performed by: FAMILY MEDICINE

## 2023-05-31 PROCEDURE — 99214 OFFICE O/P EST MOD 30 MIN: CPT | Mod: S$GLB,,, | Performed by: FAMILY MEDICINE

## 2023-05-31 PROCEDURE — 99999 PR PBB SHADOW E&M-EST. PATIENT-LVL V: CPT | Mod: PBBFAC,,, | Performed by: FAMILY MEDICINE

## 2023-05-31 PROCEDURE — 3074F PR MOST RECENT SYSTOLIC BLOOD PRESSURE < 130 MM HG: ICD-10-PCS | Mod: CPTII,S$GLB,, | Performed by: FAMILY MEDICINE

## 2023-05-31 PROCEDURE — 99214 PR OFFICE/OUTPT VISIT, EST, LEVL IV, 30-39 MIN: ICD-10-PCS | Mod: S$GLB,,, | Performed by: FAMILY MEDICINE

## 2023-05-31 PROCEDURE — 1125F PR PAIN SEVERITY QUANTIFIED, PAIN PRESENT: ICD-10-PCS | Mod: CPTII,S$GLB,, | Performed by: FAMILY MEDICINE

## 2023-05-31 PROCEDURE — 3078F DIAST BP <80 MM HG: CPT | Mod: CPTII,S$GLB,, | Performed by: FAMILY MEDICINE

## 2023-05-31 PROCEDURE — 1101F PR PT FALLS ASSESS DOC 0-1 FALLS W/OUT INJ PAST YR: ICD-10-PCS | Mod: CPTII,S$GLB,, | Performed by: FAMILY MEDICINE

## 2023-05-31 PROCEDURE — 1160F RVW MEDS BY RX/DR IN RCRD: CPT | Mod: CPTII,S$GLB,, | Performed by: FAMILY MEDICINE

## 2023-05-31 PROCEDURE — 3288F FALL RISK ASSESSMENT DOCD: CPT | Mod: CPTII,S$GLB,, | Performed by: FAMILY MEDICINE

## 2023-05-31 PROCEDURE — 3074F SYST BP LT 130 MM HG: CPT | Mod: CPTII,S$GLB,, | Performed by: FAMILY MEDICINE

## 2023-05-31 PROCEDURE — 1125F AMNT PAIN NOTED PAIN PRSNT: CPT | Mod: CPTII,S$GLB,, | Performed by: FAMILY MEDICINE

## 2023-05-31 NOTE — PROGRESS NOTES
"CHIEF COMPLAINT:  fatigue in in pt w/ macular degeneration and sleep apnea    HISTORY OF PRESENT ILLNESS: The patient is a generally healthy 82 year-old WM.      He is c/o his BP.    The patient has continuing neuropathic pain for which he had a complete workup in the past. He has tried multiple medications and is intolerant of all of them due to various side effects.  Currently pain worsening since starting KASHIF.    He was previously diagnosed with macular degeneration.     He does have CPAP for obstructive sleep apnea.  He has seen sleep clinic recently.  Also having leg cramps.    REVIEW OF SYSTEMS:  GENERAL: No fever, chills, fatigability or weight loss.  SKIN: No rashes, itching or changes in color or texture of skin.  HEAD: No headaches or recent head trauma.  EYES:  No photophobia or diplopia.  EARS: Denies ear pain, discharge or vertigo.  NOSE: No loss of smell, no epistaxis or postnasal drip.  MOUTH & THROAT: No hoarseness or change in voice. No excessive gum bleeding.  NODES: Denies swollen glands.  CHEST: Denies SANCHES, cyanosis, wheezing, cough and sputum production.  CARDIOVASCULAR: Denies chest pain, PND, orthopnea or reduced exercise tolerance.  ABDOMEN: Appetite fine. No weight loss. Denies diarrhea, abdominal pain, hematemesis or blood in stool.  URINARY: No flank pain, dysuria or hematuria.  PERIPHERAL VASCULAR: No claudication or cyanosis.  MUSCULOSKELETAL: No joint stiffness or swelling. Denies back pain.  NEUROLOGIC: No history of seizures, paralysis, alteration of gait or coordination.    SOCIAL HISTORY: The patient does not smoke.  The patient consumes alcohol socially.  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    PHYSICAL EXAMINATION:   Blood pressure 106/62, pulse 60, height 5' 7" (1.702 m), weight 72.3 kg (159 lb 6.3 oz), SpO2 96 %.    APPEARANCE: Well nourished, well developed, in no acute distress.    HEAD: Normocephalic, atraumatic.  EYES: " PERRL. EOMI.  Conjunctivae without injection and  Anicteric  NOSE: Mucosa pink. Airway clear.  MOUTH & THROAT: No tonsillar enlargement. No pharyngeal erythema or exudate. No stridor.  NECK: Supple.   NODES: No cervical, axillary or inguinal lymph node enlargement.  CHEST: Lungs clear to auscultation.  No retractions are noted.  No rales or rhonchi are present.  CARDIOVASCULAR: Normal S1, S2. No rubs, murmurs or gallops.  ABDOMEN: Bowel sounds normal. Not distended. Soft. No tenderness or masses.  No ascites is noted.  MUSCULOSKELETAL:  There is no clubbing, cyanosis, or edema of the extremities x4.  There is full range of motion of the lumbar spine.  There is full range of motion of the extremities x4.  There is no deformity noted.    NEUROLOGIC:       Normal speech development.      Hearing normal.      Normal gait.      Motor and sensory exams grossly normal.  PSYCHIATRIC: Patient is alert and oriented x3.  Thought processes are all normal.  There is no homicidality.  There is no suicidality.  There is no evidence of psychosis.    LABORATORY/RADIOLOGY:   Chart reviewed.      ASSESSMENT:   Idiopathic peripheral neuropathy, ari foot pain  Fatigue  Glaucoma  Concern over blood pressure  Insomnia  Macular degeneration  Snoring due to sleep study proven severe sleep apnea on CPAP but changing to BiPAP    PLAN:  Stop gabapentin  reassured  Reviewed normal BP ranges  He is aware that treating his KARMA is helpful to minimize his risk for progression of the macular degeneration.  Follow up sleep for KARMA and insomnia  Over-the-counter supplements as recommended by retinal specialist  Return to clinic in one year.

## 2023-06-07 ENCOUNTER — OFFICE VISIT (OUTPATIENT)
Dept: OTOLARYNGOLOGY | Facility: CLINIC | Age: 83
End: 2023-06-07
Payer: MEDICARE

## 2023-06-07 ENCOUNTER — CLINICAL SUPPORT (OUTPATIENT)
Dept: AUDIOLOGY | Facility: CLINIC | Age: 83
End: 2023-06-07
Payer: MEDICARE

## 2023-06-07 VITALS — WEIGHT: 159.38 LBS | BODY MASS INDEX: 24.96 KG/M2

## 2023-06-07 DIAGNOSIS — H90.3 SENSORINEURAL HEARING LOSS (SNHL) OF BOTH EARS: Primary | ICD-10-CM

## 2023-06-07 DIAGNOSIS — H91.90 HEARING LOSS, UNSPECIFIED HEARING LOSS TYPE, UNSPECIFIED LATERALITY: ICD-10-CM

## 2023-06-07 DIAGNOSIS — H91.13 PRESBYCUSIS OF BOTH EARS: Primary | ICD-10-CM

## 2023-06-07 PROCEDURE — 92557 PR COMPREHENSIVE HEARING TEST: ICD-10-PCS | Mod: S$GLB,,,

## 2023-06-07 PROCEDURE — 3288F FALL RISK ASSESSMENT DOCD: CPT | Mod: CPTII,S$GLB,, | Performed by: OTOLARYNGOLOGY

## 2023-06-07 PROCEDURE — 1159F MED LIST DOCD IN RCRD: CPT | Mod: CPTII,S$GLB,, | Performed by: OTOLARYNGOLOGY

## 2023-06-07 PROCEDURE — 99999 PR PBB SHADOW E&M-EST. PATIENT-LVL I: CPT | Mod: PBBFAC,,,

## 2023-06-07 PROCEDURE — 99999 PR PBB SHADOW E&M-EST. PATIENT-LVL IV: ICD-10-PCS | Mod: PBBFAC,,, | Performed by: OTOLARYNGOLOGY

## 2023-06-07 PROCEDURE — 1101F PR PT FALLS ASSESS DOC 0-1 FALLS W/OUT INJ PAST YR: ICD-10-PCS | Mod: CPTII,S$GLB,, | Performed by: OTOLARYNGOLOGY

## 2023-06-07 PROCEDURE — 92557 COMPREHENSIVE HEARING TEST: CPT | Mod: S$GLB,,,

## 2023-06-07 PROCEDURE — 92567 PR TYMPA2METRY: ICD-10-PCS | Mod: S$GLB,,,

## 2023-06-07 PROCEDURE — 99999 PR PBB SHADOW E&M-EST. PATIENT-LVL I: ICD-10-PCS | Mod: PBBFAC,,,

## 2023-06-07 PROCEDURE — 99999 PR PBB SHADOW E&M-EST. PATIENT-LVL IV: CPT | Mod: PBBFAC,,, | Performed by: OTOLARYNGOLOGY

## 2023-06-07 PROCEDURE — 99203 PR OFFICE/OUTPT VISIT, NEW, LEVL III, 30-44 MIN: ICD-10-PCS | Mod: S$GLB,,, | Performed by: OTOLARYNGOLOGY

## 2023-06-07 PROCEDURE — 99203 OFFICE O/P NEW LOW 30 MIN: CPT | Mod: S$GLB,,, | Performed by: OTOLARYNGOLOGY

## 2023-06-07 PROCEDURE — 3288F PR FALLS RISK ASSESSMENT DOCUMENTED: ICD-10-PCS | Mod: CPTII,S$GLB,, | Performed by: OTOLARYNGOLOGY

## 2023-06-07 PROCEDURE — 92567 TYMPANOMETRY: CPT | Mod: S$GLB,,,

## 2023-06-07 PROCEDURE — 1101F PT FALLS ASSESS-DOCD LE1/YR: CPT | Mod: CPTII,S$GLB,, | Performed by: OTOLARYNGOLOGY

## 2023-06-07 PROCEDURE — 1159F PR MEDICATION LIST DOCUMENTED IN MEDICAL RECORD: ICD-10-PCS | Mod: CPTII,S$GLB,, | Performed by: OTOLARYNGOLOGY

## 2023-06-07 NOTE — PROGRESS NOTES
Cricket Mcdonnell, a 82 y.o. male, was seen today in the clinic for an audiologic evaluation.  The patient's main complaint was decrease hearing in both ears.  Mr. Mcdonnell reported that he has noticed a steady decline in his hearing over the past year or so. He denied otalgia, aural pressure, otalgia and vertigo.     Tympanometry revealed Type A in the right ear and Type A in the left ear. 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  Speech reception thresholds were noted at 35 dB in the right ear and 30 dB in the left ear.  Speech discrimination scores were 100% in the right ear and 100% in the left ear.    Recommendations:  Otologic evaluation  Hearing aid consultation   Annual audiogram  Hearing protection when in noise

## 2023-06-07 NOTE — PROGRESS NOTES
Subjective     Patient ID: Cricket Mcdonnell is a 82 y.o. male.    Chief Complaint: Hearing Loss    HPI: Hx of Dimitrios Prog HL.    Pres for 1 yr.    Pos Fam Hx.    No sig Tinn/ Dizz.    Past Medical History: Patient has a past medical history of Hereditary sensory neuropathy and Sleep apnea.    Past Surgical History: Patient has a past surgical history that includes Hernia repair; tonsillectomy; Cataract extraction; galucoma surgery; Colonoscopy (N/A, 1/3/2019); Tonsillectomy; and Eye surgery.    Social History: Patient reports that he quit smoking about 21 years ago. His smoking use included cigarettes. He started smoking about 51 years ago. He has a 7.50 pack-year smoking history. He has been exposed to tobacco smoke. He has never used smokeless tobacco. He reports that he does not currently use alcohol. He reports that he does not use drugs.    Family History: family history includes COPD in his sister; Cancer in his sister; No Known Problems in his daughter and son.    Medications:   Current Outpatient Medications   Medication Sig    brimonidine 0.15 % OPTH DROP (ALPHAGAN) 0.15 % ophthalmic solution Instill 1 drop into the left eye 3 times a day (Patient taking differently: 2 (two) times a day.)    brimonidine 0.15 % OPTH DROP (ALPHAGAN) 0.15 % ophthalmic solution 1 drop Left Eye 3 times a day    brimonidine 0.2% (ALPHAGAN) 0.2 % Drop Apply 1 drop to eye.    brimonidine 0.2% (ALPHAGAN) 0.2 % Drop Instill 1 drop into left eye three times a day    brimonidine 0.2% (ALPHAGAN) 0.2 % Drop instill 1 drop Left Eye 3 times a day    brimonidine 0.2% (ALPHAGAN) 0.2 % Drop Instill 1 drop into Left Eye 3 times a day    dorzolamide-timolol 2-0.5% (COSOPT) 22.3-6.8 mg/mL ophthalmic solution Instill 1 drop into both eyes twice a day    dorzolamide-timolol 2-0.5% (COSOPT) 22.3-6.8 mg/mL ophthalmic solution Instill 1 drop into right eye twice a day    dorzolamide-timolol 2-0.5% (COSOPT) 22.3-6.8 mg/mL ophthalmic solution Instill 1  drop Left Eye twice a day    dorzolamide-timolol 2-0.5% (COSOPT) 22.3-6.8 mg/mL ophthalmic solution Place 1 drop into the left eye twice a day    dorzolamide-timolol 2-0.5% (COSOPT) 22.3-6.8 mg/mL ophthalmic solution Instill 1 drop into the Left Eye twice a day    dorzolamide-timolol 2-0.5% (COSOPT) 22.3-6.8 mg/mL ophthalmic solution Instill 1 drop into the Left Eye twice a day    dorzolamide-timolol 2-0.5% (COSOPT) 22.3-6.8 mg/mL ophthalmic solution instill 1 drop into Left Eye three times a day    dorzolamide-timolol 2-0.5% (COSOPT) 22.3-6.8 mg/mL ophthalmic solution Instill 1 drop into left eye twice a day    dorzolamide-timolol 2-0.5% (COSOPT) 22.3-6.8 mg/mL ophthalmic solution Instill 1 drop Left Eye 3 times a day    dorzolamide-timolol 2-0.5% (COSOPT) 22.3-6.8 mg/mL ophthalmic solution Instill 1 drop into the  Left Eye 3 times a day    doxepin (SINEQUAN) 10 MG capsule doxepin 10 mg capsule    ferrous sulfate 325 (65 FE) MG EC tablet Take 325 mg by mouth once daily.    fluorometholone (FLAREX) 0.1 % DrpS Instill 1 drop into left eye once a day    fluorometholone 0.1% (FML) 0.1 % DrpS Instill 1 drop into left eye once a day    fluticasone (FLONASE) 50 mcg/actuation nasal spray 2 sprays by Each Nare route once daily.    FLUZONE HIGHDOSE QUAD 22-23  mcg/0.7 mL Syrg     gabapentin (NEURONTIN) 100 MG capsule Take 1-3 capsules (100-300 mg total) by mouth nightly as needed (restless legs  (note: take 1-2 hours prior to usual onset of symptoms)).    ibuprofen (ADVIL,MOTRIN) 800 MG tablet as needed.    ketorolac 0.4% (ACULAR) 0.4 % Drop Instill 1 drop into left eye four times a day    LUMIGAN 0.01 % Drop every evening.    MARCAINE 0.25 % (2.5 mg/mL) Soln     MODERNA COVID BIVAL,6Y UP,,PF, 50 mcg/0.5 mL injection     MULTIVIT-IRON-MIN-FOLIC ACID 3,500-18-0.4 UNIT-MG-MG ORAL CHEW Take by mouth once daily.     mupirocin (BACTROBAN) 2 % ointment every evening.    neomycin-polymyxin-dexamethasone (MAXITROL) 3.5  mg/g-10,000 unit/g-0.1 % Oint Apply a small amount into left eye three times a day    neomycin-polymyxin-dexamethasone (MAXITROL) 3.5 mg/g-10,000 unit/g-0.1 % Oint Apply a small amount into left eye three times a day    neomycin-polymyxin-dexamethasone (MAXITROL) 3.5 mg/g-10,000 unit/g-0.1 % Oint Apply 0.25 inch of ointment  into right eye 3 times a day    neomycin-polymyxin-dexamethasone (MAXITROL) 3.5 mg/g-10,000 unit/g-0.1 % Oint Apply a small amount into left eye three times a day    neomycin-polymyxin-dexamethasone (MAXITROL) 3.5 mg/g-10,000 unit/g-0.1 % Oint Apply 0.25 inch into right eye 3 times a day    neomycin-polymyxin-dexamethasone (MAXITROL) 3.5 mg/g-10,000 unit/g-0.1 % Oint Apply 0.25 inch Left Eye 3 times a day STOP AFTER 3 DAYS    neomycin-polymyxin-dexamethasone (MAXITROL) 3.5 mg/g-10,000 unit/g-0.1 % Oint 0.25 inch Left Eye 3 times a day    neomycin-polymyxin-dexamethasone (MAXITROL) 3.5 mg/g-10,000 unit/g-0.1 % Oint Apply 1 a small amount into left eye three times a day STOP AFTER 3 DAYS    neomycin-polymyxin-dexamethasone (MAXITROL) 3.5 mg/g-10,000 unit/g-0.1 % Oint Apply 0.25 inch ribbon into left eye 3 times a day    netarsudiL-latanoprost (ROCKLATAN) 0.02-0.005 % Drop Instill 1 drop into both eyes at bedtime    netarsudiL-latanoprost (ROCKLATAN) 0.02-0.005 % Drop Instill 1 drop into both eyes at bedtime    netarsudiL-latanoprost (ROCKLATAN) 0.02-0.005 % Drop Instill 1 drop into left eye every night    ofloxacin (OCUFLOX) 0.3 % ophthalmic solution Place 1 drop into Left Eye 3 times a day (USE ONLY 3 DAYS AFTER EACH INJECTION)    ondansetron (ZOFRAN-ODT) 4 MG TbDL Dissolve 1 tablet (4 mg total) by mouth every 8 (eight) hours as needed (nausea or vomiting).    prednisoLONE acetate (PRED FORTE) 1 % DrpS Instill 1 drop into right eye once a day    prednisoLONE acetate (PRED FORTE) 1 % DrpS Instill 1 drop into the Right Eye once a day    PREVIDENT 5000 BOOSTER PLUS 1.1 % Pste once daily.      testosterone (ANDROGEL) 1.62 % (40.5 mg/2.5 gram) GlPk Place 2.5 g (1 packet) onto the skin once daily.    testosterone (ANDROGEL) 1.62 % (40.5 mg/2.5 gram) GlPk Place 2.5 g (1 packet) onto the skin once daily.    timolol maleate 0.25% (TIMOPTIC) 0.25 % Drop 1 drop once daily.    sildenafiL (VIAGRA) 100 MG tablet Take 1 tablet (100 mg total) by mouth daily as needed.    zolpidem (AMBIEN) 10 mg Tab Take 1 tablet (10 mg total) by mouth nightly as needed (to be taken the night of the patient's sleep study).     No current facility-administered medications for this visit.       Allergies: Patient is allergic to poison ivy extract and cam.    Review of Systems   Constitutional:  Negative for activity change, appetite change, chills, diaphoresis, fatigue, fever and unexpected weight change.   HENT:  Positive for hearing loss. Negative for nasal congestion, ear discharge, ear pain, facial swelling, nosebleeds, postnasal drip, rhinorrhea, sinus pressure/congestion, sneezing, sore throat, tinnitus, trouble swallowing and voice change.    Eyes:  Positive for photophobia. Negative for pain, discharge, redness and visual disturbance.   Respiratory:  Negative for cough, chest tightness, shortness of breath and wheezing.    Cardiovascular: Negative.  Negative for chest pain and palpitations.   Gastrointestinal: Negative.  Negative for abdominal pain, constipation, diarrhea and nausea.   Endocrine: Negative.    Genitourinary: Negative.  Negative for dysuria and frequency.   Musculoskeletal: Negative.  Negative for arthralgias, back pain, gait problem, joint swelling, myalgias, neck pain and neck stiffness.   Integumentary:  Negative for color change, pallor and rash. Negative.   Allergic/Immunologic: Negative.    Neurological: Negative.  Negative for dizziness, tremors, seizures, syncope, facial asymmetry, speech difficulty, weakness, light-headedness, numbness and headaches.   Hematological: Negative.  Negative for adenopathy.  Does not bruise/bleed easily.   Psychiatric/Behavioral:  Positive for sleep disturbance. Negative for agitation, confusion, decreased concentration and dysphoric mood. The patient is not nervous/anxious and is not hyperactive.         Objective     Physical Exam  Constitutional:       General: He is not in acute distress.     Appearance: Normal appearance. He is well-developed. He is not ill-appearing, toxic-appearing or diaphoretic.   HENT:      Head: Not macrocephalic and not microcephalic. No raccoon eyes, Raygoza's sign, abrasion, contusion, right periorbital erythema, left periorbital erythema or laceration. Hair is normal.      Jaw: No trismus.      Right Ear: Decreased hearing noted. No laceration, drainage, swelling or tenderness. No middle ear effusion. No foreign body. No mastoid tenderness. No hemotympanum. Tympanic membrane is not injected, scarred, perforated, erythematous, retracted or bulging. Tympanic membrane has normal mobility.      Left Ear: Decreased hearing noted. No laceration, drainage, swelling or tenderness.  No middle ear effusion. No foreign body. No mastoid tenderness. No hemotympanum. Tympanic membrane is not injected, scarred, perforated, erythematous, retracted or bulging. Tympanic membrane has normal mobility.      Nose: No nasal deformity, septal deviation, laceration, mucosal edema or rhinorrhea.      Right Sinus: No maxillary sinus tenderness.      Left Sinus: No maxillary sinus tenderness.      Mouth/Throat:      Mouth: Mucous membranes are not pale, not dry and not cyanotic. No lacerations or oral lesions.      Dentition: Normal dentition. No dental caries or dental abscesses.      Pharynx: Uvula midline. No oropharyngeal exudate or uvula swelling.      Tonsils: No tonsillar abscesses.   Eyes:      General: No scleral icterus.        Right eye: No foreign body or discharge.         Left eye: No foreign body or discharge.      Extraocular Movements:      Right eye: Normal  extraocular motion and no nystagmus.      Left eye: Normal extraocular motion and no nystagmus.      Conjunctiva/sclera:      Right eye: Right conjunctiva is not injected. No chemosis, exudate or hemorrhage.     Left eye: Left conjunctiva is not injected. No chemosis, exudate or hemorrhage.     Pupils: Pupils are equal.      Right eye: Pupil is round and reactive.      Left eye: Pupil is round and reactive.   Neck:      Thyroid: No thyroid mass or thyromegaly.      Vascular: No JVD.      Trachea: No tracheal tenderness or tracheal deviation.   Cardiovascular:      Rate and Rhythm: Normal rate and regular rhythm.   Pulmonary:      Effort: No tachypnea, bradypnea, accessory muscle usage or respiratory distress.      Breath sounds: Normal breath sounds. No stridor.   Abdominal:      Palpations: Abdomen is soft.   Musculoskeletal:         General: Normal range of motion.      Cervical back: No edema, erythema or rigidity. No muscular tenderness. Normal range of motion.   Lymphadenopathy:      Head:      Right side of head: No submental, submandibular, tonsillar, preauricular, posterior auricular or occipital adenopathy.      Left side of head: No submental, submandibular, tonsillar, preauricular, posterior auricular or occipital adenopathy.      Cervical: No cervical adenopathy.      Right cervical: No superficial, deep or posterior cervical adenopathy.     Left cervical: No superficial, deep or posterior cervical adenopathy.   Skin:     Coloration: Skin is not pale.      Findings: No abrasion, bruising, burn, ecchymosis, erythema, laceration, lesion or rash.   Neurological:      Mental Status: He is alert and oriented to person, place, and time. He is not disoriented.      Cranial Nerves: No cranial nerve deficit.      Sensory: No sensory deficit.      Motor: No tremor, atrophy, abnormal muscle tone or seizure activity.   Psychiatric:         Mood and Affect: Mood is not anxious or depressed. Affect is not labile,  blunt, angry or inappropriate.         Speech: He is communicative. Speech is not rapid and pressured, delayed, slurred or tangential.         Behavior: Behavior normal. Behavior is not agitated, aggressive, withdrawn, hyperactive or combative.         Thought Content: Thought content normal.         Cognition and Memory: Memory is not impaired. He does not exhibit impaired recent memory or impaired remote memory.            Assessment and Plan     1. Presbycusis of both ears    2. Hearing loss, unspecified hearing loss type, unspecified laterality  -     Ambulatory referral/consult to ENT        Patient to see Audiology for hearing aid evaluation.    F/U prn           No follow-ups on file.

## 2023-06-26 ENCOUNTER — OFFICE VISIT (OUTPATIENT)
Dept: SLEEP MEDICINE | Facility: CLINIC | Age: 83
End: 2023-06-26
Payer: MEDICARE

## 2023-06-26 VITALS
SYSTOLIC BLOOD PRESSURE: 154 MMHG | HEART RATE: 73 BPM | DIASTOLIC BLOOD PRESSURE: 93 MMHG | HEIGHT: 67 IN | WEIGHT: 159 LBS | BODY MASS INDEX: 24.96 KG/M2

## 2023-06-26 DIAGNOSIS — G47.33 OSA (OBSTRUCTIVE SLEEP APNEA): Primary | ICD-10-CM

## 2023-06-26 DIAGNOSIS — F51.09 OTHER INSOMNIA NOT DUE TO A SUBSTANCE OR KNOWN PHYSIOLOGICAL CONDITION: ICD-10-CM

## 2023-06-26 DIAGNOSIS — G25.81 RESTLESS LEGS: ICD-10-CM

## 2023-06-26 DIAGNOSIS — J31.0 CHRONIC RHINITIS: ICD-10-CM

## 2023-06-26 PROBLEM — H40.1112 PRIMARY OPEN ANGLE GLAUCOMA OF RIGHT EYE, MODERATE STAGE: Status: ACTIVE | Noted: 2022-02-23

## 2023-06-26 PROCEDURE — 1101F PT FALLS ASSESS-DOCD LE1/YR: CPT | Mod: CPTII,S$GLB,, | Performed by: PHYSICIAN ASSISTANT

## 2023-06-26 PROCEDURE — 99999 PR PBB SHADOW E&M-EST. PATIENT-LVL IV: CPT | Mod: PBBFAC,,, | Performed by: PHYSICIAN ASSISTANT

## 2023-06-26 PROCEDURE — 1159F MED LIST DOCD IN RCRD: CPT | Mod: CPTII,S$GLB,, | Performed by: PHYSICIAN ASSISTANT

## 2023-06-26 PROCEDURE — 3288F FALL RISK ASSESSMENT DOCD: CPT | Mod: CPTII,S$GLB,, | Performed by: PHYSICIAN ASSISTANT

## 2023-06-26 PROCEDURE — 3080F PR MOST RECENT DIASTOLIC BLOOD PRESSURE >= 90 MM HG: ICD-10-PCS | Mod: CPTII,S$GLB,, | Performed by: PHYSICIAN ASSISTANT

## 2023-06-26 PROCEDURE — 3288F PR FALLS RISK ASSESSMENT DOCUMENTED: ICD-10-PCS | Mod: CPTII,S$GLB,, | Performed by: PHYSICIAN ASSISTANT

## 2023-06-26 PROCEDURE — 1159F PR MEDICATION LIST DOCUMENTED IN MEDICAL RECORD: ICD-10-PCS | Mod: CPTII,S$GLB,, | Performed by: PHYSICIAN ASSISTANT

## 2023-06-26 PROCEDURE — 99214 OFFICE O/P EST MOD 30 MIN: CPT | Mod: S$GLB,,, | Performed by: PHYSICIAN ASSISTANT

## 2023-06-26 PROCEDURE — 99214 PR OFFICE/OUTPT VISIT, EST, LEVL IV, 30-39 MIN: ICD-10-PCS | Mod: S$GLB,,, | Performed by: PHYSICIAN ASSISTANT

## 2023-06-26 PROCEDURE — 3080F DIAST BP >= 90 MM HG: CPT | Mod: CPTII,S$GLB,, | Performed by: PHYSICIAN ASSISTANT

## 2023-06-26 PROCEDURE — 3077F SYST BP >= 140 MM HG: CPT | Mod: CPTII,S$GLB,, | Performed by: PHYSICIAN ASSISTANT

## 2023-06-26 PROCEDURE — 1101F PR PT FALLS ASSESS DOC 0-1 FALLS W/OUT INJ PAST YR: ICD-10-PCS | Mod: CPTII,S$GLB,, | Performed by: PHYSICIAN ASSISTANT

## 2023-06-26 PROCEDURE — 99999 PR PBB SHADOW E&M-EST. PATIENT-LVL IV: ICD-10-PCS | Mod: PBBFAC,,, | Performed by: PHYSICIAN ASSISTANT

## 2023-06-26 PROCEDURE — 3077F PR MOST RECENT SYSTOLIC BLOOD PRESSURE >= 140 MM HG: ICD-10-PCS | Mod: CPTII,S$GLB,, | Performed by: PHYSICIAN ASSISTANT

## 2023-06-26 RX ORDER — FLUTICASONE PROPIONATE 50 MCG
1 SPRAY, SUSPENSION (ML) NASAL DAILY
Qty: 16 G | Refills: 3 | Status: ON HOLD | OUTPATIENT
Start: 2023-06-26 | End: 2023-08-09 | Stop reason: HOSPADM

## 2023-06-26 RX ORDER — GABAPENTIN 100 MG/1
100-300 CAPSULE ORAL NIGHTLY PRN
Qty: 90 CAPSULE | Refills: 1 | Status: ON HOLD | OUTPATIENT
Start: 2023-06-26 | End: 2023-08-02

## 2023-06-26 RX ORDER — AZELASTINE 1 MG/ML
1 SPRAY, METERED NASAL 2 TIMES DAILY
Qty: 30 ML | Refills: 3 | Status: ON HOLD | OUTPATIENT
Start: 2023-06-26 | End: 2023-08-09 | Stop reason: HOSPADM

## 2023-06-26 NOTE — PROGRESS NOTES
Referred by No ref. provider found     ESTABLISHED PATIENT VISIT  New to me. Previously evaluated by Dr Bustos.      Cricket Mcdonnell  is a pleasant 82 y.o. male  with PMH significant for BPH, chronic rhinitis, deviated nasal septum, open angle glaucoma, MD of right eye, anxiety, peripheral neuropathy, restless legs, insomnia, KARMA      Here today for : compliance    PLAN last visit 5/8/23:   -will proceed with a BiPAP titration due to ineffective PAP therapy with PAP up to 20cwp   -ambien 10mg for the night of the sleep study  -we discussed pursuing (he would consider inspire if BiPAP is ineffective)  -continue gabapentin 100-300mg  -using and benefitting from PAP therapy      Since last visit:   Using BiPAP nightly. Feels sleep is more consolidated. Still having some sleepiness during the day. But overall feels better on new machine. Switched from dreamwear nasal to FFM and has since been having a lot of mask leak.     Also reports restless legs and onset insomnia improved with gabapentin 100-300mg prescribed at last visit. States this is the only medication he has tried that has not caused lingering drowsiness the following day. Would like to continue for now.    Also c/o chronic rhinitis. On claritin daily. Wondering about other suggestions.         PAP history   Problems Mask leak   Mask FFM (dreamwear nasal previously)   Pressure BiPAP EPAP 10, PS 4, IPAPmax 20, ramp 6 x 10min; tolerating well   DME HME   Machine age AirCurve 10 VAuto 6/16/23   Download 6/26/23: 10/10 x 9hrs 25mins, (IPAP Max 20, EPAP Min 10, PS 4), leak (8.9/50.4/76.8), AHI 16.9,c4.7       SLEEP SCHEDULE   Environment     Bed Time 10:15-10:30P               Sleep Latency 20 min   Arousals 3-4   Nocturia 3   Back to sleep 5 min   Wake time 6 AM   Naps At 8-9A, sometimes in afternoon   Work            Past Medical History:   Diagnosis Date    Hereditary sensory neuropathy     Sleep apnea     + CPAP     Patient Active Problem List   Diagnosis     Inability to maintain erection    Nocturia    Benign prostatic hyperplasia    Hereditary sensory neuropathy    Libido, decreased    Posterior rhinorrhea    Family history of prostate cancer    KARMA (obstructive sleep apnea)    Exudative age-related macular degeneration of right eye    Chronic ankle pain    Chronic rhinitis    Deviated nasal septum    Hypertrophy of nasal turbinates    Nasal obstruction    Anxiety disorder    Body mass index (BMI) of 20 to 24    Testosterone deficiency in male    Closed nondisplaced transverse fracture of left patella with routine healing    Fatigue    Peripheral neuropathy    Aortic atherosclerosis       Current Outpatient Medications:     brimonidine 0.15 % OPTH DROP (ALPHAGAN) 0.15 % ophthalmic solution, Instill 1 drop into the left eye 3 times a day (Patient taking differently: 2 (two) times a day.), Disp: 15 mL, Rfl: 0    brimonidine 0.15 % OPTH DROP (ALPHAGAN) 0.15 % ophthalmic solution, 1 drop Left Eye 3 times a day, Disp: 10 mL, Rfl: 1    brimonidine 0.2% (ALPHAGAN) 0.2 % Drop, Apply 1 drop to eye., Disp: , Rfl:     brimonidine 0.2% (ALPHAGAN) 0.2 % Drop, Instill 1 drop into left eye three times a day, Disp: 10 mL, Rfl: 6    brimonidine 0.2% (ALPHAGAN) 0.2 % Drop, instill 1 drop Left Eye 3 times a day, Disp: 15 mL, Rfl: 0    brimonidine 0.2% (ALPHAGAN) 0.2 % Drop, Instill 1 drop into Left Eye 3 times a day, Disp: 15 mL, Rfl: 1    dorzolamide-timolol 2-0.5% (COSOPT) 22.3-6.8 mg/mL ophthalmic solution, Instill 1 drop into both eyes twice a day, Disp: 10 mL, Rfl: 2    dorzolamide-timolol 2-0.5% (COSOPT) 22.3-6.8 mg/mL ophthalmic solution, Instill 1 drop into right eye twice a day, Disp: 10 mL, Rfl: 0    dorzolamide-timolol 2-0.5% (COSOPT) 22.3-6.8 mg/mL ophthalmic solution, Instill 1 drop Left Eye twice a day, Disp: 10 mL, Rfl: 0    dorzolamide-timolol 2-0.5% (COSOPT) 22.3-6.8 mg/mL ophthalmic solution, Place 1 drop into the left eye twice a day, Disp: 10 mL, Rfl: 0     dorzolamide-timolol 2-0.5% (COSOPT) 22.3-6.8 mg/mL ophthalmic solution, Instill 1 drop into the Left Eye twice a day, Disp: 10 mL, Rfl: 0    dorzolamide-timolol 2-0.5% (COSOPT) 22.3-6.8 mg/mL ophthalmic solution, Instill 1 drop into the Left Eye twice a day, Disp: 10 mL, Rfl: 3    dorzolamide-timolol 2-0.5% (COSOPT) 22.3-6.8 mg/mL ophthalmic solution, instill 1 drop into Left Eye three times a day, Disp: 10 mL, Rfl: 1    dorzolamide-timolol 2-0.5% (COSOPT) 22.3-6.8 mg/mL ophthalmic solution, Instill 1 drop into left eye twice a day, Disp: 10 mL, Rfl: 6    dorzolamide-timolol 2-0.5% (COSOPT) 22.3-6.8 mg/mL ophthalmic solution, Instill 1 drop Left Eye 3 times a day, Disp: 10 mL, Rfl: 1    dorzolamide-timolol 2-0.5% (COSOPT) 22.3-6.8 mg/mL ophthalmic solution, Instill 1 drop into the  Left Eye 3 times a day, Disp: 30 mL, Rfl: 1    doxepin (SINEQUAN) 10 MG capsule, doxepin 10 mg capsule, Disp: , Rfl:     erythromycin (ROMYCIN) ophthalmic ointment, Apply 1 a thin layer into left eye four times a day, Disp: 3.5 g, Rfl: 6    ferrous sulfate 325 (65 FE) MG EC tablet, Take 325 mg by mouth once daily., Disp: , Rfl:     fluorometholone (FLAREX) 0.1 % DrpS, Instill 1 drop into left eye once a day, Disp: 5 mL, Rfl: 0    fluorometholone 0.1% (FML) 0.1 % DrpS, Instill 1 drop into left eye once a day, Disp: 5 mL, Rfl: 1    fluticasone (FLONASE) 50 mcg/actuation nasal spray, 2 sprays by Each Nare route once daily., Disp: 1 Bottle, Rfl: 2    FLUZONE HIGHDOSE QUAD 22-23  mcg/0.7 mL Syrg, , Disp: , Rfl:     gabapentin (NEURONTIN) 100 MG capsule, Take 1-3 capsules (100-300 mg total) by mouth nightly as needed (restless legs  (note: take 1-2 hours prior to usual onset of symptoms))., Disp: 90 capsule, Rfl: 1    ibuprofen (ADVIL,MOTRIN) 800 MG tablet, as needed., Disp: , Rfl:     ketorolac 0.4% (ACULAR) 0.4 % Drop, Instill 1 drop into left eye four times a day, Disp: 5 mL, Rfl: 1    LUMIGAN 0.01 % Drop, every evening., Disp: , Rfl:      MARCAINE 0.25 % (2.5 mg/mL) Soln, , Disp: , Rfl:     MODERNA COVID BIVAL,6Y UP,,PF, 50 mcg/0.5 mL injection, , Disp: , Rfl:     MULTIVIT-IRON-MIN-FOLIC ACID 3,500-18-0.4 UNIT-MG-MG ORAL CHEW, Take by mouth once daily. , Disp: , Rfl:     mupirocin (BACTROBAN) 2 % ointment, every evening., Disp: , Rfl:     neomycin-polymyxin-dexamethasone (MAXITROL) 3.5 mg/g-10,000 unit/g-0.1 % Oint, Apply a small amount into left eye three times a day, Disp: 3.5 g, Rfl: 1    neomycin-polymyxin-dexamethasone (MAXITROL) 3.5 mg/g-10,000 unit/g-0.1 % Oint, Apply a small amount into left eye three times a day, Disp: 3.5 g, Rfl: 0    neomycin-polymyxin-dexamethasone (MAXITROL) 3.5 mg/g-10,000 unit/g-0.1 % Oint, Apply 0.25 inch of ointment  into right eye 3 times a day, Disp: 3.5 g, Rfl: 0    neomycin-polymyxin-dexamethasone (MAXITROL) 3.5 mg/g-10,000 unit/g-0.1 % Oint, Apply a small amount into left eye three times a day, Disp: 3.5 g, Rfl: 0    neomycin-polymyxin-dexamethasone (MAXITROL) 3.5 mg/g-10,000 unit/g-0.1 % Oint, Apply 0.25 inch into right eye 3 times a day, Disp: 3.5 g, Rfl: 1    neomycin-polymyxin-dexamethasone (MAXITROL) 3.5 mg/g-10,000 unit/g-0.1 % Oint, Apply 0.25 inch Left Eye 3 times a day STOP AFTER 3 DAYS, Disp: 3.5 g, Rfl: 0    neomycin-polymyxin-dexamethasone (MAXITROL) 3.5 mg/g-10,000 unit/g-0.1 % Oint, 0.25 inch Left Eye 3 times a day, Disp: 3.5 g, Rfl: 0    neomycin-polymyxin-dexamethasone (MAXITROL) 3.5 mg/g-10,000 unit/g-0.1 % Oint, Apply 1 a small amount into left eye three times a day STOP AFTER 3 DAYS, Disp: 3.5 g, Rfl: 0    neomycin-polymyxin-dexamethasone (MAXITROL) 3.5 mg/g-10,000 unit/g-0.1 % Oint, Apply 0.25 inch ribbon into left eye 3 times a day, Disp: 3.5 g, Rfl: 0    netarsudiL-latanoprost (ROCKLATAN) 0.02-0.005 % Drop, Instill 1 drop into both eyes at bedtime, Disp: 2.5 mL, Rfl: 6    netarsudiL-latanoprost (ROCKLATAN) 0.02-0.005 % Drop, Instill 1 drop into both eyes at bedtime, Disp: 2.5 mL, Rfl:  "6    netarsudiL-latanoprost (ROCKLATAN) 0.02-0.005 % Drop, Instill 1 drop into left eye every night, Disp: 2.5 mL, Rfl: 2    ofloxacin (OCUFLOX) 0.3 % ophthalmic solution, Place 1 drop into Left Eye 3 times a day (USE ONLY 3 DAYS AFTER EACH INJECTION), Disp: 10 mL, Rfl: 0    ondansetron (ZOFRAN-ODT) 4 MG TbDL, Dissolve 1 tablet (4 mg total) by mouth every 8 (eight) hours as needed (nausea or vomiting)., Disp: 12 tablet, Rfl: 0    prednisoLONE acetate (PRED FORTE) 1 % DrpS, Instill 1 drop into right eye once a day, Disp: 15 mL, Rfl: 0    prednisoLONE acetate (PRED FORTE) 1 % DrpS, Instill 1 drop into the Right Eye once a day, Disp: 15 mL, Rfl: 0    PREVIDENT 5000 BOOSTER PLUS 1.1 % Pste, once daily. , Disp: , Rfl: 0    testosterone (ANDROGEL) 1.62 % (40.5 mg/2.5 gram) GlPk, Place 2.5 g (1 packet) onto the skin once daily., Disp: 75 g, Rfl: 5    testosterone (ANDROGEL) 1.62 % (40.5 mg/2.5 gram) GlPk, Place 2.5 g (1 packet) onto the skin once daily., Disp: 75 g, Rfl: 5    timolol maleate 0.25% (TIMOPTIC) 0.25 % Drop, 1 drop once daily., Disp: , Rfl:     sildenafiL (VIAGRA) 100 MG tablet, Take 1 tablet (100 mg total) by mouth daily as needed., Disp: 10 tablet, Rfl: 12    zolpidem (AMBIEN) 10 mg Tab, Take 1 tablet (10 mg total) by mouth nightly as needed (to be taken the night of the patient's sleep study)., Disp: 1 tablet, Rfl: 0       Vitals:    06/26/23 1425   BP: (!) 154/93   BP Location: Left arm   Patient Position: Sitting   BP Method: Small (Automatic)   Pulse: 73   Weight: 72.1 kg (159 lb)   Height: 5' 7" (1.702 m)     Physical Exam:    GEN:   Well-appearing  Psych:  Appropriate affect, demonstrates insight  SKIN:  No rash on the face or bridge of the nose      LABS:   Lab Results   Component Value Date    HGB 14.0 05/22/2023    CO2 28 03/10/2023       RECORDS REVIEWED PREVIOUSLY:    Baseline Sleep Study: 06/09/2017 HST The overall AHI was 45 and overall RDI was 46. The oxygen lisa was 70.8% and % time < 90% " SpO2 was 37.7%.      CPAP titration 5.12.22: CPAP =9 supine SWS, some sREM CPAP =20.  no lateral sleep.;  Rec CPAP =9 cwp + side sleeping + FFM.  June 22-wants sleeping pill-Trz 50mg    BiPAP titration 5.20.23: 14/10 + lat N2 (apneas supine), 16/12 (sleSimplus FFM- medium sizeep onset obstructives supine)  Rx BiPAP-> EPAP 10, PS 4, IPAPmax 20, ramp 6 x 10min    ASSESSMENT    PROBLEM DESCRIPTION/ Sx on Presentation Interval Hx STATUS   Severe KARMA   HEENT:            MP4, + mild micrognathia Good usage, residual AHI 16.6 on dowload (improved from 25 on CPAP; 45 on dx study); will work on improving mask fit and increasing pressures as tolerated uncontrolled   Daytime Sx   Occasional sleepiness when inactive   Needing short naps during the day  denies sleepiness when driving   ESS n/a/24 on intake Does report sleep feels more restorative; however, still quite sleepy persists   Insomnia   Trouble falling asleep: not usually  Maintenance:         waking frequently  Prior hypnotics:      trazodone  Current hypnotics: gabapentin (helping) Waking less with gabapentin improved   Nocturia   x 2-3 per sleep period persists Persists    Chronic Rhinitis Claritin daily  Was on flonase in past, not currently   New     Other issues:     PLAN     -using and benefiting from BiPAP therapy  -continue BiPAP and current pressure settings (consider increasing pressure settings to IPAP max 25, EPAP min 12, PS4 once mask leak resolved if AHI remains elevated)  -recommended mask fitting to see about improving mask leak (can call [facility] [phone])  -will still consider inspire in future if BiPAP proves ineffective  -continue gabapentin 100-300mg   -discussed trial of floanse and astelin nasal spray to be used daily in conjunction with claritin for chronic rhinitis (trial for at least 2 consecutive weeks)  -discussed KARMA and CPAP with patient in detail, including possible complications of untreated KARMA like heart attack/stroke  -advised on  strict driving precautions; advised never to drive drowsy    Advised on plan of care. Answered all patient questions. Patient verbalized understanding and voiced agreement with plan of care.     RTC 4-6 weeks     The patient was given open opportunity to ask questions and/or express concerns about treatment plan.   All questions/concerns were discussed.     Two patient identifiers used prior to evaluation.

## 2023-07-12 ENCOUNTER — TELEPHONE (OUTPATIENT)
Dept: INTERNAL MEDICINE | Facility: CLINIC | Age: 83
End: 2023-07-12
Payer: MEDICARE

## 2023-07-12 NOTE — TELEPHONE ENCOUNTER
Spoke with the pt who stated he has neuropathy and back pain. Pt is not taking anything right now for pain. Pt stated Tylenol causes him to have blurry vision. Please advise

## 2023-07-12 NOTE — TELEPHONE ENCOUNTER
----- Message from Nimo Archer sent at 7/12/2023 10:09 AM CDT -----  Contact: MALACHI GOULD [[id number]]  Type: Call Back      Who called: MALACHI GOULD [[id number]]      What is the request in detail: Patient is requesting a call back. Pt would like to know what pain medication he can take that will not blur his vision. He states that the tylenol blurs his vision.   Please advise.     Can the clinic reply by [facility]? No      Would the patient rather a call back or a response via [facility]? Call back       Best call back number: [phone] (home)       Additional Information:

## 2023-07-13 ENCOUNTER — TELEPHONE (OUTPATIENT)
Dept: INTERNAL MEDICINE | Facility: CLINIC | Age: 83
End: 2023-07-13

## 2023-07-13 DIAGNOSIS — G62.9 PERIPHERAL POLYNEUROPATHY: ICD-10-CM

## 2023-07-13 DIAGNOSIS — G60.8 HEREDITARY SENSORY NEUROPATHY: Primary | ICD-10-CM

## 2023-07-13 NOTE — TELEPHONE ENCOUNTER
----- Message from Ellen Bird sent at 7/13/2023 12:21 PM CDT -----  Regarding: pt call back  Name of Who is Calling: MALACHI GOULD [[id number]]        What is the request in detail: pt needs a call regarding for pain in feet because he has neuropathy, cannot take tylenol because it blurs vision, prefers something he can  over the counter just needs advice on what,please advise.         Can the clinic reply by [facility]: no           [facility] [phone]

## 2023-07-14 NOTE — TELEPHONE ENCOUNTER
----- Message from Rogerio Hastings sent at 7/14/2023 10:44 AM CDT -----  Name of Who is Calling:MALACHI GOULD [[id number]]                What is the request in detail: Pt is calling because he want to know what's the next steps and what the medication might be, states him and Melinda has been playing phone tag, but he states he can be reached on [employer] as well.Please call back to further assist.                 Can the clinic reply by [facility]: Yes                What Number [facility] Back [facility]:[phone]

## 2023-07-14 NOTE — TELEPHONE ENCOUNTER
----- Message from Mauro Venegas sent at 7/13/2023  4:22 PM CDT -----  Name of Who is Calling:MALACHI GOULD [[id number]]           What is the request in detail:pt stated that he would like to speak with the office directly in regards to his questions/concerns about an missed call from the office.Please contact to further discuss and advise.           Can the clinic reply by [facility]:[phone]           What Number to [facility] Back [facility]:[phone]

## 2023-07-14 NOTE — TELEPHONE ENCOUNTER
Spoke with pt today and schedule him with the pain clinic. Pt will need a referral to pain. Pended referral.

## 2023-07-23 DIAGNOSIS — N52.01 ERECTILE DYSFUNCTION DUE TO ARTERIAL INSUFFICIENCY: ICD-10-CM

## 2023-07-23 DIAGNOSIS — R79.89 LOW TESTOSTERONE IN MALE: ICD-10-CM

## 2023-07-24 ENCOUNTER — OFFICE VISIT (OUTPATIENT)
Dept: SLEEP MEDICINE | Facility: CLINIC | Age: 83
End: 2023-07-24
Payer: MEDICARE

## 2023-07-24 VITALS
WEIGHT: 159 LBS | DIASTOLIC BLOOD PRESSURE: 65 MMHG | BODY MASS INDEX: 24.96 KG/M2 | HEIGHT: 67 IN | SYSTOLIC BLOOD PRESSURE: 135 MMHG | HEART RATE: 77 BPM

## 2023-07-24 DIAGNOSIS — G47.33 OSA (OBSTRUCTIVE SLEEP APNEA): Primary | ICD-10-CM

## 2023-07-24 PROCEDURE — 3075F PR MOST RECENT SYSTOLIC BLOOD PRESS GE 130-139MM HG: ICD-10-PCS | Mod: HCNC,CPTII,S$GLB, | Performed by: PHYSICIAN ASSISTANT

## 2023-07-24 PROCEDURE — 3075F SYST BP GE 130 - 139MM HG: CPT | Mod: HCNC,CPTII,S$GLB, | Performed by: PHYSICIAN ASSISTANT

## 2023-07-24 PROCEDURE — 1126F AMNT PAIN NOTED NONE PRSNT: CPT | Mod: HCNC,CPTII,S$GLB, | Performed by: PHYSICIAN ASSISTANT

## 2023-07-24 PROCEDURE — 1160F RVW MEDS BY RX/DR IN RCRD: CPT | Mod: HCNC,CPTII,S$GLB, | Performed by: PHYSICIAN ASSISTANT

## 2023-07-24 PROCEDURE — 99999 PR PBB SHADOW E&M-EST. PATIENT-LVL IV: ICD-10-PCS | Mod: PBBFAC,HCNC,, | Performed by: PHYSICIAN ASSISTANT

## 2023-07-24 PROCEDURE — 1159F MED LIST DOCD IN RCRD: CPT | Mod: HCNC,CPTII,S$GLB, | Performed by: PHYSICIAN ASSISTANT

## 2023-07-24 PROCEDURE — 99214 PR OFFICE/OUTPT VISIT, EST, LEVL IV, 30-39 MIN: ICD-10-PCS | Mod: HCNC,S$GLB,, | Performed by: PHYSICIAN ASSISTANT

## 2023-07-24 PROCEDURE — 99214 OFFICE O/P EST MOD 30 MIN: CPT | Mod: HCNC,S$GLB,, | Performed by: PHYSICIAN ASSISTANT

## 2023-07-24 PROCEDURE — 1126F PR PAIN SEVERITY QUANTIFIED, NO PAIN PRESENT: ICD-10-PCS | Mod: HCNC,CPTII,S$GLB, | Performed by: PHYSICIAN ASSISTANT

## 2023-07-24 PROCEDURE — 1159F PR MEDICATION LIST DOCUMENTED IN MEDICAL RECORD: ICD-10-PCS | Mod: HCNC,CPTII,S$GLB, | Performed by: PHYSICIAN ASSISTANT

## 2023-07-24 PROCEDURE — 99999 PR PBB SHADOW E&M-EST. PATIENT-LVL IV: CPT | Mod: PBBFAC,HCNC,, | Performed by: PHYSICIAN ASSISTANT

## 2023-07-24 PROCEDURE — 3078F DIAST BP <80 MM HG: CPT | Mod: HCNC,CPTII,S$GLB, | Performed by: PHYSICIAN ASSISTANT

## 2023-07-24 PROCEDURE — 1160F PR REVIEW ALL MEDS BY PRESCRIBER/CLIN PHARMACIST DOCUMENTED: ICD-10-PCS | Mod: HCNC,CPTII,S$GLB, | Performed by: PHYSICIAN ASSISTANT

## 2023-07-24 PROCEDURE — 3078F PR MOST RECENT DIASTOLIC BLOOD PRESSURE < 80 MM HG: ICD-10-PCS | Mod: HCNC,CPTII,S$GLB, | Performed by: PHYSICIAN ASSISTANT

## 2023-07-24 RX ORDER — SILDENAFIL 100 MG/1
100 TABLET, FILM COATED ORAL DAILY PRN
Qty: 10 TABLET | Refills: 11 | Status: ON HOLD | OUTPATIENT
Start: 2023-07-24 | End: 2023-08-09 | Stop reason: HOSPADM

## 2023-07-24 NOTE — TELEPHONE ENCOUNTER
No care due was identified.  [facility] Embedded Care Due Messages. Reference number: [id number].   7/23/2023 7:54:32 PM CDT

## 2023-07-24 NOTE — PROGRESS NOTES
Referred by No ref. provider found     ESTABLISHED PATIENT VISIT  Follows with Dr Akash Harley Cristhian Mcdonnell  is a pleasant 82 y.o. male  with PMH significant for BPH, chronic rhinitis, deviated nasal septum, open angle glaucoma, MD of right eye, anxiety, peripheral neuropathy, restless legs, insomnia, KARMA       Here today for : follow up    PLAN last visit:   -using and benefiting from BiPAP therapy  -continue BiPAP and current pressure settings (consider increasing pressure settings to IPAP max 25, EPAP min 12, PS4 once mask leak resolved if AHI remains elevated)  -recommended mask fitting to see about improving mask leak (can call [facility] [phone])  -will still consider inspire in future if BiPAP proves ineffective  -continue gabapentin 100-300mg   -discussed trial of floanse and astelin nasal spray to be used daily in conjunction with claritin for chronic rhinitis (trial for at least 2 consecutive weeks)  -discussed KARMA and CPAP with patient in detail, including possible complications of untreated KARMA like heart attack/stroke  -advised on strict driving precautions; advised never to drive drowsy      Since last visit:   Using nightly, feels less sleepy recently. Still working on mask issues. Switched back to FFM, leaking less, but still falls off in the night causing night time wakings.   Stopped gabapentin as that was making him drowsy during the day now. Does not want to pursue other medical management of RLS.       PAP history   Problems Mask falling off at night during sleep   Mask FFM    Pressure BiPAP EPAP 10, PS 4, IPAPmax 20, ramp 6 x 10min; tolerating well   DME HME   Machine age AirCurve 10 VAuto 6/16/23   Download 7/24/23: 30/30 x 9hrs 9mins, IPAP Max 20, EPAP min 10, PPS 4, leak (8.4/41/66.4), AHI 12.3, c 3.7       SLEEP SCHEDULE   Environment     Bed Time 10:15-10:30P               Sleep Latency 20 min   Arousals 3-4   Nocturia 3   Back to sleep 5 min   Wake time 6 AM   Naps At 8-9A, sometimes  in afternoon   Work            Past Medical History:   Diagnosis Date    Hereditary sensory neuropathy     Sleep apnea     + CPAP     Patient Active Problem List   Diagnosis    Inability to maintain erection    Nocturia    Benign prostatic hyperplasia    Hereditary sensory neuropathy    Libido, decreased    Posterior rhinorrhea    Family history of prostate cancer    KARMA (obstructive sleep apnea)    Exudative age-related macular degeneration of right eye    Chronic ankle pain    Chronic rhinitis    Deviated nasal septum    Hypertrophy of nasal turbinates    Nasal obstruction    Anxiety disorder    Body mass index (BMI) of 20 to 24    Testosterone deficiency in male    Closed nondisplaced transverse fracture of left patella with routine healing    Fatigue    Peripheral neuropathy    Aortic atherosclerosis    Primary open angle glaucoma of right eye, moderate stage       Current Outpatient Medications:     azelastine (ASTELIN) 137 mcg (0.1 %) nasal spray, 1 spray (137 mcg total) by Nasal route 2 (two) times daily., Disp: 30 mL, Rfl: 3    brimonidine 0.15 % OPTH DROP (ALPHAGAN) 0.15 % ophthalmic solution, Instill 1 drop into the left eye 3 times a day (Patient taking differently: 2 (two) times a day.), Disp: 15 mL, Rfl: 0    brimonidine 0.15 % OPTH DROP (ALPHAGAN) 0.15 % ophthalmic solution, 1 drop Left Eye 3 times a day, Disp: 10 mL, Rfl: 1    brimonidine 0.2% (ALPHAGAN) 0.2 % Drop, Apply 1 drop to eye., Disp: , Rfl:     brimonidine 0.2% (ALPHAGAN) 0.2 % Drop, Instill 1 drop into left eye three times a day, Disp: 10 mL, Rfl: 6    brimonidine 0.2% (ALPHAGAN) 0.2 % Drop, instill 1 drop Left Eye 3 times a day, Disp: 15 mL, Rfl: 0    brimonidine 0.2% (ALPHAGAN) 0.2 % Drop, Instill 1 drop into Left Eye 3 times a day, Disp: 15 mL, Rfl: 1    brimonidine 0.2% (ALPHAGAN) 0.2 % Drop, Instill 1 drop into left eye three times a day, Disp: 10 mL, Rfl: 6    dorzolamide-timolol 2-0.5% (COSOPT) 22.3-6.8 mg/mL ophthalmic solution,  Instill 1 drop into both eyes twice a day, Disp: 10 mL, Rfl: 2    dorzolamide-timolol 2-0.5% (COSOPT) 22.3-6.8 mg/mL ophthalmic solution, Instill 1 drop into right eye twice a day, Disp: 10 mL, Rfl: 0    dorzolamide-timolol 2-0.5% (COSOPT) 22.3-6.8 mg/mL ophthalmic solution, Instill 1 drop Left Eye twice a day, Disp: 10 mL, Rfl: 0    dorzolamide-timolol 2-0.5% (COSOPT) 22.3-6.8 mg/mL ophthalmic solution, Place 1 drop into the left eye twice a day, Disp: 10 mL, Rfl: 0    dorzolamide-timolol 2-0.5% (COSOPT) 22.3-6.8 mg/mL ophthalmic solution, Instill 1 drop into the Left Eye twice a day, Disp: 10 mL, Rfl: 0    dorzolamide-timolol 2-0.5% (COSOPT) 22.3-6.8 mg/mL ophthalmic solution, Instill 1 drop into the Left Eye twice a day, Disp: 10 mL, Rfl: 3    dorzolamide-timolol 2-0.5% (COSOPT) 22.3-6.8 mg/mL ophthalmic solution, instill 1 drop into Left Eye three times a day, Disp: 10 mL, Rfl: 1    dorzolamide-timolol 2-0.5% (COSOPT) 22.3-6.8 mg/mL ophthalmic solution, Instill 1 drop into left eye twice a day, Disp: 10 mL, Rfl: 6    dorzolamide-timolol 2-0.5% (COSOPT) 22.3-6.8 mg/mL ophthalmic solution, Instill 1 drop Left Eye 3 times a day, Disp: 10 mL, Rfl: 1    dorzolamide-timolol 2-0.5% (COSOPT) 22.3-6.8 mg/mL ophthalmic solution, Instill 1 drop into the  Left Eye 3 times a day, Disp: 30 mL, Rfl: 1    dorzolamide-timolol 2-0.5% (COSOPT) 22.3-6.8 mg/mL ophthalmic solution, Instill 1 drop into left eye twice a day, Disp: 10 mL, Rfl: 6    doxepin (SINEQUAN) 10 MG capsule, doxepin 10 mg capsule, Disp: , Rfl:     erythromycin (ROMYCIN) ophthalmic ointment, Apply 1 a thin layer into left eye four times a day, Disp: 3.5 g, Rfl: 6    ferrous sulfate 325 (65 FE) MG EC tablet, Take 325 mg by mouth once daily., Disp: , Rfl:     fluorometholone (FLAREX) 0.1 % DrpS, Instill 1 drop into left eye once a day, Disp: 5 mL, Rfl: 0    fluorometholone 0.1% (FML) 0.1 % DrpS, Instill 1 drop into left eye once a day, Disp: 5 mL, Rfl: 1     fluticasone (FLONASE) 50 mcg/actuation nasal spray, 2 sprays by Each Nare route once daily., Disp: 1 Bottle, Rfl: 2    fluticasone propionate (FLONASE) 50 mcg/actuation nasal spray, 1 spray (50 mcg total) by Each Nostril route once daily., Disp: 16 g, Rfl: 3    FLUZONE HIGHDOSE QUAD 22-23  mcg/0.7 mL Syrg, , Disp: , Rfl:     gabapentin (NEURONTIN) 100 MG capsule, Take 1-3 capsules (100-300 mg total) by mouth nightly as needed (restless legs  (note: take 1-2 hours prior to usual onset of symptoms))., Disp: 90 capsule, Rfl: 1    ibuprofen (ADVIL,MOTRIN) 800 MG tablet, as needed., Disp: , Rfl:     ketorolac 0.4% (ACULAR) 0.4 % Drop, Instill 1 drop into left eye four times a day, Disp: 5 mL, Rfl: 1    LUMIGAN 0.01 % Drop, every evening., Disp: , Rfl:     MARCAINE 0.25 % (2.5 mg/mL) Soln, , Disp: , Rfl:     MODERNA COVID BIVAL,6Y UP,,PF, 50 mcg/0.5 mL injection, , Disp: , Rfl:     MULTIVIT-IRON-MIN-FOLIC ACID 3,500-18-0.4 UNIT-MG-MG ORAL CHEW, Take by mouth once daily. , Disp: , Rfl:     mupirocin (BACTROBAN) 2 % ointment, every evening., Disp: , Rfl:     neomycin-polymyxin-dexamethasone (MAXITROL) 3.5 mg/g-10,000 unit/g-0.1 % Oint, Apply a small amount into left eye three times a day, Disp: 3.5 g, Rfl: 1    neomycin-polymyxin-dexamethasone (MAXITROL) 3.5 mg/g-10,000 unit/g-0.1 % Oint, Apply a small amount into left eye three times a day, Disp: 3.5 g, Rfl: 0    neomycin-polymyxin-dexamethasone (MAXITROL) 3.5 mg/g-10,000 unit/g-0.1 % Oint, Apply 0.25 inch of ointment  into right eye 3 times a day, Disp: 3.5 g, Rfl: 0    neomycin-polymyxin-dexamethasone (MAXITROL) 3.5 mg/g-10,000 unit/g-0.1 % Oint, Apply a small amount into left eye three times a day, Disp: 3.5 g, Rfl: 0    neomycin-polymyxin-dexamethasone (MAXITROL) 3.5 mg/g-10,000 unit/g-0.1 % Oint, Apply 0.25 inch into right eye 3 times a day, Disp: 3.5 g, Rfl: 1    neomycin-polymyxin-dexamethasone (MAXITROL) 3.5 mg/g-10,000 unit/g-0.1 % Oint, Apply 0.25 inch Left  Eye 3 times a day STOP AFTER 3 DAYS, Disp: 3.5 g, Rfl: 0    neomycin-polymyxin-dexamethasone (MAXITROL) 3.5 mg/g-10,000 unit/g-0.1 % Oint, 0.25 inch Left Eye 3 times a day, Disp: 3.5 g, Rfl: 0    neomycin-polymyxin-dexamethasone (MAXITROL) 3.5 mg/g-10,000 unit/g-0.1 % Oint, Apply 1 a small amount into left eye three times a day STOP AFTER 3 DAYS, Disp: 3.5 g, Rfl: 0    neomycin-polymyxin-dexamethasone (MAXITROL) 3.5 mg/g-10,000 unit/g-0.1 % Oint, Apply 0.25 inch ribbon into left eye 3 times a day, Disp: 3.5 g, Rfl: 0    netarsudiL-latanoprost (ROCKLATAN) 0.02-0.005 % Drop, Instill 1 drop into both eyes at bedtime, Disp: 2.5 mL, Rfl: 6    netarsudiL-latanoprost (ROCKLATAN) 0.02-0.005 % Drop, Instill 1 drop into both eyes at bedtime, Disp: 2.5 mL, Rfl: 6    netarsudiL-latanoprost (ROCKLATAN) 0.02-0.005 % Drop, Instill 1 drop into left eye every night, Disp: 2.5 mL, Rfl: 2    netarsudiL-latanoprost (ROCKLATAN) 0.02-0.005 % Drop, Instill 1 drop into both eyes at bedtime, Disp: 2.5 mL, Rfl: 6    ofloxacin (OCUFLOX) 0.3 % ophthalmic solution, Place 1 drop into Left Eye 3 times a day (USE ONLY 3 DAYS AFTER EACH INJECTION), Disp: 10 mL, Rfl: 0    ondansetron (ZOFRAN-ODT) 4 MG TbDL, Dissolve 1 tablet (4 mg total) by mouth every 8 (eight) hours as needed (nausea or vomiting)., Disp: 12 tablet, Rfl: 0    prednisoLONE acetate (PRED FORTE) 1 % DrpS, Instill 1 drop into right eye once a day, Disp: 15 mL, Rfl: 0    prednisoLONE acetate (PRED FORTE) 1 % DrpS, Instill 1 drop into the Right Eye once a day, Disp: 15 mL, Rfl: 0    PREVIDENT 5000 BOOSTER PLUS 1.1 % Pste, once daily. , Disp: , Rfl: 0    sildenafiL (VIAGRA) 100 MG tablet, Take 1 tablet (100 mg total) by mouth daily as needed., Disp: 10 tablet, Rfl: 11    testosterone (ANDROGEL) 1.62 % (40.5 mg/2.5 gram) GlPk, Place 2.5 g (1 packet) onto the skin once daily., Disp: 75 g, Rfl: 5    testosterone (ANDROGEL) 1.62 % (40.5 mg/2.5 gram) GlPk, Place 2.5 g (1 packet) onto the skin  "once daily., Disp: 75 g, Rfl: 5    timolol maleate 0.25% (TIMOPTIC) 0.25 % Drop, 1 drop once daily., Disp: , Rfl:     zolpidem (AMBIEN) 10 mg Tab, Take 1 tablet (10 mg total) by mouth nightly as needed (to be taken the night of the patient's sleep study)., Disp: 1 tablet, Rfl: 0       Vitals:    07/24/23 1342   BP: 135/65   BP Location: Left arm   Patient Position: Sitting   BP Method: Small (Automatic)   Pulse: 77   Weight: 72.1 kg (159 lb)   Height: 5' 7" (1.702 m)     Physical Exam:    GEN:   Well-appearing  Psych:  Appropriate affect, demonstrates insight  SKIN:  No rash on the face or bridge of the nose      LABS:   Lab Results   Component Value Date    HGB 14.0 05/22/2023    CO2 28 03/10/2023       RECORDS REVIEWED PREVIOUSLY:    Baseline Sleep Study: 06/09/2017 HST The overall AHI was 45 and overall RDI was 46. The oxygen lisa was 70.8% and % time < 90% SpO2 was 37.7%.      CPAP titration 5.12.22: CPAP =9 supine SWS, some sREM CPAP =20.  no lateral sleep.;  Rec CPAP =9 cwp + side sleeping + FFM.  June 22-wants sleeping pill-Trz 50mg     BiPAP titration 5.20.23: 14/10 + lat N2 (apneas supine), 16/12 (sleSimplus FFM- medium sizeep onset obstructives supine)  Rx BiPAP-> EPAP 10, PS 4, IPAPmax 20, ramp 6 x 10min    ASSESSMENT    PROBLEM DESCRIPTION/ Sx on Presentation Interval Hx STATUS   Severe KARMA    HEENT:            MP4, + mild micrognathia  2017 AHI 45 Good usage, residual AHI 12.3 on download (improved from 16.6 last visit 6/26/23 and improved from 25 on CPAP; 45 on dx study); will work on improving mask fit and increasing pressures as tolerated uncontrolled   Daytime Sx    Occasional sleepiness when inactive   Needing short naps during the day  denies sleepiness when driving   ESS n/a/24 on intake Does report sleep feels more restorative; however, still quite sleepy Mild improvement   Insomnia    Trouble falling asleep: not usually  Maintenance:         waking frequently  Prior hypnotics:      " trazodone  Current hypnotics: gabapentin (helping) Wakes occasionally when mask falls off Improved with CPAP, but still occurs   Nocturia    x 2-3 per sleep period persists persists    Chronic Rhinitis Claritin daily  Was on flonase in past, not currently   stable stable      Restless Legs Sx of restless legs at night    improved with gabapentin 100-300mg prescribed at last visit. States this is the only medication he has tried that has not caused lingering drowsiness the following day.  Gabapentin is now causing fatigue so he is no longer taking any medications; not interested in alternative medication management  persists   Other issues:     PLAN     -using and benefiting from BiPAP therapy  -advised KARMA is still not well controlled  -recommended to consider inspire as possible treatment option due to residual AHI >10 despite nightly BiPAP usage  -discussed inspire in detail, including need for repeat HST and exam with ENT to determine eligibility   -patient would like to follow up in 2 months and see progress as he is still working on mask fit/leak issues, then make final determination  -continue BiPAP nightly in the interim   -discussed KARMA and BiPAP with patient in detail, including possible complications of untreated/under treated KARMA like heart attack/stroke  -advised on strict driving precautions; advised never to drive drowsy    Advised on plan of care. Answered all patient questions. Patient verbalized understanding and voiced agreement with plan of care.     RTC 2 months     The patient was given open opportunity to ask questions and/or express concerns about treatment plan. All questions/concerns were discussed.     Two patient identifiers used prior to evaluation.     30 minutes spent on this encounter, including face to face time with patient, chart review, and BiPAP interrogation. Topics discussed, include but are not limited to: KARMA, BiPAP, inspire, restless leg syndrome, medication management, etc.

## 2023-07-24 NOTE — TELEPHONE ENCOUNTER
Refill Decision Note   Cricket Mcdonnell  is requesting a refill authorization.  Brief Assessment and Rationale for Refill:  Approve     Medication Therapy Plan:         Comments:     Note composed:3:15 AM 07/24/2023

## 2023-07-31 ENCOUNTER — TELEPHONE (OUTPATIENT)
Dept: INTERNAL MEDICINE | Facility: CLINIC | Age: 83
End: 2023-07-31
Payer: MEDICARE

## 2023-07-31 NOTE — TELEPHONE ENCOUNTER
LVM for the pt to call and get more info. Unable to find a same day today. Pt is scheduled with pain on 8/7/23

## 2023-07-31 NOTE — TELEPHONE ENCOUNTER
----- Message from Jessie Bolaños sent at 7/31/2023  8:08 AM CDT -----  Regarding: appointment  Name of Who is Calling: Pater            What is the request in detail: Patient is requesting a call back to be seen today for severe back pain. He would like to come in after 1:30.           Can the clinic reply by [facility]: No           What Number to Call Back if not in [facility]: [phone]

## 2023-08-01 ENCOUNTER — OFFICE VISIT (OUTPATIENT)
Dept: INTERNAL MEDICINE | Facility: CLINIC | Age: 83
End: 2023-08-01
Payer: MEDICARE

## 2023-08-01 ENCOUNTER — HOSPITAL ENCOUNTER (INPATIENT)
Facility: OTHER | Age: 83
LOS: 8 days | Discharge: HOME HEALTH | DRG: 177 | End: 2023-08-09
Attending: EMERGENCY MEDICINE | Admitting: STUDENT IN AN ORGANIZED HEALTH CARE EDUCATION/TRAINING PROGRAM
Payer: MEDICARE

## 2023-08-01 VITALS
HEIGHT: 67 IN | DIASTOLIC BLOOD PRESSURE: 66 MMHG | WEIGHT: 150 LBS | BODY MASS INDEX: 23.54 KG/M2 | OXYGEN SATURATION: 90 % | SYSTOLIC BLOOD PRESSURE: 96 MMHG | HEART RATE: 104 BPM

## 2023-08-01 DIAGNOSIS — J90 LOCULATED PLEURAL EFFUSION: Primary | ICD-10-CM

## 2023-08-01 DIAGNOSIS — R09.02 HYPOXIA: ICD-10-CM

## 2023-08-01 DIAGNOSIS — S82.035D CLOSED NONDISPLACED TRANSVERSE FRACTURE OF LEFT PATELLA WITH ROUTINE HEALING: ICD-10-CM

## 2023-08-01 DIAGNOSIS — R10.9 ACUTE ABDOMINAL PAIN IN LEFT FLANK: ICD-10-CM

## 2023-08-01 DIAGNOSIS — J90 PLEURAL EFFUSION ON LEFT: ICD-10-CM

## 2023-08-01 DIAGNOSIS — J96.01 ACUTE HYPOXEMIC RESPIRATORY FAILURE: ICD-10-CM

## 2023-08-01 DIAGNOSIS — R07.89 LEFT-SIDED CHEST WALL PAIN: ICD-10-CM

## 2023-08-01 DIAGNOSIS — M54.6 ACUTE MIDLINE THORACIC BACK PAIN: Primary | ICD-10-CM

## 2023-08-01 LAB
ANION GAP SERPL CALC-SCNC: 11 MMOL/L (ref 8–16)
BACTERIA #/AREA URNS HPF: NORMAL /HPF
BASOPHILS # BLD AUTO: 0.06 K/UL (ref 0–0.2)
BASOPHILS NFR BLD: 0.3 % (ref 0–1.9)
BILIRUB UR QL STRIP: NEGATIVE
BUN SERPL-MCNC: 12 MG/DL (ref 8–23)
CALCIUM SERPL-MCNC: 9.4 MG/DL (ref 8.7–10.5)
CHLORIDE SERPL-SCNC: 100 MMOL/L (ref 95–110)
CLARITY UR: CLEAR
CO2 SERPL-SCNC: 22 MMOL/L (ref 23–29)
COLOR UR: YELLOW
CREAT SERPL-MCNC: 0.7 MG/DL (ref 0.5–1.4)
DIFFERENTIAL METHOD: ABNORMAL
EOSINOPHIL # BLD AUTO: 0 K/UL (ref 0–0.5)
EOSINOPHIL NFR BLD: 0 % (ref 0–8)
ERYTHROCYTE [DISTWIDTH] IN BLOOD BY AUTOMATED COUNT: 12.5 % (ref 11.5–14.5)
EST. GFR  (NO RACE VARIABLE): >60 ML/MIN/1.73 M^2
GLUCOSE SERPL-MCNC: 108 MG/DL (ref 70–110)
GLUCOSE UR QL STRIP: NEGATIVE
HCT VFR BLD AUTO: 41.5 % (ref 40–54)
HGB BLD-MCNC: 14.2 G/DL (ref 14–18)
HGB UR QL STRIP: NEGATIVE
HYALINE CASTS #/AREA URNS LPF: 0 /LPF
IMM GRANULOCYTES # BLD AUTO: 0.17 K/UL (ref 0–0.04)
IMM GRANULOCYTES NFR BLD AUTO: 0.8 % (ref 0–0.5)
KETONES UR QL STRIP: ABNORMAL
LEUKOCYTE ESTERASE UR QL STRIP: NEGATIVE
LYMPHOCYTES # BLD AUTO: 0.7 K/UL (ref 1–4.8)
LYMPHOCYTES NFR BLD: 3.2 % (ref 18–48)
MCH RBC QN AUTO: 32.1 PG (ref 27–31)
MCHC RBC AUTO-ENTMCNC: 34.2 G/DL (ref 32–36)
MCV RBC AUTO: 94 FL (ref 82–98)
MICROSCOPIC COMMENT: NORMAL
MONOCYTES # BLD AUTO: 1.4 K/UL (ref 0.3–1)
MONOCYTES NFR BLD: 7.1 % (ref 4–15)
NEUTROPHILS # BLD AUTO: 17.8 K/UL (ref 1.8–7.7)
NEUTROPHILS NFR BLD: 88.6 % (ref 38–73)
NITRITE UR QL STRIP: NEGATIVE
NRBC BLD-RTO: 0 /100 WBC
PH UR STRIP: 7 [PH] (ref 5–8)
PLATELET # BLD AUTO: 358 K/UL (ref 150–450)
PMV BLD AUTO: 8.1 FL (ref 9.2–12.9)
POTASSIUM SERPL-SCNC: 4.1 MMOL/L (ref 3.5–5.1)
PROT UR QL STRIP: ABNORMAL
RBC # BLD AUTO: 4.42 M/UL (ref 4.6–6.2)
RBC #/AREA URNS HPF: 0 /HPF (ref 0–4)
SODIUM SERPL-SCNC: 133 MMOL/L (ref 136–145)
SP GR UR STRIP: >1.03 (ref 1–1.03)
SQUAMOUS #/AREA URNS HPF: 1 /HPF
URN SPEC COLLECT METH UR: ABNORMAL
UROBILINOGEN UR STRIP-ACNC: NEGATIVE EU/DL
WBC # BLD AUTO: 20.16 K/UL (ref 3.9–12.7)
WBC #/AREA URNS HPF: 0 /HPF (ref 0–5)

## 2023-08-01 PROCEDURE — 1159F MED LIST DOCD IN RCRD: CPT | Mod: HCNC,CPTII,S$GLB, | Performed by: PHYSICIAN ASSISTANT

## 2023-08-01 PROCEDURE — 94640 AIRWAY INHALATION TREATMENT: CPT | Mod: HCNC

## 2023-08-01 PROCEDURE — 96375 TX/PRO/DX INJ NEW DRUG ADDON: CPT | Mod: HCNC

## 2023-08-01 PROCEDURE — 93005 ELECTROCARDIOGRAM TRACING: CPT | Mod: HCNC

## 2023-08-01 PROCEDURE — 99285 EMERGENCY DEPT VISIT HI MDM: CPT | Mod: 25,HCNC

## 2023-08-01 PROCEDURE — 99900035 HC TECH TIME PER 15 MIN (STAT): Mod: HCNC

## 2023-08-01 PROCEDURE — 93010 EKG 12-LEAD: ICD-10-PCS | Mod: HCNC,,, | Performed by: INTERNAL MEDICINE

## 2023-08-01 PROCEDURE — 99223 PR INITIAL HOSPITAL CARE,LEVL III: ICD-10-PCS | Mod: HCNC,AI,,

## 2023-08-01 PROCEDURE — 94761 N-INVAS EAR/PLS OXIMETRY MLT: CPT | Mod: HCNC

## 2023-08-01 PROCEDURE — 96374 THER/PROPH/DIAG INJ IV PUSH: CPT | Mod: HCNC

## 2023-08-01 PROCEDURE — 63600175 PHARM REV CODE 636 W HCPCS: Mod: HCNC

## 2023-08-01 PROCEDURE — 96361 HYDRATE IV INFUSION ADD-ON: CPT | Mod: HCNC

## 2023-08-01 PROCEDURE — 99223 1ST HOSP IP/OBS HIGH 75: CPT | Mod: HCNC,AI,,

## 2023-08-01 PROCEDURE — 63600175 PHARM REV CODE 636 W HCPCS: Mod: HCNC | Performed by: EMERGENCY MEDICINE

## 2023-08-01 PROCEDURE — 99215 PR OFFICE/OUTPT VISIT, EST, LEVL V, 40-54 MIN: ICD-10-PCS | Mod: HCNC,S$GLB,, | Performed by: PHYSICIAN ASSISTANT

## 2023-08-01 PROCEDURE — 1159F PR MEDICATION LIST DOCUMENTED IN MEDICAL RECORD: ICD-10-PCS | Mod: HCNC,CPTII,S$GLB, | Performed by: PHYSICIAN ASSISTANT

## 2023-08-01 PROCEDURE — 99999 PR PBB SHADOW E&M-EST. PATIENT-LVL V: ICD-10-PCS | Mod: PBBFAC,HCNC,, | Performed by: PHYSICIAN ASSISTANT

## 2023-08-01 PROCEDURE — 25000242 PHARM REV CODE 250 ALT 637 W/ HCPCS: Mod: HCNC

## 2023-08-01 PROCEDURE — 11000001 HC ACUTE MED/SURG PRIVATE ROOM: Mod: HCNC

## 2023-08-01 PROCEDURE — 3078F PR MOST RECENT DIASTOLIC BLOOD PRESSURE < 80 MM HG: ICD-10-PCS | Mod: HCNC,CPTII,S$GLB, | Performed by: PHYSICIAN ASSISTANT

## 2023-08-01 PROCEDURE — 1101F PT FALLS ASSESS-DOCD LE1/YR: CPT | Mod: HCNC,CPTII,S$GLB, | Performed by: PHYSICIAN ASSISTANT

## 2023-08-01 PROCEDURE — 87040 BLOOD CULTURE FOR BACTERIA: CPT | Mod: HCNC | Performed by: EMERGENCY MEDICINE

## 2023-08-01 PROCEDURE — 27000221 HC OXYGEN, UP TO 24 HOURS: Mod: HCNC

## 2023-08-01 PROCEDURE — 99999 PR PBB SHADOW E&M-EST. PATIENT-LVL V: CPT | Mod: PBBFAC,HCNC,, | Performed by: PHYSICIAN ASSISTANT

## 2023-08-01 PROCEDURE — 3288F PR FALLS RISK ASSESSMENT DOCUMENTED: ICD-10-PCS | Mod: HCNC,CPTII,S$GLB, | Performed by: PHYSICIAN ASSISTANT

## 2023-08-01 PROCEDURE — 3078F DIAST BP <80 MM HG: CPT | Mod: HCNC,CPTII,S$GLB, | Performed by: PHYSICIAN ASSISTANT

## 2023-08-01 PROCEDURE — 25000003 PHARM REV CODE 250: Mod: HCNC | Performed by: EMERGENCY MEDICINE

## 2023-08-01 PROCEDURE — 93010 ELECTROCARDIOGRAM REPORT: CPT | Mod: HCNC,,, | Performed by: INTERNAL MEDICINE

## 2023-08-01 PROCEDURE — 3074F PR MOST RECENT SYSTOLIC BLOOD PRESSURE < 130 MM HG: ICD-10-PCS | Mod: HCNC,CPTII,S$GLB, | Performed by: PHYSICIAN ASSISTANT

## 2023-08-01 PROCEDURE — 3288F FALL RISK ASSESSMENT DOCD: CPT | Mod: HCNC,CPTII,S$GLB, | Performed by: PHYSICIAN ASSISTANT

## 2023-08-01 PROCEDURE — 80048 BASIC METABOLIC PNL TOTAL CA: CPT | Mod: HCNC | Performed by: EMERGENCY MEDICINE

## 2023-08-01 PROCEDURE — 1101F PR PT FALLS ASSESS DOC 0-1 FALLS W/OUT INJ PAST YR: ICD-10-PCS | Mod: HCNC,CPTII,S$GLB, | Performed by: PHYSICIAN ASSISTANT

## 2023-08-01 PROCEDURE — 1125F AMNT PAIN NOTED PAIN PRSNT: CPT | Mod: HCNC,CPTII,S$GLB, | Performed by: PHYSICIAN ASSISTANT

## 2023-08-01 PROCEDURE — 1125F PR PAIN SEVERITY QUANTIFIED, PAIN PRESENT: ICD-10-PCS | Mod: HCNC,CPTII,S$GLB, | Performed by: PHYSICIAN ASSISTANT

## 2023-08-01 PROCEDURE — 99215 OFFICE O/P EST HI 40 MIN: CPT | Mod: HCNC,S$GLB,, | Performed by: PHYSICIAN ASSISTANT

## 2023-08-01 PROCEDURE — 25500020 PHARM REV CODE 255: Mod: HCNC | Performed by: EMERGENCY MEDICINE

## 2023-08-01 PROCEDURE — 85025 COMPLETE CBC W/AUTO DIFF WBC: CPT | Mod: HCNC | Performed by: EMERGENCY MEDICINE

## 2023-08-01 PROCEDURE — 81000 URINALYSIS NONAUTO W/SCOPE: CPT | Mod: HCNC | Performed by: EMERGENCY MEDICINE

## 2023-08-01 PROCEDURE — 3074F SYST BP LT 130 MM HG: CPT | Mod: HCNC,CPTII,S$GLB, | Performed by: PHYSICIAN ASSISTANT

## 2023-08-01 RX ORDER — KETOROLAC TROMETHAMINE 30 MG/ML
10 INJECTION, SOLUTION INTRAMUSCULAR; INTRAVENOUS
Status: COMPLETED | OUTPATIENT
Start: 2023-08-01 | End: 2023-08-01

## 2023-08-01 RX ORDER — MELOXICAM 15 MG/1
15 TABLET ORAL DAILY
Qty: 14 TABLET | Refills: 0 | Status: ON HOLD | OUTPATIENT
Start: 2023-08-01 | End: 2023-08-09 | Stop reason: HOSPADM

## 2023-08-01 RX ORDER — ACETAMINOPHEN 500 MG
1000 TABLET ORAL EVERY 8 HOURS PRN
Status: DISCONTINUED | OUTPATIENT
Start: 2023-08-01 | End: 2023-08-09 | Stop reason: HOSPADM

## 2023-08-01 RX ORDER — AZITHROMYCIN 250 MG/1
500 TABLET, FILM COATED ORAL DAILY
Status: DISCONTINUED | OUTPATIENT
Start: 2023-08-02 | End: 2023-08-02

## 2023-08-01 RX ORDER — AMOXICILLIN 250 MG
2 CAPSULE ORAL DAILY PRN
Status: DISCONTINUED | OUTPATIENT
Start: 2023-08-01 | End: 2023-08-02

## 2023-08-01 RX ORDER — METHOCARBAMOL 500 MG/1
500 TABLET, FILM COATED ORAL 4 TIMES DAILY
Qty: 40 TABLET | Refills: 0 | Status: ON HOLD | OUTPATIENT
Start: 2023-08-01 | End: 2023-08-09 | Stop reason: HOSPADM

## 2023-08-01 RX ORDER — ENOXAPARIN SODIUM 100 MG/ML
40 INJECTION SUBCUTANEOUS EVERY 24 HOURS
Status: DISCONTINUED | OUTPATIENT
Start: 2023-08-01 | End: 2023-08-09 | Stop reason: HOSPADM

## 2023-08-01 RX ORDER — PROCHLORPERAZINE EDISYLATE 5 MG/ML
5 INJECTION INTRAMUSCULAR; INTRAVENOUS EVERY 6 HOURS PRN
Status: DISCONTINUED | OUTPATIENT
Start: 2023-08-01 | End: 2023-08-09 | Stop reason: HOSPADM

## 2023-08-01 RX ORDER — IPRATROPIUM BROMIDE AND ALBUTEROL SULFATE 2.5; .5 MG/3ML; MG/3ML
3 SOLUTION RESPIRATORY (INHALATION)
Status: DISCONTINUED | OUTPATIENT
Start: 2023-08-01 | End: 2023-08-08

## 2023-08-01 RX ORDER — ONDANSETRON 2 MG/ML
4 INJECTION INTRAMUSCULAR; INTRAVENOUS EVERY 8 HOURS PRN
Status: DISCONTINUED | OUTPATIENT
Start: 2023-08-01 | End: 2023-08-09 | Stop reason: HOSPADM

## 2023-08-01 RX ORDER — BISACODYL 10 MG
10 SUPPOSITORY, RECTAL RECTAL DAILY PRN
Status: DISCONTINUED | OUTPATIENT
Start: 2023-08-01 | End: 2023-08-09 | Stop reason: HOSPADM

## 2023-08-01 RX ORDER — SIMETHICONE 80 MG
1 TABLET,CHEWABLE ORAL 4 TIMES DAILY PRN
Status: DISCONTINUED | OUTPATIENT
Start: 2023-08-01 | End: 2023-08-09 | Stop reason: HOSPADM

## 2023-08-01 RX ORDER — MAG HYDROX/ALUMINUM HYD/SIMETH 200-200-20
30 SUSPENSION, ORAL (FINAL DOSE FORM) ORAL 4 TIMES DAILY PRN
Status: DISCONTINUED | OUTPATIENT
Start: 2023-08-01 | End: 2023-08-09 | Stop reason: HOSPADM

## 2023-08-01 RX ORDER — SODIUM CHLORIDE 0.9 % (FLUSH) 0.9 %
10 SYRINGE (ML) INJECTION EVERY 12 HOURS PRN
Status: DISCONTINUED | OUTPATIENT
Start: 2023-08-01 | End: 2023-08-09 | Stop reason: HOSPADM

## 2023-08-01 RX ORDER — MORPHINE SULFATE 4 MG/ML
4 INJECTION, SOLUTION INTRAMUSCULAR; INTRAVENOUS
Status: COMPLETED | OUTPATIENT
Start: 2023-08-01 | End: 2023-08-01

## 2023-08-01 RX ORDER — TALC
6 POWDER (GRAM) TOPICAL NIGHTLY PRN
Status: DISCONTINUED | OUTPATIENT
Start: 2023-08-01 | End: 2023-08-09 | Stop reason: HOSPADM

## 2023-08-01 RX ADMIN — IOHEXOL 75 ML: 350 INJECTION, SOLUTION INTRAVENOUS at 04:08

## 2023-08-01 RX ADMIN — ENOXAPARIN SODIUM 40 MG: 40 INJECTION SUBCUTANEOUS at 09:08

## 2023-08-01 RX ADMIN — CEFTRIAXONE SODIUM 1 G: 1 INJECTION, POWDER, FOR SOLUTION INTRAMUSCULAR; INTRAVENOUS at 07:08

## 2023-08-01 RX ADMIN — IPRATROPIUM BROMIDE AND ALBUTEROL SULFATE 3 ML: 2.5; .5 SOLUTION RESPIRATORY (INHALATION) at 08:08

## 2023-08-01 RX ADMIN — KETOROLAC TROMETHAMINE 10 MG: 30 INJECTION, SOLUTION INTRAMUSCULAR; INTRAVENOUS at 03:08

## 2023-08-01 RX ADMIN — SODIUM CHLORIDE 1000 ML: 9 INJECTION, SOLUTION INTRAVENOUS at 04:08

## 2023-08-01 RX ADMIN — MORPHINE SULFATE 4 MG: 4 INJECTION, SOLUTION INTRAMUSCULAR; INTRAVENOUS at 03:08

## 2023-08-01 RX ADMIN — AZITHROMYCIN MONOHYDRATE 500 MG: 500 INJECTION, POWDER, LYOPHILIZED, FOR SOLUTION INTRAVENOUS at 09:08

## 2023-08-01 NOTE — ED PROVIDER NOTES
Encounter Date: 8/1/2023    SCRIBE #1 NOTE: I, Clem Jarrett, am scribing for, and in the presence of,  Marco Florez MD.       History     Chief Complaint   Patient presents with    Flank Pain     Sent by doctors office upstairs for a CT scan. Pt reports flank L sided flank pain radiating tot he LLQ abdomen. Denies  symptoms.      Time seen by provider: 2:24 PM    Cricket Mcdonnell is a 82 y.o. male, with a PMHx of sleep apnea, who presents to the ED with left lateral chest wall pain starting six days ago. Patient was sent in by primary care for imaging. On exam, the patient is requesting muscle relaxants and NSAIDs for pain. He states his pain began on both sides with improvement to his right side. Current pain is constant and radiates to his LUQ with accompanying appetite loss and sleep disturbance. Patient notes weakness resulting from appetite loss but denies diarrhea, vomiting, or issues urinating. He denies any falls or injuries prior to onset. Patient has used heating pads and ice with temporary relief. PSHx includes hernia repair. This is the extent of the patient's complaints today in the Emergency Department.      The history is provided by the patient.     Review of patient's allergies indicates:   Allergen Reactions    Poison ivy extract     Cooper Rash     Past Medical History:   Diagnosis Date    Hereditary sensory neuropathy     Sleep apnea     + CPAP     Past Surgical History:   Procedure Laterality Date    CATARACT EXTRACTION      COLONOSCOPY N/A 1/3/2019    Procedure: COLONOSCOPY;  Surgeon: Cholo Yates MD;  Location: [address]);  Service: Endoscopy;  Laterality: N/A;    EYE SURGERY      galucoma surgery      HERNIA REPAIR      tonsillectomy      TONSILLECTOMY       Family History   Problem Relation Age of Onset    COPD Sister     Cancer Sister         lung cancer from smoking    No Known Problems Son     No Known Problems Daughter      Social History     Tobacco Use     Smoking status: Former     Current packs/day: 0.00     Average packs/day: 0.3 packs/day for 30.0 years (7.5 ttl pk-yrs)     Types: Cigarettes     Start date: 1971     Quit date: 2001     Years since quittin.0     Passive exposure: Past    Smokeless tobacco: Never    Tobacco comments:     I was a light daily smoker   Substance Use Topics    Alcohol use: Not Currently     Comment: I gave up drinking 23 years ago    Drug use: No     Review of Systems   Constitutional:  Positive for appetite change. Negative for chills and fever.   HENT:  Negative for congestion and rhinorrhea.    Respiratory:  Negative for chest tightness and shortness of breath.    Cardiovascular:  Negative for chest pain and palpitations.   Gastrointestinal:  Positive for abdominal pain. Negative for diarrhea, nausea and vomiting.   Genitourinary:  Positive for flank pain. Negative for dysuria.   Musculoskeletal:  Positive for back pain. Negative for neck pain.   Skin:  Negative for color change.   Neurological:  Negative for dizziness and headaches.   Psychiatric/Behavioral:  Positive for sleep disturbance.        Physical Exam     Initial Vitals [23 1251]   BP Pulse Resp Temp SpO2   100/67 101 20 98 °F (36.7 °C) (!) 91 %      MAP       --         Physical Exam    Nursing note and vitals reviewed.  Constitutional: He appears well-developed and well-nourished. He is not diaphoretic. No distress.   HENT:   Head: Normocephalic and atraumatic.   Mouth/Throat: Oropharynx is clear and moist.   Eyes: Conjunctivae are normal.   Neck: Neck supple.   Cardiovascular:  Normal rate, regular rhythm, normal heart sounds and intact distal pulses.     Exam reveals no gallop and no friction rub.       No murmur heard.  Pulmonary/Chest: Breath sounds normal. He has no wheezes. He has no rhonchi. He has no rales. He exhibits tenderness.   Lateral chest wall tendernss over 9th-12th ribs from the midaxillary line to the anterior axillary line. No  crepitus or rash.     Abdominal: Abdomen is soft. He exhibits no distension. There is abdominal tenderness.   Minimal tenderness to the LUQ with no guarding or distention or hernia. There is no guarding.   Musculoskeletal:         General: Tenderness present. Normal range of motion.      Cervical back: Neck supple.      Comments: 2+ DP/PT pulses no midline thoracic or lumbar spine tenderness.      Neurological: He is alert and oriented to person, place, and time.   5/5 strength, equal sensation to touch of lower extremities.    Skin: No rash noted. No erythema.         ED Course   Procedures  Labs Reviewed   CBC W/ AUTO DIFFERENTIAL - Abnormal; Notable for the following components:       Result Value    WBC 20.16 (*)     RBC 4.42 (*)     MCH 32.1 (*)     MPV 8.1 (*)     Immature Granulocytes 0.8 (*)     Gran # (ANC) 17.8 (*)     Immature Grans (Abs) 0.17 (*)     Lymph # 0.7 (*)     Mono # 1.4 (*)     Gran % 88.6 (*)     Lymph % 3.2 (*)     All other components within normal limits   BASIC METABOLIC PANEL - Abnormal; Notable for the following components:    Sodium 133 (*)     CO2 22 (*)     All other components within normal limits   URINALYSIS, REFLEX TO URINE CULTURE - Abnormal; Notable for the following components:    Specific Gravity, UA >1.030 (*)     Protein, UA 1+ (*)     Ketones, UA 1+ (*)     All other components within normal limits    Narrative:     Specimen Source->Urine   CULTURE, BLOOD   CULTURE, BLOOD   URINALYSIS MICROSCOPIC    Narrative:     Specimen Source->Urine          Imaging Results              X-Ray Chest 1 View (Final result)  Result time 08/01/23 18:26:38      Final result by Surinder Shanks MD (08/01/23 18:26:38)                   Impression:      1. Loculated appearing left pleural effusion with associated compressive atelectasis/consolidation of the left lower lung zone, please see CT 08/01/2023.      Electronically signed by: Surinder Shanks MD  Date:    08/01/2023  Time:    18:26                Narrative:    EXAMINATION:  XR CHEST 1 VIEW    CLINICAL HISTORY:  L chest wall pain;    TECHNIQUE:  Single frontal view of the chest was performed.    COMPARISON:  CT abdomen and pelvis 08/01/2023    FINDINGS:  The cardiomediastinal silhouette is not enlarged..  There is a loculated appearing left pleural effusion, better evaluated CT 08/01/2023..  The trachea is midline.  The lungs are symmetrically expanded bilaterally with coarse central hilar interstitial attenuation suggesting superimposed edema.  There is consolidation versus atelectasis throughout the left lower lung zone..  There is no pneumothorax.  The osseous structures are remarkable for degenerative change.  There is osteopenia..                                        CT Abdomen Pelvis With Contrast (Final result)  Result time 08/01/23 18:01:52      Final result by Surinder Shanks MD (08/01/23 18:01:52)                   Impression:      This report was flagged in Epic as abnormal.    1. Loculated left pleural effusion with compressive atelectasis of the left lower lobe.  Correlation with patient history is advised, no prior exams are available for comparison.  2. Findings suggesting possible hepatic steatosis, correlation with LFTs recommended.  3. Large amount of stool within the right colon suggesting constipation.  This likely accounts for slow flow through a few distal small bowel loops.  No high-grade obstruction.  4. The urinary bladder is distended, may be on the basis of outlet obstruction as there is prostatomegaly however correlation with urinalysis is recommended.  5. Please see above for several additional findings.      Electronically signed by: Surinder Shanks MD  Date:    08/01/2023  Time:    18:01               Narrative:    EXAMINATION:  CT ABDOMEN PELVIS WITH CONTRAST    CLINICAL HISTORY:  LLQ abdominal pain;    TECHNIQUE:  Low dose axial images, sagittal and coronal reformations were obtained from the lung bases to the  pubic symphysis following the IV administration of 75 mL of Omnipaque 350 .  Oral contrast was not given.    COMPARISON:  None.    FINDINGS:  Images of the lower thorax are remarkable for a partially visualized loculated left pleural effusion.  There is associated compressive atelectasis of the left lower lobe noting a few scattered air bronchograms in the region.  There is trace right pleural effusion.  There is a small pericardial effusion.    The liver is slightly hypoattenuating, may reflect sequela of contrast phase or steatosis, correlation with LFTs recommended.  Allowing for motion artifact, the spleen, pancreas, gallbladder and adrenal glands are grossly unremarkable.  There is no biliary dilation or ascites.  The portal vein, splenic vein, SMV, celiac axis and SMA all are patent.  No significant abdominal lymphadenopathy.    The kidneys enhance symmetrically without hydronephrosis or nephrolithiasis.  There is a subcentimeter low attenuating lesion arising from the interpolar region of the right kidney, too small for characterization.  The bilateral ureters are unable to be followed in their entirety to the urinary bladder, no definite calculi seen or secondary findings to suggest obstructive uropathy.  The urinary bladder is distended without wall thickening.  The prostate is enlarged.    There are a few scattered colonic diverticula without surrounding inflammation to suggest diverticulitis.  The terminal ileum is unremarkable.  The appendix is unremarkable.  The small bowel is grossly unremarkable.  There is atherosclerotic calcification of the aorta and its branches.  There are several scattered shotty periaortic, pericaval, and mesenteric lymph nodes.  No focal organized pelvic fluid collection.  There are surgical changes of anterior abdominal wall hernia repair.    There is osteopenia.  There are degenerative changes of the spine.  No significant inguinal lymphadenopathy.                                        CT Lumbar Spine Without Contrast (Final result)  Result time 08/01/23 19:31:21      Final result by Nat France MD (08/01/23 19:31:21)                   Impression:      Increase in lumbar levoscoliosis.  No definite acute lumbar fracture.    Moderate to large left pleural effusion, which is incompletely imaged.      Electronically signed by: Nat France  Date:    08/01/2023  Time:    19:31               Narrative:    EXAMINATION:  CT OF THE LUMBAR SPINE WITHOUT    CLINICAL HISTORY:  Low back pain.  Increased fracture risk.  Left-sided flank pain radiating to left lower quadrant.    TECHNIQUE:  1.25 mm axial images were obtained through the lumbar spine..  Coronal and sagittal reformatted images were provided.    COMPARISON:  MRI of the lumbar spine 03/03/2015    FINDINGS:  Moderate levoscoliosis is present, which has increased compared to the previous MRI of the spine.  The lumbar vertebral body heights are maintained.  There is grade 1 retrolisthesis of L1 on L2, L2 on L3 and anterolisthesis of L5 on S1, which is likely due to degenerative change and or ligamentum laxity.  Vacuum phenomena and intervertebral disc space narrowing seen at these levels.  Degenerative changes are greatest at L2/L3.  Facet arthrosis is greatest at L5/S1.  At L1/L2, there is significant right foraminal stenosis secondary to grade 1 retrolisthesis and a levoscoliosis.  Extruded disks are seen at L4/L5 and L5/S1 with moderate central canal narrowing segment of to ligamentum flavum hypertrophy and facet arthrosis.  There is lumbarization of the S1 vertebral body.  The bones are diffusely osteopenic.  Incidental note is made of an incompletely imaged moderate to large left pleural effusion.  Please see CT abdomen pelvis performed on the same date.                                  (radiology reading, CXR and CT-AP visualized by me)       Medications   azithromycin (ZITHROMAX) 500 mg in dextrose 5 % (D5W) 250 mL IVPB  (Vial-Mate) (500 mg Intravenous New Bag 8/1/23 2102)   cefTRIAXone (ROCEPHIN) 1 g in dextrose 5 % in water (D5W) 100 mL IVPB (MB+) (has no administration in time range)   azithromycin tablet 500 mg (has no administration in time range)   albuterol-ipratropium 2.5 mg-0.5 mg/3 mL nebulizer solution 3 mL (3 mLs Nebulization Given 8/1/23 2034)   sodium chloride 0.9% flush 10 mL (has no administration in time range)   enoxaparin injection 40 mg (40 mg Subcutaneous Given 8/1/23 2102)   acetaminophen tablet 1,000 mg (has no administration in time range)   senna-docusate 8.6-50 mg per tablet 2 tablet (has no administration in time range)   bisacodyL suppository 10 mg (has no administration in time range)   ondansetron injection 4 mg (has no administration in time range)   prochlorperazine injection Soln 5 mg (has no administration in time range)   melatonin tablet 6 mg (has no administration in time range)   simethicone chewable tablet 80 mg (has no administration in time range)   aluminum-magnesium hydroxide-simethicone 200-200-20 mg/5 mL suspension 30 mL (has no administration in time range)   ketorolac injection 9.999 mg (9.999 mg Intravenous Given 8/1/23 1515)   morphine injection 4 mg (4 mg Intravenous Given 8/1/23 1515)   sodium chloride 0.9% bolus 1,000 mL 1,000 mL (0 mLs Intravenous Stopped 8/1/23 1701)   iohexoL (OMNIPAQUE 350) injection 75 mL (75 mLs Intravenous Given 8/1/23 1629)   cefTRIAXone (ROCEPHIN) 1 g in dextrose 5 % in water (D5W) 100 mL IVPB (MB+) (0 g Intravenous Stopped 8/1/23 2006)     Medical Decision Making:   History:   Old Medical Records: I decided to obtain old medical records.  Clinical Tests:   Lab Tests: Ordered and Reviewed  Radiological Study: Ordered and Reviewed          Scribe Attestation:   Scribe #1: I performed the above scribed service and the documentation accurately describes the services I performed. I attest to the accuracy of the note.      I, Dr. Marco Florez, personally  performed the services described in this documentation. All medical record entries made by the scribe were at my direction and in my presence.  I have reviewed the chart and agree that the record reflects my personal performance and is accurate and complete. Marco Florez MD.  10:02 PM 08/01/2023    Cricket Mcdonnell is a 82 y.o. male presenting with initial presentation with around 1 week of left chest wall pain.  He was sent here for imaging including of the lumbar region as well of the abdomen and pelvis without acute process seen in either location.  Collateral associated abnormality of the lower lung noted better visualized on initial chest x-ray showing significant effusion with possible infiltrate and definite associated atelectasis.  Oxygen level lower than normal outpatient in no distress.  He ultimately does require nasal cannula oxygen and would benefit from observation as well as expedited workup.  I will cover with antibiotics for possible pneumonia although differential does not include other etiologies of effusion.  He would benefit potentially from further imaging as well as possible pulmonology assessment.  I doubt sepsis.  No sign of acute intra-abdominal process such as diverticulitis or obstructive uropathy.  Leukocytosis with left shift noted, suggestive of infectious process such as pneumonia.  I have discussed with hospital medicine who will assume care.        ED Course as of 08/01/23 2201 Tue Aug 01, 2023   1720 EKG:  Normal sinus rhythm at a rate of 99.  Normal intervals.  Normal axis.  Incomplete RBBB morphology similar to prior 17 Nov 2022.  No significant ST or T wave changes suggesting acute ischemia or infarction.  (Independently interpreted by me) [MR]   1746 CXR:  Multilobar infiltrate with possible effusion, cannot exclude mass, although new since Jan 2023 comparison.  No ptx.  Likely atelectasis with volume loss on L.    [MR]      ED Course User Index  [MR] Marco CUMMINGS  MD Gautam                 Clinical Impression:   Final diagnoses:  [R07.89] Left-sided chest wall pain  [J90] Pleural effusion on left (Primary)  [R09.02] Hypoxia        ED Disposition Condition    Admit Stable                Marco Florez MD  08/01/23 7024

## 2023-08-01 NOTE — PROGRESS NOTES
"INTERNAL MEDICINE URGENT VISIT NOTE    CHIEF COMPLAINT     Chief Complaint   Patient presents with    Back Pain       HPI     Cricket Mcdonnell is a 82 y.o. male who presents for an urgent visit today.    PCP is Neo Sneed MD, patient is new to me.     Patient presents with complaints of back pain   Started acutely wednesday night   Maybe did sit ups the "wrong way"?  Heating pad and icing has helped  Aleve RTC with no significant improvement.  No numbness or tingling in lower extremities  Pain waxes and wanes - to better or worse with standing or sitting.   Worse with laying down  No change in bladder or bowel function  No falls   No injury   No fever      Past Medical History:  Past Medical History:   Diagnosis Date    Hereditary sensory neuropathy     Sleep apnea     + CPAP       Home Medications:  Prior to Admission medications    Medication Sig Start Date End Date Taking? Authorizing Provider   azelastine (ASTELIN) 137 mcg (0.1 %) nasal spray 1 spray (137 mcg total) by Nasal route 2 (two) times daily. 6/26/23 6/25/24 Yes Rachelle Vincent PA-C   brimonidine 0.15 % OPTH DROP (ALPHAGAN) 0.15 % ophthalmic solution Instill 1 drop into the left eye 3 times a day 10/19/22  Yes    brimonidine 0.2% (ALPHAGAN) 0.2 % Drop Apply 1 drop to eye. 12/29/21  Yes Historical Provider   dorzolamide-timolol 2-0.5% (COSOPT) 22.3-6.8 mg/mL ophthalmic solution Instill 1 drop into both eyes twice a day 11/17/21  Yes    doxepin (SINEQUAN) 10 MG capsule doxepin 10 mg capsule   Yes Historical Provider   erythromycin (ROMYCIN) ophthalmic ointment Apply 1 a thin layer into left eye four times a day 6/22/23  Yes    ferrous sulfate 325 (65 FE) MG EC tablet Take 325 mg by mouth once daily.   Yes Historical Provider   fluorometholone (FLAREX) 0.1 % DrpS Instill 1 drop into left eye once a day 4/29/22  Yes    fluticasone (FLONASE) 50 mcg/actuation nasal spray 2 sprays by Each Nare route once daily. 4/10/14  Yes Cristian Diego MD "   gabapentin (NEURONTIN) 100 MG capsule Take 1-3 capsules (100-300 mg total) by mouth nightly as needed (restless legs  (note: take 1-2 hours prior to usual onset of symptoms)). 6/26/23  Yes Rachelle Vincent PA-C   ibuprofen (ADVIL,MOTRIN) 800 MG tablet as needed.   Yes Historical Provider   ketorolac 0.4% (ACULAR) 0.4 % Drop Instill 1 drop into left eye four times a day 6/1/23  Yes    LUMIGAN 0.01 % Drop every evening. 1/3/22  Yes Historical Provider   MARCAINE 0.25 % (2.5 mg/mL) Soln  1/25/22  Yes Historical Provider   MULTIVIT-IRON-MIN-FOLIC ACID 3,500-18-0.4 UNIT-MG-MG ORAL CHEW Take by mouth once daily.    Yes Historical Provider   mupirocin (BACTROBAN) 2 % ointment every evening.   Yes Historical Provider   neomycin-polymyxin-dexamethasone (MAXITROL) 3.5 mg/g-10,000 unit/g-0.1 % Oint Apply a small amount into left eye three times a day 12/15/22  Yes    netarsudiL-latanoprost (ROCKLATAN) 0.02-0.005 % Drop Instill 1 drop into both eyes at bedtime 10/13/22  Yes    ofloxacin (OCUFLOX) 0.3 % ophthalmic solution Place 1 drop into Left Eye 3 times a day (USE ONLY 3 DAYS AFTER EACH INJECTION) 9/14/22  Yes    ondansetron (ZOFRAN-ODT) 4 MG TbDL Dissolve 1 tablet (4 mg total) by mouth every 8 (eight) hours as needed (nausea or vomiting). 11/17/22  Yes Nathalie Titus PA-C   prednisoLONE acetate (PRED FORTE) 1 % DrpS Instill 1 drop into right eye once a day 4/29/22  Yes    PREVIDENT 5000 BOOSTER PLUS 1.1 % Pste once daily.  4/3/14  Yes Historical Provider   sildenafiL (VIAGRA) 100 MG tablet Take 1 tablet (100 mg total) by mouth daily as needed. 7/24/23  Yes Neo Sneed MD   testosterone (ANDROGEL) 1.62 % (40.5 mg/2.5 gram) GlPk Place 2.5 g (1 packet) onto the skin once daily. 5/23/23 11/20/23 Yes Joy Hernandez, CLAY   timolol maleate 0.25% (TIMOPTIC) 0.25 % Drop 1 drop once daily. 2/20/18  Yes Historical Provider   zolpidem (AMBIEN) 10 mg Tab Take 1 tablet (10 mg total) by mouth nightly as needed (to be  taken the night of the patient's sleep study). 5/8/23 8/1/23 Yes Juan Bustos MD   brimonidine 0.15 % OPTH DROP (ALPHAGAN) 0.15 % ophthalmic solution 1 drop Left Eye 3 times a day  Patient not taking: Reported on 8/1/2023 4/13/23      brimonidine 0.2% (ALPHAGAN) 0.2 % Drop Instill 1 drop into left eye three times a day 1/19/23      brimonidine 0.2% (ALPHAGAN) 0.2 % Drop instill 1 drop Left Eye 3 times a day 4/20/23      brimonidine 0.2% (ALPHAGAN) 0.2 % Drop Instill 1 drop into Left Eye 3 times a day 5/18/23      brimonidine 0.2% (ALPHAGAN) 0.2 % Drop Instill 1 drop into left eye three times a day 7/6/23      dorzolamide-timolol 2-0.5% (COSOPT) 22.3-6.8 mg/mL ophthalmic solution Instill 1 drop into right eye twice a day 1/12/22      dorzolamide-timolol 2-0.5% (COSOPT) 22.3-6.8 mg/mL ophthalmic solution Instill 1 drop Left Eye twice a day 5/2/22      dorzolamide-timolol 2-0.5% (COSOPT) 22.3-6.8 mg/mL ophthalmic solution Place 1 drop into the left eye twice a day 7/13/22      dorzolamide-timolol 2-0.5% (COSOPT) 22.3-6.8 mg/mL ophthalmic solution Instill 1 drop into the Left Eye twice a day 7/28/22      dorzolamide-timolol 2-0.5% (COSOPT) 22.3-6.8 mg/mL ophthalmic solution Instill 1 drop into the Left Eye twice a day 10/19/22      dorzolamide-timolol 2-0.5% (COSOPT) 22.3-6.8 mg/mL ophthalmic solution instill 1 drop into Left Eye three times a day 10/26/22      dorzolamide-timolol 2-0.5% (COSOPT) 22.3-6.8 mg/mL ophthalmic solution Instill 1 drop into left eye twice a day 1/19/23      dorzolamide-timolol 2-0.5% (COSOPT) 22.3-6.8 mg/mL ophthalmic solution Instill 1 drop Left Eye 3 times a day 4/13/23      dorzolamide-timolol 2-0.5% (COSOPT) 22.3-6.8 mg/mL ophthalmic solution Instill 1 drop into the  Left Eye 3 times a day 5/18/23      dorzolamide-timolol 2-0.5% (COSOPT) 22.3-6.8 mg/mL ophthalmic solution Instill 1 drop into left eye twice a day 7/6/23      fluorometholone 0.1% (FML) 0.1 % DrpS Instill 1 drop into  left eye once a day 5/12/22      fluticasone propionate (FLONASE) 50 mcg/actuation nasal spray 1 spray (50 mcg total) by Each Nostril route once daily. 6/26/23   Rachelle Vincent PA-C   FLUZONE HIGHDOSE QUAD 22-23  mcg/0.7 mL Syrg  10/10/22   Historical Provider   MODERNA COVID BIVAL,6Y UP,,PF, 50 mcg/0.5 mL injection  10/10/22   Historical Provider   neomycin-polymyxin-dexamethasone (MAXITROL) 3.5 mg/g-10,000 unit/g-0.1 % Oint Apply 0.25 inch of ointment  into right eye 3 times a day 12/29/22      neomycin-polymyxin-dexamethasone (MAXITROL) 3.5 mg/g-10,000 unit/g-0.1 % Oint Apply a small amount into left eye three times a day 1/26/23      neomycin-polymyxin-dexamethasone (MAXITROL) 3.5 mg/g-10,000 unit/g-0.1 % Oint Apply 0.25 inch into right eye 3 times a day 2/1/23      neomycin-polymyxin-dexamethasone (MAXITROL) 3.5 mg/g-10,000 unit/g-0.1 % Oint Apply 0.25 inch Left Eye 3 times a day STOP AFTER 3 DAYS 3/8/23      neomycin-polymyxin-dexamethasone (MAXITROL) 3.5 mg/g-10,000 unit/g-0.1 % Oint 0.25 inch Left Eye 3 times a day 3/16/23      neomycin-polymyxin-dexamethasone (MAXITROL) 3.5 mg/g-10,000 unit/g-0.1 % Oint Apply 1 a small amount into left eye three times a day STOP AFTER 3 DAYS 4/13/23      neomycin-polymyxin-dexamethasone (MAXITROL) 3.5 mg/g-10,000 unit/g-0.1 % Oint Apply 0.25 inch ribbon into left eye 3 times a day 5/18/23      neomycin-polymyxin-dexamethasone (MAXITROL) 3.5 mg/g-10,000 unit/g-0.1 % Oint aPPLY 0.25 inch into left eye 3 times a day FOR 4 DAYS THEN STOP 7/27/23      neomycin-polymyxin-dexamethasone (MAXITROL) 3.5mg/mL-10,000 unit/mL-0.1 % DrpS Instill 1 drop into left eye three times a day X 4 DAYS THEN STOP 7/28/23      netarsudiL-latanoprost (ROCKLATAN) 0.02-0.005 % Drop Instill 1 drop into both eyes at bedtime 1/19/23      netarsudiL-latanoprost (ROCKLATAN) 0.02-0.005 % Drop Instill 1 drop into left eye every night 3/8/23      netarsudiL-latanoprost (ROCKLATAN) 0.02-0.005 % Drop  "Instill 1 drop into both eyes at bedtime 7/6/23      prednisoLONE acetate (PRED FORTE) 1 % DrpS Instill 1 drop into the Right Eye once a day 7/28/22      testosterone (ANDROGEL) 1.62 % (40.5 mg/2.5 gram) GlPk Place 2.5 g (1 packet) onto the skin once daily. 5/29/23 11/26/23  Joy Hernandez NP       Review of Systems:  Review of Systems   Constitutional:  Negative for chills and fever.   HENT:  Negative for sore throat and trouble swallowing.    Eyes:  Negative for visual disturbance.   Respiratory:  Negative for cough and shortness of breath.    Cardiovascular:  Negative for chest pain.   Gastrointestinal:  Negative for abdominal pain, constipation, diarrhea, nausea and vomiting.   Genitourinary:  Negative for dysuria and flank pain.   Musculoskeletal:  Positive for back pain. Negative for neck pain and neck stiffness.   Skin:  Negative for rash.   Neurological:  Negative for dizziness, syncope, weakness and headaches.   Psychiatric/Behavioral:  Negative for confusion.        Health Maintainence:   Immunizations:  Health Maintenance         Date Due Completion Date    COVID-19 Vaccine (7 - Moderna series) 02/10/2023 10/10/2022    Influenza Vaccine (1) 09/01/2023 10/10/2022    Colonoscopy 01/03/2026 1/3/2019    TETANUS VACCINE 07/12/2026 7/12/2016    Lipid Panel 05/22/2028 5/22/2023             PHYSICAL EXAM     Pulse 104   Ht 5' 7" (1.702 m)   Wt 68 kg (150 lb)   SpO2 (!) 90%   BMI 23.49 kg/m²     Physical Exam  Vitals and nursing note reviewed.   Constitutional:       Appearance: Normal appearance.      Comments: Elderly male sitting in hospital provided WC in pain. He makes good eye contact, speaks in clear full sentences and does not ambulate on this exam.    HENT:      Head: Normocephalic and atraumatic.      Nose: Nose normal.      Mouth/Throat:      Pharynx: Oropharynx is clear.   Eyes:      Conjunctiva/sclera: Conjunctivae normal.   Cardiovascular:      Rate and Rhythm: Normal rate and regular rhythm. "      Pulses: Normal pulses.   Pulmonary:      Effort: No respiratory distress.   Abdominal:      Tenderness: There is no abdominal tenderness.   Musculoskeletal:         General: Normal range of motion.      Cervical back: No rigidity.      Comments: There is thoracic and lumbar midline bony TTP with some chronic appearing asymmetry.   There is left sided CVA TTP   There is also left upper quadrant TTP without guarding.   Normal bowel sounds      Skin:     General: Skin is warm and dry.      Capillary Refill: Capillary refill takes less than 2 seconds.      Findings: No rash.   Neurological:      General: No focal deficit present.      Mental Status: He is alert.      Gait: Gait normal.   Psychiatric:         Mood and Affect: Mood normal.         LABS     Lab Results   Component Value Date    HGBA1C 5.5 03/10/2023     CMP  Sodium   Date Value Ref Range Status   03/10/2023 139 136 - 145 mmol/L Final     Potassium   Date Value Ref Range Status   03/10/2023 4.2 3.5 - 5.1 mmol/L Final     Chloride   Date Value Ref Range Status   03/10/2023 104 95 - 110 mmol/L Final     CO2   Date Value Ref Range Status   03/10/2023 28 23 - 29 mmol/L Final     Glucose   Date Value Ref Range Status   03/10/2023 98 70 - 110 mg/dL Final     BUN   Date Value Ref Range Status   03/10/2023 15 8 - 23 mg/dL Final     Creatinine   Date Value Ref Range Status   05/22/2023 0.7 0.5 - 1.4 mg/dL Final     Calcium   Date Value Ref Range Status   03/10/2023 10.3 8.7 - 10.5 mg/dL Final     Total Protein   Date Value Ref Range Status   05/22/2023 7.0 6.0 - 8.4 g/dL Final     Albumin   Date Value Ref Range Status   05/22/2023 3.8 3.5 - 5.2 g/dL Final     Total Bilirubin   Date Value Ref Range Status   05/22/2023 0.4 0.1 - 1.0 mg/dL Final     Comment:     For infants and newborns, interpretation of results should be based  on gestational age, weight and in agreement with clinical  observations.    Premature Infant recommended reference ranges:  Up to 24  hours.............<8.0 mg/dL  Up to 48 hours............<12.0 mg/dL  3-5 days..................<15.0 mg/dL  6-29 days.................<15.0 mg/dL       Alkaline Phosphatase   Date Value Ref Range Status   05/22/2023 63 55 - 135 U/L Final     AST   Date Value Ref Range Status   05/22/2023 29 10 - 40 U/L Final     ALT   Date Value Ref Range Status   05/22/2023 23 10 - 44 U/L Final     Anion Gap   Date Value Ref Range Status   03/10/2023 7 (L) 8 - 16 mmol/L Final     eGFR if    Date Value Ref Range Status   05/25/2022 >60.0 >60 mL/min/1.73 m^2 Final     eGFR if non    Date Value Ref Range Status   05/25/2022 >60.0 >60 mL/min/1.73 m^2 Final     Comment:     Calculation used to obtain the estimated glomerular filtration  rate (eGFR) is the CKD-EPI equation.        Lab Results   Component Value Date    WBC 6.25 05/22/2023    HGB 14.0 05/22/2023    HCT 41.8 05/22/2023    MCV 98 05/22/2023     05/22/2023     Lab Results   Component Value Date    CHOL 189 05/22/2023    CHOL 133 11/04/2022    CHOL 195 05/25/2022     Lab Results   Component Value Date    HDL 51 05/22/2023    HDL 40 11/04/2022    HDL 63 05/25/2022     Lab Results   Component Value Date    LDLCALC 115.0 05/22/2023    LDLCALC 80.0 11/04/2022    LDLCALC 112.4 05/25/2022     Lab Results   Component Value Date    TRIG 115 05/22/2023    TRIG 65 11/04/2022    TRIG 98 05/25/2022     Lab Results   Component Value Date    CHOLHDL 27.0 05/22/2023    CHOLHDL 30.1 11/04/2022    CHOLHDL 32.3 05/25/2022     Lab Results   Component Value Date    TSH 1.608 03/10/2023       ASSESSMENT/PLAN     Cricket Mcdonnell is a 82 y.o. male     Cricket Morrow was seen today for back pain. Unable to get imaging done stat - will refer to ER given severity of symptoms and advanced age. Patent is aware of and amenable to plan. He is stable for transport to ED via  accompanied with office staff.     Diagnoses and all orders for this visit:    Acute midline  thoracic back pain  -     CT Thoracic Spine Without Contrast; Future  -     CT Lumbar Spine Without Contrast; Future  -     Urinalysis; Future  -     CULTURE, URINE; Future  -     CBC Auto Differential; Future  -     COMPREHENSIVE METABOLIC PANEL; Future    Acute abdominal pain in left flank    Other orders  -     methocarbamoL (ROBAXIN) 500 MG Tab; Take 1 tablet (500 mg total) by mouth 4 (four) times daily. for 10 days  -     meloxicam (MOBIC) 15 MG tablet; Take 1 tablet (15 mg total) by mouth once daily. for 14 days       Patient was counseled on when and how to seek emergent care.       Melinda Portillo PA-C   Department of Internal Medicine - [facility]   12:11 PM

## 2023-08-01 NOTE — ED TRIAGE NOTES
Pt c/o left flank pain that radiates into his abdomen. He is also reporting some left sided chest wall pain. He denies n/v/d. Denies urinary symptoms. Pt sent here by doctor from clinic to be further evaluated.

## 2023-08-02 PROBLEM — J96.01 ACUTE HYPOXEMIC RESPIRATORY FAILURE: Status: ACTIVE | Noted: 2023-08-02

## 2023-08-02 PROBLEM — R07.89 LEFT-SIDED CHEST WALL PAIN: Status: ACTIVE | Noted: 2023-08-02

## 2023-08-02 PROBLEM — R09.02 HYPOXIA: Status: ACTIVE | Noted: 2023-08-02

## 2023-08-02 PROBLEM — J90 PLEURAL EFFUSION ON LEFT: Status: ACTIVE | Noted: 2023-08-02

## 2023-08-02 PROBLEM — R09.02 HYPOXIA: Status: RESOLVED | Noted: 2023-08-02 | Resolved: 2023-08-02

## 2023-08-02 LAB
ALBUMIN SERPL BCP-MCNC: 2 G/DL (ref 3.5–5.2)
ALP SERPL-CCNC: 76 U/L (ref 55–135)
ALT SERPL W/O P-5'-P-CCNC: 18 U/L (ref 10–44)
ANION GAP SERPL CALC-SCNC: 8 MMOL/L (ref 8–16)
APPEARANCE FLD: NORMAL
AST SERPL-CCNC: 13 U/L (ref 10–40)
BILIRUB SERPL-MCNC: 0.5 MG/DL (ref 0.1–1)
BODY FLD TYPE: NORMAL
BODY FLUID SOURCE, LDH: NORMAL
BUN SERPL-MCNC: 9 MG/DL (ref 8–23)
CALCIUM SERPL-MCNC: 8.5 MG/DL (ref 8.7–10.5)
CHLORIDE SERPL-SCNC: 101 MMOL/L (ref 95–110)
CO2 SERPL-SCNC: 21 MMOL/L (ref 23–29)
COLOR FLD: YELLOW
CREAT SERPL-MCNC: 0.6 MG/DL (ref 0.5–1.4)
ERYTHROCYTE [DISTWIDTH] IN BLOOD BY AUTOMATED COUNT: 12.6 % (ref 11.5–14.5)
EST. GFR  (NO RACE VARIABLE): >60 ML/MIN/1.73 M^2
GLUCOSE FLD-MCNC: <5 MG/DL
GLUCOSE SERPL-MCNC: 128 MG/DL (ref 70–110)
HCT VFR BLD AUTO: 37 % (ref 40–54)
HGB BLD-MCNC: 12.5 G/DL (ref 14–18)
LDH FLD L TO P-CCNC: 1011 U/L
LDH SERPL L TO P-CCNC: 260 U/L (ref 110–260)
LYMPHOCYTES NFR FLD MANUAL: 3 %
MCH RBC QN AUTO: 31.3 PG (ref 27–31)
MCHC RBC AUTO-ENTMCNC: 33.8 G/DL (ref 32–36)
MCV RBC AUTO: 93 FL (ref 82–98)
MONOS+MACROS NFR FLD MANUAL: 3 %
NEUTROPHILS NFR FLD MANUAL: 94 %
PLATELET # BLD AUTO: 332 K/UL (ref 150–450)
PMV BLD AUTO: 8.1 FL (ref 9.2–12.9)
POTASSIUM SERPL-SCNC: 3.7 MMOL/L (ref 3.5–5.1)
PROT FLD-MCNC: 4.4 G/DL
PROT SERPL-MCNC: 5.6 G/DL (ref 6–8.4)
RBC # BLD AUTO: 4 M/UL (ref 4.6–6.2)
SODIUM SERPL-SCNC: 130 MMOL/L (ref 136–145)
SPECIMEN SOURCE: NORMAL
SPECIMEN SOURCE: NORMAL
WBC # BLD AUTO: 21.63 K/UL (ref 3.9–12.7)
WBC # FLD: 3621 /CU MM

## 2023-08-02 PROCEDURE — 11000001 HC ACUTE MED/SURG PRIVATE ROOM: Mod: HCNC

## 2023-08-02 PROCEDURE — 83615 LACTATE (LD) (LDH) ENZYME: CPT | Mod: 91,HCNC | Performed by: STUDENT IN AN ORGANIZED HEALTH CARE EDUCATION/TRAINING PROGRAM

## 2023-08-02 PROCEDURE — 99233 PR SUBSEQUENT HOSPITAL CARE,LEVL III: ICD-10-PCS | Mod: ,,, | Performed by: STUDENT IN AN ORGANIZED HEALTH CARE EDUCATION/TRAINING PROGRAM

## 2023-08-02 PROCEDURE — 88312 PR  SPECIAL STAINS,GROUP I: ICD-10-PCS | Mod: 26,HCNC,, | Performed by: PATHOLOGY

## 2023-08-02 PROCEDURE — 32556: ICD-10-PCS | Mod: LT,,, | Performed by: STUDENT IN AN ORGANIZED HEALTH CARE EDUCATION/TRAINING PROGRAM

## 2023-08-02 PROCEDURE — 88305 TISSUE EXAM BY PATHOLOGIST: CPT | Mod: 26,HCNC,, | Performed by: PATHOLOGY

## 2023-08-02 PROCEDURE — 32556 INSERT CATH PLEURA W/O IMAGE: CPT | Mod: LT,,, | Performed by: STUDENT IN AN ORGANIZED HEALTH CARE EDUCATION/TRAINING PROGRAM

## 2023-08-02 PROCEDURE — 36415 COLL VENOUS BLD VENIPUNCTURE: CPT | Mod: HCNC

## 2023-08-02 PROCEDURE — 88312 SPECIAL STAINS GROUP 1: CPT | Mod: 26,HCNC,, | Performed by: PATHOLOGY

## 2023-08-02 PROCEDURE — 99223 1ST HOSP IP/OBS HIGH 75: CPT | Mod: HCNC,25,GC, | Performed by: INTERNAL MEDICINE

## 2023-08-02 PROCEDURE — 27000221 HC OXYGEN, UP TO 24 HOURS: Mod: HCNC

## 2023-08-02 PROCEDURE — 63700000 PHARM REV CODE 250 ALT 637 W/O HCPCS: Mod: HCNC

## 2023-08-02 PROCEDURE — 88305 TISSUE EXAM BY PATHOLOGIST: ICD-10-PCS | Mod: 26,HCNC,, | Performed by: PATHOLOGY

## 2023-08-02 PROCEDURE — 88312 SPECIAL STAINS GROUP 1: CPT | Mod: HCNC | Performed by: PATHOLOGY

## 2023-08-02 PROCEDURE — 25000003 PHARM REV CODE 250: Mod: HCNC | Performed by: STUDENT IN AN ORGANIZED HEALTH CARE EDUCATION/TRAINING PROGRAM

## 2023-08-02 PROCEDURE — 94660 CPAP INITIATION&MGMT: CPT | Mod: HCNC

## 2023-08-02 PROCEDURE — 94640 AIRWAY INHALATION TREATMENT: CPT | Mod: HCNC

## 2023-08-02 PROCEDURE — 84157 ASSAY OF PROTEIN OTHER: CPT | Mod: HCNC | Performed by: STUDENT IN AN ORGANIZED HEALTH CARE EDUCATION/TRAINING PROGRAM

## 2023-08-02 PROCEDURE — 88305 TISSUE EXAM BY PATHOLOGIST: CPT | Mod: HCNC | Performed by: PATHOLOGY

## 2023-08-02 PROCEDURE — 99233 SBSQ HOSP IP/OBS HIGH 50: CPT | Mod: ,,, | Performed by: STUDENT IN AN ORGANIZED HEALTH CARE EDUCATION/TRAINING PROGRAM

## 2023-08-02 PROCEDURE — 88112 CYTOPATH CELL ENHANCE TECH: CPT | Mod: 26,HCNC,, | Performed by: PATHOLOGY

## 2023-08-02 PROCEDURE — 87070 CULTURE OTHR SPECIMN AEROBIC: CPT | Mod: HCNC | Performed by: STUDENT IN AN ORGANIZED HEALTH CARE EDUCATION/TRAINING PROGRAM

## 2023-08-02 PROCEDURE — 94761 N-INVAS EAR/PLS OXIMETRY MLT: CPT | Mod: HCNC

## 2023-08-02 PROCEDURE — 36415 COLL VENOUS BLD VENIPUNCTURE: CPT | Mod: HCNC | Performed by: STUDENT IN AN ORGANIZED HEALTH CARE EDUCATION/TRAINING PROGRAM

## 2023-08-02 PROCEDURE — 83615 LACTATE (LD) (LDH) ENZYME: CPT | Mod: HCNC | Performed by: STUDENT IN AN ORGANIZED HEALTH CARE EDUCATION/TRAINING PROGRAM

## 2023-08-02 PROCEDURE — 63600175 PHARM REV CODE 636 W HCPCS: Mod: HCNC

## 2023-08-02 PROCEDURE — 80053 COMPREHEN METABOLIC PANEL: CPT | Mod: HCNC

## 2023-08-02 PROCEDURE — 88112 PR  CYTOPATH, CELL ENHANCE TECH: ICD-10-PCS | Mod: 26,HCNC,, | Performed by: PATHOLOGY

## 2023-08-02 PROCEDURE — 89051 BODY FLUID CELL COUNT: CPT | Mod: HCNC | Performed by: STUDENT IN AN ORGANIZED HEALTH CARE EDUCATION/TRAINING PROGRAM

## 2023-08-02 PROCEDURE — 27000190 HC CPAP FULL FACE MASK W/VALVE: Mod: HCNC

## 2023-08-02 PROCEDURE — 25000003 PHARM REV CODE 250: Mod: HCNC

## 2023-08-02 PROCEDURE — 85027 COMPLETE CBC AUTOMATED: CPT | Mod: HCNC

## 2023-08-02 PROCEDURE — 25000242 PHARM REV CODE 250 ALT 637 W/ HCPCS: Mod: HCNC

## 2023-08-02 PROCEDURE — 82945 GLUCOSE OTHER FLUID: CPT | Mod: HCNC | Performed by: STUDENT IN AN ORGANIZED HEALTH CARE EDUCATION/TRAINING PROGRAM

## 2023-08-02 PROCEDURE — 99900035 HC TECH TIME PER 15 MIN (STAT): Mod: HCNC

## 2023-08-02 PROCEDURE — 99223 PR INITIAL HOSPITAL CARE,LEVL III: ICD-10-PCS | Mod: HCNC,25,GC, | Performed by: INTERNAL MEDICINE

## 2023-08-02 PROCEDURE — 88112 CYTOPATH CELL ENHANCE TECH: CPT | Mod: HCNC | Performed by: PATHOLOGY

## 2023-08-02 RX ORDER — DORZOLAMIDE HYDROCHLORIDE AND TIMOLOL MALEATE 20; 5 MG/ML; MG/ML
1 SOLUTION/ DROPS OPHTHALMIC 2 TIMES DAILY
Status: DISCONTINUED | OUTPATIENT
Start: 2023-08-03 | End: 2023-08-09 | Stop reason: HOSPADM

## 2023-08-02 RX ORDER — GUAIFENESIN 100 MG/5ML
200 SOLUTION ORAL EVERY 4 HOURS PRN
Status: DISCONTINUED | OUTPATIENT
Start: 2023-08-02 | End: 2023-08-09 | Stop reason: HOSPADM

## 2023-08-02 RX ORDER — BRIMONIDINE TARTRATE 1.5 MG/ML
1 SOLUTION/ DROPS OPHTHALMIC 3 TIMES DAILY
Status: DISCONTINUED | OUTPATIENT
Start: 2023-08-03 | End: 2023-08-09 | Stop reason: HOSPADM

## 2023-08-02 RX ORDER — SODIUM CHLORIDE 9 MG/ML
INJECTION, SOLUTION INTRAVENOUS CONTINUOUS
Status: DISCONTINUED | OUTPATIENT
Start: 2023-08-02 | End: 2023-08-02

## 2023-08-02 RX ORDER — AZITHROMYCIN 250 MG/1
250 TABLET, FILM COATED ORAL DAILY
Status: DISCONTINUED | OUTPATIENT
Start: 2023-08-03 | End: 2023-08-06

## 2023-08-02 RX ORDER — IBUPROFEN 400 MG/1
800 TABLET ORAL EVERY 8 HOURS PRN
Status: DISCONTINUED | OUTPATIENT
Start: 2023-08-02 | End: 2023-08-04

## 2023-08-02 RX ORDER — AMOXICILLIN 250 MG
2 CAPSULE ORAL DAILY
Status: DISCONTINUED | OUTPATIENT
Start: 2023-08-02 | End: 2023-08-09 | Stop reason: HOSPADM

## 2023-08-02 RX ORDER — TIMOLOL MALEATE 2.5 MG/ML
1 SOLUTION/ DROPS OPHTHALMIC 2 TIMES DAILY
Status: DISCONTINUED | OUTPATIENT
Start: 2023-08-02 | End: 2023-08-09 | Stop reason: HOSPADM

## 2023-08-02 RX ADMIN — TIMOLOL MALEATE 1 DROP: 2.5 SOLUTION/ DROPS OPHTHALMIC at 09:08

## 2023-08-02 RX ADMIN — IPRATROPIUM BROMIDE AND ALBUTEROL SULFATE 3 ML: 2.5; .5 SOLUTION RESPIRATORY (INHALATION) at 12:08

## 2023-08-02 RX ADMIN — IPRATROPIUM BROMIDE AND ALBUTEROL SULFATE 3 ML: 2.5; .5 SOLUTION RESPIRATORY (INHALATION) at 08:08

## 2023-08-02 RX ADMIN — IBUPROFEN 800 MG: 400 TABLET, FILM COATED ORAL at 03:08

## 2023-08-02 RX ADMIN — IPRATROPIUM BROMIDE AND ALBUTEROL SULFATE 3 ML: 2.5; .5 SOLUTION RESPIRATORY (INHALATION) at 04:08

## 2023-08-02 RX ADMIN — ENOXAPARIN SODIUM 40 MG: 40 INJECTION SUBCUTANEOUS at 05:08

## 2023-08-02 RX ADMIN — CEFTRIAXONE SODIUM 1 G: 1 INJECTION, POWDER, FOR SOLUTION INTRAMUSCULAR; INTRAVENOUS at 11:08

## 2023-08-02 RX ADMIN — IBUPROFEN 800 MG: 400 TABLET, FILM COATED ORAL at 11:08

## 2023-08-02 RX ADMIN — SODIUM CHLORIDE: 9 INJECTION, SOLUTION INTRAVENOUS at 03:08

## 2023-08-02 RX ADMIN — IBUPROFEN 800 MG: 400 TABLET, FILM COATED ORAL at 06:08

## 2023-08-02 RX ADMIN — SENNOSIDES AND DOCUSATE SODIUM 2 TABLET: 50; 8.6 TABLET ORAL at 08:08

## 2023-08-02 RX ADMIN — GUAIFENESIN 200 MG: 100 SOLUTION ORAL at 12:08

## 2023-08-02 RX ADMIN — MELATONIN TAB 3 MG 6 MG: 3 TAB at 11:08

## 2023-08-02 RX ADMIN — AZITHROMYCIN MONOHYDRATE 500 MG: 250 TABLET ORAL at 08:08

## 2023-08-02 NOTE — ASSESSMENT & PLAN NOTE
Left sided chest wall pain    Community acquired pneumonia   Acute hypoxic resp failure   - presents to the ED after primary care visit recommended patient to ED for imaging for lumbar spine   - Patient reports left back pain that radiates to left anterior chest that started six days ago, associated with a productive cough and worsening SOB. Denies fever, chills but reports fatigue and loss of appetite. Patient reports left chest wall pain was exacerbated with coughing.   - Patient denies any sick close contacts nor any recent hospitalization.   - Stopped smoking 20 + years ago   - Afebrile, , RR 20, /67, Pox 91% on arrival and placed on 4L/min O2 via NC.   - Leukocytosis 20K with left shift.   - CT Abdomen pelvis with contrast with a loculated left pleural effusion with compressive atelectasis of the left lower lobe, confirmed with CXR.   - Patient was started for management for CAP in ED with Ceftriaxone 1g IV and azithromycin 500mg IV. Patient also given 1L NaCl 0.9% fluid bolus.   - On exam, patient was comfortable looking, using CPAP. Left chest wall tenderness is reproducible. Decreased air entry to left posterior lung base.  - Continued treating as CAP for raised WBC.     Plan:  - Continue Ceftriaxone 2g IV daily and Azithromycin 250mg PO for total 5 days  - Acetaminophen 1g Q8 PRN, ibuprofen 800mg Q8 PRN for left chest wall pain  - Duonebs Q4 wake  - Wean O2 as needed to keep Pox >90%  - Pulm consult in AM - placed catheter with 400ml immediate drain. Pleural studies sent.   - Trend CBC  - Guaifenesin 200mg Q4 PRN

## 2023-08-02 NOTE — PROCEDURES
"Cricket Mcdonnell is a 82 y.o. male patient.    Temp: 97.8 °F (36.6 °C) (23)  Pulse: 92 (23)  Resp: 16 (23)  BP: 110/61 (23)  SpO2: (!) 92 % (23)  Weight: 71.3 kg (157 lb 3 oz) (23)  Height: 5' 7" (170.2 cm) (23)       Chest Tube Insertion - Eduardo Catheter    Date/Time: 2023 10:50 AM  Location procedure was performed: [facility] PULMONARY MEDICINE    Performed by: Stoney Coon MD  Authorized by: Cory Rawls MD  Assisting provider: Cory Rawls MD  Post-operative diagnosis: Same  Pre-operative diagnosis: Complicated L Pleural Effusion  Consent Done: Yes  Consent: Verbal consent obtained. Written consent obtained.  Risks and benefits: risks, benefits and alternatives were discussed  Consent given by: patient  Required items: required blood products, implants, devices, and special equipment available  Patient identity confirmed: , MRN, name, provided demographic data and verbally with patient  Time out: Immediately prior to procedure a "time out" was called to verify the correct patient, procedure, equipment, support staff and site/side marked as required.  Indications: pleural effusion    Patient sedated: no  Anesthesia: local infiltration    Anesthesia:  Local Anesthetic: lidocaine 1% without epinephrine  Anesthetic total: 5 mL  Preparation: skin prepped with ChloraPrep  Placement location: left lateral  Scalpel size: 11  Tube size (Bengali): 14.  Ultrasound guidance: yes  Tension pneumothorax heard: no  Tube connected to: suction  Drainage characteristics: serosanguinous  Drainage amount: 400 ml  Suture material: 0 silk  Dressing: 4x4 sterile gauze and petrolatum-impregnated gauze  Patient tolerance: Patient tolerated the procedure well with no immediate complications  Comments: Pending CXR for confirmation, but still with free flowing fluid.          Stoney Coon MD  Pulmonary & Critical Care Medicine Fellow    "

## 2023-08-02 NOTE — PROGRESS NOTES
[facility] Medicine  Progress Note    Patient Name: Cricket Mcdonnell  MRN: [id number]  Patient Class: IP- Inpatient   Admission Date: 8/1/2023  Length of Stay: 1 days  Attending Physician: Darin Mcguire MD  Primary Care Provider: Neo Sneed MD        Subjective:     Principal Problem:Loculated pleural effusion        HPI:  Cricket Mcdonnell is a 82 year old gentleman with a PMHx of sleep apnea, anxiety disorder and right angle glaucoma of right eye who presents to the ED after primary care visit recommended patient to ED for imaging. Patient with left back pain that radiates to left anterior chest that started six days ago. Patient reports that he was having a productive cough then, but unable to see if it was purulent. Patient denies fever, chills but reports fatigue and loss of appetite. Patient also reports worsening shortness of breath which prompted primary care visit today. Patient reports left chest wall pain was exacerbated with coughing. Patient denies any sick close contacts nor any recent hospitalization. Patient denies nausea, vomiting, diarrhea, pedal edema. Patient also denies any new onset numbness or weakness in bilateral lower extremities.     Afebrile, , RR 20, /67, Pox 91% on arrival and placed on 4L/min O2 via NC. Leukocytosis 20K with left shift. Na 133. UA grossly non-infectious, +1 ketonuria. Blood cx were sent. CT Lumbar spine without contrast with findings of Increase in lumbar levoscoliosis. No definite acute lumbar fracture. Moderate to large left pleural effusion, which is incompletely imaged. CT Abdomen pelvis with contrast with a loculated left pleural effusion with compressive atelectasis of the left lower lobe, confirmed with CXR. Patient was started for management for CAP in ED with Ceftriaxone 1g IV and azithromycin 500mg IV. Patient also given 1L NaCl 0.9% fluid bolus. On exam, patient was comfortable looking, using CPAP. Left  chest wall tenderness is reproducible. Decreased air entry to left posterior lung base.    Patient is admitted to Hospital Medicine for management of worsening shortness of breath with hypoxia and a left lateral wall chest pain likely due to left pleural effusion, currently treated as CAP. Consult placed to pulmonology for advise on need for thoracentesis.       Overview/Hospital Course:  Pt stable on 5L NC. Has stable non productive cough. On auscultation no breath sounds on left lower lung. Seen by pulmonary team and on US noted multiple small pockets of loculated effusion with areas of trapped lung. Pleural catheter placed with 400ml of fluid drained immediately. Pleural studies sent. Will continue abx at this time. Wean oxygen as tolerated.       Interval History: Pleural catheter placed and on suction. 400ml drained immediately. Pt remains on NC. Cont IV abx.     Review of Systems   Constitutional:  Negative for fatigue and fever.   Respiratory:  Positive for cough.    Cardiovascular:  Positive for chest pain (left chest / mid axillary area).   Gastrointestinal:  Negative for abdominal distention and diarrhea.   Genitourinary:  Negative for dysuria.   Psychiatric/Behavioral:  Negative for agitation and confusion.      Objective:     Vital Signs (Most Recent):  Temp: 98.3 °F (36.8 °C) (08/02/23 1218)  Pulse: 92 (08/02/23 1224)  Resp: 18 (08/02/23 1224)  BP: 126/65 (08/02/23 1218)  SpO2: (!) 92 % (08/02/23 1224) Vital Signs (24h Range):  Temp:  [97.6 °F (36.4 °C)-98.5 °F (36.9 °C)] 98.3 °F (36.8 °C)  Pulse:  [] 92  Resp:  [16-24] 18  SpO2:  [89 %-96 %] 92 %  BP: (106-132)/(61-87) 126/65     Weight: 71.3 kg (157 lb 3 oz)  Body mass index is 24.62 kg/m².    Intake/Output Summary (Last 24 hours) at 8/2/2023 1555  Last data filed at 8/2/2023 1542  Gross per 24 hour   Intake 1727.56 ml   Output 800 ml   Net 927.56 ml         Physical Exam  Constitutional:       General: He is not in acute distress.  Eyes:       Extraocular Movements: Extraocular movements intact.   Pulmonary:      Effort: No respiratory distress.      Breath sounds: No wheezing.      Comments: absent breath sounds on left lower lung  Chest:      Chest wall: Tenderness (left chest wall tenderness on palpation) present.   Abdominal:      General: There is no distension.      Tenderness: There is no abdominal tenderness.   Musculoskeletal:         General: No swelling.   Skin:     Capillary Refill: Capillary refill takes less than 2 seconds.   Neurological:      General: No focal deficit present.      Mental Status: He is oriented to person, place, and time.             Significant Labs: All pertinent labs within the past 24 hours have been reviewed.    Significant Imaging: I have reviewed all pertinent imaging results/findings within the past 24 hours.      Assessment/Plan:      * Loculated pleural effusion   Left sided chest wall pain    Community acquired pneumonia   Acute hypoxic resp failure   - presents to the ED after primary care visit recommended patient to ED for imaging for lumbar spine   - Patient reports left back pain that radiates to left anterior chest that started six days ago, associated with a productive cough and worsening SOB. Denies fever, chills but reports fatigue and loss of appetite. Patient reports left chest wall pain was exacerbated with coughing.   - Patient denies any sick close contacts nor any recent hospitalization.   - Stopped smoking 20 + years ago   - Afebrile, , RR 20, /67, Pox 91% on arrival and placed on 4L/min O2 via NC.   - Leukocytosis 20K with left shift.   - CT Abdomen pelvis with contrast with a loculated left pleural effusion with compressive atelectasis of the left lower lobe, confirmed with CXR.   - Patient was started for management for CAP in ED with Ceftriaxone 1g IV and azithromycin 500mg IV. Patient also given 1L NaCl 0.9% fluid bolus.   - On exam, patient was comfortable looking, using CPAP.  "Left chest wall tenderness is reproducible. Decreased air entry to left posterior lung base.  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 Will determine more from pleural studies.   - Continued treating as CAP for raised WBC.     Plan:  - Continue Ceftriaxone 2g IV daily and Azithromycin 250mg PO for total 5 days  - Acetaminophen 1g Q8 PRN, ibuprofen 800mg Q8 PRN for left chest wall pain  - Duonebs Q4 wake  - Wean O2 as needed to keep Pox >90%  - Pulm consult in AM - placed catheter with 400ml immediate drain. Pleural studies sent.   - Trend CBC  - Guaifenesin 200mg Q4 PRN    Acute hypoxemic respiratory failure  See above     Primary open angle glaucoma of right eye, moderate stage  Patient reports using multiple eye drops but unable to recall names of them other than "timolol TID, bimonidine TID and something at night".     - Eye drops as ordered to best of ability based on home list.       KARMA (obstructive sleep apnea)  - CPAP qhs        VTE Risk Mitigation (From admission, onward)           Ordered     enoxaparin injection 40 mg  Daily         08/01/23 2024     IP VTE HIGH RISK PATIENT  Once         08/01/23 2024     Place sequential compression device  Until discontinued         08/01/23 2024                    Discharge Planning   YASH:      Code Status: Full Code   Is the patient medically ready for discharge?:     Reason for patient still in hospital (select all that apply): Treatment for loculated pleural effusion   Discharge Plan A: Home            Darin Rasheed MD  Department of Hospital Medicine   [facility] ([place])  "

## 2023-08-02 NOTE — ASSESSMENT & PLAN NOTE
Left sided chest wall pain    Community acquired pneumonia   Hypoxia  Hx of sleep apnea, anxiety disorder   - presents to the ED after primary care visit recommended patient to ED for imaging for lumbar spine   - Patient reports left back pain that radiates to left anterior chest that started six days ago, associated with a productive cough ?purulent and worsening SOB. Denies fever, chills but reports fatigue and loss of appetite. Patient reports left chest wall pain was exacerbated with coughing.   - Patient denies any sick close contacts nor any recent hospitalization.   - Afebrile, , RR 20, /67, Pox 91% on arrival and placed on 4L/min O2 via NC.   - Leukocytosis 20K with left shift.   - CT Abdomen pelvis with contrast with a loculated left pleural effusion with compressive atelectasis of the left lower lobe, confirmed with CXR.   - Patient was started for management for CAP in ED with Ceftriaxone 1g IV and azithromycin 500mg IV. Patient also given 1L NaCl 0.9% fluid bolus.   - On exam, patient was comfortable looking, using CPAP. Left chest wall tenderness is reproducible. Decreased air entry to left posterior lung base.  - Continued treating as CAP for raised WBC. Continue IVF as patient is tachycardic, ?duoneb and insensible losses    Plan:  - Continue Ceftriaxone 1g IV daily and Azithromycin 500mg PO for the next 2 days   - Acetaminophen 1g Q8 PRN, ibuprofen 800mg Q8 PRN for left chest wall pain  - Duonebs Q4 wake  - Wean O2 as needed to keep Pox >96%  - CPAP qhs   - Pulm consult in AM - need for thoracentesis?  - Trend CBC  - Blood cx (8/1/23) in process  - Guaifenesin 200mg Q4 PRN  - Continue IVF NaCl 0.9% 125ml/hr

## 2023-08-02 NOTE — HOSPITAL COURSE
Patient is 82-year-old man admitted to the hospital with acute hypoxic respiratory failure secondary to pneumonia with a loculated parapneumonic effusion.    Patient treated with intravenous antibiotics oxygen to maintain reasonable oxygen saturation.  Patient initially required 5 liters/minute of supplemental oxygen.  Pulmonary critical care service consulted.    Patient underwent thoracentesis with placement of a pleural catheter with removal of pleural fluid.  Patient had placement of intrapleural tissue plasminogen activator (tPA) and dornase pawel (DNase) into pleural space with additional pleural fluid removed.  Pleural studies consistent with exudative pleural effusion.  Repeat imaging shows some residual effusion remaining however too small to intervene upon.    Patient clinically improved and weaned off all supplemental oxygen.  Pulmonary critical care service recommended patient complete 3-week course of oral antibiotics with amoxicillin/clavulanic acid to treat suspected aspiration pneumonia complicated by loculated parapneumonic effusion.    Close outpatient follow-up with Pulmonary Service along with his primary care physician advised.    Close outpatient follow-up with Ophthalmology also recommended to clarify what eye drop regimen he should be on as there is signficiant confusion regarding what his home regimen should be at this time.

## 2023-08-02 NOTE — PLAN OF CARE
POC reviewed with patient. AAOx4. Stable on 5 lit NC . Complaints of pain treated with PRN pain medication according to MAR. No other complaints verbalized during shift. Chest tube placed by pulm hooked with continuous suction. No leak detected in seal, dressing remains intact at catheter insertion site. Received IV antibiotics according to MAR. Urinal utilized for voiding. Positions self x assist.Instructed to call for help ambulating. No injuries, falls, or trauma occurred during shift. Purposeful rounding completed. Bed low and locked with side rails up x 3 and call light within reach.      Problem: Adult Inpatient Plan of Care  Goal: Plan of Care Review  8/2/2023 1543 by Ashley Giron RN  Outcome: Ongoing, Progressing  8/2/2023 1543 by Ashley Giron RN  Outcome: Ongoing, Progressing  Goal: Patient-Specific Goal (Individualized)  8/2/2023 1543 by Ashley Giron RN  Outcome: Ongoing, Progressing  8/2/2023 1543 by Ashley Giron RN  Outcome: Ongoing, Progressing  Goal: Absence of Hospital-Acquired Illness or Injury  8/2/2023 1543 by Ashley Giron RN  Outcome: Ongoing, Progressing  8/2/2023 1543 by Ashley Giron RN  Outcome: Ongoing, Progressing  Goal: Optimal Comfort and Wellbeing  8/2/2023 1543 by Ashley Giron RN  Outcome: Ongoing, Progressing  8/2/2023 1543 by Ashley Giron RN  Outcome: Ongoing, Progressing  Goal: Readiness for Transition of Care  8/2/2023 1543 by Ashley Giron RN  Outcome: Ongoing, Progressing  8/2/2023 1543 by Ashley Giron RN  Outcome: Ongoing, Progressing     Problem: Skin Injury Risk Increased  Goal: Skin Health and Integrity  8/2/2023 1543 by Ashley Giron RN  Outcome: Ongoing, Progressing  8/2/2023 1543 by Ashley Giron RN  Outcome: Ongoing, Progressing

## 2023-08-02 NOTE — ASSESSMENT & PLAN NOTE
- initially presented with a week of back/flank pain with productive cough; denied any fevers or recent illnesses, weight loss.  - CXR and CT noted a L pleural effusion  - On bedside US, noted multiple small pockets of loculated effusion with areas of trapped lung  - Not currently on anticoagulation, antiplatelet agents  - Decision made to place roman/pleural catheter; please see procedure note for details  - Roman catheter placed, still with some pocket of effusion on bedside ultrasound. Roughly 400mL of fluid was drained immediately    Recommendations  - Place catheter to suction, monitor output  - Follow-up cell count, culture, and cytology  - Continue antibiotics; would change azithromycin from 500mg daily -> 250mg daily. Continue ceftriaxone 2g daily.  - Will re-evaluate tomorrow, depending on bedside ultrasound findings may plan for thrombolytics.

## 2023-08-02 NOTE — SUBJECTIVE & OBJECTIVE
Past Medical History:   Diagnosis Date    Hereditary sensory neuropathy     Sleep apnea     + CPAP       Past Surgical History:   Procedure Laterality Date    CATARACT EXTRACTION      COLONOSCOPY N/A 1/3/2019    Procedure: COLONOSCOPY;  Surgeon: Cholo Yates MD;  Location: [facility] ([address] [place] [address]);  Service: Endoscopy;  Laterality: N/A;    EYE SURGERY      galucoma surgery      HERNIA REPAIR      tonsillectomy      TONSILLECTOMY         Review of patient's allergies indicates:   Allergen Reactions    Poison ivy extract     Cooper Rash       No current facility-administered medications on file prior to encounter.     Current Outpatient Medications on File Prior to Encounter   Medication Sig    azelastine (ASTELIN) 137 mcg (0.1 %) nasal spray 1 spray (137 mcg total) by Nasal route 2 (two) times daily.    brimonidine 0.15 % OPTH DROP (ALPHAGAN) 0.15 % ophthalmic solution Instill 1 drop into the left eye 3 times a day    brimonidine 0.15 % OPTH DROP (ALPHAGAN) 0.15 % ophthalmic solution 1 drop Left Eye 3 times a day (Patient not taking: Reported on 8/1/2023)    brimonidine 0.2% (ALPHAGAN) 0.2 % Drop Apply 1 drop to eye.    brimonidine 0.2% (ALPHAGAN) 0.2 % Drop Instill 1 drop into left eye three times a day    brimonidine 0.2% (ALPHAGAN) 0.2 % Drop instill 1 drop Left Eye 3 times a day    brimonidine 0.2% (ALPHAGAN) 0.2 % Drop Instill 1 drop into Left Eye 3 times a day    brimonidine 0.2% (ALPHAGAN) 0.2 % Drop Instill 1 drop into left eye three times a day    dorzolamide-timolol 2-0.5% (COSOPT) 22.3-6.8 mg/mL ophthalmic solution Instill 1 drop into both eyes twice a day    dorzolamide-timolol 2-0.5% (COSOPT) 22.3-6.8 mg/mL ophthalmic solution Instill 1 drop into right eye twice a day    dorzolamide-timolol 2-0.5% (COSOPT) 22.3-6.8 mg/mL ophthalmic solution Instill 1 drop Left Eye twice a day    dorzolamide-timolol 2-0.5% (COSOPT) 22.3-6.8 mg/mL ophthalmic solution Place 1 drop into the left eye twice a day     dorzolamide-timolol 2-0.5% (COSOPT) 22.3-6.8 mg/mL ophthalmic solution Instill 1 drop into the Left Eye twice a day    dorzolamide-timolol 2-0.5% (COSOPT) 22.3-6.8 mg/mL ophthalmic solution Instill 1 drop into the Left Eye twice a day    dorzolamide-timolol 2-0.5% (COSOPT) 22.3-6.8 mg/mL ophthalmic solution instill 1 drop into Left Eye three times a day    dorzolamide-timolol 2-0.5% (COSOPT) 22.3-6.8 mg/mL ophthalmic solution Instill 1 drop into left eye twice a day    dorzolamide-timolol 2-0.5% (COSOPT) 22.3-6.8 mg/mL ophthalmic solution Instill 1 drop Left Eye 3 times a day    dorzolamide-timolol 2-0.5% (COSOPT) 22.3-6.8 mg/mL ophthalmic solution Instill 1 drop into the  Left Eye 3 times a day    dorzolamide-timolol 2-0.5% (COSOPT) 22.3-6.8 mg/mL ophthalmic solution Instill 1 drop into left eye twice a day    doxepin (SINEQUAN) 10 MG capsule doxepin 10 mg capsule    erythromycin (ROMYCIN) ophthalmic ointment Apply 1 a thin layer into left eye four times a day    ferrous sulfate 325 (65 FE) MG EC tablet Take 325 mg by mouth once daily.    fluorometholone (FLAREX) 0.1 % DrpS Instill 1 drop into left eye once a day    fluorometholone 0.1% (FML) 0.1 % DrpS Instill 1 drop into left eye once a day    fluticasone (FLONASE) 50 mcg/actuation nasal spray 2 sprays by Each Nare route once daily.    fluticasone propionate (FLONASE) 50 mcg/actuation nasal spray 1 spray (50 mcg total) by Each Nostril route once daily.    FLUZONE HIGHDOSE QUAD 22-23  mcg/0.7 mL Syrg     ibuprofen (ADVIL,MOTRIN) 800 MG tablet as needed.    ketorolac 0.4% (ACULAR) 0.4 % Drop Instill 1 drop into left eye four times a day    LUMIGAN 0.01 % Drop every evening.    MARCAINE 0.25 % (2.5 mg/mL) Soln     meloxicam (MOBIC) 15 MG tablet Take 1 tablet (15 mg total) by mouth once daily. for 14 days    methocarbamoL (ROBAXIN) 500 MG Tab Take 1 tablet (500 mg total) by mouth 4 (four) times daily. for 10 days    MODERNA COVID BIVAL,6Y UP,,PF, 50 mcg/0.5 mL  injection     MULTIVIT-IRON-MIN-FOLIC ACID 3,500-18-0.4 UNIT-MG-MG ORAL CHEW Take by mouth once daily.     mupirocin (BACTROBAN) 2 % ointment every evening.    neomycin-polymyxin-dexamethasone (MAXITROL) 3.5 mg/g-10,000 unit/g-0.1 % Oint Apply a small amount into left eye three times a day    neomycin-polymyxin-dexamethasone (MAXITROL) 3.5 mg/g-10,000 unit/g-0.1 % Oint Apply 0.25 inch of ointment  into right eye 3 times a day    neomycin-polymyxin-dexamethasone (MAXITROL) 3.5 mg/g-10,000 unit/g-0.1 % Oint Apply a small amount into left eye three times a day    neomycin-polymyxin-dexamethasone (MAXITROL) 3.5 mg/g-10,000 unit/g-0.1 % Oint Apply 0.25 inch into right eye 3 times a day    neomycin-polymyxin-dexamethasone (MAXITROL) 3.5 mg/g-10,000 unit/g-0.1 % Oint Apply 0.25 inch Left Eye 3 times a day STOP AFTER 3 DAYS    neomycin-polymyxin-dexamethasone (MAXITROL) 3.5 mg/g-10,000 unit/g-0.1 % Oint 0.25 inch Left Eye 3 times a day    neomycin-polymyxin-dexamethasone (MAXITROL) 3.5 mg/g-10,000 unit/g-0.1 % Oint Apply 1 a small amount into left eye three times a day STOP AFTER 3 DAYS    neomycin-polymyxin-dexamethasone (MAXITROL) 3.5 mg/g-10,000 unit/g-0.1 % Oint Apply 0.25 inch ribbon into left eye 3 times a day    neomycin-polymyxin-dexamethasone (MAXITROL) 3.5 mg/g-10,000 unit/g-0.1 % Oint aPPLY 0.25 inch into left eye 3 times a day FOR 4 DAYS THEN STOP    neomycin-polymyxin-dexamethasone (MAXITROL) 3.5mg/mL-10,000 unit/mL-0.1 % DrpS Instill 1 drop into left eye three times a day X 4 DAYS THEN STOP    netarsudiL-latanoprost (ROCKLATAN) 0.02-0.005 % Drop Instill 1 drop into both eyes at bedtime    netarsudiL-latanoprost (ROCKLATAN) 0.02-0.005 % Drop Instill 1 drop into both eyes at bedtime    netarsudiL-latanoprost (ROCKLATAN) 0.02-0.005 % Drop Instill 1 drop into left eye every night    netarsudiL-latanoprost (ROCKLATAN) 0.02-0.005 % Drop Instill 1 drop into both eyes at bedtime    ofloxacin (OCUFLOX) 0.3 % ophthalmic  solution Place 1 drop into Left Eye 3 times a day (USE ONLY 3 DAYS AFTER EACH INJECTION)    ondansetron (ZOFRAN-ODT) 4 MG TbDL Dissolve 1 tablet (4 mg total) by mouth every 8 (eight) hours as needed (nausea or vomiting).    prednisoLONE acetate (PRED FORTE) 1 % DrpS Instill 1 drop into right eye once a day    prednisoLONE acetate (PRED FORTE) 1 % DrpS Instill 1 drop into the Right Eye once a day    PREVIDENT 5000 BOOSTER PLUS 1.1 % Pste once daily.     sildenafiL (VIAGRA) 100 MG tablet Take 1 tablet (100 mg total) by mouth daily as needed.    testosterone (ANDROGEL) 1.62 % (40.5 mg/2.5 gram) GlPk Place 2.5 g (1 packet) onto the skin once daily.    testosterone (ANDROGEL) 1.62 % (40.5 mg/2.5 gram) GlPk Place 2.5 g (1 packet) onto the skin once daily.    timolol maleate 0.25% (TIMOPTIC) 0.25 % Drop 1 drop once daily.    zolpidem (AMBIEN) 10 mg Tab Take 1 tablet (10 mg total) by mouth nightly as needed (to be taken the night of the patient's sleep study).    [DISCONTINUED] gabapentin (NEURONTIN) 100 MG capsule Take 1-3 capsules (100-300 mg total) by mouth nightly as needed (restless legs  (note: take 1-2 hours prior to usual onset of symptoms)).     Family History       Problem Relation (Age of Onset)    COPD Sister    Cancer Sister    No Known Problems Son, Daughter          Tobacco Use    Smoking status: Former     Current packs/day: 0.00     Average packs/day: 0.3 packs/day for 30.0 years (7.5 ttl pk-yrs)     Types: Cigarettes     Start date: 1971     Quit date: 2001     Years since quittin.0     Passive exposure: Past    Smokeless tobacco: Never    Tobacco comments:     Quit 26 year ago   Substance and Sexual Activity    Alcohol use: Not Currently     Comment: I gave up drinking 23 years ago    Drug use: No    Sexual activity: Yes     Partners: Female     Birth control/protection: Diaphragm     Review of Systems   Constitutional:  Positive for appetite change and fatigue. Negative for chills.   HENT:   Negative for congestion and sore throat.    Eyes:  Positive for visual disturbance (right eye, chronic). Negative for redness.   Respiratory:  Positive for cough and shortness of breath.    Cardiovascular:  Positive for chest pain.   Gastrointestinal:  Negative for diarrhea, nausea and vomiting.   Genitourinary:  Negative for difficulty urinating and dysuria.   Musculoskeletal:  Positive for back pain (left back radiating to left chest). Negative for gait problem.   Skin:  Negative for rash and wound.   Neurological:  Negative for light-headedness and headaches.   Psychiatric/Behavioral:  Negative for agitation and behavioral problems.      Objective:     Vital Signs (Most Recent):  Temp: 97.6 °F (36.4 °C) (08/01/23 2354)  Pulse: 106 (08/01/23 2354)  Resp: (!) 22 (08/01/23 2354)  BP: 108/61 (08/01/23 2354)  SpO2: (!) 92 % (08/02/23 0015) Vital Signs (24h Range):  Temp:  [97.6 °F (36.4 °C)-98.5 °F (36.9 °C)] 97.6 °F (36.4 °C)  Pulse:  [] 106  Resp:  [20-24] 22  SpO2:  [89 %-96 %] 92 %  BP: ()/(61-87) 108/61     Weight: 71.3 kg (157 lb 3 oz)  Body mass index is 24.62 kg/m².     Physical Exam  Vitals and nursing note reviewed.   Constitutional:       General: He is not in acute distress.     Appearance: He is not ill-appearing or toxic-appearing.   HENT:      Head: Normocephalic and atraumatic.      Nose: No congestion or rhinorrhea.   Eyes:      General: No scleral icterus.        Right eye: No discharge.         Left eye: No discharge.   Cardiovascular:      Rate and Rhythm: Regular rhythm. Tachycardia present.      Pulses: Normal pulses.      Heart sounds: Normal heart sounds.   Pulmonary:      Effort: Pulmonary effort is normal.      Comments: Decreased air entry to left lung bases   Abdominal:      Palpations: Abdomen is soft.      Tenderness: There is no abdominal tenderness.   Musculoskeletal:      Right lower leg: No edema.      Left lower leg: No edema.   Skin:     General: Skin is warm and dry.  "  Neurological:      Mental Status: He is alert and oriented to person, place, and time. Mental status is at baseline.   Psychiatric:         Mood and Affect: Mood normal.         Behavior: Behavior normal.                Significant Labs: All pertinent labs within the past 24 hours have been reviewed.  Blood Culture: No results for input(s): "LABBLOO" in the last 48 hours.  BMP:   Recent Labs   Lab 08/01/23  1519      *   K 4.1      CO2 22*   BUN 12   CREATININE 0.7   CALCIUM 9.4     CBC:   Recent Labs   Lab 08/01/23  1519   WBC 20.16*   HGB 14.2   HCT 41.5        CMP:   Recent Labs   Lab 08/01/23  1519   *   K 4.1      CO2 22*      BUN 12   CREATININE 0.7   CALCIUM 9.4   ANIONGAP 11     Magnesium: No results for input(s): "MG" in the last 48 hours.  POCT Glucose: No results for input(s): "POCTGLUCOSE" in the last 48 hours.  Urine Culture: No results for input(s): "LABURIN" in the last 48 hours.  Urine Studies:   Recent Labs   Lab 08/01/23  1709   COLORU Yellow   APPEARANCEUA Clear   PHUR 7.0   SPECGRAV >1.030*   PROTEINUA 1+*   GLUCUA Negative   KETONESU 1+*   BILIRUBINUA Negative   OCCULTUA Negative   NITRITE Negative   UROBILINOGEN Negative   LEUKOCYTESUR Negative   RBCUA 0   WBCUA 0   BACTERIA None   SQUAMEPITHEL 1   HYALINECASTS 0       Significant Imaging: I have reviewed and interpreted all pertinent imaging results/findings within the past 24 hours.  "

## 2023-08-02 NOTE — PLAN OF CARE
SW met with patient at bedside.    Patient is alert and oriented with no communication barriers.    Patient verified address and PCP is correct on face sheet.    Patient pharmacy of choice is [facility].    Patient denies HH or DME use.    Patient friend will provide a ride home at discharge.    CM team to continue to follow.   08/02/23 0959   Discharge Assessment   Assessment Type Discharge Planning Assessment   Confirmed/corrected address, phone number and insurance Yes   Confirmed Demographics Correct on Facesheet   Source of Information patient   Communicated YASH with patient/caregiver Date not available/Unable to determine   People in Home alone   Do you expect to return to your current living situation? Yes   Do you have help at home or someone to help you manage your care at home? No   Prior to hospitilization cognitive status: Alert/Oriented   Current cognitive status: Alert/Oriented   Equipment Currently Used at Home cane, straight;CPAP   Readmission within 30 days? No   Patient currently being followed by outpatient case management? No   Do you currently have service(s) that help you manage your care at home? No   Do you take prescription medications? Yes   Do you have any problems affording any of your prescribed medications? No   Is the patient taking medications as prescribed? yes   How do you get to doctors appointments? car, drives self;family or friend will provide   Are you on dialysis? No   Do you take coumadin? No   Discharge Plan A Home   DME Needed Upon Discharge  none   Transition of Care Barriers None     [facility] ([facility])  Initial Discharge Assessment       Primary Care Provider: Neo Sneed MD    Admission Diagnosis: Hypoxia [R09.02]  Left-sided chest wall pain [R07.89]  Pleural effusion on left [J90]    Admission Date: 8/1/2023  Expected Discharge Date:     Transition of Care Barriers: (P) None    Payor: [employer] MANAGED MEDICARE / Plan: [employer] MEDICARE HMO /  Product Type: Capitation /     Extended Emergency Contact Information  Primary Emergency Contact: Trinh Mcdonnell  Mobile Phone: [phone]  Relation: Daughter  Preferred language: English   needed? No  Secondary Emergency Contact: Xiao Kahn  Mobile Phone: [phone]  Relation: Daughter  Preferred language: English   needed? No    Discharge Plan A: (P) Home         [facility]  [address]  [address] [place] [address]  [place] [address]  Phone: [phone] Fax: [phone]    [facility]  [address]  [place] [address]  Phone: [phone] Fax: [phone]    [facility]  [address] [facility] [address]  [facility] [address]  Phone: [phone] Fax: [phone]      Initial Assessment (most recent)       Adult Discharge Assessment - 08/02/23 0959          Discharge Assessment    Assessment Type Discharge Planning Assessment (P)      Confirmed/corrected address, phone number and insurance Yes (P)      Confirmed Demographics Correct on Facesheet (P)      Source of Information patient (P)      Communicated YASH with patient/caregiver Date not available/Unable to determine (P)      People in Home alone (P)      Do you expect to return to your current living situation? Yes (P)      Do you have help at home or someone to help you manage your care at home? No (P)      Prior to hospitilization cognitive status: Alert/Oriented (P)      Current cognitive status: Alert/Oriented (P)      Equipment Currently Used at Home cane, straight;CPAP (P)      Readmission within 30 days? No (P)      Patient currently being followed by outpatient case management? No (P)      Do you currently have service(s) that help you manage your care at home? No (P)      Do you take prescription medications? Yes (P)      Do you have any problems affording any of your prescribed medications? No (P)      Is the patient taking medications as prescribed? yes (P)      How  do you get to doctors appointments? car, drives self;family or friend will provide (P)      Are you on dialysis? No (P)      Do you take coumadin? No (P)      Discharge Plan A Home (P)      DME Needed Upon Discharge  none (P)      Transition of Care Barriers None (P)

## 2023-08-02 NOTE — SUBJECTIVE & OBJECTIVE
Past Medical History:   Diagnosis Date    Hereditary sensory neuropathy     Sleep apnea     + CPAP       Past Surgical History:   Procedure Laterality Date    CATARACT EXTRACTION      COLONOSCOPY N/A 1/3/2019    Procedure: COLONOSCOPY;  Surgeon: Cholo Yates MD;  Location: [facility] ([address] [place] [address]);  Service: Endoscopy;  Laterality: N/A;    EYE SURGERY      galucoma surgery      HERNIA REPAIR      tonsillectomy      TONSILLECTOMY         Review of patient's allergies indicates:   Allergen Reactions    Poison ivy extract     [place] Rash       Family History       Problem Relation (Age of Onset)    COPD Sister    Cancer Sister    No Known Problems Son, Daughter          Tobacco Use    Smoking status: Former     Current packs/day: 0.00     Average packs/day: 0.3 packs/day for 30.0 years (7.5 ttl pk-yrs)     Types: Cigarettes     Start date: 1971     Quit date: 2001     Years since quittin.0     Passive exposure: Past    Smokeless tobacco: Never    Tobacco comments:     Quit 26 year ago   Substance and Sexual Activity    Alcohol use: Not Currently     Comment: I gave up drinking 23 years ago    Drug use: No    Sexual activity: Yes     Partners: Female     Birth control/protection: Diaphragm         Review of Systems   Constitutional:  Negative for activity change, chills and fatigue.   HENT:  Negative for congestion, hearing loss, nosebleeds, postnasal drip, rhinorrhea and sinus pain.    Eyes:  Negative for discharge and itching.   Respiratory:  Positive for cough. Negative for shortness of breath, wheezing and stridor.    Cardiovascular:  Positive for chest pain. Negative for leg swelling.   Gastrointestinal:  Negative for constipation, diarrhea, nausea and vomiting.   Endocrine: Negative for cold intolerance and heat intolerance.   Genitourinary:  Negative for dysuria, frequency and hematuria.   Musculoskeletal:  Positive for back pain.   Skin:  Negative for color change, pallor, rash and wound.    Allergic/Immunologic: Negative for environmental allergies, food allergies and immunocompromised state.   Neurological:  Negative for syncope.   Hematological:  Negative for adenopathy. Does not bruise/bleed easily.   Psychiatric/Behavioral:  Negative for agitation. The patient is not nervous/anxious.      Objective:     Vital Signs (Most Recent):  Temp: 97.8 °F (36.6 °C) (08/02/23 1109)  Pulse: 92 (08/02/23 0849)  Resp: 16 (08/02/23 0849)  BP: 110/61 (08/02/23 0721)  SpO2: (!) 92 % (08/02/23 0849) Vital Signs (24h Range):  Temp:  [97.6 °F (36.4 °C)-98.5 °F (36.9 °C)] 97.8 °F (36.6 °C)  Pulse:  [] 92  Resp:  [16-24] 16  SpO2:  [89 %-96 %] 92 %  BP: ()/(61-87) 110/61     Weight: 71.3 kg (157 lb 3 oz)  Body mass index is 24.62 kg/m².      Intake/Output Summary (Last 24 hours) at 8/2/2023 1134  Last data filed at 8/2/2023 0347  Gross per 24 hour   Intake 999 ml   Output 350 ml   Net 649 ml        Physical Exam  Constitutional:       General: He is not in acute distress.     Appearance: Normal appearance. He is normal weight. He is not ill-appearing.   HENT:      Head: Normocephalic and atraumatic.      Nose: Nose normal. No congestion.      Mouth/Throat:      Mouth: Mucous membranes are moist.      Pharynx: Oropharynx is clear.   Eyes:      Extraocular Movements: Extraocular movements intact.      Conjunctiva/sclera: Conjunctivae normal.      Pupils: Pupils are equal, round, and reactive to light.   Cardiovascular:      Rate and Rhythm: Tachycardia present.      Pulses: Normal pulses.      Heart sounds: Normal heart sounds. No murmur heard.  Pulmonary:      Effort: Pulmonary effort is normal. No respiratory distress.      Breath sounds: Rhonchi present. No wheezing.   Abdominal:      General: Abdomen is flat. Bowel sounds are normal. There is no distension.      Palpations: Abdomen is soft.      Tenderness: There is no abdominal tenderness.   Musculoskeletal:         General: Normal range of motion.       Cervical back: Normal range of motion and neck supple.      Right lower leg: No edema.      Left lower leg: No edema.   Skin:     General: Skin is warm and dry.      Capillary Refill: Capillary refill takes less than 2 seconds.      Coloration: Skin is not jaundiced.   Neurological:      General: No focal deficit present.      Mental Status: He is alert and oriented to person, place, and time. Mental status is at baseline.      Cranial Nerves: No cranial nerve deficit.      Sensory: No sensory deficit.      Motor: No weakness.   Psychiatric:         Mood and Affect: Mood normal.         Behavior: Behavior normal.         Thought Content: Thought content normal.         Judgment: Judgment normal.          Vents:  Oxygen Concentration (%): 45 (08/02/23 0015)    Lines/Drains/Airways       Peripheral Intravenous Line  Duration                  Peripheral IV - Single Lumen 08/01/23 2000 20 G Anterior;Right Forearm <1 day                    Significant Labs:    CBC/Anemia Profile:  Recent Labs   Lab 08/01/23  1519 08/02/23  0450   WBC 20.16* 21.63*   HGB 14.2 12.5*   HCT 41.5 37.0*    332   MCV 94 93   RDW 12.5 12.6        Chemistries:  Recent Labs   Lab 08/01/23  1519 08/02/23  0450   * 130*   K 4.1 3.7    101   CO2 22* 21*   BUN 12 9   CREATININE 0.7 0.6   CALCIUM 9.4 8.5*   ALBUMIN  --  2.0*   PROT  --  5.6*   BILITOT  --  0.5   ALKPHOS  --  76   ALT  --  18   AST  --  13       All pertinent labs within the past 24 hours have been reviewed.    Significant Imaging:   I have reviewed all pertinent imaging results/findings within the past 24 hours.

## 2023-08-02 NOTE — H&P
[facility] Medicine  History & Physical    Patient Name: Cricket Mcdonnell  MRN: [id number]  Patient Class: IP- Inpatient  Admission Date: 8/1/2023  Attending Physician: Darin Mcguire MD   Primary Care Provider: Neo Sneed MD    Patient information was obtained from patient, past medical records and ER records.     Subjective:     Principal Problem:Pleural effusion on left    Chief Complaint:   Chief Complaint   Patient presents with    Flank Pain     Sent by doctors office upstairs for a CT scan. Pt reports flank L sided flank pain radiating tot he LLQ abdomen. Denies  symptoms.         HPI: Cricket Mcdonnell is a 82 year old gentleman with a PMHx of sleep apnea, anxiety disorder and right angle glaucoma of right eye who presents to the ED after primary care visit recommended patient to ED for imaging. Patient with left back pain that radiates to left anterior chest that started six days ago. Patient reports that he was having a productive cough then, but unable to see if it was purulent. Patient denies fever, chills but reports fatigue and loss of appetite. Patient also reports worsening shortness of breath which prompted primary care visit today. Patient reports left chest wall pain was exacerbated with coughing. Patient denies any sick close contacts nor any recent hospitalization. Patient denies nausea, vomiting, diarrhea, pedal edema. Patient also denies any new onset numbness or weakness in bilateral lower extremities.     Afebrile, , RR 20, /67, Pox 91% on arrival and placed on 4L/min O2 via NC. Leukocytosis 20K with left shift. Na 133. UA grossly non-infectious, +1 ketonuria. Blood cx were sent. CT Lumbar spine without contrast with findings of Increase in lumbar levoscoliosis. No definite acute lumbar fracture. Moderate to large left pleural effusion, which is incompletely imaged. CT Abdomen pelvis with contrast with a loculated left pleural effusion  with compressive atelectasis of the left lower lobe, confirmed with CXR. Patient was started for management for CAP in ED with Ceftriaxone 1g IV and azithromycin 500mg IV. Patient also given 1L NaCl 0.9% fluid bolus. On exam, patient was comfortable looking, using CPAP. Left chest wall tenderness is reproducible. Decreased air entry to left posterior lung base.    Patient is admitted to Hospital Medicine for management of worsening shortness of breath with hypoxia and a left lateral wall chest pain likely due to left pleural effusion, currently treated as CAP. Consult placed to pulmonology for advise on need for thoracentesis.       Past Medical History:   Diagnosis Date    Hereditary sensory neuropathy     Sleep apnea     + CPAP       Past Surgical History:   Procedure Laterality Date    CATARACT EXTRACTION      COLONOSCOPY N/A 1/3/2019    Procedure: COLONOSCOPY;  Surgeon: Cholo Yates MD;  Location: [facility] ([address] [place] [address]);  Service: Endoscopy;  Laterality: N/A;    EYE SURGERY      galucoma surgery      HERNIA REPAIR      tonsillectomy      TONSILLECTOMY         Review of patient's allergies indicates:   Allergen Reactions    Poison ivy extract     [place] Rash       No current facility-administered medications on file prior to encounter.     Current Outpatient Medications on File Prior to Encounter   Medication Sig    azelastine (ASTELIN) 137 mcg (0.1 %) nasal spray 1 spray (137 mcg total) by Nasal route 2 (two) times daily.    brimonidine 0.15 % OPTH DROP (ALPHAGAN) 0.15 % ophthalmic solution Instill 1 drop into the left eye 3 times a day    brimonidine 0.15 % OPTH DROP (ALPHAGAN) 0.15 % ophthalmic solution 1 drop Left Eye 3 times a day (Patient not taking: Reported on 8/1/2023)    brimonidine 0.2% (ALPHAGAN) 0.2 % Drop Apply 1 drop to eye.    brimonidine 0.2% (ALPHAGAN) 0.2 % Drop Instill 1 drop into left eye three times a day    brimonidine 0.2% (ALPHAGAN) 0.2 % Drop instill 1 drop Left Eye 3  times a day    brimonidine 0.2% (ALPHAGAN) 0.2 % Drop Instill 1 drop into Left Eye 3 times a day    brimonidine 0.2% (ALPHAGAN) 0.2 % Drop Instill 1 drop into left eye three times a day    dorzolamide-timolol 2-0.5% (COSOPT) 22.3-6.8 mg/mL ophthalmic solution Instill 1 drop into both eyes twice a day    dorzolamide-timolol 2-0.5% (COSOPT) 22.3-6.8 mg/mL ophthalmic solution Instill 1 drop into right eye twice a day    dorzolamide-timolol 2-0.5% (COSOPT) 22.3-6.8 mg/mL ophthalmic solution Instill 1 drop Left Eye twice a day    dorzolamide-timolol 2-0.5% (COSOPT) 22.3-6.8 mg/mL ophthalmic solution Place 1 drop into the left eye twice a day    dorzolamide-timolol 2-0.5% (COSOPT) 22.3-6.8 mg/mL ophthalmic solution Instill 1 drop into the Left Eye twice a day    dorzolamide-timolol 2-0.5% (COSOPT) 22.3-6.8 mg/mL ophthalmic solution Instill 1 drop into the Left Eye twice a day    dorzolamide-timolol 2-0.5% (COSOPT) 22.3-6.8 mg/mL ophthalmic solution instill 1 drop into Left Eye three times a day    dorzolamide-timolol 2-0.5% (COSOPT) 22.3-6.8 mg/mL ophthalmic solution Instill 1 drop into left eye twice a day    dorzolamide-timolol 2-0.5% (COSOPT) 22.3-6.8 mg/mL ophthalmic solution Instill 1 drop Left Eye 3 times a day    dorzolamide-timolol 2-0.5% (COSOPT) 22.3-6.8 mg/mL ophthalmic solution Instill 1 drop into the  Left Eye 3 times a day    dorzolamide-timolol 2-0.5% (COSOPT) 22.3-6.8 mg/mL ophthalmic solution Instill 1 drop into left eye twice a day    doxepin (SINEQUAN) 10 MG capsule doxepin 10 mg capsule    erythromycin (ROMYCIN) ophthalmic ointment Apply 1 a thin layer into left eye four times a day    ferrous sulfate 325 (65 FE) MG EC tablet Take 325 mg by mouth once daily.    fluorometholone (FLAREX) 0.1 % DrpS Instill 1 drop into left eye once a day    fluorometholone 0.1% (FML) 0.1 % DrpS Instill 1 drop into left eye once a day    fluticasone (FLONASE) 50 mcg/actuation nasal spray 2 sprays by  Each Nare route once daily.    fluticasone propionate (FLONASE) 50 mcg/actuation nasal spray 1 spray (50 mcg total) by Each Nostril route once daily.    FLUZONE HIGHDOSE QUAD 22-23  mcg/0.7 mL Syrg     ibuprofen (ADVIL,MOTRIN) 800 MG tablet as needed.    ketorolac 0.4% (ACULAR) 0.4 % Drop Instill 1 drop into left eye four times a day    LUMIGAN 0.01 % Drop every evening.    MARCAINE 0.25 % (2.5 mg/mL) Soln     meloxicam (MOBIC) 15 MG tablet Take 1 tablet (15 mg total) by mouth once daily. for 14 days    methocarbamoL (ROBAXIN) 500 MG Tab Take 1 tablet (500 mg total) by mouth 4 (four) times daily. for 10 days    MODERNA COVID BIVAL,6Y UP,,PF, 50 mcg/0.5 mL injection     MULTIVIT-IRON-MIN-FOLIC ACID 3,500-18-0.4 UNIT-MG-MG ORAL CHEW Take by mouth once daily.     mupirocin (BACTROBAN) 2 % ointment every evening.    neomycin-polymyxin-dexamethasone (MAXITROL) 3.5 mg/g-10,000 unit/g-0.1 % Oint Apply a small amount into left eye three times a day    neomycin-polymyxin-dexamethasone (MAXITROL) 3.5 mg/g-10,000 unit/g-0.1 % Oint Apply 0.25 inch of ointment  into right eye 3 times a day    neomycin-polymyxin-dexamethasone (MAXITROL) 3.5 mg/g-10,000 unit/g-0.1 % Oint Apply a small amount into left eye three times a day    neomycin-polymyxin-dexamethasone (MAXITROL) 3.5 mg/g-10,000 unit/g-0.1 % Oint Apply 0.25 inch into right eye 3 times a day    neomycin-polymyxin-dexamethasone (MAXITROL) 3.5 mg/g-10,000 unit/g-0.1 % Oint Apply 0.25 inch Left Eye 3 times a day STOP AFTER 3 DAYS    neomycin-polymyxin-dexamethasone (MAXITROL) 3.5 mg/g-10,000 unit/g-0.1 % Oint 0.25 inch Left Eye 3 times a day    neomycin-polymyxin-dexamethasone (MAXITROL) 3.5 mg/g-10,000 unit/g-0.1 % Oint Apply 1 a small amount into left eye three times a day STOP AFTER 3 DAYS    neomycin-polymyxin-dexamethasone (MAXITROL) 3.5 mg/g-10,000 unit/g-0.1 % Oint Apply 0.25 inch ribbon into left eye 3 times a day     neomycin-polymyxin-dexamethasone (MAXITROL) 3.5 mg/g-10,000 unit/g-0.1 % Oint aPPLY 0.25 inch into left eye 3 times a day FOR 4 DAYS THEN STOP    neomycin-polymyxin-dexamethasone (MAXITROL) 3.5mg/mL-10,000 unit/mL-0.1 % DrpS Instill 1 drop into left eye three times a day X 4 DAYS THEN STOP    netarsudiL-latanoprost (ROCKLATAN) 0.02-0.005 % Drop Instill 1 drop into both eyes at bedtime    netarsudiL-latanoprost (ROCKLATAN) 0.02-0.005 % Drop Instill 1 drop into both eyes at bedtime    netarsudiL-latanoprost (ROCKLATAN) 0.02-0.005 % Drop Instill 1 drop into left eye every night    netarsudiL-latanoprost (ROCKLATAN) 0.02-0.005 % Drop Instill 1 drop into both eyes at bedtime    ofloxacin (OCUFLOX) 0.3 % ophthalmic solution Place 1 drop into Left Eye 3 times a day (USE ONLY 3 DAYS AFTER EACH INJECTION)    ondansetron (ZOFRAN-ODT) 4 MG TbDL Dissolve 1 tablet (4 mg total) by mouth every 8 (eight) hours as needed (nausea or vomiting).    prednisoLONE acetate (PRED FORTE) 1 % DrpS Instill 1 drop into right eye once a day    prednisoLONE acetate (PRED FORTE) 1 % DrpS Instill 1 drop into the Right Eye once a day    PREVIDENT 5000 BOOSTER PLUS 1.1 % Pste once daily.     sildenafiL (VIAGRA) 100 MG tablet Take 1 tablet (100 mg total) by mouth daily as needed.    testosterone (ANDROGEL) 1.62 % (40.5 mg/2.5 gram) GlPk Place 2.5 g (1 packet) onto the skin once daily.    testosterone (ANDROGEL) 1.62 % (40.5 mg/2.5 gram) GlPk Place 2.5 g (1 packet) onto the skin once daily.    timolol maleate 0.25% (TIMOPTIC) 0.25 % Drop 1 drop once daily.    zolpidem (AMBIEN) 10 mg Tab Take 1 tablet (10 mg total) by mouth nightly as needed (to be taken the night of the patient's sleep study).    [DISCONTINUED] gabapentin (NEURONTIN) 100 MG capsule Take 1-3 capsules (100-300 mg total) by mouth nightly as needed (restless legs  (note: take 1-2 hours prior to usual onset of symptoms)).     Family History       Problem Relation (Age of  Onset)    COPD Sister    Cancer Sister    No Known Problems Son, Daughter          Tobacco Use    Smoking status: Former     Current packs/day: 0.00     Average packs/day: 0.3 packs/day for 30.0 years (7.5 ttl pk-yrs)     Types: Cigarettes     Start date: 1971     Quit date: 2001     Years since quittin.0     Passive exposure: Past    Smokeless tobacco: Never    Tobacco comments:     Quit 26 year ago   Substance and Sexual Activity    Alcohol use: Not Currently     Comment: I gave up drinking 23 years ago    Drug use: No    Sexual activity: Yes     Partners: Female     Birth control/protection: Diaphragm     Review of Systems   Constitutional:  Positive for appetite change and fatigue. Negative for chills.   HENT:  Negative for congestion and sore throat.    Eyes:  Positive for visual disturbance (right eye, chronic). Negative for redness.   Respiratory:  Positive for cough and shortness of breath.    Cardiovascular:  Positive for chest pain.   Gastrointestinal:  Negative for diarrhea, nausea and vomiting.   Genitourinary:  Negative for difficulty urinating and dysuria.   Musculoskeletal:  Positive for back pain (left back radiating to left chest). Negative for gait problem.   Skin:  Negative for rash and wound.   Neurological:  Negative for light-headedness and headaches.   Psychiatric/Behavioral:  Negative for agitation and behavioral problems.      Objective:     Vital Signs (Most Recent):  Temp: 97.6 °F (36.4 °C) (23)  Pulse: 106 (23)  Resp: (!) 22 (23)  BP: 108/61 (23)  SpO2: (!) 92 % (23 0015) Vital Signs (24h Range):  Temp:  [97.6 °F (36.4 °C)-98.5 °F (36.9 °C)] 97.6 °F (36.4 °C)  Pulse:  [] 106  Resp:  [20-24] 22  SpO2:  [89 %-96 %] 92 %  BP: ()/(61-87) 108/61     Weight: 71.3 kg (157 lb 3 oz)  Body mass index is 24.62 kg/m².     Physical Exam  Vitals and nursing note reviewed.   Constitutional:       General: He is not in  "acute distress.     Appearance: He is not ill-appearing or toxic-appearing.   HENT:      Head: Normocephalic and atraumatic.      Nose: No congestion or rhinorrhea.   Eyes:      General: No scleral icterus.        Right eye: No discharge.         Left eye: No discharge.   Cardiovascular:      Rate and Rhythm: Regular rhythm. Tachycardia present.      Pulses: Normal pulses.      Heart sounds: Normal heart sounds.   Pulmonary:      Effort: Pulmonary effort is normal.      Comments: Decreased air entry to left lung bases   Abdominal:      Palpations: Abdomen is soft.      Tenderness: There is no abdominal tenderness.   Musculoskeletal:      Right lower leg: No edema.      Left lower leg: No edema.   Skin:     General: Skin is warm and dry.   Neurological:      Mental Status: He is alert and oriented to person, place, and time. Mental status is at baseline.   Psychiatric:         Mood and Affect: Mood normal.         Behavior: Behavior normal.                Significant Labs: All pertinent labs within the past 24 hours have been reviewed.  Blood Culture: No results for input(s): "LABBLOO" in the last 48 hours.  BMP:   Recent Labs   Lab 08/01/23  1519      *   K 4.1      CO2 22*   BUN 12   CREATININE 0.7   CALCIUM 9.4     CBC:   Recent Labs   Lab 08/01/23  1519   WBC 20.16*   HGB 14.2   HCT 41.5        CMP:   Recent Labs   Lab 08/01/23  1519   *   K 4.1      CO2 22*      BUN 12   CREATININE 0.7   CALCIUM 9.4   ANIONGAP 11     Magnesium: No results for input(s): "MG" in the last 48 hours.  POCT Glucose: No results for input(s): "POCTGLUCOSE" in the last 48 hours.  Urine Culture: No results for input(s): "LABURIN" in the last 48 hours.  Urine Studies:   Recent Labs   Lab 08/01/23  1709   COLORU Yellow   APPEARANCEUA Clear   PHUR 7.0   SPECGRAV >1.030*   PROTEINUA 1+*   GLUCUA Negative   KETONESU 1+*   BILIRUBINUA Negative   OCCULTUA Negative   NITRITE Negative   UROBILINOGEN " Negative   LEUKOCYTESUR Negative   RBCUA 0   WBCUA 0   BACTERIA None   SQUAMEPITHEL 1   HYALINECASTS 0       Significant Imaging: I have reviewed and interpreted all pertinent imaging results/findings within the past 24 hours.    Assessment/Plan:     * Pleural effusion on left   Left sided chest wall pain    Community acquired pneumonia   Hypoxia  Hx of sleep apnea, anxiety disorder   - presents to the ED after primary care visit recommended patient to ED for imaging for lumbar spine   - Patient reports left back pain that radiates to left anterior chest that started six days ago, associated with a productive cough ?purulent and worsening SOB. Denies fever, chills but reports fatigue and loss of appetite. Patient reports left chest wall pain was exacerbated with coughing.   - Patient denies any sick close contacts nor any recent hospitalization.   - Afebrile, , RR 20, /67, Pox 91% on arrival and placed on 4L/min O2 via NC.   - Leukocytosis 20K with left shift.   - CT Abdomen pelvis with contrast with a loculated left pleural effusion with compressive atelectasis of the left lower lobe, confirmed with CXR.   - Patient was started for management for CAP in ED with Ceftriaxone 1g IV and azithromycin 500mg IV. Patient also given 1L NaCl 0.9% fluid bolus.   - On exam, patient was comfortable looking, using CPAP. Left chest wall tenderness is reproducible. Decreased air entry to left posterior lung base.  - Continued treating as CAP for raised WBC. Continue IVF as patient is tachycardic, ?duoneb and insensible losses    Plan:  - Continue Ceftriaxone 1g IV daily and Azithromycin 500mg PO for the next 2 days   - Acetaminophen 1g Q8 PRN, ibuprofen 800mg Q8 PRN for left chest wall pain  - Duonebs Q4 wake  - Wean O2 as needed to keep Pox >96%  - CPAP qhs   - Pulm consult in AM - need for thoracentesis?  - Trend CBC  - Blood cx (8/1/23) in process  - Guaifenesin 200mg Q4 PRN  - Continue IVF NaCl 0.9%  "125ml/hr    Primary open angle glaucoma of right eye, moderate stage  Patient reports using multiple eye drops but unable to recall names of them other than "timolol TID, bimonidine TID and something at night". Ordered what I could make out of the list.     - Eye drops as ordered.       KARMA (obstructive sleep apnea)  CPAP qhs      VTE Risk Mitigation (From admission, onward)         Ordered     enoxaparin injection 40 mg  Daily         08/01/23 2024     IP VTE HIGH RISK PATIENT  Once         08/01/23 2024     Place sequential compression device  Until discontinued         08/01/23 2024              Brando Antoine NP  Department of Hospital Medicine  [facility] ([place])  "

## 2023-08-02 NOTE — HPI
Mr. Cricket Mcdonnell is a 82 year old man with Sleep Apnea, SWETHA, Glaucoma, and former tobacco use (Quit ~26 years ago) who presents after having a cough & left back pain for about 6 days. States that at baseline able to perform all ADLs for himself; noticed a week ago had some productive cough, denied any fevers, chills, dyspnea, loss of appetite, or weight loss, however then noticed worsneing flank and back pain as well. He states the chest pain began after a coughing spell, but that has since improved. He went to make a PCP appointment and was referred to the ED for imaging where he was found to have an effusion and admitted. Pulmonology was consulted.    On exam, appears comfortable, able to get up and move around. Denied any fevers, chills, weight loss. Symptoms all began about a week ago and progressively worsened, his back pain described as aching sensation with a radiation to the L flank. Sometimes the R as well. He described it as dull and aching; associated with a productive cough. Never occurred before. No recent illness. No sick contacts. Former smoker for 30-40 pack years; quit about 26 years ago.

## 2023-08-02 NOTE — ASSESSMENT & PLAN NOTE
"Patient reports using multiple eye drops but unable to recall names of them other than "timolol TID, bimonidine TID and something at night". Ordered what I could make out of the list.     - Eye drops as ordered.     "

## 2023-08-02 NOTE — PLAN OF CARE
Patient admitted from the ED for SOB and SANCHES. Patient is alert and oriented x 4, able to make needs and wants known. Patient oriented to room and call light use. IV antibiotics tolerated well overnight and continuous IVF infusing as ordered. CPAP ordered and used overnight until 0330. Patient is on 5L per n/c humidified with SpO2 at 91-92%. Patient is tachypneic with RR 22- 24 overnight. Cough is congested but nonproductive at this time. All remaining vitals stable. Fall precautions are in place. Purposeful rounding completed overnight.

## 2023-08-02 NOTE — ASSESSMENT & PLAN NOTE
Left sided chest wall pain    Community acquired pneumonia   Acute hypoxic resp failure   - presents to the ED after primary care visit recommended patient to ED for imaging for lumbar spine   - Patient reports left back pain that radiates to left anterior chest that started six days ago, associated with a productive cough and worsening SOB. Denies fever, chills but reports fatigue and loss of appetite. Patient reports left chest wall pain was exacerbated with coughing.   - Patient denies any sick close contacts nor any recent hospitalization.   - Stopped smoking 20 + years ago   - Afebrile, , RR 20, /67, Pox 91% on arrival and placed on 4L/min O2 via NC.   - Leukocytosis 20K with left shift.   - CT Abdomen pelvis with contrast with a loculated left pleural effusion with compressive atelectasis of the left lower lobe, confirmed with CXR.   - Patient was started for management for CAP in ED with Ceftriaxone 1g IV and azithromycin 500mg IV. Patient also given 1L NaCl 0.9% fluid bolus.   - On exam, patient was comfortable looking, using CPAP. Left chest wall tenderness is reproducible. Decreased air entry to left posterior lung base.  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 Will determine more from pleural studies.   - Continued treating as CAP for raised WBC.   - Exudative effusion   - Pt coughed during intake today says this has happened at home as well - could be that pt is aspirating and that is contributing to pneumonia and effusion     Plan:  - Continue Ceftriaxone 2g IV daily and Azithromycin 250mg PO for total 5 days  - Acetaminophen 1g Q8 PRN, ibuprofen 800mg Q8  PRN for left chest wall pain  - Duonebs Q4 wake  - Wean O2 as needed to keep Pox >90%  - Pulm consult - CT chest to determine further mngx   - Trend CBC  - Guaifenesin 200mg Q4 PRN  - Speech eval

## 2023-08-02 NOTE — ASSESSMENT & PLAN NOTE
"Patient reports using multiple eye drops but unable to recall names of them other than "timolol TID, bimonidine TID and something at night".     - Eye drops as ordered to best of ability based on home list.     "

## 2023-08-02 NOTE — HPI
Cricket Mcdonnell is a 82 year old gentleman with a PMHx of sleep apnea, anxiety disorder and right angle glaucoma of right eye who presents to the ED after primary care visit recommended patient to ED for imaging. Patient with left back pain that radiates to left anterior chest that started six days ago. Patient reports that he was having a productive cough then, but unable to see if it was purulent. Patient denies fever, chills but reports fatigue and loss of appetite. Patient also reports worsening shortness of breath which prompted primary care visit today. Patient reports left chest wall pain was exacerbated with coughing. Patient denies any sick close contacts nor any recent hospitalization. Patient denies nausea, vomiting, diarrhea, pedal edema. Patient also denies any new onset numbness or weakness in bilateral lower extremities.     Afebrile, , RR 20, /67, Pox 91% on arrival and placed on 4L/min O2 via NC. Leukocytosis 20K with left shift. Na 133. UA grossly non-infectious, +1 ketonuria. Blood cx were sent. CT Lumbar spine without contrast with findings of Increase in lumbar levoscoliosis. No definite acute lumbar fracture. Moderate to large left pleural effusion, which is incompletely imaged. CT Abdomen pelvis with contrast with a loculated left pleural effusion with compressive atelectasis of the left lower lobe, confirmed with CXR. Patient was started for management for CAP in ED with Ceftriaxone 1g IV and azithromycin 500mg IV. Patient also given 1L NaCl 0.9% fluid bolus. On exam, patient was comfortable looking, using CPAP. Left chest wall tenderness is reproducible. Decreased air entry to left posterior lung base.    Patient is admitted to Hospital Medicine for management of worsening shortness of breath with hypoxia and a left lateral wall chest pain likely due to left pleural effusion, currently treated as CAP. Consult placed to pulmonology for advise on need for thoracentesis.

## 2023-08-02 NOTE — CONSULTS
[facility] ([place])  Pulmonology  Consult Note    Patient Name: Cricket Mcdonnell  MRN: [id number]  Admission Date: 8/1/2023  Hospital Length of Stay: 1 days  Code Status: Full Code  Attending Physician: Darin Mcguire MD  Primary Care Provider: Neo Sneed MD   Principal Problem: Loculated pleural effusion    Inpatient consult to Pulmonology  Consult performed by: Stoney Coon MD  Consult ordered by: Brando Antoine NP        Subjective:     HPI:  Mr. Cricket Mcdonnell is a 82 year old man with Sleep Apnea, SWETHA, Glaucoma, and former tobacco use (Quit ~26 years ago) who presents after having a cough & left back pain for about 6 days. States that at baseline able to perform all ADLs for himself; noticed a week ago had some productive cough, denied any fevers, chills, dyspnea, loss of appetite, or weight loss, however then noticed worsneing flank and back pain as well. He states the chest pain began after a coughing spell, but that has since improved. He went to make a PCP appointment and was referred to the ED for imaging where he was found to have an effusion and admitted. Pulmonology was consulted.    On exam, appears comfortable, able to get up and move around. Denied any fevers, chills, weight loss. Symptoms all began about a week ago and progressively worsened, his back pain described as aching sensation with a radiation to the L flank. Sometimes the R as well. He described it as dull and aching; associated with a productive cough. Never occurred before. No recent illness. No sick contacts. Former smoker for 30-40 pack years; quit about 26 years ago.       Past Medical History:   Diagnosis Date    Hereditary sensory neuropathy     Sleep apnea     + CPAP       Past Surgical History:   Procedure Laterality Date    CATARACT EXTRACTION      COLONOSCOPY N/A 1/3/2019    Procedure: COLONOSCOPY;  Surgeon: Cholo Yates MD;  Location: [facility] ([address] [place] [address]);  Service: Endoscopy;  Laterality: N/A;    EYE  SURGERY      galucoma surgery      HERNIA REPAIR      tonsillectomy      TONSILLECTOMY         Review of patient's allergies indicates:   Allergen Reactions    Poison ivy extract     [place] Rash       Family History       Problem Relation (Age of Onset)    COPD Sister    Cancer Sister    No Known Problems Son, Daughter          Tobacco Use    Smoking status: Former     Current packs/day: 0.00     Average packs/day: 0.3 packs/day for 30.0 years (7.5 ttl pk-yrs)     Types: Cigarettes     Start date: 1971     Quit date: 2001     Years since quittin.0     Passive exposure: Past    Smokeless tobacco: Never    Tobacco comments:     Quit 26 year ago   Substance and Sexual Activity    Alcohol use: Not Currently     Comment: I gave up drinking 23 years ago    Drug use: No    Sexual activity: Yes     Partners: Female     Birth control/protection: Diaphragm         Review of Systems   Constitutional:  Negative for activity change, chills and fatigue.   HENT:  Negative for congestion, hearing loss, nosebleeds, postnasal drip, rhinorrhea and sinus pain.    Eyes:  Negative for discharge and itching.   Respiratory:  Positive for cough. Negative for shortness of breath, wheezing and stridor.    Cardiovascular:  Positive for chest pain. Negative for leg swelling.   Gastrointestinal:  Negative for constipation, diarrhea, nausea and vomiting.   Endocrine: Negative for cold intolerance and heat intolerance.   Genitourinary:  Negative for dysuria, frequency and hematuria.   Musculoskeletal:  Positive for back pain.   Skin:  Negative for color change, pallor, rash and wound.   Allergic/Immunologic: Negative for environmental allergies, food allergies and immunocompromised state.   Neurological:  Negative for syncope.   Hematological:  Negative for adenopathy. Does not bruise/bleed easily.   Psychiatric/Behavioral:  Negative for agitation. The patient is not nervous/anxious.      Objective:     Vital Signs (Most  Recent):  Temp: 97.8 °F (36.6 °C) (08/02/23 1109)  Pulse: 92 (08/02/23 0849)  Resp: 16 (08/02/23 0849)  BP: 110/61 (08/02/23 0721)  SpO2: (!) 92 % (08/02/23 0849) Vital Signs (24h Range):  Temp:  [97.6 °F (36.4 °C)-98.5 °F (36.9 °C)] 97.8 °F (36.6 °C)  Pulse:  [] 92  Resp:  [16-24] 16  SpO2:  [89 %-96 %] 92 %  BP: ()/(61-87) 110/61     Weight: 71.3 kg (157 lb 3 oz)  Body mass index is 24.62 kg/m².      Intake/Output Summary (Last 24 hours) at 8/2/2023 1134  Last data filed at 8/2/2023 0347  Gross per 24 hour   Intake 999 ml   Output 350 ml   Net 649 ml        Physical Exam  Constitutional:       General: He is not in acute distress.     Appearance: Normal appearance. He is normal weight. He is not ill-appearing.   HENT:      Head: Normocephalic and atraumatic.      Nose: Nose normal. No congestion.      Mouth/Throat:      Mouth: Mucous membranes are moist.      Pharynx: Oropharynx is clear.   Eyes:      Extraocular Movements: Extraocular movements intact.      Conjunctiva/sclera: Conjunctivae normal.      Pupils: Pupils are equal, round, and reactive to light.   Cardiovascular:      Rate and Rhythm: Tachycardia present.      Pulses: Normal pulses.      Heart sounds: Normal heart sounds. No murmur heard.  Pulmonary:      Effort: Pulmonary effort is normal. No respiratory distress.      Breath sounds: Rhonchi present. No wheezing.   Abdominal:      General: Abdomen is flat. Bowel sounds are normal. There is no distension.      Palpations: Abdomen is soft.      Tenderness: There is no abdominal tenderness.   Musculoskeletal:         General: Normal range of motion.      Cervical back: Normal range of motion and neck supple.      Right lower leg: No edema.      Left lower leg: No edema.   Skin:     General: Skin is warm and dry.      Capillary Refill: Capillary refill takes less than 2 seconds.      Coloration: Skin is not jaundiced.   Neurological:      General: No focal deficit present.      Mental Status:  He is alert and oriented to person, place, and time. Mental status is at baseline.      Cranial Nerves: No cranial nerve deficit.      Sensory: No sensory deficit.      Motor: No weakness.   Psychiatric:         Mood and Affect: Mood normal.         Behavior: Behavior normal.         Thought Content: Thought content normal.         Judgment: Judgment normal.          Vents:  Oxygen Concentration (%): 45 (08/02/23 0015)    Lines/Drains/Airways       Peripheral Intravenous Line  Duration                  Peripheral IV - Single Lumen 08/01/23 2000 20 G Anterior;Right Forearm <1 day                    Significant Labs:    CBC/Anemia Profile:  Recent Labs   Lab 08/01/23  1519 08/02/23  0450   WBC 20.16* 21.63*   HGB 14.2 12.5*   HCT 41.5 37.0*    332   MCV 94 93   RDW 12.5 12.6        Chemistries:  Recent Labs   Lab 08/01/23  1519 08/02/23  0450   * 130*   K 4.1 3.7    101   CO2 22* 21*   BUN 12 9   CREATININE 0.7 0.6   CALCIUM 9.4 8.5*   ALBUMIN  --  2.0*   PROT  --  5.6*   BILITOT  --  0.5   ALKPHOS  --  76   ALT  --  18   AST  --  13       All pertinent labs within the past 24 hours have been reviewed.    Significant Imaging:   I have reviewed all pertinent imaging results/findings within the past 24 hours.    Assessment/Plan:     Pulmonary  * Loculated pleural effusion  - initially presented with a week of back/flank pain with productive cough; denied any fevers or recent illnesses, weight loss.  - CXR and CT noted a L pleural effusion  - labs noting leukocytosis  - On bedside US, noted multiple small pockets of loculated effusion with areas of trapped lung  - Not currently on anticoagulation, antiplatelet agents  - Decision made to place roman/pleural catheter; please see procedure note for details  - Roman catheter placed, still with some pocket of effusion on bedside ultrasound. Roughly 400mL of fluid was drained immediately    Recommendations  - Place catheter to suction, monitor output  -  Follow-up cell count, culture, and cytology  - Continue antibiotics; would change azithromycin from 500mg daily -> 250mg daily. Continue ceftriaxone 2g daily.  - Will re-evaluate tomorrow, depending on bedside ultrasound findings may plan for thrombolytics.    Acute Hypoxemic Respriatory Failure  - suspect 2/2 underlying loculated effusion & consolidation  - Continue antibiotics  - wean supplemental oxygen as tolerated, goal SpO2 88-95%        Thank you for your consult. I will follow-up with patient. Please contact us if you have any additional questions.     Stoney Coon MD  Pulmonology  [facility] ([place])

## 2023-08-03 PROBLEM — R13.10 DYSPHAGIA: Status: ACTIVE | Noted: 2023-08-03

## 2023-08-03 PROBLEM — K59.00 CONSTIPATION: Status: ACTIVE | Noted: 2023-08-03

## 2023-08-03 LAB
ALBUMIN SERPL BCP-MCNC: 1.9 G/DL (ref 3.5–5.2)
ALP SERPL-CCNC: 83 U/L (ref 55–135)
ALT SERPL W/O P-5'-P-CCNC: 22 U/L (ref 10–44)
ANION GAP SERPL CALC-SCNC: 8 MMOL/L (ref 8–16)
AST SERPL-CCNC: 21 U/L (ref 10–40)
BILIRUB SERPL-MCNC: 0.3 MG/DL (ref 0.1–1)
BUN SERPL-MCNC: 10 MG/DL (ref 8–23)
CALCIUM SERPL-MCNC: 8.6 MG/DL (ref 8.7–10.5)
CHLORIDE SERPL-SCNC: 101 MMOL/L (ref 95–110)
CO2 SERPL-SCNC: 21 MMOL/L (ref 23–29)
CREAT SERPL-MCNC: 0.5 MG/DL (ref 0.5–1.4)
ERYTHROCYTE [DISTWIDTH] IN BLOOD BY AUTOMATED COUNT: 12.5 % (ref 11.5–14.5)
EST. GFR  (NO RACE VARIABLE): >60 ML/MIN/1.73 M^2
GLUCOSE SERPL-MCNC: 113 MG/DL (ref 70–110)
HCT VFR BLD AUTO: 35.1 % (ref 40–54)
HGB BLD-MCNC: 12.1 G/DL (ref 14–18)
MCH RBC QN AUTO: 32 PG (ref 27–31)
MCHC RBC AUTO-ENTMCNC: 34.5 G/DL (ref 32–36)
MCV RBC AUTO: 93 FL (ref 82–98)
PLATELET # BLD AUTO: 332 K/UL (ref 150–450)
PMV BLD AUTO: 8.1 FL (ref 9.2–12.9)
POTASSIUM SERPL-SCNC: 3.8 MMOL/L (ref 3.5–5.1)
PROT SERPL-MCNC: 5.6 G/DL (ref 6–8.4)
RBC # BLD AUTO: 3.78 M/UL (ref 4.6–6.2)
SODIUM SERPL-SCNC: 130 MMOL/L (ref 136–145)
TSH SERPL DL<=0.005 MIU/L-ACNC: 1.38 UIU/ML (ref 0.4–4)
WBC # BLD AUTO: 20.11 K/UL (ref 3.9–12.7)

## 2023-08-03 PROCEDURE — 63600175 PHARM REV CODE 636 W HCPCS: Mod: HCNC

## 2023-08-03 PROCEDURE — 25000003 PHARM REV CODE 250: Mod: HCNC | Performed by: STUDENT IN AN ORGANIZED HEALTH CARE EDUCATION/TRAINING PROGRAM

## 2023-08-03 PROCEDURE — 99233 PR SUBSEQUENT HOSPITAL CARE,LEVL III: ICD-10-PCS | Mod: ,,, | Performed by: STUDENT IN AN ORGANIZED HEALTH CARE EDUCATION/TRAINING PROGRAM

## 2023-08-03 PROCEDURE — 84443 ASSAY THYROID STIM HORMONE: CPT | Mod: HCNC | Performed by: STUDENT IN AN ORGANIZED HEALTH CARE EDUCATION/TRAINING PROGRAM

## 2023-08-03 PROCEDURE — 99233 SBSQ HOSP IP/OBS HIGH 50: CPT | Mod: ,,, | Performed by: STUDENT IN AN ORGANIZED HEALTH CARE EDUCATION/TRAINING PROGRAM

## 2023-08-03 PROCEDURE — 25000242 PHARM REV CODE 250 ALT 637 W/ HCPCS: Mod: HCNC

## 2023-08-03 PROCEDURE — 63700000 PHARM REV CODE 250 ALT 637 W/O HCPCS: Mod: HCNC | Performed by: STUDENT IN AN ORGANIZED HEALTH CARE EDUCATION/TRAINING PROGRAM

## 2023-08-03 PROCEDURE — 63600175 PHARM REV CODE 636 W HCPCS: Mod: HCNC | Performed by: STUDENT IN AN ORGANIZED HEALTH CARE EDUCATION/TRAINING PROGRAM

## 2023-08-03 PROCEDURE — 25000003 PHARM REV CODE 250: Mod: HCNC

## 2023-08-03 PROCEDURE — 92610 EVALUATE SWALLOWING FUNCTION: CPT | Mod: HCNC

## 2023-08-03 PROCEDURE — 27000221 HC OXYGEN, UP TO 24 HOURS: Mod: HCNC

## 2023-08-03 PROCEDURE — 94640 AIRWAY INHALATION TREATMENT: CPT | Mod: HCNC

## 2023-08-03 PROCEDURE — 80053 COMPREHEN METABOLIC PANEL: CPT | Mod: HCNC

## 2023-08-03 PROCEDURE — 99900035 HC TECH TIME PER 15 MIN (STAT): Mod: HCNC

## 2023-08-03 PROCEDURE — 94660 CPAP INITIATION&MGMT: CPT | Mod: HCNC

## 2023-08-03 PROCEDURE — 99233 SBSQ HOSP IP/OBS HIGH 50: CPT | Mod: HCNC,GC,, | Performed by: INTERNAL MEDICINE

## 2023-08-03 PROCEDURE — 94761 N-INVAS EAR/PLS OXIMETRY MLT: CPT | Mod: HCNC

## 2023-08-03 PROCEDURE — 85027 COMPLETE CBC AUTOMATED: CPT | Mod: HCNC

## 2023-08-03 PROCEDURE — 36415 COLL VENOUS BLD VENIPUNCTURE: CPT | Mod: HCNC

## 2023-08-03 PROCEDURE — 99233 PR SUBSEQUENT HOSPITAL CARE,LEVL III: ICD-10-PCS | Mod: HCNC,GC,, | Performed by: INTERNAL MEDICINE

## 2023-08-03 PROCEDURE — 11000001 HC ACUTE MED/SURG PRIVATE ROOM: Mod: HCNC

## 2023-08-03 RX ORDER — LACTULOSE 10 G/15ML
10 SOLUTION ORAL 3 TIMES DAILY
Status: DISCONTINUED | OUTPATIENT
Start: 2023-08-03 | End: 2023-08-09 | Stop reason: HOSPADM

## 2023-08-03 RX ADMIN — ENOXAPARIN SODIUM 40 MG: 40 INJECTION SUBCUTANEOUS at 04:08

## 2023-08-03 RX ADMIN — IBUPROFEN 800 MG: 400 TABLET, FILM COATED ORAL at 04:08

## 2023-08-03 RX ADMIN — GUAIFENESIN 200 MG: 100 SOLUTION ORAL at 04:08

## 2023-08-03 RX ADMIN — TIMOLOL MALEATE 1 DROP: 2.5 SOLUTION/ DROPS OPHTHALMIC at 09:08

## 2023-08-03 RX ADMIN — DORZOLAMIDE HYDROCHLORIDE AND TIMOLOL MALEATE 1 DROP: 22.3; 6.8 SOLUTION/ DROPS OPHTHALMIC at 09:08

## 2023-08-03 RX ADMIN — BIMATOPROST 1 DROP: 0.1 SOLUTION/ DROPS OPHTHALMIC at 09:08

## 2023-08-03 RX ADMIN — LACTULOSE 10 G: 20 SOLUTION ORAL at 04:08

## 2023-08-03 RX ADMIN — SENNOSIDES AND DOCUSATE SODIUM 2 TABLET: 50; 8.6 TABLET ORAL at 09:08

## 2023-08-03 RX ADMIN — IPRATROPIUM BROMIDE AND ALBUTEROL SULFATE 3 ML: 2.5; .5 SOLUTION RESPIRATORY (INHALATION) at 08:08

## 2023-08-03 RX ADMIN — IBUPROFEN 800 MG: 400 TABLET, FILM COATED ORAL at 11:08

## 2023-08-03 RX ADMIN — GUAIFENESIN 200 MG: 100 SOLUTION ORAL at 11:08

## 2023-08-03 RX ADMIN — BRIMONIDINE TARTRATE 1 DROP: 1.5 SOLUTION OPHTHALMIC at 04:08

## 2023-08-03 RX ADMIN — IPRATROPIUM BROMIDE AND ALBUTEROL SULFATE 3 ML: 2.5; .5 SOLUTION RESPIRATORY (INHALATION) at 07:08

## 2023-08-03 RX ADMIN — LACTULOSE 10 G: 20 SOLUTION ORAL at 09:08

## 2023-08-03 RX ADMIN — IPRATROPIUM BROMIDE AND ALBUTEROL SULFATE 3 ML: 2.5; .5 SOLUTION RESPIRATORY (INHALATION) at 03:08

## 2023-08-03 RX ADMIN — GUAIFENESIN 200 MG: 100 SOLUTION ORAL at 09:08

## 2023-08-03 RX ADMIN — BRIMONIDINE TARTRATE 1 DROP: 1.5 SOLUTION OPHTHALMIC at 11:08

## 2023-08-03 RX ADMIN — BRIMONIDINE TARTRATE 1 DROP: 1.5 SOLUTION OPHTHALMIC at 09:08

## 2023-08-03 RX ADMIN — MELATONIN TAB 3 MG 6 MG: 3 TAB at 09:08

## 2023-08-03 RX ADMIN — AZITHROMYCIN MONOHYDRATE 250 MG: 250 TABLET ORAL at 09:08

## 2023-08-03 RX ADMIN — CEFTRIAXONE SODIUM 2 G: 2 INJECTION, POWDER, FOR SOLUTION INTRAMUSCULAR; INTRAVENOUS at 11:08

## 2023-08-03 RX ADMIN — IPRATROPIUM BROMIDE AND ALBUTEROL SULFATE 3 ML: 2.5; .5 SOLUTION RESPIRATORY (INHALATION) at 12:08

## 2023-08-03 NOTE — ASSESSMENT & PLAN NOTE
- initially presented with a week of back/flank pain with productive cough; denied any fevers or recent illnesses, weight loss. Imaging consistent with a pleural effusion, bedside ultrasound noting multiple pockets of loculated effusion and areas of trapped lung. Unsure if they are all connecting.  - 8/2: roman catheter placed with initially 400mL of fluid evacuated; studies sent and consistent with a complicated parapneumonic effusion  - Since placement: ~1200mL of fluid drained    Recommendations  - Continue to leave on contious suction  - Follow-up culture and cytology  - Continue antibiotics for now  - CT Chest today to evaluate. He could potentially require continuous suction of this catheter, tpa/dornase, CTS evaluation for VATS, another catheter (if there are pockets not connecting) depending on findings.

## 2023-08-03 NOTE — PT/OT/SLP EVAL
Speech Language Pathology Evaluation  Bedside Swallow    Patient Name:  Cricket Mcdonnell   MRN:  [id number]  Admitting Diagnosis: Loculated pleural effusion    Recommendations:                 General Recommendations:    SLP to follow 3-4x/week during admit to ensure tolerance of PO diet    Diet recommendations: regular solids, thin liquids    Aspiration Precautions:   Decrease rate of intake, decrease bolus size especially with thin liquids  Small singular cupsips as able (may use straw, must take small sips)  Upright seating during intake     General Precautions: Standard,      Assessment:     Cricket Mcdonnell is a 82 y.o. male with an SLP diagnosis of  signs of pharyngeal dysphagia with large volume cupsips per nursing .  He presents with functional oropharyngeal swallow this session given slow rate of intake.    History:     H&P per physician HPI as follows     HPI: Cricket Mcdonnell is a 82 year old gentleman with a PMHx of sleep apnea, anxiety disorder and right angle glaucoma of right eye who presents to the ED after primary care visit recommended patient to ED for imaging. Patient with left back pain that radiates to left anterior chest that started six days ago. Patient reports that he was having a productive cough then, but unable to see if it was purulent. Patient denies fever, chills but reports fatigue and loss of appetite. Patient also reports worsening shortness of breath which prompted primary care visit today. Patient reports left chest wall pain was exacerbated with coughing. Patient denies any sick close contacts nor any recent hospitalization. Patient denies nausea, vomiting, diarrhea, pedal edema. Patient also denies any new onset numbness or weakness in bilateral lower extremities.      Afebrile, , RR 20, /67, Pox 91% on arrival and placed on 4L/min O2 via NC. Leukocytosis 20K with left shift. Na 133. UA grossly non-infectious, +1 ketonuria. Blood cx were sent. CT Lumbar spine without  contrast with findings of Increase in lumbar levoscoliosis. No definite acute lumbar fracture. Moderate to large left pleural effusion, which is incompletely imaged. CT Abdomen pelvis with contrast with a loculated left pleural effusion with compressive atelectasis of the left lower lobe, confirmed with CXR. Patient was started for management for CAP in ED with Ceftriaxone 1g IV and azithromycin 500mg IV. Patient also given 1L NaCl 0.9% fluid bolus. On exam, patient was comfortable looking, using CPAP. Left chest wall tenderness is reproducible. Decreased air entry to left posterior lung base.     Patient is admitted to Hospital Medicine for management of worsening shortness of breath with hypoxia and a left lateral wall chest pain likely due to left pleural effusion, currently treated as CAP. Consult placed to pulmonology for advise on need for thoracentesis.      Past Medical History:   Diagnosis Date    Hereditary sensory neuropathy     Sleep apnea     + CPAP       Past Surgical History:   Procedure Laterality Date    CATARACT EXTRACTION      COLONOSCOPY N/A 1/3/2019    Procedure: COLONOSCOPY;  Surgeon: Cholo Yates MD;  Location: [address]);  Service: Endoscopy;  Laterality: N/A;    EYE SURGERY      galucoma surgery      HERNIA REPAIR      tonsillectomy      TONSILLECTOMY       Prior Intubation HX:  none this admit    Modified Barium Swallow: none per EMR    Chest CT 8/3/23:  Lungs:     -large left pleural effusion with scattered air in this patient with a chest tube.  Associated pleural thickening.  Consolidation of the left lung, particularly the left lower lobe, with air bronchograms.  There may be fluid within the lung parenchyma.     -small right pleural effusion.  Subsegmental opacity right middle lobe has the appearance of atelectasis.     Upper Abdomen: No abnormality of the partially imaged upper abdomen.     Bones: No acute fracture. No suspicious lytic or sclerotic lesion.      Impression:     Persistent large left loculated pleural effusion now with scattered air post chest tube placement.  Adjacent pleural thickening suggesting complex collection such as empyema.  Consolidation of the left lower lobe with atelectasis.  There may be fluid within the lung parenchyma as well which can be seen with developing abscess, incompletely evaluated on this noncontrast exam.  Close follow-up advised.     Small right pleural effusion.    Chest x-ray:  FINDINGS:  Patient is slightly rotated.  Cardiac silhouette is stable.  Persistent opacities and pleural effusion in the left mid and lower lung zone.  Interval placement of left-sided pigtail chest tube with the tip overlying the lateral aspect of the left lung base.  Suspect mild improvement of left-sided complex pleural fluid collection.  Suggest correlation with chest tube output.  No detrimental change in lung aeration.     Impression:     Interval placement of left-sided chest tube with probable mild improvement of loculated left-sided pleural effusion seen on prior radiograph and prior CT abdomen.    Prior diet: regular solids and thin liquids .    Subjective     Patient seen in bed resting with head of bed elevated. Patient cooperative and agreeable to assesment    Pain/Comfort:   Denies    Respiratory Status: Nasal cannula, flow 5 L/min    Objective:     Cognitive linguistic status  Alert, oriented to all aspects   Fluent verbal expression  Able to answer all questions re: self, medical hx, hospital admission without difficulty  Speech is 100% intelligible, no indication of motor speech deficits  Able to sustain attention throughout entire assessment period    Oral Musculature Evaluation   Lingual surface with white coating  Face is symmetric at rest and during speech/swallowing tasks  Dentition adequate   Adequate retraction of bilateral labial corners during smile  Lingual protrusion to midline with adequate lateralization    Bedside Swallow  "Eval:   Consistencies Assessed:  Thin liquids via singular cupsips and singular strawsips  Pureed solids (applesauce)  Chewable solids (cracker)     Oral Phase:   Adequate mandibular depression and elevation, functional biteforce on solids to break into small chewable sized bolus  Adequate rotary chew pattern of mastication  Functional oral clearance, no oral residues noted     Pharyngeal Phase:   Single swallows per bolus   No signs of aspiration noted, no throat clearing/coughing post intake    Impressions:  Reviewed with patient and RN risk is present and likely elevated for aspiration of thin liquids given inc level of respiratory support, mildly elevated RR, current pulmonary status. Reviewed with patient utilize strict aspiration precautions including decreased rate of intake, small singular cupsips, use of cup vs straw when able. Patient in agreement with plan. Reports he does feel like when he takes consecutive "gulps" of liquids results in coughing, reports he tries to take small sips and this mitigates his symptoms. Reviewed will follow up and if needed can complete objective assessment of swallow function.    Goals:   Multidisciplinary Problems       SLP Goals          Problem: SLP    Goal Priority Disciplines Outcome   SLP Goal     SLP Ongoing, Progressing   Description: 1. Patient will utilize safe swallow strategies/aspiration risk precautions (decreased rate/bolus size, slow rate of intake, upright seating) in 100% of opportunities independently to safely tolerate a regular diet with thin liquids without pulmonary compromise.     2. Patient will recall pillars of aspiration pna with 100% accuracy independently                        Plan:     Patient to be seen:   3-4x/week  Plan of Care expires:   8/10/23  Plan of Care reviewed with:    patient, RN  SLP Follow-Up:   yes       Discharge recommendations:      Barriers to Discharge:      Time Tracking:     SLP Treatment Date:   08/03/23  Speech Start " Time:  1515  Speech Stop Time:  1538     Speech Total Time (min):  23 min    Billable Minutes: Eval 23 08/03/2023

## 2023-08-03 NOTE — PROGRESS NOTES
[facility] Medicine  Progress Note    Patient Name: Cricket Mcdonnell  MRN: [id number]  Patient Class: IP- Inpatient   Admission Date: 8/1/2023  Length of Stay: 2 days  Attending Physician: Darin Mcguire MD  Primary Care Provider: Neo Sneed MD        Subjective:     Principal Problem:Loculated pleural effusion        HPI:  Cricket Mcdonnell is a 82 year old gentleman with a PMHx of sleep apnea, anxiety disorder and right angle glaucoma of right eye who presents to the ED after primary care visit recommended patient to ED for imaging. Patient with left back pain that radiates to left anterior chest that started six days ago. Patient reports that he was having a productive cough then, but unable to see if it was purulent. Patient denies fever, chills but reports fatigue and loss of appetite. Patient also reports worsening shortness of breath which prompted primary care visit today. Patient reports left chest wall pain was exacerbated with coughing. Patient denies any sick close contacts nor any recent hospitalization. Patient denies nausea, vomiting, diarrhea, pedal edema. Patient also denies any new onset numbness or weakness in bilateral lower extremities.     Afebrile, , RR 20, /67, Pox 91% on arrival and placed on 4L/min O2 via NC. Leukocytosis 20K with left shift. Na 133. UA grossly non-infectious, +1 ketonuria. Blood cx were sent. CT Lumbar spine without contrast with findings of Increase in lumbar levoscoliosis. No definite acute lumbar fracture. Moderate to large left pleural effusion, which is incompletely imaged. CT Abdomen pelvis with contrast with a loculated left pleural effusion with compressive atelectasis of the left lower lobe, confirmed with CXR. Patient was started for management for CAP in ED with Ceftriaxone 1g IV and azithromycin 500mg IV. Patient also given 1L NaCl 0.9% fluid bolus. On exam, patient was comfortable looking, using CPAP. Left  chest wall tenderness is reproducible. Decreased air entry to left posterior lung base.    Patient is admitted to Hospital Medicine for management of worsening shortness of breath with hypoxia and a left lateral wall chest pain likely due to left pleural effusion, currently treated as CAP. Consult placed to pulmonology for advise on need for thoracentesis.       Overview/Hospital Course:  Pt stable on 5L NC. Has stable non productive cough. On auscultation no breath sounds on left lower lung. Seen by pulmonary team and on US noted multiple small pockets of loculated effusion with areas of trapped lung. Pleural catheter placed with 400ml of fluid drained immediately. Pleural studies sent. Will continue abx at this time. Wean oxygen as tolerated.     Pleural studies suggestive of exudative effusion. Will continue abx and Pulm team ordered CT chest to determine next steps. Pt continue to be on 5L NC. Nurse noticed that pt coughed while swallowing water which pt reports has been going on at home intermittently. As such, aspiration could be the source of his pneumonia/effusion. Speech was consulted.       Interval History: Chest CT per pulm team. Consulted speech as pt coughed with drinking water. Says this difficulty has happened before at home.     Review of Systems   Constitutional:  Negative for fatigue and fever.   Respiratory:  Positive for cough.    Cardiovascular:  Positive for chest pain (left chest / mid axillary area).   Gastrointestinal:  Negative for abdominal distention and diarrhea.   Genitourinary:  Negative for dysuria.   Psychiatric/Behavioral:  Negative for agitation and confusion.      Objective:     Vital Signs (Most Recent):  Temp: 98.1 °F (36.7 °C) (08/03/23 0759)  Pulse: 79 (08/03/23 0855)  Resp: 18 (08/03/23 0855)  BP: 117/61 (08/03/23 0759)  SpO2: 95 % (08/03/23 0856) Vital Signs (24h Range):  Temp:  [97.5 °F (36.4 °C)-98.4 °F (36.9 °C)] 98.1 °F (36.7 °C)  Pulse:  [79-98] 79  Resp:  [16-27]  18  SpO2:  [90 %-96 %] 95 %  BP: (103-130)/(55-71) 117/61     Weight: 71.3 kg (157 lb 3 oz)  Body mass index is 24.62 kg/m².    Intake/Output Summary (Last 24 hours) at 8/3/2023 1052  Last data filed at 8/3/2023 0907  Gross per 24 hour   Intake 778.56 ml   Output 3657 ml   Net -2878.44 ml           Physical Exam  Constitutional:       General: He is not in acute distress.  Eyes:      Extraocular Movements: Extraocular movements intact.   Pulmonary:      Effort: No respiratory distress.      Breath sounds: No wheezing.      Comments: absent breath sounds on left lower lung  Chest:      Chest wall: Tenderness (left chest wall tenderness on palpation) present.   Abdominal:      General: There is no distension.      Tenderness: There is no abdominal tenderness.   Musculoskeletal:         General: No swelling.   Skin:     Capillary Refill: Capillary refill takes less than 2 seconds.   Neurological:      General: No focal deficit present.      Mental Status: He is oriented to person, place, and time.             Significant Labs: All pertinent labs within the past 24 hours have been reviewed.    Significant Imaging: I have reviewed all pertinent imaging results/findings within the past 24 hours.      Assessment/Plan:      * Loculated pleural effusion   Left sided chest wall pain    Community acquired pneumonia   Acute hypoxic resp failure   - presents to the ED after primary care visit recommended patient to ED for imaging for lumbar spine   - Patient reports left back pain that radiates to left anterior chest that started six days ago, associated with a productive cough and worsening SOB. Denies fever, chills but reports fatigue and loss of appetite. Patient reports left chest wall pain was exacerbated with coughing.   - Patient denies any sick close contacts nor any recent hospitalization.   - Stopped smoking 20 + years ago   - Afebrile, , RR 20, /67, Pox 91% on arrival and placed on 4L/min O2 via NC.   -  "Leukocytosis 20K with left shift.   - CT Abdomen pelvis with contrast with a loculated left pleural effusion with compressive atelectasis of the left lower lobe, confirmed with CXR.   - Patient was started for management for CAP in ED with Ceftriaxone 1g IV and azithromycin 500mg IV. Patient also given 1L NaCl 0.9% fluid bolus.   - On exam, patient was comfortable looking, using CPAP. Left chest wall tenderness is reproducible. Decreased air entry to left posterior lung base.  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 Will determine more from pleural studies.   - Continued treating as CAP for raised WBC.   - Exudative effusion   - Pt coughed during intake today says this has happened at home as well - could be that pt is aspirating and that is contributing to pneumonia and effusion     Plan:  - Continue Ceftriaxone 2g IV daily and Azithromycin 250mg PO for total 5 days  - Acetaminophen 1g Q8 PRN, ibuprofen 800mg Q8 PRN for left chest wall pain  - Duonebs Q4 wake  - Wean O2 as needed to keep Pox >90%  - Pulm consult - CT chest to determine further mngx   - Trend CBC  - Guaifenesin 200mg Q4 PRN  - Speech eval     Acute hypoxemic respiratory failure  See above     Constipation  - Cont senna docusate  - Add lactulose       Primary open angle glaucoma of right eye, moderate stage  Patient reports using multiple eye drops but unable to recall names of them other than "timolol TID, bimonidine TID and something at night".     - Eye drops as ordered to best of ability based on home list.       KARMA (obstructive sleep apnea)  - CPAP qhs        VTE Risk Mitigation (From admission, onward)        "  Ordered     enoxaparin injection 40 mg  Daily         08/01/23 2024     IP VTE HIGH RISK PATIENT  Once         08/01/23 2024     Place sequential compression device  Until discontinued         08/01/23 2024                Discharge Planning   YASH:      Code Status: Full Code   Is the patient medically ready for discharge?:     Reason for patient still in hospital (select all that apply): Treatment - still 5L NC 2/2 effusion   Discharge Plan A: Home                  Darin Rasheed MD  Department of Hospital Medicine   [facility] Surg ([place])

## 2023-08-03 NOTE — SUBJECTIVE & OBJECTIVE
Interval History: remains afebrile. Feels better, cough is a bit more productive today. Roughly 1200mL of fluid completely evacuated since placement. Still with pockets of loculated effusion on bedside US. Will plan for CT today and he's aware. Great appetite still.      Objective:     Vital Signs (Most Recent):  Temp: 98.1 °F (36.7 °C) (08/03/23 0759)  Pulse: 79 (08/03/23 0855)  Resp: 18 (08/03/23 0855)  BP: 117/61 (08/03/23 0759)  SpO2: 95 % (08/03/23 0856) Vital Signs (24h Range):  Temp:  [97.5 °F (36.4 °C)-98.4 °F (36.9 °C)] 98.1 °F (36.7 °C)  Pulse:  [79-98] 79  Resp:  [16-27] 18  SpO2:  [90 %-96 %] 95 %  BP: (103-130)/(55-71) 117/61     Weight: 71.3 kg (157 lb 3 oz)  Body mass index is 24.62 kg/m².      Intake/Output Summary (Last 24 hours) at 8/3/2023 0936  Last data filed at 8/3/2023 0907  Gross per 24 hour   Intake 778.56 ml   Output 3657 ml   Net -2878.44 ml        Physical Exam  Constitutional:       Appearance: Normal appearance. He is normal weight.   HENT:      Head: Normocephalic and atraumatic.      Nose: Nose normal. No congestion.      Mouth/Throat:      Mouth: Mucous membranes are moist.      Pharynx: Oropharynx is clear.   Eyes:      Extraocular Movements: Extraocular movements intact.      Conjunctiva/sclera: Conjunctivae normal.      Pupils: Pupils are equal, round, and reactive to light.   Cardiovascular:      Pulses: Normal pulses.      Heart sounds: Normal heart sounds. No murmur heard.  Pulmonary:      Effort: Pulmonary effort is normal. No respiratory distress.      Breath sounds: Rhonchi present. No wheezing.   Abdominal:      General: Abdomen is flat. Bowel sounds are normal. There is no distension.      Palpations: Abdomen is soft.      Tenderness: There is no abdominal tenderness.   Musculoskeletal:         General: Normal range of motion.      Cervical back: Normal range of motion and neck supple.      Right lower leg: No edema.      Left lower leg: No edema.   Skin:     General: Skin  is warm and dry.      Capillary Refill: Capillary refill takes less than 2 seconds.   Neurological:      General: No focal deficit present.      Mental Status: He is alert and oriented to person, place, and time. Mental status is at baseline.      Cranial Nerves: No cranial nerve deficit.      Sensory: No sensory deficit.      Motor: No weakness.   Psychiatric:         Mood and Affect: Mood normal.         Behavior: Behavior normal.         Thought Content: Thought content normal.         Judgment: Judgment normal.         Vents:  Oxygen Concentration (%): 45 (08/02/23 2230)    Lines/Drains/Airways       Drain  Duration                  Y Chest Tube 1 and 2 08/02/23 0949 Left <1 day              Peripheral Intravenous Line  Duration                  Peripheral IV - Single Lumen 08/01/23 2000 20 G Anterior;Right Forearm 1 day                    Significant Labs:    CBC/Anemia Profile:  Recent Labs   Lab 08/01/23  1519 08/02/23  0450 08/03/23  0650   WBC 20.16* 21.63* 20.11*   HGB 14.2 12.5* 12.1*   HCT 41.5 37.0* 35.1*    332 332   MCV 94 93 93   RDW 12.5 12.6 12.5        Chemistries:  Recent Labs   Lab 08/01/23  1519 08/02/23  0450 08/03/23  0650   * 130* 130*   K 4.1 3.7 3.8    101 101   CO2 22* 21* 21*   BUN 12 9 10   CREATININE 0.7 0.6 0.5   CALCIUM 9.4 8.5* 8.6*   ALBUMIN  --  2.0* 1.9*   PROT  --  5.6* 5.6*   BILITOT  --  0.5 0.3   ALKPHOS  --  76 83   ALT  --  18 22   AST  --  13 21       All pertinent labs within the past 24 hours have been reviewed.    Significant Imaging:  I have reviewed all pertinent imaging results/findings within the past 24 hours.

## 2023-08-03 NOTE — PLAN OF CARE
SLP dysphagia evaluation completed this date. Risk for aspiration slightly elevated in setting of respiratory compromise and illness. Patient able to implement aspiration precautions throughout assessment period without signs of aspiration. Recommend continuing a regular diet with thin liquids given strict aspiration precautions. Recommend oral care TID, provided toothbrushes, basin, toothpaste and mouthwash for patient to complete.     Problem: SLP  Goal: SLP Goal  Description: 1. Patient will utilize safe swallow strategies/aspiration risk precautions (decreased rate/bolus size, slow rate of intake, upright seating) in 100% of opportunities independently to safely tolerate a regular diet with thin liquids without pulmonary compromise.     2. Patient will recall pillars of aspiration pna with 100% accuracy independently   Outcome: Ongoing, Progressing

## 2023-08-03 NOTE — PLAN OF CARE
Patient resting well  Oxygen in use at 4 lpm nasal cannula, Aerosol given with no adverse reaction Bipap not in use at this time

## 2023-08-03 NOTE — PROGRESS NOTES
[facility] ([place])  Pulmonology  Progress Note    Patient Name: Cricket Mcdonnell  MRN: [id number]  Admission Date: 8/1/2023  Hospital Length of Stay: 2 days  Code Status: Full Code  Attending Provider: Darin Mcguire MD  Primary Care Provider: Neo Sneed MD   Principal Problem: Loculated pleural effusion    Subjective:     Interval History: remains afebrile. Feels better, cough is a bit more productive today. Roughly 1200mL of fluid completely evacuated since placement. Still with pockets of loculated effusion on bedside US. Will plan for CT today and he's aware. Great appetite still.      Objective:     Vital Signs (Most Recent):  Temp: 98.1 °F (36.7 °C) (08/03/23 0759)  Pulse: 79 (08/03/23 0855)  Resp: 18 (08/03/23 0855)  BP: 117/61 (08/03/23 0759)  SpO2: 95 % (08/03/23 0856) Vital Signs (24h Range):  Temp:  [97.5 °F (36.4 °C)-98.4 °F (36.9 °C)] 98.1 °F (36.7 °C)  Pulse:  [79-98] 79  Resp:  [16-27] 18  SpO2:  [90 %-96 %] 95 %  BP: (103-130)/(55-71) 117/61     Weight: 71.3 kg (157 lb 3 oz)  Body mass index is 24.62 kg/m².      Intake/Output Summary (Last 24 hours) at 8/3/2023 0936  Last data filed at 8/3/2023 0907  Gross per 24 hour   Intake 778.56 ml   Output 3657 ml   Net -2878.44 ml        Physical Exam  Constitutional:       Appearance: Normal appearance. He is normal weight.   HENT:      Head: Normocephalic and atraumatic.      Nose: Nose normal. No congestion.      Mouth/Throat:      Mouth: Mucous membranes are moist.      Pharynx: Oropharynx is clear.   Eyes:      Extraocular Movements: Extraocular movements intact.      Conjunctiva/sclera: Conjunctivae normal.      Pupils: Pupils are equal, round, and reactive to light.   Cardiovascular:      Pulses: Normal pulses.      Heart sounds: Normal heart sounds. No murmur heard.  Pulmonary:      Effort: Pulmonary effort is normal. No respiratory distress.      Breath sounds: Rhonchi present. No wheezing.   Abdominal:      General: Abdomen is  flat. Bowel sounds are normal. There is no distension.      Palpations: Abdomen is soft.      Tenderness: There is no abdominal tenderness.   Musculoskeletal:         General: Normal range of motion.      Cervical back: Normal range of motion and neck supple.      Right lower leg: No edema.      Left lower leg: No edema.   Skin:     General: Skin is warm and dry.      Capillary Refill: Capillary refill takes less than 2 seconds.   Neurological:      General: No focal deficit present.      Mental Status: He is alert and oriented to person, place, and time. Mental status is at baseline.      Cranial Nerves: No cranial nerve deficit.      Sensory: No sensory deficit.      Motor: No weakness.   Psychiatric:         Mood and Affect: Mood normal.         Behavior: Behavior normal.         Thought Content: Thought content normal.         Judgment: Judgment normal.         Vents:  Oxygen Concentration (%): 45 (08/02/23 2230)    Lines/Drains/Airways       Drain  Duration                  Y Chest Tube 1 and 2 08/02/23 0949 Left <1 day              Peripheral Intravenous Line  Duration                  Peripheral IV - Single Lumen 08/01/23 2000 20 G Anterior;Right Forearm 1 day                    Significant Labs:    CBC/Anemia Profile:  Recent Labs   Lab 08/01/23  1519 08/02/23  0450 08/03/23  0650   WBC 20.16* 21.63* 20.11*   HGB 14.2 12.5* 12.1*   HCT 41.5 37.0* 35.1*    332 332   MCV 94 93 93   RDW 12.5 12.6 12.5        Chemistries:  Recent Labs   Lab 08/01/23  1519 08/02/23  0450 08/03/23  0650   * 130* 130*   K 4.1 3.7 3.8    101 101   CO2 22* 21* 21*   BUN 12 9 10   CREATININE 0.7 0.6 0.5   CALCIUM 9.4 8.5* 8.6*   ALBUMIN  --  2.0* 1.9*   PROT  --  5.6* 5.6*   BILITOT  --  0.5 0.3   ALKPHOS  --  76 83   ALT  --  18 22   AST  --  13 21       All pertinent labs within the past 24 hours have been reviewed.    Significant Imaging:  I have reviewed all pertinent imaging results/findings within the past 24  hours.    Assessment/Plan:     Pulmonary  * Loculated pleural effusion  - initially presented with a week of back/flank pain with productive cough; denied any fevers or recent illnesses, weight loss. Imaging consistent with a pleural effusion, bedside ultrasound noting multiple pockets of loculated effusion and areas of trapped lung. Unsure if they are all connecting.  - 8/2: roman catheter placed with initially 400mL of fluid evacuated; studies sent and consistent with a complicated parapneumonic effusion  - Since placement: ~1200mL of fluid drained    Recommendations  - Continue to leave on contious suction  - Follow-up culture and cytology  - Continue antibiotics for now  - CT Chest today to evaluate. He could potentially require continuous suction of this catheter, tpa/dornase, CTS evaluation for VATS, another catheter (if there are pockets not connecting) depending on findings.    Acute hypoxemic respiratory failure  - On 5LNC and comfortable, weaning down as tolerated  - Remainder under loculated pleural effusion  - Goal SpO2 88-92%      Thank you for allowing us to participate in the care of this patient, we will continue to follow. Please let us know if there are questions or concerns.    Stoney Coon MD  Pulmonology  [facility]  Med Surg ([place])

## 2023-08-03 NOTE — ASSESSMENT & PLAN NOTE
- On 5LNC and comfortable, weaning down as tolerated  - Remainder under loculated pleural effusion  - Goal SpO2 88-92%

## 2023-08-03 NOTE — NURSING
Ok to resume diet per speech. Aspiration precautions in place and continued to review with patient.

## 2023-08-03 NOTE — SUBJECTIVE & OBJECTIVE
Interval History: Chest CT per pulm team. Consulted speech as pt coughed with drinking water. Says this difficulty has happened before at home.     Review of Systems   Constitutional:  Negative for fatigue and fever.   Respiratory:  Positive for cough.    Cardiovascular:  Positive for chest pain (left chest / mid axillary area).   Gastrointestinal:  Negative for abdominal distention and diarrhea.   Genitourinary:  Negative for dysuria.   Psychiatric/Behavioral:  Negative for agitation and confusion.      Objective:     Vital Signs (Most Recent):  Temp: 98.1 °F (36.7 °C) (08/03/23 0759)  Pulse: 79 (08/03/23 0855)  Resp: 18 (08/03/23 0855)  BP: 117/61 (08/03/23 0759)  SpO2: 95 % (08/03/23 0856) Vital Signs (24h Range):  Temp:  [97.5 °F (36.4 °C)-98.4 °F (36.9 °C)] 98.1 °F (36.7 °C)  Pulse:  [79-98] 79  Resp:  [16-27] 18  SpO2:  [90 %-96 %] 95 %  BP: (103-130)/(55-71) 117/61     Weight: 71.3 kg (157 lb 3 oz)  Body mass index is 24.62 kg/m².    Intake/Output Summary (Last 24 hours) at 8/3/2023 1052  Last data filed at 8/3/2023 0907  Gross per 24 hour   Intake 778.56 ml   Output 3657 ml   Net -2878.44 ml           Physical Exam  Constitutional:       General: He is not in acute distress.  Eyes:      Extraocular Movements: Extraocular movements intact.   Pulmonary:      Effort: No respiratory distress.      Breath sounds: No wheezing.      Comments: absent breath sounds on left lower lung  Chest:      Chest wall: Tenderness (left chest wall tenderness on palpation) present.   Abdominal:      General: There is no distension.      Tenderness: There is no abdominal tenderness.   Musculoskeletal:         General: No swelling.   Skin:     Capillary Refill: Capillary refill takes less than 2 seconds.   Neurological:      General: No focal deficit present.      Mental Status: He is oriented to person, place, and time.             Significant Labs: All pertinent labs within the past 24 hours have been reviewed.    Significant  Imaging: I have reviewed all pertinent imaging results/findings within the past 24 hours.

## 2023-08-03 NOTE — PLAN OF CARE
POC reviewed. Purposeful rounding completed. No significant events this shift. VS and assessment per flowsheets. Chest tube in place, incision site with dressing C/D/I. No drainage noted. Complaints of pain treated with PRN pain medication according to MAR. No other needs verbalized during shift. No injuries, falls, or trauma occurred during shift. Bed low and locked, with side rails up x3, Personal belongings, call light, and urinal placed within easy reach. Safety maintained throughout shift.        Problem: Adult Inpatient Plan of Care  Goal: Plan of Care Review  Outcome: Ongoing, Progressing  Goal: Patient-Specific Goal (Individualized)  Outcome: Ongoing, Progressing  Goal: Absence of Hospital-Acquired Illness or Injury  Outcome: Ongoing, Progressing  Goal: Optimal Comfort and Wellbeing  Outcome: Ongoing, Progressing  Goal: Readiness for Transition of Care  Outcome: Ongoing, Progressing     Problem: Skin Injury Risk Increased  Goal: Skin Health and Integrity  Outcome: Ongoing, Progressing

## 2023-08-03 NOTE — PLAN OF CARE
POC reviewed with patient. VSS on 5L with humidification. Choking noted with sips of water this am; Dr. Mcguire made aware, pt made npo for speech therapy evaluation. L chest tube dressing remains CDI to wall suction. Output measured. Pt shifts weight in bed independently. Free from falls; safety precautions in place. Care ongoing.

## 2023-08-03 NOTE — CARE UPDATE
08/02/23 2029   Patient Assessment/Suction   Level of Consciousness (AVPU) alert   Respiratory Effort Normal;Unlabored   Expansion/Accessory Muscles/Retractions no use of accessory muscles;no retractions   All Lung Fields Breath Sounds coarse   Skin Integrity   $ Wound Care Tech Time 15 min   Area Observed Left;Right;Behind ear;Nares   Skin Appearance without discoloration   PRE-TX-O2   Device (Oxygen Therapy) nasal cannula with humidification   $ Is the patient on Low Flow Oxygen? Yes   Flow (L/min) 5   SpO2 (!) 94 %   Pulse Oximetry Type Intermittent   $ Pulse Oximetry - Multiple Charge Pulse Oximetry - Multiple   Pulse 95   Resp 18   Aerosol Therapy   $ Aerosol Therapy Charges Aerosol Treatment   Daily Review of Necessity (SVN) completed   Respiratory Treatment Status (SVN) given   Treatment Route (SVN) mask;air   Patient Position (SVN) semi-Monroe's   Post Treatment Assessment (SVN) breath sounds unchanged   Signs of Intolerance (SVN) none   Preset CPAP/BiPAP Settings   Mode Of Delivery Standby

## 2023-08-04 LAB
ALBUMIN SERPL BCP-MCNC: 1.9 G/DL (ref 3.5–5.2)
ALP SERPL-CCNC: 91 U/L (ref 55–135)
ALT SERPL W/O P-5'-P-CCNC: 45 U/L (ref 10–44)
ANION GAP SERPL CALC-SCNC: 8 MMOL/L (ref 8–16)
AST SERPL-CCNC: 41 U/L (ref 10–40)
BILIRUB SERPL-MCNC: 0.2 MG/DL (ref 0.1–1)
BUN SERPL-MCNC: 8 MG/DL (ref 8–23)
CALCIUM SERPL-MCNC: 8.8 MG/DL (ref 8.7–10.5)
CHLORIDE SERPL-SCNC: 101 MMOL/L (ref 95–110)
CO2 SERPL-SCNC: 26 MMOL/L (ref 23–29)
COMMENT: NORMAL
CREAT SERPL-MCNC: 0.6 MG/DL (ref 0.5–1.4)
ERYTHROCYTE [DISTWIDTH] IN BLOOD BY AUTOMATED COUNT: 12.6 % (ref 11.5–14.5)
EST. GFR  (NO RACE VARIABLE): >60 ML/MIN/1.73 M^2
FINAL PATHOLOGIC DIAGNOSIS: NORMAL
GLUCOSE SERPL-MCNC: 115 MG/DL (ref 70–110)
HCT VFR BLD AUTO: 37 % (ref 40–54)
HGB BLD-MCNC: 12.4 G/DL (ref 14–18)
Lab: NORMAL
MCH RBC QN AUTO: 31.6 PG (ref 27–31)
MCHC RBC AUTO-ENTMCNC: 33.5 G/DL (ref 32–36)
MCV RBC AUTO: 94 FL (ref 82–98)
PLATELET # BLD AUTO: 395 K/UL (ref 150–450)
PMV BLD AUTO: 8.1 FL (ref 9.2–12.9)
POTASSIUM SERPL-SCNC: 4.2 MMOL/L (ref 3.5–5.1)
PROT SERPL-MCNC: 5.8 G/DL (ref 6–8.4)
RBC # BLD AUTO: 3.92 M/UL (ref 4.6–6.2)
SODIUM SERPL-SCNC: 135 MMOL/L (ref 136–145)
WBC # BLD AUTO: 16.88 K/UL (ref 3.9–12.7)

## 2023-08-04 PROCEDURE — 11000001 HC ACUTE MED/SURG PRIVATE ROOM: Mod: HCNC

## 2023-08-04 PROCEDURE — 99233 SBSQ HOSP IP/OBS HIGH 50: CPT | Mod: HCNC,GC,, | Performed by: INTERNAL MEDICINE

## 2023-08-04 PROCEDURE — 94660 CPAP INITIATION&MGMT: CPT | Mod: HCNC

## 2023-08-04 PROCEDURE — 63600175 PHARM REV CODE 636 W HCPCS: Mod: HCNC

## 2023-08-04 PROCEDURE — 99900035 HC TECH TIME PER 15 MIN (STAT): Mod: HCNC

## 2023-08-04 PROCEDURE — 27000221 HC OXYGEN, UP TO 24 HOURS: Mod: HCNC

## 2023-08-04 PROCEDURE — 80053 COMPREHEN METABOLIC PANEL: CPT | Mod: HCNC

## 2023-08-04 PROCEDURE — 85027 COMPLETE CBC AUTOMATED: CPT | Mod: HCNC

## 2023-08-04 PROCEDURE — 25000003 PHARM REV CODE 250: Mod: HCNC | Performed by: STUDENT IN AN ORGANIZED HEALTH CARE EDUCATION/TRAINING PROGRAM

## 2023-08-04 PROCEDURE — 94761 N-INVAS EAR/PLS OXIMETRY MLT: CPT | Mod: HCNC

## 2023-08-04 PROCEDURE — 36415 COLL VENOUS BLD VENIPUNCTURE: CPT | Mod: HCNC

## 2023-08-04 PROCEDURE — 25000003 PHARM REV CODE 250: Mod: HCNC

## 2023-08-04 PROCEDURE — 99233 PR SUBSEQUENT HOSPITAL CARE,LEVL III: ICD-10-PCS | Mod: ,,, | Performed by: STUDENT IN AN ORGANIZED HEALTH CARE EDUCATION/TRAINING PROGRAM

## 2023-08-04 PROCEDURE — 99233 SBSQ HOSP IP/OBS HIGH 50: CPT | Mod: ,,, | Performed by: STUDENT IN AN ORGANIZED HEALTH CARE EDUCATION/TRAINING PROGRAM

## 2023-08-04 PROCEDURE — 63600175 PHARM REV CODE 636 W HCPCS: Mod: HCNC | Performed by: STUDENT IN AN ORGANIZED HEALTH CARE EDUCATION/TRAINING PROGRAM

## 2023-08-04 PROCEDURE — 25000242 PHARM REV CODE 250 ALT 637 W/ HCPCS: Mod: HCNC

## 2023-08-04 PROCEDURE — 63700000 PHARM REV CODE 250 ALT 637 W/O HCPCS: Mod: HCNC | Performed by: STUDENT IN AN ORGANIZED HEALTH CARE EDUCATION/TRAINING PROGRAM

## 2023-08-04 PROCEDURE — 99233 PR SUBSEQUENT HOSPITAL CARE,LEVL III: ICD-10-PCS | Mod: HCNC,GC,, | Performed by: INTERNAL MEDICINE

## 2023-08-04 PROCEDURE — 25000242 PHARM REV CODE 250 ALT 637 W/ HCPCS: Mod: HCNC | Performed by: STUDENT IN AN ORGANIZED HEALTH CARE EDUCATION/TRAINING PROGRAM

## 2023-08-04 PROCEDURE — A4217 STERILE WATER/SALINE, 500 ML: HCPCS | Mod: HCNC | Performed by: STUDENT IN AN ORGANIZED HEALTH CARE EDUCATION/TRAINING PROGRAM

## 2023-08-04 PROCEDURE — 94640 AIRWAY INHALATION TREATMENT: CPT | Mod: HCNC

## 2023-08-04 RX ORDER — LIDOCAINE 50 MG/G
1 PATCH TOPICAL
Status: DISCONTINUED | OUTPATIENT
Start: 2023-08-04 | End: 2023-08-08

## 2023-08-04 RX ORDER — IBUPROFEN 400 MG/1
800 TABLET ORAL EVERY 6 HOURS PRN
Status: DISCONTINUED | OUTPATIENT
Start: 2023-08-04 | End: 2023-08-09 | Stop reason: HOSPADM

## 2023-08-04 RX ORDER — OXYCODONE HYDROCHLORIDE 5 MG/1
5 TABLET ORAL EVERY 6 HOURS PRN
Status: DISCONTINUED | OUTPATIENT
Start: 2023-08-04 | End: 2023-08-07

## 2023-08-04 RX ADMIN — GUAIFENESIN 200 MG: 100 SOLUTION ORAL at 09:08

## 2023-08-04 RX ADMIN — IPRATROPIUM BROMIDE AND ALBUTEROL SULFATE 3 ML: 2.5; .5 SOLUTION RESPIRATORY (INHALATION) at 11:08

## 2023-08-04 RX ADMIN — IPRATROPIUM BROMIDE AND ALBUTEROL SULFATE 3 ML: 2.5; .5 SOLUTION RESPIRATORY (INHALATION) at 03:08

## 2023-08-04 RX ADMIN — DORZOLAMIDE HYDROCHLORIDE AND TIMOLOL MALEATE 1 DROP: 22.3; 6.8 SOLUTION/ DROPS OPHTHALMIC at 09:08

## 2023-08-04 RX ADMIN — SENNOSIDES AND DOCUSATE SODIUM 2 TABLET: 50; 8.6 TABLET ORAL at 09:08

## 2023-08-04 RX ADMIN — LACTULOSE 10 G: 20 SOLUTION ORAL at 09:08

## 2023-08-04 RX ADMIN — LIDOCAINE 1 PATCH: 50 PATCH CUTANEOUS at 03:08

## 2023-08-04 RX ADMIN — LACTULOSE 10 G: 20 SOLUTION ORAL at 08:08

## 2023-08-04 RX ADMIN — BIMATOPROST 1 DROP: 0.1 SOLUTION/ DROPS OPHTHALMIC at 10:08

## 2023-08-04 RX ADMIN — ALTEPLASE 10 MG: 2.2 INJECTION, POWDER, LYOPHILIZED, FOR SOLUTION INTRAVENOUS at 09:08

## 2023-08-04 RX ADMIN — GUAIFENESIN 200 MG: 100 SOLUTION ORAL at 05:08

## 2023-08-04 RX ADMIN — CEFTRIAXONE SODIUM 2 G: 2 INJECTION, POWDER, FOR SOLUTION INTRAMUSCULAR; INTRAVENOUS at 11:08

## 2023-08-04 RX ADMIN — DORNASE ALFA 5 MG: 1 SOLUTION RESPIRATORY (INHALATION) at 09:08

## 2023-08-04 RX ADMIN — OXYCODONE HYDROCHLORIDE 5 MG: 5 TABLET ORAL at 11:08

## 2023-08-04 RX ADMIN — IPRATROPIUM BROMIDE AND ALBUTEROL SULFATE 3 ML: 2.5; .5 SOLUTION RESPIRATORY (INHALATION) at 07:08

## 2023-08-04 RX ADMIN — MELATONIN TAB 3 MG 6 MG: 3 TAB at 10:08

## 2023-08-04 RX ADMIN — BRIMONIDINE TARTRATE 1 DROP: 1.5 SOLUTION OPHTHALMIC at 09:08

## 2023-08-04 RX ADMIN — LACTULOSE 10 G: 20 SOLUTION ORAL at 02:08

## 2023-08-04 RX ADMIN — TIMOLOL MALEATE 1 DROP: 2.5 SOLUTION/ DROPS OPHTHALMIC at 09:08

## 2023-08-04 RX ADMIN — IBUPROFEN 800 MG: 400 TABLET, FILM COATED ORAL at 09:08

## 2023-08-04 RX ADMIN — OXYCODONE HYDROCHLORIDE 5 MG: 5 TABLET ORAL at 05:08

## 2023-08-04 RX ADMIN — BRIMONIDINE TARTRATE 1 DROP: 1.5 SOLUTION OPHTHALMIC at 03:08

## 2023-08-04 RX ADMIN — IBUPROFEN 800 MG: 400 TABLET ORAL at 03:08

## 2023-08-04 RX ADMIN — ENOXAPARIN SODIUM 40 MG: 40 INJECTION SUBCUTANEOUS at 05:08

## 2023-08-04 RX ADMIN — AZITHROMYCIN MONOHYDRATE 250 MG: 250 TABLET ORAL at 09:08

## 2023-08-04 RX ADMIN — IBUPROFEN 800 MG: 400 TABLET ORAL at 10:08

## 2023-08-04 RX ADMIN — DORZOLAMIDE HYDROCHLORIDE AND TIMOLOL MALEATE 1 DROP: 22.3; 6.8 SOLUTION/ DROPS OPHTHALMIC at 08:08

## 2023-08-04 RX ADMIN — BRIMONIDINE TARTRATE 1 DROP: 1.5 SOLUTION OPHTHALMIC at 08:08

## 2023-08-04 RX ADMIN — TIMOLOL MALEATE 1 DROP: 2.5 SOLUTION/ DROPS OPHTHALMIC at 08:08

## 2023-08-04 NOTE — PLAN OF CARE
POC reviewed with patient and daughter. VSS on 4.5L NC w/ humidification. Back and L thoracic pain managed with prn oxycodone and ibuprofen. L chest tube site remains w/o complication - continuous suction per orders. Shifts weight in bed with min assist. Free from falls; safety precautions in place. Denies needs at this time. Care ongoing.

## 2023-08-04 NOTE — PROGRESS NOTES
[facility] ([place])  Pulmonology  Progress Note    Patient Name: Cricket Mcdonnell  MRN: [id number]  Admission Date: 8/1/2023  Hospital Length of Stay: 3 days  Code Status: Full Code  Attending Provider: Darin Mcguire MD  Primary Care Provider: Neo Sneed MD   Principal Problem: Loculated pleural effusion    Subjective:     Interval History: Still with a productive cough this morning; feels better slowly. Only additional 300mL of output over the past 24 hours; discussed with CTS, his daughter, and patient regarding the findings of CT and plan. Thrombolytics today and re-evaluation tomorrow. Potentially surgical candidate    Objective:     Vital Signs (Most Recent):  Temp: 97.9 °F (36.6 °C) (08/04/23 0720)  Pulse: 86 (08/04/23 0723)  Resp: 14 (08/04/23 0723)  BP: 124/66 (08/04/23 0720)  SpO2: 95 % (08/04/23 0723) Vital Signs (24h Range):  Temp:  [96.8 °F (36 °C)-98.9 °F (37.2 °C)] 97.9 °F (36.6 °C)  Pulse:  [78-97] 86  Resp:  [14-22] 14  SpO2:  [92 %-98 %] 95 %  BP: (108-138)/(60-72) 124/66     Weight: 71.3 kg (157 lb 3 oz)  Body mass index is 24.62 kg/m².      Intake/Output Summary (Last 24 hours) at 8/4/2023 0957  Last data filed at 8/4/2023 0840  Gross per 24 hour   Intake 304.21 ml   Output 2065 ml   Net -1760.79 ml          Physical Exam  Constitutional:       Appearance: Normal appearance. He is normal weight.   HENT:      Head: Normocephalic and atraumatic.      Nose: Nose normal. No congestion.      Mouth/Throat:      Mouth: Mucous membranes are moist.      Pharynx: Oropharynx is clear.   Eyes:      Extraocular Movements: Extraocular movements intact.      Conjunctiva/sclera: Conjunctivae normal.      Pupils: Pupils are equal, round, and reactive to light.   Cardiovascular:      Pulses: Normal pulses.      Heart sounds: Normal heart sounds. No murmur heard.  Pulmonary:      Effort: Pulmonary effort is normal. No respiratory distress.      Breath sounds: Rhonchi present. No wheezing.    Abdominal:      General: Abdomen is flat. Bowel sounds are normal. There is no distension.      Palpations: Abdomen is soft.      Tenderness: There is no abdominal tenderness.   Musculoskeletal:         General: Normal range of motion.      Cervical back: Normal range of motion and neck supple.      Right lower leg: No edema.      Left lower leg: No edema.   Skin:     General: Skin is warm and dry.      Capillary Refill: Capillary refill takes less than 2 seconds.   Neurological:      General: No focal deficit present.      Mental Status: He is alert and oriented to person, place, and time. Mental status is at baseline.      Cranial Nerves: No cranial nerve deficit.      Sensory: No sensory deficit.      Motor: No weakness.   Psychiatric:         Mood and Affect: Mood normal.         Behavior: Behavior normal.         Thought Content: Thought content normal.         Judgment: Judgment normal.         Vents:  Oxygen Concentration (%): 45 (08/04/23 0329)    Lines/Drains/Airways       Drain  Duration                  Y Chest Tube 1 and 2 08/02/23 0949 Left 2 days              Peripheral Intravenous Line  Duration                  Peripheral IV - Single Lumen 08/01/23 2000 20 G Anterior;Right Forearm 2 days                    Significant Labs:    CBC/Anemia Profile:  Recent Labs   Lab 08/03/23  0650 08/04/23  0455   WBC 20.11* 16.88*   HGB 12.1* 12.4*   HCT 35.1* 37.0*    395   MCV 93 94   RDW 12.5 12.6          Chemistries:  Recent Labs   Lab 08/03/23  0650 08/04/23  0455   * 135*   K 3.8 4.2    101   CO2 21* 26   BUN 10 8   CREATININE 0.5 0.6   CALCIUM 8.6* 8.8   ALBUMIN 1.9* 1.9*   PROT 5.6* 5.8*   BILITOT 0.3 0.2   ALKPHOS 83 91   ALT 22 45*   AST 21 41*         All pertinent labs within the past 24 hours have been reviewed.    Significant Imaging:  I have reviewed all pertinent imaging results/findings within the past 24 hours.      Assessment/Plan:     Pulmonary  * Loculated pleural  effusion  - initially presented with a week of back/flank pain with productive cough; denied any fevers or recent illnesses, weight loss. Imaging consistent with a pleural effusion, bedside ultrasound noting multiple pockets of loculated effusion and areas of trapped lung. Unsure if they are all connecting.  - 8/2: roman catheter placed with initially 400mL of fluid evacuated; studies sent and consistent with a complicated parapneumonic effusion  - Since placement: ~1500mL of fluid drained  - CT chest with roman placed in one pocket, appears there are 2 other (superior lateral and posterior) pockets that do not communicate with the large effusion that the roman is in.  - Discussed case with CTS at [facility], at this time recommending attempt of lytic therapy and re-evaluation tomorrow to see. Potentially surgical candidate but wouldn't be until next week at earliest.    Recommendations  - Dose 1 of tpa/dornase instilled this morning; see treatment plan note for further details of volume.  - Continue to leave on contious suction  - Follow-up culture and cytology  - Continue antibiotics for now  - Will re-evaluate with US tomorrow    Acute hypoxemic respiratory failure  - On 5LNC and comfortable, weaning down as tolerated  - Remainder under loculated pleural effusion  - Goal SpO2 88-92%        Thank you for allowing us to participate in the care of this patient, we will continue to follow. Please let us know if there are questions or concerns.    Stoney Coon MD  Pulmonology  [facility] ([place])

## 2023-08-04 NOTE — ASSESSMENT & PLAN NOTE
Left sided chest wall pain    Community acquired pneumonia   Acute hypoxic resp failure   - presents to the ED after primary care visit recommended patient to ED for imaging for lumbar spine   - Patient reports left back pain that radiates to left anterior chest that started six days ago, associated with a productive cough and worsening SOB. Denies fever, chills but reports fatigue and loss of appetite. Patient reports left chest wall pain was exacerbated with coughing.   - Patient denies any sick close contacts nor any recent hospitalization.   - Stopped smoking 20 + years ago   - Afebrile, , RR 20, /67, Pox 91% on arrival and placed on 4L/min O2 via NC.   - Leukocytosis 20K with left shift.   - CT Abdomen pelvis with contrast with a loculated left pleural effusion with compressive atelectasis of the left lower lobe, confirmed with CXR.   - Patient was started for management for CAP in ED with Ceftriaxone 1g IV and azithromycin 500mg IV. Patient also given 1L NaCl 0.9% fluid bolus.   - On exam, patient was comfortable looking, using CPAP. Left chest wall tenderness is reproducible. Decreased air entry to left posterior lung base.  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 Will determine more from pleural studies.   - Continued treating as CAP for raised WBC.   - Exudative effusion   - Pt coughed during intake today says this has happened at home as well - could be that pt is aspirating and that is contributing to pneumonia and effusion  - Speech eval - Aspiration Precautions:   Decrease rate of intake, decrease bolus size especially with thin liquids  Small singular  cupsips as able (may use straw, must take small sips)  Upright seating during intake    Plan:  - Continue Ceftriaxone 2g IV daily and Azithromycin 250mg PO for total 5 days  - Acetaminophen 1g Q8 PRN, ibuprofen 800mg Q8 PRN for left chest wall pain  - Duonebs Q4 wake  - Wean O2 as needed to keep Pox >90%  - Pulm consult - started TPA/dornase today. Will monitor. CTS at [facility] aware of case.   - Started oxycodone PRN for pain with lytics  - Trend CBC  - Guaifenesin 200mg Q4 PRN

## 2023-08-04 NOTE — TREATMENT PLAN
Pulmonary & Critical Care Medicine Lytic Therapy Plan    Lytic Therapy 1/6    - Instilled 30mL of tpa and 30mL of dornase with 10mL of saline flush at 0930 and clamped  - Opened at 1030 with drainage of roughly 700mL of serosanguineous output.  - Keep on suction, will re-evaluate tomorrow with ultrasound to see if further lytic therapy indicated    Stoney Coon MD  [employer] Pulmonary & Critical Care Medicine Fellow

## 2023-08-04 NOTE — PROGRESS NOTES
[facility], [place], operated by [facility]  Progress Note    Patient Name: Cricket Mcdonnell  MRN: [id number]  Patient Class: IP- Inpatient   Admission Date: 8/1/2023  Length of Stay: 3 days  Attending Physician: Darin Mcguire MD  Primary Care Provider: Neo Sneed MD        Subjective:     Principal Problem:Loculated pleural effusion        HPI:  Cricket Mcdonnell is a 82 year old gentleman with a PMHx of sleep apnea, anxiety disorder and right angle glaucoma of right eye who presents to the ED after primary care visit recommended patient to ED for imaging. Patient with left back pain that radiates to left anterior chest that started six days ago. Patient reports that he was having a productive cough then, but unable to see if it was purulent. Patient denies fever, chills but reports fatigue and loss of appetite. Patient also reports worsening shortness of breath which prompted primary care visit today. Patient reports left chest wall pain was exacerbated with coughing. Patient denies any sick close contacts nor any recent hospitalization. Patient denies nausea, vomiting, diarrhea, pedal edema. Patient also denies any new onset numbness or weakness in bilateral lower extremities.     Afebrile, , RR 20, /67, Pox 91% on arrival and placed on 4L/min O2 via NC. Leukocytosis 20K with left shift. Na 133. UA grossly non-infectious, +1 ketonuria. Blood cx were sent. CT Lumbar spine without contrast with findings of Increase in lumbar levoscoliosis. No definite acute lumbar fracture. Moderate to large left pleural effusion, which is incompletely imaged. CT Abdomen pelvis with contrast with a loculated left pleural effusion with compressive atelectasis of the left lower lobe, confirmed with CXR. Patient was started for management for CAP in ED with Ceftriaxone 1g IV and azithromycin 500mg IV. Patient also given 1L NaCl 0.9% fluid bolus. On exam, patient was comfortable looking, using CPAP. Left  chest wall tenderness is reproducible. Decreased air entry to left posterior lung base.    Patient is admitted to Hospital Medicine for management of worsening shortness of breath with hypoxia and a left lateral wall chest pain likely due to left pleural effusion, currently treated as CAP. Consult placed to pulmonology for advise on need for thoracentesis.       Overview/Hospital Course:  Pt stable on 5L NC. Has stable non productive cough. On auscultation no breath sounds on left lower lung. Seen by pulmonary team and on US noted multiple small pockets of loculated effusion with areas of trapped lung. Pleural catheter placed with 400ml of fluid drained immediately. Pleural studies sent. Will continue abx at this time. Wean oxygen as tolerated.     Pleural studies suggestive of exudative effusion. Will continue abx and Pulm team ordered CT chest to determine next steps. Pt continue to be on 5L NC. Nurse noticed that pt coughed while swallowing water which pt reports has been going on at home intermittently. As such, aspiration could be the source of his pneumonia/effusion. Speech was consulted ; Aspiration Precautions:   Decrease rate of intake, decrease bolus size especially with thin liquids  Small singular cupsips as able (may use straw, must take small sips)  Upright seating during intake    Met daughter Xiao orantes who flew in from [facility]. She reports that pt often forget details as he a very poetic and abstract mind. Daughter also confirms that pt often eats very rapidly and eating slowly might help if he any aspiration is taking place.    Pulm team instilled tpa/dornase into catheter.  Will continue to monitor with ultra sound. Pulm team also touched base with CTS team at [facility] it it came to situation that pt would need vats. If at all ,this would take place next week. For now, watching how pt does with lytics.       Interval History: Lytic therapy started through catheter. Daughter Fifi at  bedside and updated.     Review of Systems   Constitutional:  Negative for fatigue and fever.   Respiratory:  Positive for cough.    Cardiovascular:  Negative for chest pain (left chest / mid axillary area - catheter in place).   Gastrointestinal:  Negative for abdominal distention and diarrhea.   Genitourinary:  Negative for dysuria.   Psychiatric/Behavioral:  Negative for agitation and confusion.      Objective:     Vital Signs (Most Recent):  Temp: 98.7 °F (37.1 °C) (08/04/23 1117)  Pulse: 88 (08/04/23 1117)  Resp: 17 (08/04/23 1117)  BP: 121/69 (08/04/23 1117)  SpO2: (!) 93 % (08/04/23 1117) Vital Signs (24h Range):  Temp:  [96.8 °F (36 °C)-98.9 °F (37.2 °C)] 98.7 °F (37.1 °C)  Pulse:  [78-97] 88  Resp:  [14-22] 17  SpO2:  [92 %-98 %] 93 %  BP: (108-138)/(60-72) 121/69     Weight: 71.3 kg (157 lb 3 oz)  Body mass index is 24.62 kg/m².    Intake/Output Summary (Last 24 hours) at 8/4/2023 1128  Last data filed at 8/4/2023 0840  Gross per 24 hour   Intake 184.21 ml   Output 1915 ml   Net -1730.79 ml           Physical Exam  Constitutional:       General: He is not in acute distress.  Eyes:      Extraocular Movements: Extraocular movements intact.   Pulmonary:      Effort: No respiratory distress.      Breath sounds: No wheezing.      Comments: absent breath sounds on left lower lung  Chest:      Chest wall: Tenderness (left chest wall tenderness on palpation) present.   Abdominal:      General: There is no distension.      Tenderness: There is no abdominal tenderness.   Musculoskeletal:         General: No swelling.   Skin:     Capillary Refill: Capillary refill takes less than 2 seconds.   Neurological:      General: No focal deficit present.      Mental Status: He is oriented to person, place, and time.             Significant Labs: All pertinent labs within the past 24 hours have been reviewed.    Significant Imaging: I have reviewed all pertinent imaging results/findings within the past 24  hours.      Assessment/Plan:      * Loculated pleural effusion   Left sided chest wall pain    Community acquired pneumonia   Acute hypoxic resp failure   - presents to the ED after primary care visit recommended patient to ED for imaging for lumbar spine   - Patient reports left back pain that radiates to left anterior chest that started six days ago, associated with a productive cough and worsening SOB. Denies fever, chills but reports fatigue and loss of appetite. Patient reports left chest wall pain was exacerbated with coughing.   - Patient denies any sick close contacts nor any recent hospitalization.   - Stopped smoking 20 + years ago   - Afebrile, , RR 20, /67, Pox 91% on arrival and placed on 4L/min O2 via NC.   - Leukocytosis 20K with left shift.   - CT Abdomen pelvis with contrast with a loculated left pleural effusion with compressive atelectasis of the left lower lobe, confirmed with CXR.   - Patient was started for management for CAP in ED with Ceftriaxone 1g IV and azithromycin 500mg IV. Patient also given 1L NaCl 0.9% fluid bolus.   - On exam, patient was comfortable looking, using CPAP. Left chest wall tenderness is reproducible. Decreased air entry to left posterior lung base.  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 Will determine more from pleural studies.   - Continued treating as CAP for raised WBC.   - Exudative effusion   - Pt coughed during intake today says this has happened at home as well - could be that pt is aspirating and that is contributing to pneumonia and effusion  - Speech eval - Aspiration Precautions:   Decrease rate of intake,  "decrease bolus size especially with thin liquids  Small singular cupsips as able (may use straw, must take small sips)  Upright seating during intake  Regular diet     Plan:  - Continue Ceftriaxone 2g IV daily and Azithromycin 250mg PO for total 5 days  - Acetaminophen 1g Q8 PRN, ibuprofen 800mg Q8 PRN for left chest wall pain  - Duonebs Q4 wake  - Wean O2 as needed to keep Pox >90%  - Pulm consult - started TPA/dornase today. Will monitor. CTS at [facility] aware of case.   - Started oxycodone PRN for pain with lytics  - Trend CBC  - Guaifenesin 200mg Q4 PRN      Acute hypoxemic respiratory failure  See above     Constipation  - Cont senna docusate and lactulose  - Enema tomrorow if no BM today       Primary open angle glaucoma of right eye, moderate stage  Patient reports using multiple eye drops but unable to recall names of them other than "timolol TID, bimonidine TID and something at night".     - Eye drops as ordered to best of ability based on home list.       KARMA (obstructive sleep apnea)  - CPAP qhs        VTE Risk Mitigation (From admission, onward)         Ordered     enoxaparin injection 40 mg  Daily         08/01/23 2024     IP VTE HIGH RISK PATIENT  Once         08/01/23 2024     Place sequential compression device  Until discontinued         08/01/23 2024                Discharge Planning   YASH:      Code Status: Full Code   Is the patient medically ready for discharge?:     Reason for patient still in hospital (select all that apply): Treatment - lytics for loculated effusion, 5L NC  Discharge Plan A: Home            Darin Rasheed MD  Department of Hospital Medicine   [facility] ([place])  "

## 2023-08-04 NOTE — SUBJECTIVE & OBJECTIVE
Interval History: Lytic therapy started through catheter. Daughter Fifi at bedside and updated.     Review of Systems   Constitutional:  Negative for fatigue and fever.   Respiratory:  Positive for cough.    Cardiovascular:  Negative for chest pain (left chest / mid axillary area - catheter in place).   Gastrointestinal:  Negative for abdominal distention and diarrhea.   Genitourinary:  Negative for dysuria.   Psychiatric/Behavioral:  Negative for agitation and confusion.      Objective:     Vital Signs (Most Recent):  Temp: 98.7 °F (37.1 °C) (08/04/23 1117)  Pulse: 88 (08/04/23 1117)  Resp: 17 (08/04/23 1117)  BP: 121/69 (08/04/23 1117)  SpO2: (!) 93 % (08/04/23 1117) Vital Signs (24h Range):  Temp:  [96.8 °F (36 °C)-98.9 °F (37.2 °C)] 98.7 °F (37.1 °C)  Pulse:  [78-97] 88  Resp:  [14-22] 17  SpO2:  [92 %-98 %] 93 %  BP: (108-138)/(60-72) 121/69     Weight: 71.3 kg (157 lb 3 oz)  Body mass index is 24.62 kg/m².    Intake/Output Summary (Last 24 hours) at 8/4/2023 1128  Last data filed at 8/4/2023 0840  Gross per 24 hour   Intake 184.21 ml   Output 1915 ml   Net -1730.79 ml           Physical Exam  Constitutional:       General: He is not in acute distress.  Eyes:      Extraocular Movements: Extraocular movements intact.   Pulmonary:      Effort: No respiratory distress.      Breath sounds: No wheezing.      Comments: absent breath sounds on left lower lung  Chest:      Chest wall: Tenderness (left chest wall tenderness on palpation) present.   Abdominal:      General: There is no distension.      Tenderness: There is no abdominal tenderness.   Musculoskeletal:         General: No swelling.   Skin:     Capillary Refill: Capillary refill takes less than 2 seconds.   Neurological:      General: No focal deficit present.      Mental Status: He is oriented to person, place, and time.             Significant Labs: All pertinent labs within the past 24 hours have been reviewed.    Significant Imaging: I have reviewed  all pertinent imaging results/findings within the past 24 hours.

## 2023-08-04 NOTE — SUBJECTIVE & OBJECTIVE
Interval History: Still with a productive cough this morning; feels better slowly. Only additional 300mL of output over the past 24 hours; discussed with CTS, his daughter, and patient regarding the findings of CT and plan. Thrombolytics today and re-evaluation tomorrow. Potentially surgical candidate    Objective:     Vital Signs (Most Recent):  Temp: 97.9 °F (36.6 °C) (08/04/23 0720)  Pulse: 86 (08/04/23 0723)  Resp: 14 (08/04/23 0723)  BP: 124/66 (08/04/23 0720)  SpO2: 95 % (08/04/23 0723) Vital Signs (24h Range):  Temp:  [96.8 °F (36 °C)-98.9 °F (37.2 °C)] 97.9 °F (36.6 °C)  Pulse:  [78-97] 86  Resp:  [14-22] 14  SpO2:  [92 %-98 %] 95 %  BP: (108-138)/(60-72) 124/66     Weight: 71.3 kg (157 lb 3 oz)  Body mass index is 24.62 kg/m².      Intake/Output Summary (Last 24 hours) at 8/4/2023 0957  Last data filed at 8/4/2023 0840  Gross per 24 hour   Intake 304.21 ml   Output 2065 ml   Net -1760.79 ml          Physical Exam  Constitutional:       Appearance: Normal appearance. He is normal weight.   HENT:      Head: Normocephalic and atraumatic.      Nose: Nose normal. No congestion.      Mouth/Throat:      Mouth: Mucous membranes are moist.      Pharynx: Oropharynx is clear.   Eyes:      Extraocular Movements: Extraocular movements intact.      Conjunctiva/sclera: Conjunctivae normal.      Pupils: Pupils are equal, round, and reactive to light.   Cardiovascular:      Pulses: Normal pulses.      Heart sounds: Normal heart sounds. No murmur heard.  Pulmonary:      Effort: Pulmonary effort is normal. No respiratory distress.      Breath sounds: Rhonchi present. No wheezing.   Abdominal:      General: Abdomen is flat. Bowel sounds are normal. There is no distension.      Palpations: Abdomen is soft.      Tenderness: There is no abdominal tenderness.   Musculoskeletal:         General: Normal range of motion.      Cervical back: Normal range of motion and neck supple.      Right lower leg: No edema.      Left lower leg:  No edema.   Skin:     General: Skin is warm and dry.      Capillary Refill: Capillary refill takes less than 2 seconds.   Neurological:      General: No focal deficit present.      Mental Status: He is alert and oriented to person, place, and time. Mental status is at baseline.      Cranial Nerves: No cranial nerve deficit.      Sensory: No sensory deficit.      Motor: No weakness.   Psychiatric:         Mood and Affect: Mood normal.         Behavior: Behavior normal.         Thought Content: Thought content normal.         Judgment: Judgment normal.         Vents:  Oxygen Concentration (%): 45 (08/04/23 0329)    Lines/Drains/Airways       Drain  Duration                  Y Chest Tube 1 and 2 08/02/23 0949 Left 2 days              Peripheral Intravenous Line  Duration                  Peripheral IV - Single Lumen 08/01/23 2000 20 G Anterior;Right Forearm 2 days                    Significant Labs:    CBC/Anemia Profile:  Recent Labs   Lab 08/03/23  0650 08/04/23  0455   WBC 20.11* 16.88*   HGB 12.1* 12.4*   HCT 35.1* 37.0*    395   MCV 93 94   RDW 12.5 12.6          Chemistries:  Recent Labs   Lab 08/03/23  0650 08/04/23  0455   * 135*   K 3.8 4.2    101   CO2 21* 26   BUN 10 8   CREATININE 0.5 0.6   CALCIUM 8.6* 8.8   ALBUMIN 1.9* 1.9*   PROT 5.6* 5.8*   BILITOT 0.3 0.2   ALKPHOS 83 91   ALT 22 45*   AST 21 41*         All pertinent labs within the past 24 hours have been reviewed.    Significant Imaging:  I have reviewed all pertinent imaging results/findings within the past 24 hours.

## 2023-08-04 NOTE — ASSESSMENT & PLAN NOTE
- initially presented with a week of back/flank pain with productive cough; denied any fevers or recent illnesses, weight loss. Imaging consistent with a pleural effusion, bedside ultrasound noting multiple pockets of loculated effusion and areas of trapped lung. Unsure if they are all connecting.  - 8/2: roman catheter placed with initially 400mL of fluid evacuated; studies sent and consistent with a complicated parapneumonic effusion  - Since placement: ~1500mL of fluid drained  - CT chest with roman placed in one pocket, appears there are 2 other (superior lateral and posterior) pockets that do not communicate with the large effusion that the roman is in.  - Discussed case with CTS at [facility], at this time recommending attempt of lytic therapy and re-evaluation tomorrow to see. Potentially surgical candidate but wouldn't be until next week at earliest.    Recommendations  - Dose 1 of tpa/dornase instilled this morning; see treatment plan note for further details of volume.  - Continue to leave on contious suction  - Follow-up culture and cytology  - Continue antibiotics for now  - Will re-evaluate with US tomorrow

## 2023-08-05 PROBLEM — J18.9 CAP (COMMUNITY ACQUIRED PNEUMONIA): Status: ACTIVE | Noted: 2023-08-05

## 2023-08-05 LAB
ANION GAP SERPL CALC-SCNC: 9 MMOL/L (ref 8–16)
BUN SERPL-MCNC: 14 MG/DL (ref 8–23)
CALCIUM SERPL-MCNC: 8.9 MG/DL (ref 8.7–10.5)
CHLORIDE SERPL-SCNC: 100 MMOL/L (ref 95–110)
CO2 SERPL-SCNC: 26 MMOL/L (ref 23–29)
CREAT SERPL-MCNC: 0.5 MG/DL (ref 0.5–1.4)
ERYTHROCYTE [DISTWIDTH] IN BLOOD BY AUTOMATED COUNT: 12.8 % (ref 11.5–14.5)
EST. GFR  (NO RACE VARIABLE): >60 ML/MIN/1.73 M^2
GLUCOSE SERPL-MCNC: 102 MG/DL (ref 70–110)
HCT VFR BLD AUTO: 36.5 % (ref 40–54)
HGB BLD-MCNC: 12.5 G/DL (ref 14–18)
MCH RBC QN AUTO: 32.1 PG (ref 27–31)
MCHC RBC AUTO-ENTMCNC: 34.2 G/DL (ref 32–36)
MCV RBC AUTO: 94 FL (ref 82–98)
PLATELET # BLD AUTO: 430 K/UL (ref 150–450)
PMV BLD AUTO: 8.1 FL (ref 9.2–12.9)
POTASSIUM SERPL-SCNC: 3.9 MMOL/L (ref 3.5–5.1)
RBC # BLD AUTO: 3.9 M/UL (ref 4.6–6.2)
SODIUM SERPL-SCNC: 135 MMOL/L (ref 136–145)
WBC # BLD AUTO: 15.09 K/UL (ref 3.9–12.7)

## 2023-08-05 PROCEDURE — 25000242 PHARM REV CODE 250 ALT 637 W/ HCPCS: Mod: HCNC | Performed by: STUDENT IN AN ORGANIZED HEALTH CARE EDUCATION/TRAINING PROGRAM

## 2023-08-05 PROCEDURE — 94660 CPAP INITIATION&MGMT: CPT | Mod: HCNC

## 2023-08-05 PROCEDURE — 25000242 PHARM REV CODE 250 ALT 637 W/ HCPCS: Mod: HCNC

## 2023-08-05 PROCEDURE — 99900035 HC TECH TIME PER 15 MIN (STAT): Mod: HCNC

## 2023-08-05 PROCEDURE — 27000221 HC OXYGEN, UP TO 24 HOURS: Mod: HCNC

## 2023-08-05 PROCEDURE — 63600175 PHARM REV CODE 636 W HCPCS: Mod: JZ,JG,HCNC | Performed by: STUDENT IN AN ORGANIZED HEALTH CARE EDUCATION/TRAINING PROGRAM

## 2023-08-05 PROCEDURE — 25000003 PHARM REV CODE 250: Mod: HCNC | Performed by: STUDENT IN AN ORGANIZED HEALTH CARE EDUCATION/TRAINING PROGRAM

## 2023-08-05 PROCEDURE — A4217 STERILE WATER/SALINE, 500 ML: HCPCS | Mod: HCNC | Performed by: STUDENT IN AN ORGANIZED HEALTH CARE EDUCATION/TRAINING PROGRAM

## 2023-08-05 PROCEDURE — 63700000 PHARM REV CODE 250 ALT 637 W/O HCPCS: Mod: HCNC | Performed by: STUDENT IN AN ORGANIZED HEALTH CARE EDUCATION/TRAINING PROGRAM

## 2023-08-05 PROCEDURE — 99233 PR SUBSEQUENT HOSPITAL CARE,LEVL III: ICD-10-PCS | Mod: HCNC,GC,, | Performed by: INTERNAL MEDICINE

## 2023-08-05 PROCEDURE — 11000001 HC ACUTE MED/SURG PRIVATE ROOM: Mod: HCNC

## 2023-08-05 PROCEDURE — 25000003 PHARM REV CODE 250: Mod: HCNC

## 2023-08-05 PROCEDURE — 94761 N-INVAS EAR/PLS OXIMETRY MLT: CPT | Mod: HCNC

## 2023-08-05 PROCEDURE — 99233 SBSQ HOSP IP/OBS HIGH 50: CPT | Mod: ,,, | Performed by: STUDENT IN AN ORGANIZED HEALTH CARE EDUCATION/TRAINING PROGRAM

## 2023-08-05 PROCEDURE — 99233 SBSQ HOSP IP/OBS HIGH 50: CPT | Mod: HCNC,GC,, | Performed by: INTERNAL MEDICINE

## 2023-08-05 PROCEDURE — 94640 AIRWAY INHALATION TREATMENT: CPT | Mod: HCNC

## 2023-08-05 PROCEDURE — 63600175 PHARM REV CODE 636 W HCPCS: Mod: HCNC

## 2023-08-05 PROCEDURE — 99233 PR SUBSEQUENT HOSPITAL CARE,LEVL III: ICD-10-PCS | Mod: ,,, | Performed by: STUDENT IN AN ORGANIZED HEALTH CARE EDUCATION/TRAINING PROGRAM

## 2023-08-05 PROCEDURE — 85027 COMPLETE CBC AUTOMATED: CPT | Mod: HCNC | Performed by: STUDENT IN AN ORGANIZED HEALTH CARE EDUCATION/TRAINING PROGRAM

## 2023-08-05 PROCEDURE — 36415 COLL VENOUS BLD VENIPUNCTURE: CPT | Mod: HCNC | Performed by: STUDENT IN AN ORGANIZED HEALTH CARE EDUCATION/TRAINING PROGRAM

## 2023-08-05 PROCEDURE — 80048 BASIC METABOLIC PNL TOTAL CA: CPT | Mod: HCNC | Performed by: STUDENT IN AN ORGANIZED HEALTH CARE EDUCATION/TRAINING PROGRAM

## 2023-08-05 RX ORDER — HYDROMORPHONE HYDROCHLORIDE 1 MG/ML
0.5 INJECTION, SOLUTION INTRAMUSCULAR; INTRAVENOUS; SUBCUTANEOUS ONCE
Status: COMPLETED | OUTPATIENT
Start: 2023-08-05 | End: 2023-08-05

## 2023-08-05 RX ADMIN — ALTEPLASE 10 MG: 2.2 INJECTION, POWDER, LYOPHILIZED, FOR SOLUTION INTRAVENOUS at 10:08

## 2023-08-05 RX ADMIN — IBUPROFEN 800 MG: 400 TABLET ORAL at 02:08

## 2023-08-05 RX ADMIN — BISACODYL 10 MG: 10 SUPPOSITORY RECTAL at 02:08

## 2023-08-05 RX ADMIN — IPRATROPIUM BROMIDE AND ALBUTEROL SULFATE 3 ML: 2.5; .5 SOLUTION RESPIRATORY (INHALATION) at 08:08

## 2023-08-05 RX ADMIN — AZITHROMYCIN MONOHYDRATE 250 MG: 250 TABLET ORAL at 09:08

## 2023-08-05 RX ADMIN — BIMATOPROST 1 DROP: 0.1 SOLUTION/ DROPS OPHTHALMIC at 09:08

## 2023-08-05 RX ADMIN — IBUPROFEN 800 MG: 400 TABLET ORAL at 09:08

## 2023-08-05 RX ADMIN — DORZOLAMIDE HYDROCHLORIDE AND TIMOLOL MALEATE 1 DROP: 22.3; 6.8 SOLUTION/ DROPS OPHTHALMIC at 09:08

## 2023-08-05 RX ADMIN — TIMOLOL MALEATE 1 DROP: 2.5 SOLUTION/ DROPS OPHTHALMIC at 09:08

## 2023-08-05 RX ADMIN — BRIMONIDINE TARTRATE 1 DROP: 1.5 SOLUTION OPHTHALMIC at 09:08

## 2023-08-05 RX ADMIN — HYDROMORPHONE HYDROCHLORIDE 0.5 MG: 0.5 INJECTION, SOLUTION INTRAMUSCULAR; INTRAVENOUS; SUBCUTANEOUS at 12:08

## 2023-08-05 RX ADMIN — CEFTRIAXONE SODIUM 2 G: 2 INJECTION, POWDER, FOR SOLUTION INTRAMUSCULAR; INTRAVENOUS at 12:08

## 2023-08-05 RX ADMIN — ENOXAPARIN SODIUM 40 MG: 40 INJECTION SUBCUTANEOUS at 05:08

## 2023-08-05 RX ADMIN — OXYCODONE HYDROCHLORIDE 5 MG: 5 TABLET ORAL at 10:08

## 2023-08-05 RX ADMIN — Medication 1 ENEMA: at 05:08

## 2023-08-05 RX ADMIN — LACTULOSE 10 G: 20 SOLUTION ORAL at 09:08

## 2023-08-05 RX ADMIN — SENNOSIDES AND DOCUSATE SODIUM 2 TABLET: 50; 8.6 TABLET ORAL at 09:08

## 2023-08-05 RX ADMIN — LACTULOSE 10 G: 20 SOLUTION ORAL at 02:08

## 2023-08-05 RX ADMIN — DORNASE ALFA 5 MG: 1 SOLUTION RESPIRATORY (INHALATION) at 10:08

## 2023-08-05 RX ADMIN — IPRATROPIUM BROMIDE AND ALBUTEROL SULFATE 3 ML: 2.5; .5 SOLUTION RESPIRATORY (INHALATION) at 11:08

## 2023-08-05 RX ADMIN — BRIMONIDINE TARTRATE 1 DROP: 1.5 SOLUTION OPHTHALMIC at 02:08

## 2023-08-05 RX ADMIN — LIDOCAINE 1 PATCH: 50 PATCH CUTANEOUS at 05:08

## 2023-08-05 NOTE — PROGRESS NOTES
[facility] Medicine  Progress Note    Patient Name: Cricket Mcdonnell  MRN: [id number]  Patient Class: IP- Inpatient   Admission Date: 8/1/2023  Length of Stay: 4 days  Attending Physician: Darin Mcguire MD  Primary Care Provider: Neo Sneed MD        Subjective:     Principal Problem:Loculated pleural effusion        HPI:  Cricket Mcdonnell is a 82 year old gentleman with a PMHx of sleep apnea, anxiety disorder and right angle glaucoma of right eye who presents to the ED after primary care visit recommended patient to ED for imaging. Patient with left back pain that radiates to left anterior chest that started six days ago. Patient reports that he was having a productive cough then, but unable to see if it was purulent. Patient denies fever, chills but reports fatigue and loss of appetite. Patient also reports worsening shortness of breath which prompted primary care visit today. Patient reports left chest wall pain was exacerbated with coughing. Patient denies any sick close contacts nor any recent hospitalization. Patient denies nausea, vomiting, diarrhea, pedal edema. Patient also denies any new onset numbness or weakness in bilateral lower extremities.     Afebrile, , RR 20, /67, Pox 91% on arrival and placed on 4L/min O2 via NC. Leukocytosis 20K with left shift. Na 133. UA grossly non-infectious, +1 ketonuria. Blood cx were sent. CT Lumbar spine without contrast with findings of Increase in lumbar levoscoliosis. No definite acute lumbar fracture. Moderate to large left pleural effusion, which is incompletely imaged. CT Abdomen pelvis with contrast with a loculated left pleural effusion with compressive atelectasis of the left lower lobe, confirmed with CXR. Patient was started for management for CAP in ED with Ceftriaxone 1g IV and azithromycin 500mg IV. Patient also given 1L NaCl 0.9% fluid bolus. On exam, patient was comfortable looking, using CPAP. Left  chest wall tenderness is reproducible. Decreased air entry to left posterior lung base.    Patient is admitted to Hospital Medicine for management of worsening shortness of breath with hypoxia and a left lateral wall chest pain likely due to left pleural effusion, currently treated as CAP. Consult placed to pulmonology for advise on need for thoracentesis.       Overview/Hospital Course:  Pt stable on 5L NC. Has stable non productive cough. On auscultation no breath sounds on left lower lung. Seen by pulmonary team and on US noted multiple small pockets of loculated effusion with areas of trapped lung. Pleural catheter placed with 400ml of fluid drained immediately. Pleural studies sent. Will continue abx at this time. Wean oxygen as tolerated.     Pleural studies suggestive of exudative effusion. Will continue abx and Pulm team ordered CT chest to determine next steps. Pt continue to be on 5L NC. Nurse noticed that pt coughed while swallowing water which pt reports has been going on at home intermittently. As such, aspiration could be the source of his pneumonia/effusion. Speech was consulted ; Aspiration Precautions:   Decrease rate of intake, decrease bolus size especially with thin liquids  Small singular cupsips as able (may use straw, must take small sips)  Upright seating during intake    Met daughter Xiao orantes who flew in from [facility]. She reports that pt often forget details as he a very poetic and abstract mind. Daughter also confirms that pt often eats very rapidly and eating slowly might help if he any aspiration is taking place.    Pulm team instilled tpa/dornase into catheter.  Will continue to monitor with ultra sound. Pulm team also touched base with CTS team at [facility] it it came to situation that pt would need vats. If at all, this would take place next week. For now, watching how pt does with lytics.       Interval History: Day 2 of Lytic therapy.  started through catheter. Daughter  Nettie at bedside and updated. Myrtle expressed concerns of opioids and risk of dependency. We discussed that yes that is a risk of opioids but at the same time, in setting of acute pain the benefits may outweigh the risks. Through shared decision making it was decided to keep pt on opioids for the time being, especially while receiving lytics which is causing him severe pain.     Review of Systems   Constitutional:  Negative for fatigue and fever.   Respiratory:  Positive for cough.    Cardiovascular:  Negative for chest pain (left chest / mid axillary area - catheter in place).   Gastrointestinal:  Negative for abdominal distention and diarrhea.   Genitourinary:  Negative for dysuria.   Psychiatric/Behavioral:  Negative for agitation and confusion.      Objective:     Vital Signs (Most Recent):  Temp: 98.4 °F (36.9 °C) (08/05/23 1134)  Pulse: 88 (08/05/23 1134)  Resp: 15 (08/05/23 1134)  BP: 101/66 (08/05/23 1134)  SpO2: (!) 93 % (08/05/23 1134) Vital Signs (24h Range):  Temp:  [97.7 °F (36.5 °C)-98.5 °F (36.9 °C)] 98.4 °F (36.9 °C)  Pulse:  [58-95] 88  Resp:  [15-18] 15  SpO2:  [87 %-97 %] 93 %  BP: (101-129)/(60-68) 101/66     Weight: 71.3 kg (157 lb 3 oz)  Body mass index is 24.62 kg/m².    Intake/Output Summary (Last 24 hours) at 8/5/2023 1140  Last data filed at 8/5/2023 0539  Gross per 24 hour   Intake 578.13 ml   Output 1320 ml   Net -741.87 ml           Physical Exam  Constitutional:       General: He is not in acute distress.  Eyes:      Extraocular Movements: Extraocular movements intact.   Pulmonary:      Effort: No respiratory distress.      Breath sounds: No wheezing.      Comments: absent breath sounds on left lower lung  Chest:      Chest wall: Tenderness (left chest wall tenderness on palpation) present.   Abdominal:      General: There is no distension.      Tenderness: There is no abdominal tenderness.   Musculoskeletal:         General: No swelling.   Skin:     Capillary Refill: Capillary  refill takes less than 2 seconds.   Neurological:      General: No focal deficit present.      Mental Status: He is oriented to person, place, and time.             Significant Labs: All pertinent labs within the past 24 hours have been reviewed.    Significant Imaging: I have reviewed all pertinent imaging results/findings within the past 24 hours.      Assessment/Plan:      * Loculated pleural effusion   Left sided chest wall pain    Community acquired pneumonia   Acute hypoxic resp failure   - presents to the ED after primary care visit recommended patient to ED for imaging for lumbar spine   - Patient reports left back pain that radiates to left anterior chest that started six days ago, associated with a productive cough and worsening SOB. Denies fever, chills but reports fatigue and loss of appetite. Patient reports left chest wall pain was exacerbated with coughing.   - Patient denies any sick close contacts nor any recent hospitalization.   - Stopped smoking 20 + years ago   - Afebrile, , RR 20, /67, Pox 91% on arrival and placed on 4L/min O2 via NC.   - Leukocytosis 20K with left shift.   - CT Abdomen pelvis with contrast with a loculated left pleural effusion with compressive atelectasis of the left lower lobe, confirmed with CXR.   - Patient was started for management for CAP in ED with Ceftriaxone 1g IV and azithromycin 500mg IV. Patient also given 1L NaCl 0.9% fluid bolus.   - On exam, patient was comfortable looking, using CPAP. Left chest wall tenderness is reproducible. Decreased air entry to left posterior lung base.  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 Will determine more from pleural studies.   - Continued treating as CAP for raised WBC.   - Exudative effusion   - Pt coughed during intake today says this has happened at home as well - could be that pt is aspirating and that is contributing to pneumonia and effusion  - Speech eval - Aspiration Precautions:   Decrease rate of intake, decrease bolus size especially with thin liquids  Small singular cupsips as able (may use straw, must take small sips)  Upright seating during intake    Plan:  - Continue Ceftriaxone 2g IV daily and Azithromycin 250mg PO for total 5 days  - Acetaminophen 1g Q8 PRN, ibuprofen 800mg Q8 PRN for left chest wall pain  - Duonebs Q4 wake  - Wean O2 as needed to keep Pox >90%  - Pulm consult - Day 2 TPA/dornase today. Will monitor. CTS at [facility] aware of case.   - Cont oxycodone PRN for pain with lytics - discussed with daughter risks vs benefits of opioids.   - Trend CBC  - Guaifenesin 200mg Q4 PRN      Acute hypoxemic respiratory failure  See above     Dysphagia        Constipation  - Cont senna docusate and lactulose  - Enema        Primary open angle glaucoma of right eye, moderate stage  Patient reports using multiple eye drops but unable to recall names of them other than "timolol TID, bimonidine TID and something at night".     - Eye drops as ordered to best of ability based on home list.       KARMA (obstructive sleep apnea)  - CPAP qhs        VTE Risk Mitigation (From admission, onward)         Ordered     enoxaparin injection 40 mg  Daily         08/01/23 2024     IP VTE HIGH RISK PATIENT  Once         08/01/23 2024     Place sequential compression device  Until discontinued         08/01/23 2024                Discharge Planning   YASH:      Code Status: Full Code   Is the patient medically ready for discharge?:     Reason for patient still in hospital (select all that apply): Treatment - 5L NC, receiving lytics therapy   Discharge Plan A: Home          Darin Rasheed, " MD  Department of Hospital Medicine   [facility] - Med Surg ([place])

## 2023-08-05 NOTE — PLAN OF CARE
AAOX4, VS stable on RA. Had a good sleep on CPAP machine. He remains stable at this time.  Problem: Adult Inpatient Plan of Care  Goal: Plan of Care Review  Outcome: Ongoing, Progressing  Goal: Patient-Specific Goal (Individualized)  Outcome: Ongoing, Progressing  Goal: Absence of Hospital-Acquired Illness or Injury  Outcome: Ongoing, Progressing  Goal: Optimal Comfort and Wellbeing  Outcome: Ongoing, Progressing  Goal: Readiness for Transition of Care  Outcome: Ongoing, Progressing     Problem: Skin Injury Risk Increased  Goal: Skin Health and Integrity  Outcome: Ongoing, Progressing

## 2023-08-05 NOTE — SUBJECTIVE & OBJECTIVE
Interval History: Day 2 of Lytic therapy.  started through catheter. Daughter Nettie at bedside and updated. Myrtle expressed concerns of opioids and risk of dependency. We discussed that yes that is a risk of opioids but at the same time, in setting of acute pain the benefits may outweigh the risks. Through shared decision making it was decided to keep pt on opioids for the time being, especially while receiving lytics which is causing him severe pain.     Review of Systems   Constitutional:  Negative for fatigue and fever.   Respiratory:  Positive for cough.    Cardiovascular:  Negative for chest pain (left chest / mid axillary area - catheter in place).   Gastrointestinal:  Negative for abdominal distention and diarrhea.   Genitourinary:  Negative for dysuria.   Psychiatric/Behavioral:  Negative for agitation and confusion.      Objective:     Vital Signs (Most Recent):  Temp: 98.4 °F (36.9 °C) (08/05/23 1134)  Pulse: 88 (08/05/23 1134)  Resp: 15 (08/05/23 1134)  BP: 101/66 (08/05/23 1134)  SpO2: (!) 93 % (08/05/23 1134) Vital Signs (24h Range):  Temp:  [97.7 °F (36.5 °C)-98.5 °F (36.9 °C)] 98.4 °F (36.9 °C)  Pulse:  [58-95] 88  Resp:  [15-18] 15  SpO2:  [87 %-97 %] 93 %  BP: (101-129)/(60-68) 101/66     Weight: 71.3 kg (157 lb 3 oz)  Body mass index is 24.62 kg/m².    Intake/Output Summary (Last 24 hours) at 8/5/2023 1140  Last data filed at 8/5/2023 0539  Gross per 24 hour   Intake 578.13 ml   Output 1320 ml   Net -741.87 ml           Physical Exam  Constitutional:       General: He is not in acute distress.  Eyes:      Extraocular Movements: Extraocular movements intact.   Pulmonary:      Effort: No respiratory distress.      Breath sounds: No wheezing.      Comments: absent breath sounds on left lower lung  Chest:      Chest wall: Tenderness (left chest wall tenderness on palpation) present.   Abdominal:      General: There is no distension.      Tenderness: There is no abdominal tenderness.    Musculoskeletal:         General: No swelling.   Skin:     Capillary Refill: Capillary refill takes less than 2 seconds.   Neurological:      General: No focal deficit present.      Mental Status: He is oriented to person, place, and time.             Significant Labs: All pertinent labs within the past 24 hours have been reviewed.    Significant Imaging: I have reviewed all pertinent imaging results/findings within the past 24 hours.

## 2023-08-05 NOTE — SUBJECTIVE & OBJECTIVE
Interval History: Had some pain after instillation yesterday, but tolerated it fairly well with oxycodone. Repeat US at bedside this morning with a small pocket of fluid still; however significantly improved comapred to previous. Decision made to instill 2nd dose today. Drained roughly 1200mL of fluid since first dose. WBC downtrending and remains afebrile. Slept fairly poorly last night, but appetite is still good.    Objective:     Vital Signs (Most Recent):  Temp: 98 °F (36.7 °C) (08/05/23 0911)  Pulse: 81 (08/05/23 0801)  Resp: 17 (08/05/23 0801)  BP: 112/68 (08/05/23 0758)  SpO2: 96 % (08/05/23 0801) Vital Signs (24h Range):  Temp:  [97.7 °F (36.5 °C)-98.7 °F (37.1 °C)] 98 °F (36.7 °C)  Pulse:  [58-95] 81  Resp:  [16-18] 17  SpO2:  [87 %-97 %] 96 %  BP: (111-129)/(60-69) 112/68     Weight: 71.3 kg (157 lb 3 oz)  Body mass index is 24.62 kg/m².      Intake/Output Summary (Last 24 hours) at 8/5/2023 1053  Last data filed at 8/5/2023 0539  Gross per 24 hour   Intake 578.13 ml   Output 2020 ml   Net -1441.87 ml          Physical Exam  Constitutional:       Appearance: Normal appearance. He is normal weight.   HENT:      Head: Normocephalic and atraumatic.      Nose: Nose normal. No congestion.      Mouth/Throat:      Mouth: Mucous membranes are moist.      Pharynx: Oropharynx is clear.   Eyes:      Extraocular Movements: Extraocular movements intact.      Conjunctiva/sclera: Conjunctivae normal.      Pupils: Pupils are equal, round, and reactive to light.   Cardiovascular:      Pulses: Normal pulses.      Heart sounds: Normal heart sounds. No murmur heard.  Pulmonary:      Effort: Pulmonary effort is normal. No respiratory distress.      Breath sounds: Rhonchi present. No wheezing.   Abdominal:      General: Abdomen is flat. Bowel sounds are normal. There is no distension.      Palpations: Abdomen is soft.      Tenderness: There is no abdominal tenderness.   Musculoskeletal:         General: Normal range of motion.       Cervical back: Normal range of motion and neck supple.      Right lower leg: No edema.      Left lower leg: No edema.   Skin:     General: Skin is warm and dry.      Capillary Refill: Capillary refill takes less than 2 seconds.   Neurological:      General: No focal deficit present.      Mental Status: He is alert and oriented to person, place, and time. Mental status is at baseline.      Cranial Nerves: No cranial nerve deficit.      Sensory: No sensory deficit.      Motor: No weakness.   Psychiatric:         Mood and Affect: Mood normal.         Behavior: Behavior normal.         Thought Content: Thought content normal.         Judgment: Judgment normal.         Vents:  Oxygen Concentration (%): 45 (08/05/23 0304)    Lines/Drains/Airways       Drain  Duration                  Y Chest Tube 1 and 2 08/02/23 0949 Left 3 days              Peripheral Intravenous Line  Duration                  Peripheral IV - Single Lumen 08/01/23 2000 20 G Anterior;Right Forearm 3 days                    Significant Labs:    CBC/Anemia Profile:  Recent Labs   Lab 08/04/23  0455 08/05/23  0413   WBC 16.88* 15.09*   HGB 12.4* 12.5*   HCT 37.0* 36.5*    430   MCV 94 94   RDW 12.6 12.8          Chemistries:  Recent Labs   Lab 08/04/23  0455 08/05/23  0412   * 135*   K 4.2 3.9    100   CO2 26 26   BUN 8 14   CREATININE 0.6 0.5   CALCIUM 8.8 8.9   ALBUMIN 1.9*  --    PROT 5.8*  --    BILITOT 0.2  --    ALKPHOS 91  --    ALT 45*  --    AST 41*  --          All pertinent labs within the past 24 hours have been reviewed.    Significant Imaging:  I have reviewed all pertinent imaging results/findings within the past 24 hours.

## 2023-08-05 NOTE — PROGRESS NOTES
[facility] Surg ([place])  Pulmonology  Progress Note    Patient Name: Cricket Mcdonnell  MRN: [id number]  Admission Date: 8/1/2023  Hospital Length of Stay: 4 days  Code Status: Full Code  Attending Provider: Darin Mcguire MD  Primary Care Provider: Neo Sneed MD   Principal Problem: Loculated pleural effusion    Subjective:     Interval History: Had some pain after instillation yesterday, but tolerated it fairly well with oxycodone. Repeat US at bedside this morning with a small pocket of fluid still; however significantly improved comapred to previous. Decision made to instill 2nd dose today. Drained roughly 1200mL of fluid since first dose. WBC downtrending and remains afebrile. Slept fairly poorly last night, but appetite is still good.    Objective:     Vital Signs (Most Recent):  Temp: 98 °F (36.7 °C) (08/05/23 0911)  Pulse: 81 (08/05/23 0801)  Resp: 17 (08/05/23 0801)  BP: 112/68 (08/05/23 0758)  SpO2: 96 % (08/05/23 0801) Vital Signs (24h Range):  Temp:  [97.7 °F (36.5 °C)-98.7 °F (37.1 °C)] 98 °F (36.7 °C)  Pulse:  [58-95] 81  Resp:  [16-18] 17  SpO2:  [87 %-97 %] 96 %  BP: (111-129)/(60-69) 112/68     Weight: 71.3 kg (157 lb 3 oz)  Body mass index is 24.62 kg/m².      Intake/Output Summary (Last 24 hours) at 8/5/2023 1053  Last data filed at 8/5/2023 0539  Gross per 24 hour   Intake 578.13 ml   Output 2020 ml   Net -1441.87 ml          Physical Exam  Constitutional:       Appearance: Normal appearance. He is normal weight.   HENT:      Head: Normocephalic and atraumatic.      Nose: Nose normal. No congestion.      Mouth/Throat:      Mouth: Mucous membranes are moist.      Pharynx: Oropharynx is clear.   Eyes:      Extraocular Movements: Extraocular movements intact.      Conjunctiva/sclera: Conjunctivae normal.      Pupils: Pupils are equal, round, and reactive to light.   Cardiovascular:      Pulses: Normal pulses.      Heart sounds: Normal heart sounds. No murmur heard.  Pulmonary:       Effort: Pulmonary effort is normal. No respiratory distress.      Breath sounds: Rhonchi present. No wheezing.   Abdominal:      General: Abdomen is flat. Bowel sounds are normal. There is no distension.      Palpations: Abdomen is soft.      Tenderness: There is no abdominal tenderness.   Musculoskeletal:         General: Normal range of motion.      Cervical back: Normal range of motion and neck supple.      Right lower leg: No edema.      Left lower leg: No edema.   Skin:     General: Skin is warm and dry.      Capillary Refill: Capillary refill takes less than 2 seconds.   Neurological:      General: No focal deficit present.      Mental Status: He is alert and oriented to person, place, and time. Mental status is at baseline.      Cranial Nerves: No cranial nerve deficit.      Sensory: No sensory deficit.      Motor: No weakness.   Psychiatric:         Mood and Affect: Mood normal.         Behavior: Behavior normal.         Thought Content: Thought content normal.         Judgment: Judgment normal.         Vents:  Oxygen Concentration (%): 45 (08/05/23 0304)    Lines/Drains/Airways       Drain  Duration                  Y Chest Tube 1 and 2 08/02/23 0949 Left 3 days              Peripheral Intravenous Line  Duration                  Peripheral IV - Single Lumen 08/01/23 2000 20 G Anterior;Right Forearm 3 days                    Significant Labs:    CBC/Anemia Profile:  Recent Labs   Lab 08/04/23  0455 08/05/23  0413   WBC 16.88* 15.09*   HGB 12.4* 12.5*   HCT 37.0* 36.5*    430   MCV 94 94   RDW 12.6 12.8          Chemistries:  Recent Labs   Lab 08/04/23  0455 08/05/23  0412   * 135*   K 4.2 3.9    100   CO2 26 26   BUN 8 14   CREATININE 0.6 0.5   CALCIUM 8.8 8.9   ALBUMIN 1.9*  --    PROT 5.8*  --    BILITOT 0.2  --    ALKPHOS 91  --    ALT 45*  --    AST 41*  --          All pertinent labs within the past 24 hours have been reviewed.    Significant Imaging:  I have reviewed all pertinent  imaging results/findings within the past 24 hours.        Assessment/Plan:     Pulmonary  * Loculated pleural effusion  - initially presented with a week of back/flank pain with productive cough; denied any fevers or recent illnesses, weight loss. Imaging consistent with a pleural effusion, bedside ultrasound noting multiple pockets of loculated effusion and areas of trapped lung. Unsure if they are all connecting.  - 8/2: roman catheter placed with initially 400mL of fluid evacuated; studies sent and consistent with a complicated parapneumonic effusion  - CT chest with roman placed in one pocket, appears there are 2 other (superior lateral and posterior) pockets that do not communicate with the large effusion that the roman is in.  - Since placement: ~2700mL of fluid drained (1500mL initially, 1200mL since 1st dose of tpa/dornase)  - Discussed case with CTS at [facility], at this time recommending attempt of lytic therapy and re-evaluation.    Recommendations  - Instilled Dose #2 of tpa/dornase today  - Cultures remain NGTD, continue ceftriaxone & azithromycin, can stop azithromycin after 5 days.  - Continue to leave catheter to contious suction  - Will re-evaluate tomorrow with ultrasound again, if no obvious pocket of fluid can be found may plan for CT chest and further recommendations to follow.    CAP (community acquired pneumonia)  - See under loculated pleural effusion    Acute hypoxemic respiratory failure  - On 4LNC and comfortable, weaning down as tolerated  - Remainder under loculated pleural effusion  - Goal SpO2 88-92%      Thank you for allowing us to participate in the care of this patient, we will continue to follow. Please let us know if there are questions or concerns.    Stoney Coon MD  Pulmonology  [facility] S[facility] ([place])

## 2023-08-05 NOTE — ASSESSMENT & PLAN NOTE
Left sided chest wall pain    Community acquired pneumonia   Acute hypoxic resp failure   - presents to the ED after primary care visit recommended patient to ED for imaging for lumbar spine   - Patient reports left back pain that radiates to left anterior chest that started six days ago, associated with a productive cough and worsening SOB. Denies fever, chills but reports fatigue and loss of appetite. Patient reports left chest wall pain was exacerbated with coughing.   - Patient denies any sick close contacts nor any recent hospitalization.   - Stopped smoking 20 + years ago   - Afebrile, , RR 20, /67, Pox 91% on arrival and placed on 4L/min O2 via NC.   - Leukocytosis 20K with left shift.   - CT Abdomen pelvis with contrast with a loculated left pleural effusion with compressive atelectasis of the left lower lobe, confirmed with CXR.   - Patient was started for management for CAP in ED with Ceftriaxone 1g IV and azithromycin 500mg IV. Patient also given 1L NaCl 0.9% fluid bolus.   - On exam, patient was comfortable looking, using CPAP. Left chest wall tenderness is reproducible. Decreased air entry to left posterior lung base.  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 Will determine more from pleural studies.   - Continued treating as CAP for raised WBC.   - Exudative effusion   - Pt coughed during intake today says this has happened at home as well - could be that pt is aspirating and that is contributing to pneumonia and effusion  - Speech eval - Aspiration Precautions:   Decrease rate of intake, decrease bolus size especially with thin liquids  Small singular  cupsips as able (may use straw, must take small sips)  Upright seating during intake    Plan:  - Continue Ceftriaxone 2g IV daily and Azithromycin 250mg PO for total 5 days  - Acetaminophen 1g Q8 PRN, ibuprofen 800mg Q8 PRN for left chest wall pain  - Duonebs Q4 wake  - Wean O2 as needed to keep Pox >90%  - Pulm consult - Day 2 TPA/dornase today. Will monitor. CTS at [facility] aware of case.   - Cont oxycodone PRN for pain with lytics - discussed with daughter risks vs benefits of opioids.   - Trend CBC  - Guaifenesin 200mg Q4 PRN

## 2023-08-05 NOTE — TREATMENT PLAN
Pulmonary & Critical Care Medicine Lytic Therapy Plan    Lytic Therapy #2  - Instilled 30mL of tpa and 30mL of dornase with 10mL of saline flush at 1030 and clamped. Will plan to open at 1130 and monitor output  - Had some pain today during instillation; will give oxycodone now.  - Keep on suction, will re-evaluate tomorrow with ultrasound to see if further lytic therapy indicated  - If further lytic therapy indicated, will pre-medicate    Stoney Coon MD  [employer] Pul[facility] Critical [facility] Medicine Fellow

## 2023-08-05 NOTE — ASSESSMENT & PLAN NOTE
- On 4LNC and comfortable, weaning down as tolerated  - Remainder under loculated pleural effusion  - Goal SpO2 88-92%

## 2023-08-05 NOTE — PROGRESS NOTES
Pt received on 4.5LNC;SPO2 normal. Treatments were given and tolerated well. Will continue to monitor.

## 2023-08-05 NOTE — ASSESSMENT & PLAN NOTE
- initially presented with a week of back/flank pain with productive cough; denied any fevers or recent illnesses, weight loss. Imaging consistent with a pleural effusion, bedside ultrasound noting multiple pockets of loculated effusion and areas of trapped lung. Unsure if they are all connecting.  - 8/2: roman catheter placed with initially 400mL of fluid evacuated; studies sent and consistent with a complicated parapneumonic effusion  - CT chest with roman placed in one pocket, appears there are 2 other (superior lateral and posterior) pockets that do not communicate with the large effusion that the roman is in.  - Since placement: ~2700mL of fluid drained (1500mL initially, 1200mL since 1st dose of tpa/dornase)  - Discussed case with CTS at [facility], at this time recommending attempt of lytic therapy and re-evaluation.    Recommendations  - Instilled Dose #2 of tpa/dornase today  - Cultures remain NGTD, continue ceftriaxone & azithromycin, can stop azithromycin after 5 days.  - Continue to leave catheter to contious suction  - Will re-evaluate tomorrow with ultrasound again, if no obvious pocket of fluid can be found may plan for CT chest and further recommendations to follow.

## 2023-08-06 LAB
ANION GAP SERPL CALC-SCNC: 9 MMOL/L (ref 8–16)
BUN SERPL-MCNC: 14 MG/DL (ref 8–23)
CALCIUM SERPL-MCNC: 8.6 MG/DL (ref 8.7–10.5)
CHLORIDE SERPL-SCNC: 99 MMOL/L (ref 95–110)
CO2 SERPL-SCNC: 24 MMOL/L (ref 23–29)
CREAT SERPL-MCNC: 0.5 MG/DL (ref 0.5–1.4)
ERYTHROCYTE [DISTWIDTH] IN BLOOD BY AUTOMATED COUNT: 12.8 % (ref 11.5–14.5)
EST. GFR  (NO RACE VARIABLE): >60 ML/MIN/1.73 M^2
GLUCOSE SERPL-MCNC: 104 MG/DL (ref 70–110)
HCT VFR BLD AUTO: 38.3 % (ref 40–54)
HGB BLD-MCNC: 12.9 G/DL (ref 14–18)
MCH RBC QN AUTO: 31.5 PG (ref 27–31)
MCHC RBC AUTO-ENTMCNC: 33.7 G/DL (ref 32–36)
MCV RBC AUTO: 94 FL (ref 82–98)
PLATELET # BLD AUTO: 489 K/UL (ref 150–450)
PMV BLD AUTO: 8 FL (ref 9.2–12.9)
POTASSIUM SERPL-SCNC: 4 MMOL/L (ref 3.5–5.1)
RBC # BLD AUTO: 4.09 M/UL (ref 4.6–6.2)
SODIUM SERPL-SCNC: 132 MMOL/L (ref 136–145)
WBC # BLD AUTO: 19.2 K/UL (ref 3.9–12.7)

## 2023-08-06 PROCEDURE — 99233 SBSQ HOSP IP/OBS HIGH 50: CPT | Mod: HCNC,GC,, | Performed by: INTERNAL MEDICINE

## 2023-08-06 PROCEDURE — 80048 BASIC METABOLIC PNL TOTAL CA: CPT | Mod: HCNC | Performed by: STUDENT IN AN ORGANIZED HEALTH CARE EDUCATION/TRAINING PROGRAM

## 2023-08-06 PROCEDURE — 99233 PR SUBSEQUENT HOSPITAL CARE,LEVL III: ICD-10-PCS | Mod: HCNC,GC,, | Performed by: INTERNAL MEDICINE

## 2023-08-06 PROCEDURE — 99900035 HC TECH TIME PER 15 MIN (STAT): Mod: HCNC

## 2023-08-06 PROCEDURE — 94761 N-INVAS EAR/PLS OXIMETRY MLT: CPT | Mod: HCNC

## 2023-08-06 PROCEDURE — 25000003 PHARM REV CODE 250: Mod: HCNC | Performed by: STUDENT IN AN ORGANIZED HEALTH CARE EDUCATION/TRAINING PROGRAM

## 2023-08-06 PROCEDURE — 63600175 PHARM REV CODE 636 W HCPCS: Mod: HCNC | Performed by: STUDENT IN AN ORGANIZED HEALTH CARE EDUCATION/TRAINING PROGRAM

## 2023-08-06 PROCEDURE — 27000221 HC OXYGEN, UP TO 24 HOURS: Mod: HCNC

## 2023-08-06 PROCEDURE — 99233 SBSQ HOSP IP/OBS HIGH 50: CPT | Mod: ,,, | Performed by: STUDENT IN AN ORGANIZED HEALTH CARE EDUCATION/TRAINING PROGRAM

## 2023-08-06 PROCEDURE — 25000003 PHARM REV CODE 250: Mod: HCNC

## 2023-08-06 PROCEDURE — 25000242 PHARM REV CODE 250 ALT 637 W/ HCPCS: Mod: HCNC

## 2023-08-06 PROCEDURE — 11000001 HC ACUTE MED/SURG PRIVATE ROOM: Mod: HCNC

## 2023-08-06 PROCEDURE — 94660 CPAP INITIATION&MGMT: CPT | Mod: HCNC

## 2023-08-06 PROCEDURE — 94640 AIRWAY INHALATION TREATMENT: CPT | Mod: HCNC

## 2023-08-06 PROCEDURE — 36415 COLL VENOUS BLD VENIPUNCTURE: CPT | Mod: HCNC | Performed by: STUDENT IN AN ORGANIZED HEALTH CARE EDUCATION/TRAINING PROGRAM

## 2023-08-06 PROCEDURE — 99233 PR SUBSEQUENT HOSPITAL CARE,LEVL III: ICD-10-PCS | Mod: ,,, | Performed by: STUDENT IN AN ORGANIZED HEALTH CARE EDUCATION/TRAINING PROGRAM

## 2023-08-06 PROCEDURE — 85027 COMPLETE CBC AUTOMATED: CPT | Mod: HCNC | Performed by: STUDENT IN AN ORGANIZED HEALTH CARE EDUCATION/TRAINING PROGRAM

## 2023-08-06 PROCEDURE — 63600175 PHARM REV CODE 636 W HCPCS: Mod: HCNC

## 2023-08-06 RX ADMIN — DORZOLAMIDE HYDROCHLORIDE AND TIMOLOL MALEATE 1 DROP: 22.3; 6.8 SOLUTION/ DROPS OPHTHALMIC at 08:08

## 2023-08-06 RX ADMIN — SENNOSIDES AND DOCUSATE SODIUM 2 TABLET: 50; 8.6 TABLET ORAL at 10:08

## 2023-08-06 RX ADMIN — IPRATROPIUM BROMIDE AND ALBUTEROL SULFATE 3 ML: 2.5; .5 SOLUTION RESPIRATORY (INHALATION) at 03:08

## 2023-08-06 RX ADMIN — BRIMONIDINE TARTRATE 1 DROP: 1.5 SOLUTION OPHTHALMIC at 10:08

## 2023-08-06 RX ADMIN — LACTULOSE 10 G: 20 SOLUTION ORAL at 02:08

## 2023-08-06 RX ADMIN — BIMATOPROST 1 DROP: 0.1 SOLUTION/ DROPS OPHTHALMIC at 08:08

## 2023-08-06 RX ADMIN — TIMOLOL MALEATE 1 DROP: 2.5 SOLUTION/ DROPS OPHTHALMIC at 10:08

## 2023-08-06 RX ADMIN — BRIMONIDINE TARTRATE 1 DROP: 1.5 SOLUTION OPHTHALMIC at 02:08

## 2023-08-06 RX ADMIN — IPRATROPIUM BROMIDE AND ALBUTEROL SULFATE 3 ML: 2.5; .5 SOLUTION RESPIRATORY (INHALATION) at 11:08

## 2023-08-06 RX ADMIN — TIMOLOL MALEATE 1 DROP: 2.5 SOLUTION/ DROPS OPHTHALMIC at 08:08

## 2023-08-06 RX ADMIN — AMPICILLIN SODIUM AND SULBACTAM SODIUM 3 G: 2; 1 INJECTION, POWDER, FOR SOLUTION INTRAMUSCULAR; INTRAVENOUS at 10:08

## 2023-08-06 RX ADMIN — LACTULOSE 10 G: 20 SOLUTION ORAL at 10:08

## 2023-08-06 RX ADMIN — AMPICILLIN SODIUM AND SULBACTAM SODIUM 3 G: 2; 1 INJECTION, POWDER, FOR SOLUTION INTRAMUSCULAR; INTRAVENOUS at 04:08

## 2023-08-06 RX ADMIN — IPRATROPIUM BROMIDE AND ALBUTEROL SULFATE 3 ML: 2.5; .5 SOLUTION RESPIRATORY (INHALATION) at 07:08

## 2023-08-06 RX ADMIN — LIDOCAINE 1 PATCH: 50 PATCH CUTANEOUS at 04:08

## 2023-08-06 RX ADMIN — IBUPROFEN 800 MG: 400 TABLET ORAL at 08:08

## 2023-08-06 RX ADMIN — ENOXAPARIN SODIUM 40 MG: 40 INJECTION SUBCUTANEOUS at 04:08

## 2023-08-06 RX ADMIN — DORZOLAMIDE HYDROCHLORIDE AND TIMOLOL MALEATE 1 DROP: 22.3; 6.8 SOLUTION/ DROPS OPHTHALMIC at 10:08

## 2023-08-06 RX ADMIN — BRIMONIDINE TARTRATE 1 DROP: 1.5 SOLUTION OPHTHALMIC at 08:08

## 2023-08-06 NOTE — SUBJECTIVE & OBJECTIVE
Interval History: Catheter became detached last night while pt was maneuvering to bedside commode. Still requiring O2 - pulm team will reassess and decide on further management.     Review of Systems   Constitutional:  Negative for fatigue and fever.   Respiratory:  Positive for cough.    Cardiovascular:  Negative for chest pain (left chest / mid axillary area).   Gastrointestinal:  Negative for abdominal distention and diarrhea.   Genitourinary:  Negative for dysuria.   Psychiatric/Behavioral:  Negative for agitation and confusion.      Objective:     Vital Signs (Most Recent):  Temp: 98.7 °F (37.1 °C) (08/06/23 0725)  Pulse: 88 (08/06/23 0733)  Resp: 18 (08/06/23 0733)  BP: 132/64 (08/06/23 0725)  SpO2: 95 % (08/06/23 0740) Vital Signs (24h Range):  Temp:  [98.4 °F (36.9 °C)-98.8 °F (37.1 °C)] 98.7 °F (37.1 °C)  Pulse:  [58-93] 88  Resp:  [15-20] 18  SpO2:  [93 %-96 %] 95 %  BP: (101-132)/(64-77) 132/64     Weight: 71.3 kg (157 lb 3 oz)  Body mass index is 24.62 kg/m².    Intake/Output Summary (Last 24 hours) at 8/6/2023 0923  Last data filed at 8/6/2023 0314  Gross per 24 hour   Intake 220 ml   Output 1390 ml   Net -1170 ml           Physical Exam  Constitutional:       General: He is not in acute distress.  Eyes:      Extraocular Movements: Extraocular movements intact.   Pulmonary:      Effort: No respiratory distress.      Breath sounds: No wheezing.      Comments: absent breath sounds on left lower lung  Chest:      Chest wall: Tenderness (left chest wall tenderness on palpation) present.   Abdominal:      General: There is no distension.      Tenderness: There is no abdominal tenderness.   Musculoskeletal:         General: No swelling.   Skin:     Capillary Refill: Capillary refill takes less than 2 seconds.   Neurological:      General: No focal deficit present.      Mental Status: He is oriented to person, place, and time.      Comments: Wears shoes in beds as this helps with his peripheral neuropathy pain               Significant Labs: All pertinent labs within the past 24 hours have been reviewed.    Significant Imaging: I have reviewed all pertinent imaging results/findings within the past 24 hours.

## 2023-08-06 NOTE — CARE UPDATE
Was informed by Charge nurse Da that left chest tube was inadvertently removed. Petroleum gauze dressing applied. Patient with stable vitals. Comfortable, no respiratory distress, no worsening respiratory function, speaking in full sentences. Secure chat to pulmonology on call, Dr. Coon. To get a CXR.

## 2023-08-06 NOTE — NURSING
Chest tube removed while patient was on the bedside commode, tech witnessed.Petroleum gauze, 4x4, and tegaderm placed for coverage, using aseptic technique. The patient tolerated well. Jeremie Young RN notified.

## 2023-08-06 NOTE — PLAN OF CARE
Patient is AAOX4, VS stable. Had a bowel movement, during the course of moving to and from the bedside commode his chest tube pulled out, an occlusive dressing has been placed on the site, pulmonologist will follow up with the chest xray result for the next line of action. Purposeful rounding was done, safety precautions maintained. Will continue to monitor.  Problem: Adult Inpatient Plan of Care  Goal: Plan of Care Review  Outcome: Ongoing, Progressing  Goal: Patient-Specific Goal (Individualized)  Outcome: Ongoing, Progressing  Goal: Absence of Hospital-Acquired Illness or Injury  Outcome: Ongoing, Progressing  Goal: Optimal Comfort and Wellbeing  Outcome: Ongoing, Progressing  Goal: Readiness for Transition of Care  Outcome: Ongoing, Progressing     Problem: Skin Injury Risk Increased  Goal: Skin Health and Integrity  Outcome: Ongoing, Progressing     Problem: Fluid Imbalance (Pneumonia)  Goal: Fluid Balance  Outcome: Ongoing, Progressing     Problem: Infection (Pneumonia)  Goal: Resolution of Infection Signs and Symptoms  Outcome: Ongoing, Progressing     Problem: Respiratory Compromise (Pneumonia)  Goal: Effective Oxygenation and Ventilation  Outcome: Ongoing, Progressing

## 2023-08-06 NOTE — PROGRESS NOTES
[facility] Medicine  Progress Note    Patient Name: Cricket Mcdonnell  MRN: [id number]  Patient Class: IP- Inpatient   Admission Date: 8/1/2023  Length of Stay: 5 days  Attending Physician: Darin Mcguire MD  Primary Care Provider: Neo Sneed MD        Subjective:     Principal Problem:Loculated pleural effusion        HPI:  Cricket Mcdonnell is a 82 year old gentleman with a PMHx of sleep apnea, anxiety disorder and right angle glaucoma of right eye who presents to the ED after primary care visit recommended patient to ED for imaging. Patient with left back pain that radiates to left anterior chest that started six days ago. Patient reports that he was having a productive cough then, but unable to see if it was purulent. Patient denies fever, chills but reports fatigue and loss of appetite. Patient also reports worsening shortness of breath which prompted primary care visit today. Patient reports left chest wall pain was exacerbated with coughing. Patient denies any sick close contacts nor any recent hospitalization. Patient denies nausea, vomiting, diarrhea, pedal edema. Patient also denies any new onset numbness or weakness in bilateral lower extremities.     Afebrile, , RR 20, /67, Pox 91% on arrival and placed on 4L/min O2 via NC. Leukocytosis 20K with left shift. Na 133. UA grossly non-infectious, +1 ketonuria. Blood cx were sent. CT Lumbar spine without contrast with findings of Increase in lumbar levoscoliosis. No definite acute lumbar fracture. Moderate to large left pleural effusion, which is incompletely imaged. CT Abdomen pelvis with contrast with a loculated left pleural effusion with compressive atelectasis of the left lower lobe, confirmed with CXR. Patient was started for management for CAP in ED with Ceftriaxone 1g IV and azithromycin 500mg IV. Patient also given 1L NaCl 0.9% fluid bolus. On exam, patient was comfortable looking, using CPAP. Left  chest wall tenderness is reproducible. Decreased air entry to left posterior lung base.    Patient is admitted to Hospital Medicine for management of worsening shortness of breath with hypoxia and a left lateral wall chest pain likely due to left pleural effusion, currently treated as CAP. Consult placed to pulmonology for advise on need for thoracentesis.       Overview/Hospital Course:  Pt stable on 5L NC. Has stable non productive cough. On auscultation no breath sounds on left lower lung. Seen by pulmonary team and on US noted multiple small pockets of loculated effusion with areas of trapped lung. Pleural catheter placed with 400ml of fluid drained immediately. Pleural studies sent. Will continue abx at this time. Wean oxygen as tolerated.     Pleural studies suggestive of exudative effusion. Will continue abx and Pulm team ordered CT chest to determine next steps. Pt continue to be on 5L NC. Nurse noticed that pt coughed while swallowing water which pt reports has been going on at home intermittently. As such, aspiration could be the source of his pneumonia/effusion. Speech was consulted ; Aspiration Precautions:   Decrease rate of intake, decrease bolus size especially with thin liquids  Small singular cupsips as able (may use straw, must take small sips)  Upright seating during intake    Met daughter Xiao orantes who flew in from [facility]. She reports that pt often forget details as he a very poetic and abstract mind. Daughter also confirms that pt often eats very rapidly and eating slowly might help if he any aspiration is taking place.    Pulm team instilled tpa/dornase into catheter.  Will continue to monitor with ultra sound. Pulm team also touched base with CTS team at [facility] it it came to situation that pt would need vats. If at all, this would take place next week. For now, watching how pt does with lytics.     Pt was experiencing constipation (exacerbated by lying in beds with high oxygen needs  and opioids) - he had no luck with oral regimen or suppository and enema was given. While pt was maneuvering to bedside commode the pleural catheter became detached (despite being stitched on). Pulm team to reassess and decide on further management.        Interval History: Catheter became detached last night while pt was maneuvering to bedside commode. Still requiring O2 - pulm team will reassess and decide on further management.     Review of Systems   Constitutional:  Negative for fatigue and fever.   Respiratory:  Positive for cough.    Cardiovascular:  Negative for chest pain (left chest / mid axillary area).   Gastrointestinal:  Negative for abdominal distention and diarrhea.   Genitourinary:  Negative for dysuria.   Psychiatric/Behavioral:  Negative for agitation and confusion.      Objective:     Vital Signs (Most Recent):  Temp: 98.7 °F (37.1 °C) (08/06/23 0725)  Pulse: 88 (08/06/23 0733)  Resp: 18 (08/06/23 0733)  BP: 132/64 (08/06/23 0725)  SpO2: 95 % (08/06/23 0740) Vital Signs (24h Range):  Temp:  [98.4 °F (36.9 °C)-98.8 °F (37.1 °C)] 98.7 °F (37.1 °C)  Pulse:  [58-93] 88  Resp:  [15-20] 18  SpO2:  [93 %-96 %] 95 %  BP: (101-132)/(64-77) 132/64     Weight: 71.3 kg (157 lb 3 oz)  Body mass index is 24.62 kg/m².    Intake/Output Summary (Last 24 hours) at 8/6/2023 0923  Last data filed at 8/6/2023 0314  Gross per 24 hour   Intake 220 ml   Output 1390 ml   Net -1170 ml           Physical Exam  Constitutional:       General: He is not in acute distress.  Eyes:      Extraocular Movements: Extraocular movements intact.   Pulmonary:      Effort: No respiratory distress.      Breath sounds: No wheezing.      Comments: absent breath sounds on left lower lung  Chest:      Chest wall: Tenderness (left chest wall tenderness on palpation) present.   Abdominal:      General: There is no distension.      Tenderness: There is no abdominal tenderness.   Musculoskeletal:         General: No swelling.   Skin:      Capillary Refill: Capillary refill takes less than 2 seconds.   Neurological:      General: No focal deficit present.      Mental Status: He is oriented to person, place, and time.      Comments: Wears shoes in beds as this helps with his peripheral neuropathy pain              Significant Labs: All pertinent labs within the past 24 hours have been reviewed.    Significant Imaging: I have reviewed all pertinent imaging results/findings within the past 24 hours.      Assessment/Plan:      * Loculated pleural effusion   Left sided chest wall pain    Community acquired pneumonia   Acute hypoxic resp failure   - presents to the ED after primary care visit recommended patient to ED for imaging for lumbar spine   - Patient reports left back pain that radiates to left anterior chest that started six days ago, associated with a productive cough and worsening SOB. Denies fever, chills but reports fatigue and loss of appetite. Patient reports left chest wall pain was exacerbated with coughing.   - Patient denies any sick close contacts nor any recent hospitalization.   - Stopped smoking 20 + years ago   - Afebrile, , RR 20, /67, Pox 91% on arrival and placed on 4L/min O2 via NC.   - Leukocytosis 20K with left shift.   - CT Abdomen pelvis with contrast with a loculated left pleural effusion with compressive atelectasis of the left lower lobe, confirmed with CXR.   - On exam, patient was comfortable looking, using CPAP. Left chest wall tenderness is reproducible. Decreased air entry to left posterior lung base.  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, not on drugs associated with effusion (amio, hydralazine), no signs of malignancy (no weight loss, hemoptysis, night sweats - but does has hx of smoking for 20 odd years but stopped 20 years ago),   - Pt coughed during intake today  "says this has happened at home as well - could be that pt is aspirating and that is contributing to pneumonia and effusion  - Speech eval - Aspiration Precautions:   Decrease rate of intake, decrease bolus size especially with thin liquids  Small singular cupsips as able (may use straw, must take small sips)  Upright seating during intake  8/4 - 1st lytic dose  8/5 - 2nd lytic dose, at night catheter detached from pt while he was maneuvering to bed side commode   8/6 - completed 5 days of Azithromycin and ceftriaxone and started on Unasyn per Pulm team       Plan:  - Duonebs Q4 wake  - Wean O2 as needed to keep Pox >90%  - Pulm consult - Will reassess pt today and decide on further plan - switch to Unasyn   - Cont oxycodone PRN for pain with lytics - discussed with daughter risks vs benefits of opioids.   - Cont tylenol, ibuprofen and lidocaine patches   - Trend CBC  - Guaifenesin 200mg Q4 PRN      Acute hypoxemic respiratory failure  See above     Left-sided chest wall pain        CAP (community acquired pneumonia)        Dysphagia        Constipation  Had BM 8/5 after suppository and enema     - Cont senna docusate and lactulose given pt still in bed and receiving opioids. Monitor for diarrhea            Primary open angle glaucoma of right eye, moderate stage  Patient reports using multiple eye drops but unable to recall names of them other than "timolol TID, bimonidine TID and something at night".     - Eye drops as ordered to best of ability based on home list.       KARMA (obstructive sleep apnea)  - CPAP qhs        VTE Risk Mitigation (From admission, onward)         Ordered     enoxaparin injection 40 mg  Daily         08/01/23 2024     IP VTE HIGH RISK PATIENT  Once         08/01/23 2024     Place sequential compression device  Until discontinued         08/01/23 2024                Discharge Planning   YASH:      Code Status: Full Code   Is the patient medically ready for discharge?:     Reason for patient " still in hospital (select all that apply): Treatment - 5L NC for loculated pleural effusion   Discharge Plan A: Home            Darin Rasheed MD  Department of Hospital Medicine   [facility] - Med Surg ([place])

## 2023-08-06 NOTE — ASSESSMENT & PLAN NOTE
Had BM 8/5 after suppository and enema     - Cont senna docusate and lactulose given pt still in bed and receiving opioids. Monitor for diarrhea

## 2023-08-06 NOTE — ASSESSMENT & PLAN NOTE
- On 4LNC and comfortable, weaning down as tolerated  - Goal SpO2 88-92%  - PT/OT and out of bed today to assist with his atelectasis  - Remainder under loculated pleural effusion

## 2023-08-06 NOTE — ASSESSMENT & PLAN NOTE
Left sided chest wall pain    Community acquired pneumonia   Acute hypoxic resp failure   - presents to the ED after primary care visit recommended patient to ED for imaging for lumbar spine   - Patient reports left back pain that radiates to left anterior chest that started six days ago, associated with a productive cough and worsening SOB. Denies fever, chills but reports fatigue and loss of appetite. Patient reports left chest wall pain was exacerbated with coughing.   - Patient denies any sick close contacts nor any recent hospitalization.   - Stopped smoking 20 + years ago   - Afebrile, , RR 20, /67, Pox 91% on arrival and placed on 4L/min O2 via NC.   - Leukocytosis 20K with left shift.   - CT Abdomen pelvis with contrast with a loculated left pleural effusion with compressive atelectasis of the left lower lobe, confirmed with CXR.   - On exam, patient was comfortable looking, using CPAP. Left chest wall tenderness is reproducible. Decreased air entry to left posterior lung base.  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, not on drugs associated with effusion (amio, hydralazine), no signs of malignancy (no weight loss, hemoptysis, night sweats - but does has hx of smoking for 20 odd years but stopped 20 years ago),   - Pt coughed during intake today says this has happened at home as well - could be that pt is aspirating and that is contributing to pneumonia and effusion  - Speech eval - Aspiration Precautions:   Decrease rate of intake, decrease bolus size especially with thin liquids  Small singular cupsips as able (may use straw, must take small sips)  Upright seating during intake  8/4 - 1st lytic dose  8/5 - 2nd lytic dose, at night catheter detached from pt while he was maneuvering to bed side commode   8/6 - completed 5 days of Azithromycin and ceftriaxone  and started on Unasyn per Pulm team       Plan:  - Duonebs Q4 wake  - Wean O2 as needed to keep Pox >90%  - Pulm consult - Will reassess pt today and decide on further plan - switch to Unasyn   - Cont oxycodone PRN for pain with lytics - discussed with daughter risks vs benefits of opioids.   - Cont tylenol, ibuprofen and lidocaine patches   - Trend CBC  - Guaifenesin 200mg Q4 PRN

## 2023-08-06 NOTE — SUBJECTIVE & OBJECTIVE
Interval History: received 2nd dose of lytics and had some pain post instillation. Improved with dilaudid and oxycodone. Had drained roughly 300mL additionally immediately afterwards and another 100-200mL after the next 8 hours or so. Unfortunately, roman catheter became dislodged and removed when he used the commode. Remained stable. Plan for CT today to evaluate and they are aware    Objective:     Vital Signs (Most Recent):  Temp: 98.7 °F (37.1 °C) (08/06/23 0725)  Pulse: 88 (08/06/23 0733)  Resp: 18 (08/06/23 0733)  BP: 132/64 (08/06/23 0725)  SpO2: 95 % (08/06/23 0740) Vital Signs (24h Range):  Temp:  [98.4 °F (36.9 °C)-98.8 °F (37.1 °C)] 98.7 °F (37.1 °C)  Pulse:  [58-93] 88  Resp:  [15-20] 18  SpO2:  [93 %-96 %] 95 %  BP: (101-132)/(64-77) 132/64     Weight: 71.3 kg (157 lb 3 oz)  Body mass index is 24.62 kg/m².      Intake/Output Summary (Last 24 hours) at 8/6/2023 1035  Last data filed at 8/6/2023 0314  Gross per 24 hour   Intake 220 ml   Output 1390 ml   Net -1170 ml          Physical Exam  Constitutional:       Appearance: Normal appearance. He is normal weight.   HENT:      Head: Normocephalic and atraumatic.      Nose: Nose normal. No congestion.      Mouth/Throat:      Mouth: Mucous membranes are moist.      Pharynx: Oropharynx is clear.   Eyes:      Extraocular Movements: Extraocular movements intact.      Conjunctiva/sclera: Conjunctivae normal.      Pupils: Pupils are equal, round, and reactive to light.   Cardiovascular:      Pulses: Normal pulses.      Heart sounds: Normal heart sounds. No murmur heard.  Pulmonary:      Effort: Pulmonary effort is normal. No respiratory distress.      Breath sounds: Rhonchi present. No wheezing.   Abdominal:      General: Abdomen is flat. Bowel sounds are normal. There is no distension.      Palpations: Abdomen is soft.      Tenderness: There is no abdominal tenderness.   Musculoskeletal:         General: Normal range of motion.      Cervical back: Normal range of  motion and neck supple.      Right lower leg: No edema.      Left lower leg: No edema.   Skin:     General: Skin is warm and dry.      Capillary Refill: Capillary refill takes less than 2 seconds.   Neurological:      General: No focal deficit present.      Mental Status: He is alert and oriented to person, place, and time. Mental status is at baseline.      Cranial Nerves: No cranial nerve deficit.      Sensory: No sensory deficit.      Motor: No weakness.   Psychiatric:         Mood and Affect: Mood normal.         Behavior: Behavior normal.         Thought Content: Thought content normal.         Judgment: Judgment normal.         Vents:  Oxygen Concentration (%): 40 (08/05/23 9988)    Lines/Drains/Airways       Peripheral Intravenous Line  Duration                  Peripheral IV - Single Lumen 08/01/23 2000 20 G Anterior;Right Forearm 4 days                    Significant Labs:    CBC/Anemia Profile:  Recent Labs   Lab 08/05/23 0413 08/06/23  0442   WBC 15.09* 19.20*   HGB 12.5* 12.9*   HCT 36.5* 38.3*    489*   MCV 94 94   RDW 12.8 12.8          Chemistries:  Recent Labs   Lab 08/05/23 0412 08/06/23  0442   * 132*   K 3.9 4.0    99   CO2 26 24   BUN 14 14   CREATININE 0.5 0.5   CALCIUM 8.9 8.6*         All pertinent labs within the past 24 hours have been reviewed.    Significant Imaging:  I have reviewed all pertinent imaging results/findings within the past 24 hours.

## 2023-08-06 NOTE — PROGRESS NOTES
[facility])  Pulmonology  Progress Note    Patient Name: Cricket Mcdonnell  MRN: [id number]  Admission Date: 8/1/2023  Hospital Length of Stay: 5 days  Code Status: Full Code  Attending Provider: Darin Mcguire MD  Primary Care Provider: Neo Sneed MD   Principal Problem: Loculated pleural effusion    Subjective:     Interval History: received 2nd dose of lytics and had some pain post instillation. Improved with dilaudid and oxycodone. Had drained roughly 300mL additionally immediately afterwards and another 100-200mL after the next 8 hours or so. Unfortunately, roman catheter became dislodged and removed when he used the commode. Remained stable. Plan for CT today to evaluate and they are aware    Objective:     Vital Signs (Most Recent):  Temp: 98.7 °F (37.1 °C) (08/06/23 0725)  Pulse: 88 (08/06/23 0733)  Resp: 18 (08/06/23 0733)  BP: 132/64 (08/06/23 0725)  SpO2: 95 % (08/06/23 0740) Vital Signs (24h Range):  Temp:  [98.4 °F (36.9 °C)-98.8 °F (37.1 °C)] 98.7 °F (37.1 °C)  Pulse:  [58-93] 88  Resp:  [15-20] 18  SpO2:  [93 %-96 %] 95 %  BP: (101-132)/(64-77) 132/64     Weight: 71.3 kg (157 lb 3 oz)  Body mass index is 24.62 kg/m².      Intake/Output Summary (Last 24 hours) at 8/6/2023 1035  Last data filed at 8/6/2023 0314  Gross per 24 hour   Intake 220 ml   Output 1390 ml   Net -1170 ml          Physical Exam  Constitutional:       Appearance: Normal appearance. He is normal weight.   HENT:      Head: Normocephalic and atraumatic.      Nose: Nose normal. No congestion.      Mouth/Throat:      Mouth: Mucous membranes are moist.      Pharynx: Oropharynx is clear.   Eyes:      Extraocular Movements: Extraocular movements intact.      Conjunctiva/sclera: Conjunctivae normal.      Pupils: Pupils are equal, round, and reactive to light.   Cardiovascular:      Pulses: Normal pulses.      Heart sounds: Normal heart sounds. No murmur heard.  Pulmonary:      Effort: Pulmonary effort is normal.  No respiratory distress.      Breath sounds: Rhonchi present. No wheezing.   Abdominal:      General: Abdomen is flat. Bowel sounds are normal. There is no distension.      Palpations: Abdomen is soft.      Tenderness: There is no abdominal tenderness.   Musculoskeletal:         General: Normal range of motion.      Cervical back: Normal range of motion and neck supple.      Right lower leg: No edema.      Left lower leg: No edema.   Skin:     General: Skin is warm and dry.      Capillary Refill: Capillary refill takes less than 2 seconds.   Neurological:      General: No focal deficit present.      Mental Status: He is alert and oriented to person, place, and time. Mental status is at baseline.      Cranial Nerves: No cranial nerve deficit.      Sensory: No sensory deficit.      Motor: No weakness.   Psychiatric:         Mood and Affect: Mood normal.         Behavior: Behavior normal.         Thought Content: Thought content normal.         Judgment: Judgment normal.         Vents:  Oxygen Concentration (%): 40 (08/05/23 4718)    Lines/Drains/Airways       Peripheral Intravenous Line  Duration                  Peripheral IV - Single Lumen 08/01/23 2000 20 G Anterior;Right Forearm 4 days                    Significant Labs:    CBC/Anemia Profile:  Recent Labs   Lab 08/05/23  0413 08/06/23  0442   WBC 15.09* 19.20*   HGB 12.5* 12.9*   HCT 36.5* 38.3*    489*   MCV 94 94   RDW 12.8 12.8          Chemistries:  Recent Labs   Lab 08/05/23  0412 08/06/23  0442   * 132*   K 3.9 4.0    99   CO2 26 24   BUN 14 14   CREATININE 0.5 0.5   CALCIUM 8.9 8.6*         All pertinent labs within the past 24 hours have been reviewed.    Significant Imaging:  I have reviewed all pertinent imaging results/findings within the past 24 hours.      Assessment/Plan:     Pulmonary  * Loculated pleural effusion  - initially presented with a week of back/flank pain with productive cough; denied any fevers or recent illnesses,  weight loss. Imaging consistent with a pleural effusion, bedside ultrasound noting multiple pockets of loculated effusion and areas of trapped lung. Unsure if they are all connecting.  - 8/2: roman catheter placed with initially 400mL of fluid evacuated; studies sent and consistent with a complicated parapneumonic effusion  - CT chest with roman placed in one pocket, appears there are 2 other (superior lateral and posterior) pockets that do not communicate with the large effusion that the roman is in.  - Since placement: ~3200mL of fluid drained (1500mL initially, 1200mL since 1st dose of tpa/dornase, 500mL since 2nd dose of tpa/dornase)  - 8/5: evening, roman catheter became dislodged and removed. Post CXR showing residual hydropneumothorax    Recommendations  - CT chest today to evaluate. Depending on the results, will dictate our next step.   - If there's still a pocket of fluid that needs drainage, will ask IR for assistance in seeing if they can place a CT-guided roman catheter as it does not appear to have a safe location to place at bedside under ultrasound guidance.   - If there's near resolution of the fluid, we can anticipate for just a prolonged antibiotic course.  - Cultures remain NGTD, can change antibiotics to unasyn monotherapy today. At time of discharge, anticipate change to augmentin. Duration dependent on CT findings (if lung abscess noted, will plan for 3 weeks. If not, we can plan for 2 weeks of total antibiotics); however will leave our recommendations after CT    CAP (community acquired pneumonia)  - See under loculated pleural effusion    Acute hypoxemic respiratory failure  - On 4LNC and comfortable, weaning down as tolerated  - Goal SpO2 88-92%  - PT/OT and out of bed today to assist with his atelectasis  - Remainder under loculated pleural effusion      Thank you for allowing us to participate in the care of this patient, we will continue to follow. Please let us know if there are  questions or concerns.    Stoney Coon MD  Pulmonology  [facility] ([place])

## 2023-08-06 NOTE — PLAN OF CARE
Pulmonary & Critical Care Medicine Plan of Care Note    Notified by Hospitalist NP and charge nurse regarding inadvertent removal of patient's pleural catheter. CXR was obtained. Appears to still have residual fluid and potentially a lung entrapment vs hydropneumothorax? However his effusion does look significantly improved compared to previous.    - At this time, no planned intervention. Will plan to ultrasound him tomorrow to evaluate if there's any additional fluid that needs to be drained  - Please notify us if patient were to become more symptomatic. Any signs or symptoms such as:  - New chest pain, shortness of breath/dyspnea  - hemodynamic instability (hypotensive, indy/tachycardic, tachypneic, hypoxemic - worsening)  - Would get stat chest XR at that time as well.    Stoney Coon MD  Pulmonary & Critical Care Medicine Fellow  2102, 8/5/2023

## 2023-08-06 NOTE — ASSESSMENT & PLAN NOTE
- initially presented with a week of back/flank pain with productive cough; denied any fevers or recent illnesses, weight loss. Imaging consistent with a pleural effusion, bedside ultrasound noting multiple pockets of loculated effusion and areas of trapped lung. Unsure if they are all connecting.  - 8/2: roman catheter placed with initially 400mL of fluid evacuated; studies sent and consistent with a complicated parapneumonic effusion  - CT chest with roman placed in one pocket, appears there are 2 other (superior lateral and posterior) pockets that do not communicate with the large effusion that the roman is in.  - Since placement: ~3200mL of fluid drained (1500mL initially, 1200mL since 1st dose of tpa/dornase, 500mL since 2nd dose of tpa/dornase)  - 8/5: evening, roman catheter became dislodged and removed. Post CXR showing residual hydropneumothorax    Recommendations  - CT chest today to evaluate. Depending on the results, will dictate our next step.   - If there's still a pocket of fluid that needs drainage, will ask IR for assistance in seeing if they can place a CT-guided roman catheter as it does not appear to have a safe location to place at bedside under ultrasound guidance.   - If there's near resolution of the fluid, we can anticipate for just a prolonged antibiotic course.  - Cultures remain NGTD, can change antibiotics to unasyn monotherapy today. At time of discharge, anticipate change to augmentin. Duration dependent on CT findings (if lung abscess noted, will plan for 3 weeks. If not, we can plan for 2 weeks of total antibiotics); however will leave our recommendations after CT

## 2023-08-07 LAB
ANION GAP SERPL CALC-SCNC: 8 MMOL/L (ref 8–16)
BACTERIA BLD CULT: NORMAL
BACTERIA BLD CULT: NORMAL
BACTERIA FLD CULT: NORMAL
BUN SERPL-MCNC: 12 MG/DL (ref 8–23)
CALCIUM SERPL-MCNC: 8.4 MG/DL (ref 8.7–10.5)
CHLORIDE SERPL-SCNC: 99 MMOL/L (ref 95–110)
CO2 SERPL-SCNC: 28 MMOL/L (ref 23–29)
CREAT SERPL-MCNC: 0.6 MG/DL (ref 0.5–1.4)
ERYTHROCYTE [DISTWIDTH] IN BLOOD BY AUTOMATED COUNT: 12.7 % (ref 11.5–14.5)
EST. GFR  (NO RACE VARIABLE): >60 ML/MIN/1.73 M^2
GLUCOSE SERPL-MCNC: 92 MG/DL (ref 70–110)
HCT VFR BLD AUTO: 36.1 % (ref 40–54)
HGB BLD-MCNC: 12 G/DL (ref 14–18)
MCH RBC QN AUTO: 31.7 PG (ref 27–31)
MCHC RBC AUTO-ENTMCNC: 33.2 G/DL (ref 32–36)
MCV RBC AUTO: 96 FL (ref 82–98)
PLATELET # BLD AUTO: 411 K/UL (ref 150–450)
PMV BLD AUTO: 7.9 FL (ref 9.2–12.9)
POTASSIUM SERPL-SCNC: 3.5 MMOL/L (ref 3.5–5.1)
RBC # BLD AUTO: 3.78 M/UL (ref 4.6–6.2)
SODIUM SERPL-SCNC: 135 MMOL/L (ref 136–145)
WBC # BLD AUTO: 14.01 K/UL (ref 3.9–12.7)

## 2023-08-07 PROCEDURE — 97165 OT EVAL LOW COMPLEX 30 MIN: CPT | Mod: HCNC

## 2023-08-07 PROCEDURE — 99232 PR SUBSEQUENT HOSPITAL CARE,LEVL II: ICD-10-PCS | Mod: GC,,, | Performed by: STUDENT IN AN ORGANIZED HEALTH CARE EDUCATION/TRAINING PROGRAM

## 2023-08-07 PROCEDURE — 27000221 HC OXYGEN, UP TO 24 HOURS: Mod: HCNC

## 2023-08-07 PROCEDURE — 80048 BASIC METABOLIC PNL TOTAL CA: CPT | Mod: HCNC | Performed by: STUDENT IN AN ORGANIZED HEALTH CARE EDUCATION/TRAINING PROGRAM

## 2023-08-07 PROCEDURE — 85027 COMPLETE CBC AUTOMATED: CPT | Mod: HCNC | Performed by: STUDENT IN AN ORGANIZED HEALTH CARE EDUCATION/TRAINING PROGRAM

## 2023-08-07 PROCEDURE — 36415 COLL VENOUS BLD VENIPUNCTURE: CPT | Mod: HCNC | Performed by: STUDENT IN AN ORGANIZED HEALTH CARE EDUCATION/TRAINING PROGRAM

## 2023-08-07 PROCEDURE — 25000003 PHARM REV CODE 250: Mod: HCNC | Performed by: STUDENT IN AN ORGANIZED HEALTH CARE EDUCATION/TRAINING PROGRAM

## 2023-08-07 PROCEDURE — 94640 AIRWAY INHALATION TREATMENT: CPT | Mod: HCNC

## 2023-08-07 PROCEDURE — 94761 N-INVAS EAR/PLS OXIMETRY MLT: CPT | Mod: HCNC

## 2023-08-07 PROCEDURE — 97535 SELF CARE MNGMENT TRAINING: CPT | Mod: HCNC

## 2023-08-07 PROCEDURE — 99900035 HC TECH TIME PER 15 MIN (STAT): Mod: HCNC

## 2023-08-07 PROCEDURE — 92526 ORAL FUNCTION THERAPY: CPT | Mod: HCNC

## 2023-08-07 PROCEDURE — 11000001 HC ACUTE MED/SURG PRIVATE ROOM: Mod: HCNC

## 2023-08-07 PROCEDURE — 63600175 PHARM REV CODE 636 W HCPCS: Mod: HCNC | Performed by: STUDENT IN AN ORGANIZED HEALTH CARE EDUCATION/TRAINING PROGRAM

## 2023-08-07 PROCEDURE — 94660 CPAP INITIATION&MGMT: CPT | Mod: HCNC

## 2023-08-07 PROCEDURE — 63600175 PHARM REV CODE 636 W HCPCS: Mod: HCNC

## 2023-08-07 PROCEDURE — 99232 SBSQ HOSP IP/OBS MODERATE 35: CPT | Mod: GC,,, | Performed by: STUDENT IN AN ORGANIZED HEALTH CARE EDUCATION/TRAINING PROGRAM

## 2023-08-07 PROCEDURE — 99232 PR SUBSEQUENT HOSPITAL CARE,LEVL II: ICD-10-PCS | Mod: HCNC,GC,, | Performed by: INTERNAL MEDICINE

## 2023-08-07 PROCEDURE — 25000242 PHARM REV CODE 250 ALT 637 W/ HCPCS: Mod: HCNC

## 2023-08-07 PROCEDURE — 25000003 PHARM REV CODE 250: Mod: HCNC

## 2023-08-07 PROCEDURE — 99232 SBSQ HOSP IP/OBS MODERATE 35: CPT | Mod: HCNC,GC,, | Performed by: INTERNAL MEDICINE

## 2023-08-07 RX ADMIN — AMPICILLIN SODIUM AND SULBACTAM SODIUM 3 G: 2; 1 INJECTION, POWDER, FOR SOLUTION INTRAMUSCULAR; INTRAVENOUS at 04:08

## 2023-08-07 RX ADMIN — LACTULOSE 10 G: 20 SOLUTION ORAL at 04:08

## 2023-08-07 RX ADMIN — SENNOSIDES AND DOCUSATE SODIUM 2 TABLET: 50; 8.6 TABLET ORAL at 08:08

## 2023-08-07 RX ADMIN — BRIMONIDINE TARTRATE 1 DROP: 1.5 SOLUTION OPHTHALMIC at 08:08

## 2023-08-07 RX ADMIN — IPRATROPIUM BROMIDE AND ALBUTEROL SULFATE 3 ML: 2.5; .5 SOLUTION RESPIRATORY (INHALATION) at 07:08

## 2023-08-07 RX ADMIN — IBUPROFEN 800 MG: 400 TABLET ORAL at 07:08

## 2023-08-07 RX ADMIN — DORZOLAMIDE HYDROCHLORIDE AND TIMOLOL MALEATE 1 DROP: 22.3; 6.8 SOLUTION/ DROPS OPHTHALMIC at 08:08

## 2023-08-07 RX ADMIN — BRIMONIDINE TARTRATE 1 DROP: 1.5 SOLUTION OPHTHALMIC at 04:08

## 2023-08-07 RX ADMIN — IPRATROPIUM BROMIDE AND ALBUTEROL SULFATE 3 ML: 2.5; .5 SOLUTION RESPIRATORY (INHALATION) at 04:08

## 2023-08-07 RX ADMIN — IPRATROPIUM BROMIDE AND ALBUTEROL SULFATE 3 ML: 2.5; .5 SOLUTION RESPIRATORY (INHALATION) at 11:08

## 2023-08-07 RX ADMIN — LIDOCAINE 1 PATCH: 50 PATCH CUTANEOUS at 04:08

## 2023-08-07 RX ADMIN — LACTULOSE 10 G: 20 SOLUTION ORAL at 08:08

## 2023-08-07 RX ADMIN — ENOXAPARIN SODIUM 40 MG: 40 INJECTION SUBCUTANEOUS at 04:08

## 2023-08-07 RX ADMIN — AMPICILLIN SODIUM AND SULBACTAM SODIUM 3 G: 2; 1 INJECTION, POWDER, FOR SOLUTION INTRAMUSCULAR; INTRAVENOUS at 10:08

## 2023-08-07 RX ADMIN — TIMOLOL MALEATE 1 DROP: 2.5 SOLUTION/ DROPS OPHTHALMIC at 08:08

## 2023-08-07 RX ADMIN — BIMATOPROST 1 DROP: 0.1 SOLUTION/ DROPS OPHTHALMIC at 08:08

## 2023-08-07 RX ADMIN — AMPICILLIN SODIUM AND SULBACTAM SODIUM 3 G: 2; 1 INJECTION, POWDER, FOR SOLUTION INTRAMUSCULAR; INTRAVENOUS at 09:08

## 2023-08-07 RX ADMIN — MELATONIN TAB 3 MG 6 MG: 3 TAB at 08:08

## 2023-08-07 NOTE — ASSESSMENT & PLAN NOTE
- Initially presented with a week of back/flank pain with productive cough; denied any fevers or recent illnesses, weight loss. Imaging consistent with a pleural effusion, bedside ultrasound noting multiple pockets of loculated effusion and areas of trapped lung and catheter placed.  - 8/2: Roman catheter placed with initially 400mL of fluid evacuated; studies sent and consistent with a complicated parapneumonic effusion  - CT chest with roman placed in one pocket, appears there are 2 other (superior lateral and posterior) pockets that do not communicate with the large effusion that the roman is in.  - 8/5: Evening, roman catheter became dislodged and removed. Post CXR showing residual hydropneumothorax  - In total: ~3200mL of fluid drained (1500mL initially, 1200mL since 1st dose of tpa/dornase, 500mL since 2nd dose of tpa/dornase)  - Repeat CT with some residual effusion remaining, noted in the bases and fissure, but too small to safely intervene upon    Recommendations  - Continue unasyn, cultures remain no growth to date. At discharge will anticipate for an additional 2 weeks of augmentin BID therapy.  - Defer any further intervention at this time, risk outweigh the benefit of another catheter placement.   - Will need follow-up with us in clinic in roughly 4 weeks, will get a CXR at that time. If his symptoms improve and imaging improves, will defer further imaging and intervention. If clinically worsens, fevers, chills, rigors, SOB, chest pain; may need to re-evaluate need for further therapy  - PT/OT and out of bed today

## 2023-08-07 NOTE — PLAN OF CARE
Problem: Adult Inpatient Plan of Care  Goal: Plan of Care Review  Outcome: Ongoing, Not Progressing  Goal: Patient-Specific Goal (Individualized)  Outcome: Ongoing, Not Progressing  Goal: Absence of Hospital-Acquired Illness or Injury  Outcome: Ongoing, Not Progressing  Goal: Optimal Comfort and Wellbeing  Outcome: Ongoing, Not Progressing  Goal: Readiness for Transition of Care  Outcome: Ongoing, Not Progressing     Problem: Skin Injury Risk Increased  Goal: Skin Health and Integrity  Outcome: Ongoing, Not Progressing     Problem: Fluid Imbalance (Pneumonia)  Goal: Fluid Balance  Outcome: Ongoing, Not Progressing     Problem: Infection (Pneumonia)  Goal: Resolution of Infection Signs and Symptoms  Outcome: Ongoing, Not Progressing     Problem: Respiratory Compromise (Pneumonia)  Goal: Effective Oxygenation and Ventilation  Outcome: Ongoing, Not Progressing

## 2023-08-07 NOTE — ASSESSMENT & PLAN NOTE
Left sided chest wall pain    Community acquired pneumonia   Acute hypoxic resp failure   - presents to the ED after primary care visit recommended patient to ED for imaging for lumbar spine   - Patient reports left back pain that radiates to left anterior chest that started six days ago, associated with a productive cough and worsening SOB. Denies fever, chills but reports fatigue and loss of appetite. Patient reports left chest wall pain was exacerbated with coughing.   - Patient denies any sick close contacts nor any recent hospitalization.   - Stopped smoking 20 + years ago   - Afebrile, , RR 20, /67, Pox 91% on arrival and placed on 4L/min O2 via NC.   - Leukocytosis 20K with left shift.   - CT Abdomen pelvis with contrast with a loculated left pleural effusion with compressive atelectasis of the left lower lobe, confirmed with CXR.   - On exam, patient was comfortable looking, using CPAP. Left chest wall tenderness is reproducible. Decreased air entry to left posterior lung base.  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, not on drugs associated with effusion (amio, hydralazine), no signs of malignancy (no weight loss, hemoptysis, night sweats - but does has hx of smoking for 20 odd years but stopped 20 years ago),   - Pt coughed during intake today says this has happened at home as well - could be that pt is aspirating and that is contributing to pneumonia and effusion  - Speech eval - Aspiration Precautions:   Decrease rate of intake, decrease bolus size especially with thin liquids  Small singular cupsips as able (may use straw, must take small sips)  Upright seating during intake  8/4 - 1st lytic dose  8/5 - 2nd lytic dose, at night catheter detached from pt while he was maneuvering to bed side commode   8/6 - completed 5 days of Azithromycin and ceftriaxone  and started on Unasyn per Pulm team. CT hcest showing improvement.   8/7 - Pt weaning down oxygen. Pain is much improved.       Plan:  - Duonebs Q4 wake  - Wean O2 as needed to keep Pox >90%  - Pulm consult - continue to wean pt.   - Cont tylenol, ibuprofen and lidocaine patches   - Trend CBC  - Guaifenesin 200mg Q4 PRN

## 2023-08-07 NOTE — PT/OT/SLP EVAL
Occupational Therapy   Evaluation & Treatment    Name: Cricket Mcdonnell  MRN: [id number]  Admitting Diagnosis: Loculated pleural effusion  Recent Surgery: * No surgery found *      Recommendations:     Discharge Recommendations: home health OT  Discharge Equipment Recommendations:  bedside commode  Barriers to discharge:   Decreased caregiver support; pt lives alone     Assessment:     Cricket Mcdonnell is a 82 y.o. male with a medical diagnosis of Loculated pleural effusion.  He presents with mild pain in left lower back/side. Performance deficits affecting function: impaired balance, decreased safety awareness, impaired self care skills, impaired functional mobility, gait instability.  Pt agreeable to participating in therapy upon arrival to room.  Pt demonstrates strength and ROM in (B) UE needed for ADLs that is WNL, and is able to perform sit <> stand transfer with CGA, RW.  While standing at sink pt able to perform grooming task with CGA, RW with cues for RW placement provided.      Overall, pt tolerated therapy well.  PTA pt reports being (I) with ADLs and functional mobility.  Pt states he has only used the SPC or RW for mobility over the last week when he started experiencing pain.  Pt would benefit from skilled OT services to address problems listed above and increase independence and safety with ADLs.  Home health with 24/7 assist and supervision is recommended upon d/c from acute care to further address deficits and help pt improve overall functional independence.             Rehab Prognosis: Good; patient would benefit from acute skilled OT services to address these deficits and reach maximum level of function.       Plan:     Patient to be seen 4 x/week to address the above listed problems via self-care/home management, therapeutic activities, therapeutic exercises  Plan of Care Expires: 09/06/23  Plan of Care Reviewed with: patient    Subjective     Chief Complaint: Mild flank pain; L  side  Patient/Family Comments/goals: Return home; pt reports he is eager to return home and prefers to do therapy there instead of going somewhere else for therapy    Occupational Profile:  Living Environment: Pt lives alone in [facility], 2 [address], guardrails present.  Bathroom has tub/shower with grab bar present.  Previous level of function:   -ADLs: Independent  -iADLs: Independent  -Mobility: Independent  Notes: Pt reports he has used a RW and SPC over the last week since experiencing flank pain  Roles and Routines: Father (had 3 children- son  lives in [place], daughter lives in [place] teacher, daughter  lives in [place]), , drives, walks everyday on [place] (lives near [facility])  Equipment Used at Home: SPC, RW (only during last week)  Assistance upon Discharge: Daughter able to provide some assist; on sabbatical and could possibly stay a week to assist  *both daughters currently visiting     Pain/Comfort:  Pain Rating 1: 1/10  Location - Side 1:  (left lower back)  Pain Rating Post-Intervention 1:  (1-2; left lower back)    Patients cultural, spiritual, Faith conflicts given the current situation: no    Objective:     Communicated with: RN (Cinda) prior to session.  Patient found HOB elevated with oxygen, peripheral IV upon OT entry to room.  *Pt noted to be wet with urine after sit <> stand transfer.  OT offered to help pt clean himself and change underwear; however, pt stated he preferred to do it on his own.  RN Tricia) notified at end of session.    General Precautions: Standard, fall  Orthopedic Precautions: N/A  Braces: N/A  Respiratory Status: Nasal cannula, flow 2 L/min    Occupational Performance:    Bed Mobility:    Supine <> sit: SBA  Scooting: SBA    Functional Mobility/Transfers:  Sit <> Stand:   SBA, RW x 1 trial from EOB  Impaired balance and postural instability observed  CGA, RW x 1 trial from EOB  Functional Mobility: Pt ambulated ~40 ft in room with  CGA, RW.  Mild impaired balance noted; no LOB observed    Activities of Daily Living:  Grooming: CGA, RW for washing hands while standing at sink    Cognitive/Visual Perceptual:  Cognitive/Psychosocial Skills:    -       Oriented to: Person, Place, Time, and Situation   -       Follows Commands/attention: Follows 100% of one-step commands  -       Communication: clear/fluent  -       Memory: No Deficits noted  -       Safety awareness/insight to disability: Mildly impaired; pt not at PLOF- appears to lack some insight into current functional level  -       Mood/Affect/Coping skills/emotional control: pleasant, cooperative    Physical Exam:  Postural examination/scapula alignment:   -       Rounded shoulders  -       Forward head  Skin integrity: Visible skin intact  Edema:  None noted  Sensation: Intact for (B) UE; neuropathy in both feet  Motor Planning: -       WNL  Dominant hand: -       right  Upper Extremity Range of Motion:    -       Right Upper Extremity: WNL  -       Left Upper Extremity: WNL  Upper Extremity Strength:   -       Right Upper Extremity: WNL; grossly 4+/5 all muscle groups  -       Left Upper Extremity: WNL; grossly 4+5 all muscle groups   Strength: 4/5 both hands  Fine Motor Coordination: -       Intact  Gross motor coordination: WFL  Balance: Sitting- Independent; Standing- SBA-CGA  Vision: Some impairments; receives injection    AMPAC 6 Click ADL:  AMPAC Total Score: 19    Treatment & Education:  *Session focused on promoting increased endurance, strength, balance, coordination, and ROM needed for ADLs and functional transfers as part of daily routine.   -pt educated on role of OT in acute care setting  -pt performed sit <> stand transfer with and without AD; cues for technique provided  -pt ambulated in room; cues for RW management provided  -pt performed grooming task standing at sink  -POC reviewed     Patient left up in chair with all lines intact and call button in reach    GOALS:    Multidisciplinary Problems       Occupational Therapy Goals          Problem: Occupational Therapy    Goal Priority Disciplines Outcome Interventions   Occupational Therapy Goal     OT, PT/OT Ongoing, Progressing    Description: Goals to be met by: 8/17/2023     Patient will increase functional independence with ADLs by performing:    UE Dressing with [place].  LE Dressing with Supervision.  Grooming while standing at sink with Supervision.  Toileting from toilet with Supervision for hygiene and clothing management.   Toilet transfer to toilet with Supervision.                         History:     Past Medical History:   Diagnosis Date    Hereditary sensory neuropathy     Sleep apnea     + CPAP         Past Surgical History:   Procedure Laterality Date    CATARACT EXTRACTION      COLONOSCOPY N/A 1/3/2019    Procedure: COLONOSCOPY;  Surgeon: Cholo Yates MD;  Location: [address]);  Service: Endoscopy;  Laterality: N/A;    EYE SURGERY      galucoma surgery      HERNIA REPAIR      tonsillectomy      TONSILLECTOMY         Time Tracking:     OT Date of Treatment: 08/07/23  OT Start Time: 1343  OT Stop Time: 1408  OT Total Time (min): 25 min    Billable Minutes:Evaluation 17  Self Care/Home Management 8    SURI Bellamy  8/7/2023

## 2023-08-07 NOTE — CARE UPDATE
08/06/23 1917   Patient Assessment/Suction   Level of Consciousness (AVPU) alert   Respiratory Effort Normal;Unlabored   Expansion/Accessory Muscles/Retractions no use of accessory muscles;no retractions;expansion symmetric   All Lung Fields Breath Sounds diminished;clear   Rhythm/Pattern, Respiratory unlabored;pattern regular;depth regular   Skin Integrity   $ Wound Care Tech Time 15 min   Area Observed Left;Right;Behind ear;Cheek;Bridge of nose   Skin Appearance without discoloration   Barrier used? Liquid Filled Cushion   PRE-TX-O2   Device (Oxygen Therapy) nasal cannula with humidification   $ Is the patient on Low Flow Oxygen? Yes   Flow (L/min) 4   Oxygen Concentration (%) 36   SpO2 96 %   Pulse Oximetry Type Intermittent   $ Pulse Oximetry - Multiple Charge Pulse Oximetry - Multiple   Pulse 85   Resp 18   Aerosol Therapy   $ Aerosol Therapy Charges Aerosol Treatment   Daily Review of Necessity (SVN) completed   Respiratory Treatment Status (SVN) given   Treatment Route (SVN) air;mask   Patient Position (SVN) semi-Monroe's   Post Treatment Assessment (SVN) breath sounds unchanged   Signs of Intolerance (SVN) none   Breath Sounds Post-Respiratory Treatment   Throughout All Fields Post-Treatment All Fields   Throughout All Fields Post-Treatment no change   Post-treatment Heart Rate (beats/min) 85   Post-treatment Resp Rate (breaths/min) 18   Ready to Wean/Extubation Screen   FIO2<=50 (chart decimal) 0.36   Preset CPAP/BiPAP Settings   Mode Of Delivery Standby

## 2023-08-07 NOTE — PLAN OF CARE
Patient is alert and oriented x 4, able to make needs and wants known. Patient reports decreased pain and improvement of respiratory symptoms. Supplemental oxygen titrated down to 3.5L per n/c with humidification. Dressing to left chest changed overnight, no drainage noted. Patient's VSS. IV antibiotics administered per MAR, patient tolerated well. No acute events to note. POC reviewed with patient and daughter. Purposeful rounding completed overnight.    US today in office  THE UTERUS IS ANTEVERTED, NORMAL IN SIZE AND ECHOGENICITY. THE ENDOMETRIUM MEASURES 15MM IN THICKNESS. NO MASSES OR ABNORMALITIES ARE SEEN. RIGHT OVARY APPEARS WNL. LEFT OVARY APPEARS WNL. NO FREE FLUID IS SEEN IN THE CDS. Patient presents today to discuss ultrasound results. See attached ultrasound report. We discussed findings of US today including normal pelvis. We discussed options for mgmt today including expectant mgmt v GI consult-they are unsure the pain is so bothersome she would like to see GI-contact info given. Visit time 10min. More than 50% of my face-to-face time was spend on patient counseling.     Signed By: Colleen Yan MD     June 13, 2019

## 2023-08-07 NOTE — PROGRESS NOTES
[facility] Medicine  Progress Note    Patient Name: Cricket Mcdonnell  MRN: [id number]  Patient Class: IP- Inpatient   Admission Date: 8/1/2023  Length of Stay: 6 days  Attending Physician: Darin Mcguire MD  Primary Care Provider: Neo Sneed MD        Subjective:     Principal Problem:Loculated pleural effusion        HPI:  Cricket Mcdonnell is a 82 year old gentleman with a PMHx of sleep apnea, anxiety disorder and right angle glaucoma of right eye who presents to the ED after primary care visit recommended patient to ED for imaging. Patient with left back pain that radiates to left anterior chest that started six days ago. Patient reports that he was having a productive cough then, but unable to see if it was purulent. Patient denies fever, chills but reports fatigue and loss of appetite. Patient also reports worsening shortness of breath which prompted primary care visit today. Patient reports left chest wall pain was exacerbated with coughing. Patient denies any sick close contacts nor any recent hospitalization. Patient denies nausea, vomiting, diarrhea, pedal edema. Patient also denies any new onset numbness or weakness in bilateral lower extremities.     Afebrile, , RR 20, /67, Pox 91% on arrival and placed on 4L/min O2 via NC. Leukocytosis 20K with left shift. Na 133. UA grossly non-infectious, +1 ketonuria. Blood cx were sent. CT Lumbar spine without contrast with findings of Increase in lumbar levoscoliosis. No definite acute lumbar fracture. Moderate to large left pleural effusion, which is incompletely imaged. CT Abdomen pelvis with contrast with a loculated left pleural effusion with compressive atelectasis of the left lower lobe, confirmed with CXR. Patient was started for management for CAP in ED with Ceftriaxone 1g IV and azithromycin 500mg IV. Patient also given 1L NaCl 0.9% fluid bolus. On exam, patient was comfortable looking, using CPAP. Left  chest wall tenderness is reproducible. Decreased air entry to left posterior lung base.    Patient is admitted to Hospital Medicine for management of worsening shortness of breath with hypoxia and a left lateral wall chest pain likely due to left pleural effusion, currently treated as CAP. Consult placed to pulmonology for advise on need for thoracentesis.       Overview/Hospital Course:  Pt stable on 5L NC. Has stable non productive cough. On auscultation no breath sounds on left lower lung. Seen by pulmonary team and on US noted multiple small pockets of loculated effusion with areas of trapped lung. Pleural catheter placed with 400ml of fluid drained immediately. Pleural studies sent. Will continue abx at this time. Wean oxygen as tolerated.     Pleural studies suggestive of exudative effusion. Will continue abx and Pulm team ordered CT chest to determine next steps. Pt continue to be on 5L NC. Nurse noticed that pt coughed while swallowing water which pt reports has been going on at home intermittently. As such, aspiration could be the source of his pneumonia/effusion. Speech was consulted ; Aspiration Precautions:   Decrease rate of intake, decrease bolus size especially with thin liquids  Small singular cupsips as able (may use straw, must take small sips)  Upright seating during intake    Met daughter Xiao orantes who flew in from [facility]. She reports that pt often forget details as he a very poetic and abstract mind. Daughter also confirms that pt often eats very rapidly and eating slowly might help if he any aspiration is taking place.    Pulm team instilled tpa/dornase into catheter.  Will continue to monitor with ultra sound. Pulm team also touched base with CTS team at [facility] it it came to situation that pt would need vats. If at all, this would take place next week. For now, watching how pt does with lytics.     Pt was experiencing constipation (exacerbated by lying in beds with high oxygen needs  and opioids) - he had no luck with oral regimen or suppository and enema was given. While pt was maneuvering to bedside commode the pleural catheter became detached (despite being stitched on). Pulm team to reassess and decide on further management.      Rpt CT chest showed improvement and pt is having decreased oxygen requirements - 'Interval decrease in left-sided loculated pleural effusion with small loculated pleural effusion now present.  There has been interval removal of the left chest tube.  A trace left pneumothorax is identified within loculated pleural fluid along the lateral aspect of the left hemithorax.'    Per Pulm team will continue to wean oxygen and plan for discharge on oral abx with pulm follow up. In the meantime will wean patient as tolerated and PT and OT consulted.       Interval History: CT chest shows improvement. Pt reports pain is much improved. Will d/c opioids from MAR. Pt down to 3L NC. Continue to wean oxygen and be evaluated by PT and OT.      Review of Systems   Constitutional:  Negative for fatigue and fever.   Respiratory:  Positive for cough.    Cardiovascular:  Negative for chest pain.   Gastrointestinal:  Negative for abdominal distention and diarrhea.   Genitourinary:  Negative for dysuria.   Psychiatric/Behavioral:  Negative for agitation and confusion.      Objective:     Vital Signs (Most Recent):  Temp: 98.2 °F (36.8 °C) (08/07/23 1212)  Pulse: 81 (08/07/23 1212)  Resp: 18 (08/07/23 1212)  BP: 117/64 (08/07/23 1212)  SpO2: (!) 94 % (08/07/23 1212) Vital Signs (24h Range):  Temp:  [97.5 °F (36.4 °C)-98.6 °F (37 °C)] 98.2 °F (36.8 °C)  Pulse:  [74-87] 81  Resp:  [15-21] 18  SpO2:  [93 %-98 %] 94 %  BP: (117-142)/(62-71) 117/64     Weight: 71.3 kg (157 lb 3 oz)  Body mass index is 24.62 kg/m².    Intake/Output Summary (Last 24 hours) at 8/7/2023 1300  Last data filed at 8/7/2023 0525  Gross per 24 hour   Intake 393.59 ml   Output 750 ml   Net -356.41 ml           Physical  Exam  Constitutional:       General: He is not in acute distress.  Eyes:      Extraocular Movements: Extraocular movements intact.   Pulmonary:      Effort: No respiratory distress.      Breath sounds: No wheezing.      Comments: absent breath sounds on left lower lung  Chest:      Chest wall: No tenderness (left chest wall tenderness improved).   Abdominal:      General: There is no distension.      Tenderness: There is no abdominal tenderness.   Musculoskeletal:         General: No swelling.   Skin:     Capillary Refill: Capillary refill takes less than 2 seconds.   Neurological:      General: No focal deficit present.      Mental Status: He is oriented to person, place, and time.      Comments: Wears shoes in beds as this helps with his peripheral neuropathy pain              Significant Labs: All pertinent labs within the past 24 hours have been reviewed.    Significant Imaging: I have reviewed all pertinent imaging results/findings within the past 24 hours.      Assessment/Plan:      * Loculated pleural effusion   Left sided chest wall pain    Community acquired pneumonia   Acute hypoxic resp failure   - presents to the ED after primary care visit recommended patient to ED for imaging for lumbar spine   - Patient reports left back pain that radiates to left anterior chest that started six days ago, associated with a productive cough and worsening SOB. Denies fever, chills but reports fatigue and loss of appetite. Patient reports left chest wall pain was exacerbated with coughing.   - Patient denies any sick close contacts nor any recent hospitalization.   - Stopped smoking 20 + years ago   - Afebrile, , RR 20, /67, Pox 91% on arrival and placed on 4L/min O2 via NC.   - Leukocytosis 20K with left shift.   - CT Abdomen pelvis with contrast with a loculated left pleural effusion with compressive atelectasis of the left lower lobe, confirmed with CXR.   - On exam, patient was comfortable looking, using  "CPAP. Left chest wall tenderness is reproducible. Decreased air entry to left posterior lung base.  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, not on drugs associated with effusion (amio, hydralazine), no signs of malignancy (no weight loss, hemoptysis, night sweats - but does has hx of smoking for 20 odd years but stopped 20 years ago),   - Pt coughed during intake today says this has happened at home as well - could be that pt is aspirating and that is contributing to pneumonia and effusion  - Speech eval - Aspiration Precautions:   Decrease rate of intake, decrease bolus size especially with thin liquids  Small singular cupsips as able (may use straw, must take small sips)  Upright seating during intake  8/4 - 1st lytic dose  8/5 - 2nd lytic dose, at night catheter detached from pt while he was maneuvering to bed side commode   8/6 - completed 5 days of Azithromycin and ceftriaxone and started on Unasyn per Pulm team. CT hcest showing improvement.   8/7 - Pt weaning down oxygen. Pain is much improved.       Plan:  - Duonebs Q4 wake  - Wean O2 as needed to keep Pox >90%  - Pulm consult - continue to wean pt.   - Cont tylenol, ibuprofen and lidocaine patches   - Trend CBC  - Guaifenesin 200mg Q4 PRN  - Cont IV unasyn while inpatient       Acute hypoxemic respiratory failure  See above         Constipation  Had BM 8/5 after suppository and enema     - Cont senna docusate and lactulose given pt still in bed and receiving opioids. Monitor for diarrhea          Primary open angle glaucoma of right eye, moderate stage  Patient reports using multiple eye drops but unable to recall names of them other than "timolol TID, bimonidine TID and something at night".     - Eye drops as ordered to best of ability based on home list.       KARMA (obstructive sleep apnea)  - CPAP qhs        VTE Risk " Mitigation (From admission, onward)         Ordered     enoxaparin injection 40 mg  Daily         08/01/23 2024     IP VTE HIGH RISK PATIENT  Once         08/01/23 2024     Place sequential compression device  Until discontinued         08/01/23 2024                Discharge Planning   YASH:      Code Status: Full Code   Is the patient medically ready for discharge?:     Reason for patient still in hospital (select all that apply): Treatment - Weaning down oxygen, disposition   Discharge Plan A: Home            Darin Rasheed MD  Department of Hospital Medicine   [facility])

## 2023-08-07 NOTE — ASSESSMENT & PLAN NOTE
- Was on 5LNC at start, now down to 2LNC and comfortable appearing  - Goal SpO2 88-92%; wean supplemental oxygen. May ultimately need it for a short duration for home. Consider walk test prior to discharge  - PT/OT and out of bed today to assist with his atelectasis  - Remainder under loculated pleural effusion

## 2023-08-07 NOTE — PLAN OF CARE
Problem: Occupational Therapy  Goal: Occupational Therapy Goal  Description: Goals to be met by: 8/17/2023     Patient will increase functional independence with ADLs by performing:    UE Dressing with [place].  LE Dressing with Supervision.  Grooming while standing at sink with Supervision.  Toileting from toilet with Supervision for hygiene and clothing management.   Toilet transfer to toilet with Supervision.    Outcome: Ongoing, Progressing     OT evaluation complete and POC established.  Home health with 24/7 assist and supervision is recommended upon d/c from acute care to further address deficits and help pt improve overall functional independence.     SURI Bellamy  8/21/2023

## 2023-08-07 NOTE — PROGRESS NOTES
[facility] ([place])  Pulmonology  Progress Note    Patient Name: Cricket Mcdonnell  MRN: [id number]  Admission Date: 8/1/2023  Hospital Length of Stay: 6 days  Code Status: Full Code  Attending Provider: Darin Mcguire MD  Primary Care Provider: Neo Sneed MD   Principal Problem: Loculated pleural effusion    Subjective:     Interval History: CT chest with some residual fluid; however no pocket large enough to intervene safely. Discussed with him and his daughter at bedside. Remains afebrile, downtrending WBC. Nearly asymptomatic from a pain standpoint, still with a nonproductive cough (improving), and overall continues to clinically improve. Able to get up and move to the chair, needs PT/OT evaluation     Objective:     Vital Signs (Most Recent):  Temp: 97.9 °F (36.6 °C) (08/07/23 0728)  Pulse: 85 (08/07/23 0742)  Resp: 18 (08/07/23 0742)  BP: 129/62 (08/07/23 0728)  SpO2: 95 % (08/07/23 0742) Vital Signs (24h Range):  Temp:  [97.5 °F (36.4 °C)-98.6 °F (37 °C)] 97.9 °F (36.6 °C)  Pulse:  [78-87] 85  Resp:  [15-21] 18  SpO2:  [93 %-98 %] 95 %  BP: (118-142)/(62-71) 129/62     Weight: 71.3 kg (157 lb 3 oz)  Body mass index is 24.62 kg/m².      Intake/Output Summary (Last 24 hours) at 8/7/2023 0914  Last data filed at 8/7/2023 0525  Gross per 24 hour   Intake 393.59 ml   Output 950 ml   Net -556.41 ml          Physical Exam  Constitutional:       Appearance: Normal appearance. He is normal weight.   HENT:      Head: Normocephalic and atraumatic.      Nose: Nose normal. No congestion.      Mouth/Throat:      Mouth: Mucous membranes are moist.      Pharynx: Oropharynx is clear.   Eyes:      Extraocular Movements: Extraocular movements intact.      Conjunctiva/sclera: Conjunctivae normal.      Pupils: Pupils are equal, round, and reactive to light.   Cardiovascular:      Pulses: Normal pulses.      Heart sounds: Normal heart sounds. No murmur heard.  Pulmonary:      Effort: Pulmonary effort is  normal. No respiratory distress.      Breath sounds: No wheezing or rhonchi.   Abdominal:      General: Abdomen is flat. Bowel sounds are normal. There is no distension.      Palpations: Abdomen is soft.      Tenderness: There is no abdominal tenderness.   Musculoskeletal:         General: Normal range of motion.      Cervical back: Normal range of motion and neck supple.      Right lower leg: No edema.      Left lower leg: No edema.   Skin:     General: Skin is warm and dry.      Capillary Refill: Capillary refill takes less than 2 seconds.   Neurological:      General: No focal deficit present.      Mental Status: He is alert and oriented to person, place, and time. Mental status is at baseline.      Cranial Nerves: No cranial nerve deficit.      Sensory: No sensory deficit.      Motor: No weakness.   Psychiatric:         Mood and Affect: Mood normal.         Behavior: Behavior normal.         Thought Content: Thought content normal.         Judgment: Judgment normal.         Vents:  Oxygen Concentration (%): 45 (08/07/23 0440)    Lines/Drains/Airways       Peripheral Intravenous Line  Duration                  Peripheral IV - Single Lumen 08/01/23 2000 20 G Anterior;Right Forearm 5 days                    Significant Labs:    CBC/Anemia Profile:  Recent Labs   Lab 08/06/23 0442 08/07/23  0451   WBC 19.20* 14.01*   HGB 12.9* 12.0*   HCT 38.3* 36.1*   * 411   MCV 94 96   RDW 12.8 12.7          Chemistries:  Recent Labs   Lab 08/06/23 0442 08/07/23  0451   * 135*   K 4.0 3.5   CL 99 99   CO2 24 28   BUN 14 12   CREATININE 0.5 0.6   CALCIUM 8.6* 8.4*         All pertinent labs within the past 24 hours have been reviewed.    Significant Imaging:  I have reviewed all pertinent imaging results/findings within the past 24 hours.        Assessment/Plan:     Pulmonary  * Loculated pleural effusion  - Initially presented with a week of back/flank pain with productive cough; denied any fevers or recent  illnesses, weight loss. Imaging consistent with a pleural effusion, bedside ultrasound noting multiple pockets of loculated effusion and areas of trapped lung and catheter placed.  - 8/2: Roman catheter placed with initially 400mL of fluid evacuated; studies sent and consistent with a complicated parapneumonic effusion  - CT chest with roman placed in one pocket, appears there are 2 other (superior lateral and posterior) pockets that do not communicate with the large effusion that the roman is in.  - 8/5: Evening, roman catheter became dislodged and removed. Post CXR showing residual hydropneumothorax  - In total: ~3200mL of fluid drained (1500mL initially, 1200mL since 1st dose of tpa/dornase, 500mL since 2nd dose of tpa/dornase)  - Repeat CT with some residual effusion remaining, noted in the bases and fissure, but too small to safely intervene upon    Recommendations  - Continue unasyn, cultures remain no growth to date. At discharge will anticipate for an additional 2 weeks of augmentin BID therapy.  - Defer any further intervention at this time, risk outweigh the benefit of another catheter placement.   - Will need follow-up with us in clinic in roughly 4 weeks, will get a CXR at that time. If his symptoms improve and imaging improves, will defer further imaging and intervention. If clinically worsens, fevers, chills, rigors, SOB, chest pain; may need to re-evaluate need for further therapy  - PT/OT and out of bed today    CAP (community acquired pneumonia)  - See under loculated pleural effusion    Acute hypoxemic respiratory failure  - Was on 5LNC at start, now down to 2LNC and comfortable appearing  - Goal SpO2 88-92%; wean supplemental oxygen. May ultimately need it for a short duration for home. Consider walk test prior to discharge  - PT/OT and out of bed today to assist with his atelectasis  - Remainder under loculated pleural effusion                 Stoney Coon MD  Pulmonology  [employer]  Med Surg  ([place])

## 2023-08-07 NOTE — SUBJECTIVE & OBJECTIVE
Medication Management/Psychiatric Progress Notes     Patient Name: Wood Hall    MRN:  [id number]  :  2009    Age: 14 year old  Sex: Male    Vitals:   There were no vitals taken for this visit.     Current Medications:   Current Outpatient Medications   Medication Sig     buPROPion (WELLBUTRIN SR) 150 MG 12 hr tablet Take 1 tablet (150 mg) by mouth daily     escitalopram (LEXAPRO) 20 MG tablet Take 1 tablet (20 mg) by mouth daily     guanFACINE (INTUNIV) 1 MG TB24 24 hr tablet At Bedtime     ibuprofen (ADVIL/MOTRIN) 400 MG tablet Take 1 tablet (400 mg) by mouth every 6 hours as needed (Patient not taking: Reported on 2023)     meclizine (ANTIVERT) 25 MG tablet Take 1 tablet (25 mg) by mouth 2 times daily As needed for motion sickness     melatonin 3 MG CAPS Take 6 mg by mouth At Bedtime One at bedtime     melatonin ER 3 MG tablet Take 5 mg by mouth At Bedtime Mother reports it is a 5 mg pill     ondansetron (ZOFRAN ODT) 4 MG ODT tab Take 1 tablet (4 mg) by mouth every 8 hours as needed for nausea or vomiting (Patient not taking: Reported on 2023)     traZODone (DESYREL) 50 MG tablet Take 0.5 tablets (25 mg) by mouth At Bedtime     No current facility-administered medications for this encounter.     Facility-Administered Medications Ordered in Other Encounters   Medication     acetaminophen (TYLENOL) tablet 650 mg     calcium carbonate (TUMS) chewable tablet 500 mg     Review of Systems/Side Effects:  Constitutional    No                                                      Musculoskeletal  No                     Eyes  No                                                                  Integumentary    No                                                                ENT    No                                                                  Neurological    No     Respiratory    No                                                       Psychiatric    No     Cardiovascular   Interval History: CT chest shows improvement. Pt reports pain is much improved. Will d/c opioids from MAR. Pt down to 3L NC. Continue to wean oxygen and be evaluated by PT and OT.      Review of Systems   Constitutional:  Negative for fatigue and fever.   Respiratory:  Positive for cough.    Cardiovascular:  Negative for chest pain.   Gastrointestinal:  Negative for abdominal distention and diarrhea.   Genitourinary:  Negative for dysuria.   Psychiatric/Behavioral:  Negative for agitation and confusion.      Objective:     Vital Signs (Most Recent):  Temp: 98.2 °F (36.8 °C) (08/07/23 1212)  Pulse: 81 (08/07/23 1212)  Resp: 18 (08/07/23 1212)  BP: 117/64 (08/07/23 1212)  SpO2: (!) 94 % (08/07/23 1212) Vital Signs (24h Range):  Temp:  [97.5 °F (36.4 °C)-98.6 °F (37 °C)] 98.2 °F (36.8 °C)  Pulse:  [74-87] 81  Resp:  [15-21] 18  SpO2:  [93 %-98 %] 94 %  BP: (117-142)/(62-71) 117/64     Weight: 71.3 kg (157 lb 3 oz)  Body mass index is 24.62 kg/m².    Intake/Output Summary (Last 24 hours) at 8/7/2023 1300  Last data filed at 8/7/2023 0525  Gross per 24 hour   Intake 393.59 ml   Output 750 ml   Net -356.41 ml           Physical Exam  Constitutional:       General: He is not in acute distress.  Eyes:      Extraocular Movements: Extraocular movements intact.   Pulmonary:      Effort: No respiratory distress.      Breath sounds: No wheezing.      Comments: absent breath sounds on left lower lung  Chest:      Chest wall: No tenderness (left chest wall tenderness improved).   Abdominal:      General: There is no distension.      Tenderness: There is no abdominal tenderness.   Musculoskeletal:         General: No swelling.   Skin:     Capillary Refill: Capillary refill takes less than 2 seconds.   Neurological:      General: No focal deficit present.      Mental Status: He is oriented to person, place, and time.      Comments: Wears shoes in beds as this helps with his peripheral neuropathy pain              Significant Labs:  "  No                                          Endocrine    No     Gastrointestinal    No                                           Hemat/Lymph    No     Genitourinary  No                                                       Allergic/Immuno    No     Subjective:   The patient's care was discussed with the treatment team and chart notes were reviewed.    Side effects to medication: Denies  Sleep: Did not assess.   Intake: Eating/drinking without difficulty  Groups: Attending groups  Peer interactions: No conflicts   Anxiety: Stable, no worries.   Continued to explore Kash's anxiety. When discussing social anxiety, Kash states \"I have that.\" He has trouble talking with new people, and does not like large groups. He describes a general nervousness. \"It is just easier to talk with people online because it is not face-to-face.\"  In program, he has socially felt \"fine,\" because there are a lot of adults, and his peer group is much smaller. He finds it easier to talk with people whom he has known for awhile. He finds comfort in having adults around, because they can notice when he is having a hard time.   The last time he had a challenging time at school was when his mother shared with him that his great-grandfather had passed just prior to leaving. He was able to step out of the classroom. He is unable to identify spaces in school where he feels unsafe/distressing.  Kash is doing well today, and is not having any issues in the program. He has been spending sometime outside of [facility] playing with a friend from [employer]. The two of them spend time together in-person, and also play video games. When asked about his connections made online, Kash denies seeking out friendships via games. He does notice that online, people will treat him as a 16-20 year old. When he is perceived as older, people expect more of him. People mistake Kash for being younger than his age rather frequently. He attributes this to looking " All pertinent labs within the past 24 hours have been reviewed.    Significant Imaging: I have reviewed all pertinent imaging results/findings within the past 24 hours.   "young. He is \"used to it,\" but acknowledges that if someone were not used to this it could make you mad.    Examination:  General Appearance:  Well groomed, awake and alert, minimal eye contact (though improved when compared to previous), organizing beads throughout our conversation   Motor (Muscle Strength/Tone/Gait/and Station): Normal    Speech: Normal rate, rhythm and volume  Mood and Affect: Euthymic mood, blunt affect, minimally reactive  Thought content: Denies suicidal or homicidal ideation or thoughts of self-harm.  Thought Process: Linear and logical though vague, limited in answers.   Perception: Denies auditory or visual hallucinations.  Intellect: Appropriate for development and level of education, not formally tested   Insight: Fair; Awareness of illness, often has difficulty with labeling describing emotions and symptoms      Labs/Tests Ordered or Reviewed:  None    Risk Assessment:       Risk for harm is low. Pt denies current suicidal ideation or plan.  Risk factors: maladaptive coping strategies, trouble identifying anxieties   Protective factors: family and engaged in treatment   Pt is appropriate for PHP level of care.       Diagnoses and Plan:     Principal Diagnosis: Principal Diagnosis: Generalized Anxiety Disorder F41.1  Rule-Out Attention Deficit Hyperactivity Disorder F90.0    Medications: The medication risks, benefits, alternatives and side effects have been discussed and are understood by the patient and other caregivers.  Continue PTA medications. Trazodone started 7/10/2023.   Laboratory/Imaging: Psychological testing ordered  Patient will be treated in therapeutic milieu with appropriate individual and group therapies as described.  Family meetings to be scheduled  Goals: Improve adaptive coping for mental health symptoms, identifying triggers for anxiety, identify better coping strategies for dealing with anxiety. Have discussed arranging mental health resources for the patient " during the school year with [employer].   Target symptoms: Anxiety, depression  Kash would benefit from day treatment or an at-school therapist.    Medical diagnoses to be addressed this admission:  None  Safety has been addressed and patient is deemed safe to continue current outpatient programming at this time.  Collateral information will be obtained as appropriate from outpatient providers regarding patient's participation in this program. LISA's in paper chart.     Tylenol 325 mg po q4h prn (wt<90#) or 650 mg po q4h prn (wt>90#) for pain or fever     Serum Drug screen and random drug screen prn  Throat culture and rapid strep test prn red, sore throat or sore throat and T > 100 F     Total Time: 20 minutes          Counseling/Coordination of Care Time: 20 minutes    Beena Velasco, MS4  [employer]  I, Johnnie Lorenzo, was present with the medical student who participated in the service and the documentation of the note. I have verified the history and personally performed the mental status exam and medical decision making. I agree with the assessment and plan of care as documented in the note.     Johnnie Lorenzo MD

## 2023-08-07 NOTE — SUBJECTIVE & OBJECTIVE
Interval History: CT chest with some residual fluid; however no pocket large enough to intervene safely. Discussed with him and his daughter at bedside. Remains afebrile, downtrending WBC. Nearly asymptomatic from a pain standpoint, still with a nonproductive cough (improving), and overall continues to clinically improve. Able to get up and move to the chair, needs PT/OT evaluation     Objective:     Vital Signs (Most Recent):  Temp: 97.9 °F (36.6 °C) (08/07/23 0728)  Pulse: 85 (08/07/23 0742)  Resp: 18 (08/07/23 0742)  BP: 129/62 (08/07/23 0728)  SpO2: 95 % (08/07/23 0742) Vital Signs (24h Range):  Temp:  [97.5 °F (36.4 °C)-98.6 °F (37 °C)] 97.9 °F (36.6 °C)  Pulse:  [78-87] 85  Resp:  [15-21] 18  SpO2:  [93 %-98 %] 95 %  BP: (118-142)/(62-71) 129/62     Weight: 71.3 kg (157 lb 3 oz)  Body mass index is 24.62 kg/m².      Intake/Output Summary (Last 24 hours) at 8/7/2023 0914  Last data filed at 8/7/2023 0525  Gross per 24 hour   Intake 393.59 ml   Output 950 ml   Net -556.41 ml          Physical Exam  Constitutional:       Appearance: Normal appearance. He is normal weight.   HENT:      Head: Normocephalic and atraumatic.      Nose: Nose normal. No congestion.      Mouth/Throat:      Mouth: Mucous membranes are moist.      Pharynx: Oropharynx is clear.   Eyes:      Extraocular Movements: Extraocular movements intact.      Conjunctiva/sclera: Conjunctivae normal.      Pupils: Pupils are equal, round, and reactive to light.   Cardiovascular:      Pulses: Normal pulses.      Heart sounds: Normal heart sounds. No murmur heard.  Pulmonary:      Effort: Pulmonary effort is normal. No respiratory distress.      Breath sounds: No wheezing or rhonchi.   Abdominal:      General: Abdomen is flat. Bowel sounds are normal. There is no distension.      Palpations: Abdomen is soft.      Tenderness: There is no abdominal tenderness.   Musculoskeletal:         General: Normal range of motion.      Cervical back: Normal range of  motion and neck supple.      Right lower leg: No edema.      Left lower leg: No edema.   Skin:     General: Skin is warm and dry.      Capillary Refill: Capillary refill takes less than 2 seconds.   Neurological:      General: No focal deficit present.      Mental Status: He is alert and oriented to person, place, and time. Mental status is at baseline.      Cranial Nerves: No cranial nerve deficit.      Sensory: No sensory deficit.      Motor: No weakness.   Psychiatric:         Mood and Affect: Mood normal.         Behavior: Behavior normal.         Thought Content: Thought content normal.         Judgment: Judgment normal.         Vents:  Oxygen Concentration (%): 45 (08/07/23 0440)    Lines/Drains/Airways       Peripheral Intravenous Line  Duration                  Peripheral IV - Single Lumen 08/01/23 2000 20 G Anterior;Right Forearm 5 days                    Significant Labs:    CBC/Anemia Profile:  Recent Labs   Lab 08/06/23 0442 08/07/23 0451   WBC 19.20* 14.01*   HGB 12.9* 12.0*   HCT 38.3* 36.1*   * 411   MCV 94 96   RDW 12.8 12.7          Chemistries:  Recent Labs   Lab 08/06/23 0442 08/07/23 0451   * 135*   K 4.0 3.5   CL 99 99   CO2 24 28   BUN 14 12   CREATININE 0.5 0.6   CALCIUM 8.6* 8.4*         All pertinent labs within the past 24 hours have been reviewed.    Significant Imaging:  I have reviewed all pertinent imaging results/findings within the past 24 hours.

## 2023-08-07 NOTE — PT/OT/SLP PROGRESS
Speech Language Pathology Treatment    Patient Name:  Cricket Mcdonnell   MRN:  [id number]  Admitting Diagnosis: Loculated pleural effusion    Recommendations:               General Recommendations:    SLP to follow 3-4x/week during admit to ensure tolerance of PO diet     Diet recommendations: regular solids, thin liquids     Aspiration Precautions:   Decrease rate of intake, decrease bolus size especially with thin liquids  Small singular cupsips as able (may use straw, must take small sips)  Upright seating during intake     General Precautions: Standard,    Assessment:     Cricket Mcdonnell is a 82 y.o. male with an SLP diagnosis of signs of pharyngeal dysphagia with large volume cupsips per nursing .  He presents with functional oropharyngeal swallow this session given slow rate of intake.       Subjective     Patient awake, alert, seated upright at tray table in chair upon entry to room     Pain/Comfort:   Denies    Respiratory Status: Nasal cannula, flow 3 L/min    Objective:     Cognitive linguistic status  Alert, oriented to all aspects   Fluent verbal expression  Able to answer all questions re: self, medical hx, hospital admission without difficulty  Speech is 100% intelligible, no indication of motor speech deficits  Able to sustain attention throughout entire assessment period  Patient able to recall details from conversation with this writer 4 days prior, memory appears intact     Oropharyngeal swallow function:   Consistencies Assessed:  Thin liquids via singular cupsips (patient hesitant to take inc volume, suspect due to known context of assessment/follow up and attempts to not cough/show signs of dec tolerance)  Chewable solids (cracker)      Oral Phase:   Adequate mandibular depression and elevation, functional biteforce on solids to break into small chewable sized bolus  Adequate rotary chew pattern of mastication  Functional oral clearance, no oral residues noted      Pharyngeal Phase:   Single  "swallows per bolus   No signs of aspiration noted, no throat clearing/coughing post intake     Impressions:  Reviewed with patient risk for aspiration is present given inc level of respiratory support, mildly elevated RR, current pulmonary status. Reviewed with patient utilize strict aspiration precautions including decreased rate of intake, small singular cupsips, use of cup vs straw when able. Patient in agreement with plan. Reports he does feel like when he takes consecutive "gulps" of liquids results in coughing, reports he tries to take small sips and this mitigates his symptoms. Reviewed will follow up and if needed can complete objective assessment of swallow function.    Patient with decreased intake of thin liquids for this assessment period, suspect attempts to limit deficits in context of evaluation. Reviewed with patient need for assessment for proper tx plan. Pt reports he will think about recommendation of MBSS     Goals:   Multidisciplinary Problems       SLP Goals          Problem: SLP    Goal Priority Disciplines Outcome   SLP Goal     SLP Ongoing, Progressing   Description: 1. Patient will utilize safe swallow strategies/aspiration risk precautions (decreased rate/bolus size, slow rate of intake, upright seating) in 100% of opportunities independently to safely tolerate a regular diet with thin liquids without pulmonary compromise.     2. Patient will recall pillars of aspiration pna with 100% accuracy independently                        Plan:     Patient to be seen:   3-4x/week  Plan of Care expires:   8/10/23  Plan of Care reviewed with:    patient, RN  SLP Follow-Up:   yes       Discharge recommendations:    possible SLP at next level of care if deficits persist  Barriers to Discharge:  Level of Skilled Assistance Needed      Time Tracking:     SLP Treatment Date:   08/07/23  Speech Start Time:  1420  Speech Stop Time:  1435     Speech Total Time (min):  15 min    Billable Minutes: Treatment " Swallowing Dysfunction 15    08/07/2023

## 2023-08-07 NOTE — PLAN OF CARE
Medicare Message     Important Message from Medicare regarding Discharge Appeal Rights Given to patient/caregiver; Explained to patient/caregiver; Signed/date by patient/caregiver   Date IMM was signed 8/7/2023   Time IMM was signed 1220

## 2023-08-07 NOTE — PLAN OF CARE
Patient resting well  Oxygen in use at 3 lpm nasal cannula, Aerosol given with no adverse reaction Bipap not in use at this time

## 2023-08-08 PROBLEM — R07.89 LEFT-SIDED CHEST WALL PAIN: Status: RESOLVED | Noted: 2023-08-02 | Resolved: 2023-08-08

## 2023-08-08 PROBLEM — R13.10 DYSPHAGIA: Status: RESOLVED | Noted: 2023-08-03 | Resolved: 2023-08-08

## 2023-08-08 PROBLEM — J18.9 CAP (COMMUNITY ACQUIRED PNEUMONIA): Status: RESOLVED | Noted: 2023-08-05 | Resolved: 2023-08-08

## 2023-08-08 LAB
ANION GAP SERPL CALC-SCNC: 6 MMOL/L (ref 8–16)
BUN SERPL-MCNC: 9 MG/DL (ref 8–23)
CALCIUM SERPL-MCNC: 8.2 MG/DL (ref 8.7–10.5)
CHLORIDE SERPL-SCNC: 103 MMOL/L (ref 95–110)
CO2 SERPL-SCNC: 26 MMOL/L (ref 23–29)
CREAT SERPL-MCNC: 0.5 MG/DL (ref 0.5–1.4)
ERYTHROCYTE [DISTWIDTH] IN BLOOD BY AUTOMATED COUNT: 12.8 % (ref 11.5–14.5)
EST. GFR  (NO RACE VARIABLE): >60 ML/MIN/1.73 M^2
GLUCOSE SERPL-MCNC: 100 MG/DL (ref 70–110)
HCT VFR BLD AUTO: 34 % (ref 40–54)
HGB BLD-MCNC: 11.5 G/DL (ref 14–18)
MCH RBC QN AUTO: 31.6 PG (ref 27–31)
MCHC RBC AUTO-ENTMCNC: 33.8 G/DL (ref 32–36)
MCV RBC AUTO: 93 FL (ref 82–98)
PLATELET # BLD AUTO: 387 K/UL (ref 150–450)
PMV BLD AUTO: 7.8 FL (ref 9.2–12.9)
POTASSIUM SERPL-SCNC: 3.5 MMOL/L (ref 3.5–5.1)
RBC # BLD AUTO: 3.64 M/UL (ref 4.6–6.2)
SODIUM SERPL-SCNC: 135 MMOL/L (ref 136–145)
WBC # BLD AUTO: 11.16 K/UL (ref 3.9–12.7)

## 2023-08-08 PROCEDURE — 27000221 HC OXYGEN, UP TO 24 HOURS: Mod: HCNC

## 2023-08-08 PROCEDURE — 99233 PR SUBSEQUENT HOSPITAL CARE,LEVL III: ICD-10-PCS | Mod: ,,, | Performed by: STUDENT IN AN ORGANIZED HEALTH CARE EDUCATION/TRAINING PROGRAM

## 2023-08-08 PROCEDURE — 99900035 HC TECH TIME PER 15 MIN (STAT): Mod: HCNC

## 2023-08-08 PROCEDURE — 25000003 PHARM REV CODE 250: Mod: HCNC

## 2023-08-08 PROCEDURE — 97162 PT EVAL MOD COMPLEX 30 MIN: CPT | Mod: HCNC

## 2023-08-08 PROCEDURE — 94761 N-INVAS EAR/PLS OXIMETRY MLT: CPT | Mod: HCNC

## 2023-08-08 PROCEDURE — 63600175 PHARM REV CODE 636 W HCPCS: Mod: HCNC | Performed by: STUDENT IN AN ORGANIZED HEALTH CARE EDUCATION/TRAINING PROGRAM

## 2023-08-08 PROCEDURE — 94640 AIRWAY INHALATION TREATMENT: CPT | Mod: HCNC

## 2023-08-08 PROCEDURE — 63600175 PHARM REV CODE 636 W HCPCS: Mod: HCNC

## 2023-08-08 PROCEDURE — 99233 SBSQ HOSP IP/OBS HIGH 50: CPT | Mod: ,,, | Performed by: STUDENT IN AN ORGANIZED HEALTH CARE EDUCATION/TRAINING PROGRAM

## 2023-08-08 PROCEDURE — 97535 SELF CARE MNGMENT TRAINING: CPT | Mod: HCNC,CO

## 2023-08-08 PROCEDURE — 85027 COMPLETE CBC AUTOMATED: CPT | Mod: HCNC | Performed by: STUDENT IN AN ORGANIZED HEALTH CARE EDUCATION/TRAINING PROGRAM

## 2023-08-08 PROCEDURE — 80048 BASIC METABOLIC PNL TOTAL CA: CPT | Mod: HCNC | Performed by: STUDENT IN AN ORGANIZED HEALTH CARE EDUCATION/TRAINING PROGRAM

## 2023-08-08 PROCEDURE — 25000242 PHARM REV CODE 250 ALT 637 W/ HCPCS: Mod: HCNC

## 2023-08-08 PROCEDURE — 25000003 PHARM REV CODE 250: Mod: HCNC | Performed by: STUDENT IN AN ORGANIZED HEALTH CARE EDUCATION/TRAINING PROGRAM

## 2023-08-08 PROCEDURE — 97530 THERAPEUTIC ACTIVITIES: CPT | Mod: HCNC

## 2023-08-08 PROCEDURE — 11000001 HC ACUTE MED/SURG PRIVATE ROOM: Mod: HCNC

## 2023-08-08 PROCEDURE — 36415 COLL VENOUS BLD VENIPUNCTURE: CPT | Mod: HCNC | Performed by: STUDENT IN AN ORGANIZED HEALTH CARE EDUCATION/TRAINING PROGRAM

## 2023-08-08 RX ORDER — IPRATROPIUM BROMIDE AND ALBUTEROL SULFATE 2.5; .5 MG/3ML; MG/3ML
3 SOLUTION RESPIRATORY (INHALATION) EVERY 4 HOURS PRN
Status: DISCONTINUED | OUTPATIENT
Start: 2023-08-08 | End: 2023-08-09 | Stop reason: HOSPADM

## 2023-08-08 RX ORDER — MAGNESIUM SULFATE HEPTAHYDRATE 40 MG/ML
2 INJECTION, SOLUTION INTRAVENOUS ONCE
Status: COMPLETED | OUTPATIENT
Start: 2023-08-08 | End: 2023-08-08

## 2023-08-08 RX ADMIN — MAGNESIUM SULFATE 2 G: 2 INJECTION INTRAVENOUS at 12:08

## 2023-08-08 RX ADMIN — AMPICILLIN SODIUM AND SULBACTAM SODIUM 3 G: 2; 1 INJECTION, POWDER, FOR SOLUTION INTRAMUSCULAR; INTRAVENOUS at 09:08

## 2023-08-08 RX ADMIN — DORZOLAMIDE HYDROCHLORIDE AND TIMOLOL MALEATE 1 DROP: 22.3; 6.8 SOLUTION/ DROPS OPHTHALMIC at 09:08

## 2023-08-08 RX ADMIN — AMPICILLIN SODIUM AND SULBACTAM SODIUM 3 G: 2; 1 INJECTION, POWDER, FOR SOLUTION INTRAMUSCULAR; INTRAVENOUS at 10:08

## 2023-08-08 RX ADMIN — AMPICILLIN SODIUM AND SULBACTAM SODIUM 3 G: 2; 1 INJECTION, POWDER, FOR SOLUTION INTRAMUSCULAR; INTRAVENOUS at 05:08

## 2023-08-08 RX ADMIN — BRIMONIDINE TARTRATE 1 DROP: 1.5 SOLUTION OPHTHALMIC at 08:08

## 2023-08-08 RX ADMIN — DORZOLAMIDE HYDROCHLORIDE AND TIMOLOL MALEATE 1 DROP: 22.3; 6.8 SOLUTION/ DROPS OPHTHALMIC at 08:08

## 2023-08-08 RX ADMIN — IPRATROPIUM BROMIDE AND ALBUTEROL SULFATE 3 ML: 2.5; .5 SOLUTION RESPIRATORY (INHALATION) at 07:08

## 2023-08-08 RX ADMIN — LACTULOSE 10 G: 20 SOLUTION ORAL at 09:08

## 2023-08-08 RX ADMIN — TIMOLOL MALEATE 1 DROP: 2.5 SOLUTION/ DROPS OPHTHALMIC at 08:08

## 2023-08-08 RX ADMIN — IBUPROFEN 800 MG: 400 TABLET ORAL at 09:08

## 2023-08-08 RX ADMIN — MELATONIN TAB 3 MG 6 MG: 3 TAB at 08:08

## 2023-08-08 RX ADMIN — BRIMONIDINE TARTRATE 1 DROP: 1.5 SOLUTION OPHTHALMIC at 05:08

## 2023-08-08 RX ADMIN — BRIMONIDINE TARTRATE 1 DROP: 1.5 SOLUTION OPHTHALMIC at 09:08

## 2023-08-08 RX ADMIN — IBUPROFEN 800 MG: 400 TABLET ORAL at 07:08

## 2023-08-08 RX ADMIN — TIMOLOL MALEATE 1 DROP: 2.5 SOLUTION/ DROPS OPHTHALMIC at 09:08

## 2023-08-08 RX ADMIN — LACTULOSE 10 G: 20 SOLUTION ORAL at 05:08

## 2023-08-08 RX ADMIN — BIMATOPROST 1 DROP: 0.1 SOLUTION/ DROPS OPHTHALMIC at 08:08

## 2023-08-08 RX ADMIN — AMPICILLIN SODIUM AND SULBACTAM SODIUM 3 G: 2; 1 INJECTION, POWDER, FOR SOLUTION INTRAMUSCULAR; INTRAVENOUS at 04:08

## 2023-08-08 RX ADMIN — ENOXAPARIN SODIUM 40 MG: 40 INJECTION SUBCUTANEOUS at 05:08

## 2023-08-08 RX ADMIN — SENNOSIDES AND DOCUSATE SODIUM 2 TABLET: 50; 8.6 TABLET ORAL at 09:08

## 2023-08-08 NOTE — ASSESSMENT & PLAN NOTE
- Initially presented with a week of back/flank pain with productive cough; denied any fevers or recent illnesses, weight loss. Imaging consistent with a pleural effusion, bedside ultrasound noting multiple pockets of loculated effusion and areas of trapped lung and catheter placed.  - roman catheter placed initially with 400mL of fluid evacuated, subsequently instilled 2 doses worth of tpa/dornase with additional drainage. In total 3200mL of fluid was evacuated prior to the catheter becoming inadvertently removed.  - Repeat CT with some residual effusion remaining, noted in the bases and fissure, but too small to safely intervene upon    Recommendations  - At this time, appears to continue to improve. Continue unasyn while here; at discharge can transition to augmentin BID for a total of 3 weeks of antibiotics.  - Defer any further intervention at this time, risk outweigh the benefit of another catheter placement.   - Will get him follow-up with us in 3-4 weeks with CXR at that time to evaluate for resolution.  - Continue PT/OT evaluation and out of bed. Weaned off supplemental oxygen this morning and sats were 93% on RA for at least 5 minutes during my evaluation.  - Encouraged increased caloric intake as he's been only nibbling at his food, consider ensure/boost (vanilla) between meals.

## 2023-08-08 NOTE — SUBJECTIVE & OBJECTIVE
Interval History: Pt stable on RA. Seen seated in chair. Planning disposition.     Review of Systems   Constitutional:  Negative for fatigue and fever.   Respiratory:  Negative for cough.    Cardiovascular:  Negative for chest pain.   Gastrointestinal:  Negative for abdominal distention and diarrhea.   Genitourinary:  Negative for dysuria.   Psychiatric/Behavioral:  Negative for agitation and confusion.      Objective:     Vital Signs (Most Recent):  Temp: 98 °F (36.7 °C) (08/08/23 1130)  Pulse: 73 (08/08/23 1130)  Resp: 16 (08/08/23 1130)  BP: 113/61 (08/08/23 1130)  SpO2: (!) 92 % (08/08/23 1130) Vital Signs (24h Range):  Temp:  [97.5 °F (36.4 °C)-98.8 °F (37.1 °C)] 98 °F (36.7 °C)  Pulse:  [73-84] 73  Resp:  [14-20] 16  SpO2:  [92 %-97 %] 92 %  BP: (112-128)/(59-76) 113/61     Weight: 71.3 kg (157 lb 3 oz)  Body mass index is 24.62 kg/m².    Intake/Output Summary (Last 24 hours) at 8/8/2023 1432  Last data filed at 8/8/2023 0804  Gross per 24 hour   Intake --   Output 400 ml   Net -400 ml           Physical Exam  Constitutional:       General: He is not in acute distress.  Eyes:      Extraocular Movements: Extraocular movements intact.   Pulmonary:      Effort: No respiratory distress.      Breath sounds: No wheezing.      Comments: breath sounds improved on left lower lung  Chest:      Chest wall: No tenderness (left chest wall tenderness improved).   Abdominal:      General: There is no distension.      Tenderness: There is no abdominal tenderness.   Musculoskeletal:         General: No swelling.   Skin:     Capillary Refill: Capillary refill takes less than 2 seconds.   Neurological:      General: No focal deficit present.      Mental Status: He is oriented to person, place, and time.      Comments: Wears shoes in beds as this helps with his peripheral neuropathy pain              Significant Labs: All pertinent labs within the past 24 hours have been reviewed.    Significant Imaging: I have reviewed all  pertinent imaging results/findings within the past 24 hours.

## 2023-08-08 NOTE — PLAN OF CARE
Patient is AAOX4, VS stable on 3.5L NC.Tolerated antibiotics, did not complain of CP or difficulty in breathing. Bed is low and locked, c  Problem: Adult Inpatient Plan of Care  Goal: Plan of Care Review  Outcome: Ongoing, Progressing  Goal: Patient-Specific Goal (Individualized)  Outcome: Ongoing, Progressing  Goal: Absence of Hospital-Acquired Illness or Injury  Outcome: Ongoing, Progressing  Goal: Optimal Comfort and Wellbeing  Outcome: Ongoing, Progressing  Goal: Readiness for Transition of Care  Outcome: Ongoing, Progressing     Problem: Skin Injury Risk Increased  Goal: Skin Health and Integrity  Outcome: Ongoing, Progressing     Problem: Fluid Imbalance (Pneumonia)  Goal: Fluid Balance  Outcome: Ongoing, Progressing     Problem: Infection (Pneumonia)  Goal: Resolution of Infection Signs and Symptoms  Outcome: Ongoing, Progressing     Problem: Respiratory Compromise (Pneumonia)  Goal: Effective Oxygenation and Ventilation  Outcome: Ongoing, Progressing   all light is within reach. Will continue to monitor.

## 2023-08-08 NOTE — ASSESSMENT & PLAN NOTE
Left sided chest wall pain    Community acquired pneumonia   Acute hypoxic resp failure   - presents to the ED after primary care visit recommended patient to ED for imaging for lumbar spine   - Patient reports left back pain that radiates to left anterior chest that started six days ago, associated with a productive cough and worsening SOB.   - Stopped smoking 20 + years ago   - Leukocytosis 20K with left shift.   - CT Abdomen pelvis with contrast with a loculated left pleural effusion with compressive atelectasis of the left lower lobe, confirmed with CXR.   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), no recent procedures, not on drugs associated with effusion (amio, hydralazine), no signs of malignancy (no weight loss, hemoptysis, night sweats - but does has hx of smoking for 20 odd years but stopped 20 years ago),   - Pt coughed during intake today says this has happened at home as well - could be that pt is aspirating and that is contributing to pneumonia and effusion  - Speech eval - Aspiration Precautions:   Decrease rate of intake, decrease bolus size especially with thin liquids  Small singular cupsips as able (may use straw, must take small sips)  Upright seating during intake    8/2- Pleural cath placed   8/4 - 1st lytic dose  8/5 - 2nd lytic dose, at night catheter detached from pt while he was maneuvering to bed side commode   8/6 - completed 5 days of Azithromycin and ceftriaxone and started on Unasyn per Pulm team. CT chest showing improvement.   8/7 - Pt weaning down oxygen. Pain is much improved.   8/8 - Stable on room air       Plan:  - Duonebs Q4 PRN wake  - Wean O2 as needed to keep Pox >90%  - Pulm consult - d/c with oral abx to complete total abx duration of 3 weeks (augmentin BID), pulmonary follow up - cont Unasyn while inpatient   - Cont tylenol, ibuprofen PRN  - Trend  CBC  - Guaifenesin 200mg Q4 PRN  - Incentive spirometry   - Will need oxygen walk test prior to discharge with [facility]     no

## 2023-08-08 NOTE — ASSESSMENT & PLAN NOTE
Left sided chest wall pain    Community acquired pneumonia   Acute hypoxic resp failure   - presents to the ED after primary care visit recommended patient to ED for imaging for lumbar spine   - Patient reports left back pain that radiates to left anterior chest that started six days ago, associated with a productive cough and worsening SOB.   - Stopped smoking 20 + years ago   - Leukocytosis 20K with left shift.   - CT Abdomen pelvis with contrast with a loculated left pleural effusion with compressive atelectasis of the left lower lobe, confirmed with CXR.   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, not on drugs associated with effusion (amio, hydralazine), no signs of malignancy (no weight loss, hemoptysis, night sweats - but does has hx of smoking for 20 odd years but stopped 20 years ago),   - Pt coughed during intake today says this has happened at home as well - could be that pt is aspirating and that is contributing to pneumonia and effusion  - Speech eval - Aspiration Precautions:   Decrease rate of intake, decrease bolus size especially with thin liquids  Small singular cupsips as able (may use straw, must take small sips)  Upright seating during intake    8/2- Pleural cath placed   8/4 - 1st lytic dose  8/5 - 2nd lytic dose, at night catheter detached from pt while he was maneuvering to bed side commode   8/6 - completed 5 days of Azithromycin and ceftriaxone and started on Unasyn per Pulm team. CT chest showing improvement.   8/7 - Pt weaning down oxygen. Pain is much improved.   8/8 - Stable on room air       Plan:  - Duonebs Q4 PRN wake  - Wean O2 as needed to keep Pox >90%  - Pulm consult - d/c with oral abx to complete total abx duration of 3 weeks (augmentin BID), pulmonary follow up   - Cont tylenol, ibuprofen PRN  - Trend CBC  - Guaifenesin 200mg Q4 PRN  -  Incentive spirometry

## 2023-08-08 NOTE — PT/OT/SLP PROGRESS
Speech Language Pathology      Cricket Cristhian Mcdonnell  MRN: [id number]    Patient not seen today secondary to patient with PT at attempt . Will follow-up 8/9/23.

## 2023-08-08 NOTE — ASSESSMENT & PLAN NOTE
- Was on 5LNC at start, now weaned off supplemental oxygen and comfortable appearing  - Goal SpO2 88-92%; was satting 93% on room air this morning.  - Continue PT/OT evaluation; out of bed to assist with atelectasis. Continue IS  - Remainder under loculated pleural effusion

## 2023-08-08 NOTE — PLAN OF CARE
Problem: Physical Therapy  Goal: Physical Therapy Goal  Description: Goals to be met by: 23    Patient will increase functional independence with mobility by performin. Supine<>sit with supervision without use of HB features.   2. Sit<>stand with supervision with LRAD.  3. Gait x 100 feet with supervision with LRAD.  4. Ascend/descend 3 step(s) with least restrictive assistive device and uni HR with SBA.  5. TUG less than 13 sec without AD to indicate pt at low risk for falls.   Outcome: Ongoing, Progressing     Patient evaluated by PT and goals established. Patient endorses weakness that he attributes to lack of appetite impacting how much he eats (boost at bedside). Demo's gross gait instability without AD, unable to take steps without UE support. Gait training performed with and without RW and encouraged pt to continue using RW for all OOB mobility and plan for PT to address standing activities without AD to improve strength and balance. PT will continue to follow and progress as tolerated. Rec for dc to home with HH PT and OT with initiate family supervision (2 dtrs at home, one will stay until Thurs and one will stay until Tues). Please see progress note for detailed plan of care and recommendations.

## 2023-08-08 NOTE — PROGRESS NOTES
[facility]  Progress Note    Patient Name: Cricket Mcdonnell  MRN: [id number]  Patient Class: IP- Inpatient   Admission Date: 8/1/2023  Length of Stay: 7 days  Attending Physician: Darin Mcguire MD  Primary Care Provider: Neo Sneed MD        Subjective:     Principal Problem:Loculated pleural effusion        HPI:  Cricket Mcdonnell is a 82 year old gentleman with a PMHx of sleep apnea, anxiety disorder and right angle glaucoma of right eye who presents to the ED after primary care visit recommended patient to ED for imaging. Patient with left back pain that radiates to left anterior chest that started six days ago. Patient reports that he was having a productive cough then, but unable to see if it was purulent. Patient denies fever, chills but reports fatigue and loss of appetite. Patient also reports worsening shortness of breath which prompted primary care visit today. Patient reports left chest wall pain was exacerbated with coughing. Patient denies any sick close contacts nor any recent hospitalization. Patient denies nausea, vomiting, diarrhea, pedal edema. Patient also denies any new onset numbness or weakness in bilateral lower extremities.     Afebrile, , RR 20, /67, Pox 91% on arrival and placed on 4L/min O2 via NC. Leukocytosis 20K with left shift. Na 133. UA grossly non-infectious, +1 ketonuria. Blood cx were sent. CT Lumbar spine without contrast with findings of Increase in lumbar levoscoliosis. No definite acute lumbar fracture. Moderate to large left pleural effusion, which is incompletely imaged. CT Abdomen pelvis with contrast with a loculated left pleural effusion with compressive atelectasis of the left lower lobe, confirmed with CXR. Patient was started for management for CAP in ED with Ceftriaxone 1g IV and azithromycin 500mg IV. Patient also given 1L NaCl 0.9% fluid bolus. On exam, patient was comfortable looking, using CPAP. Left  chest wall tenderness is reproducible. Decreased air entry to left posterior lung base.    Patient is admitted to Hospital Medicine for management of worsening shortness of breath with hypoxia and a left lateral wall chest pain likely due to left pleural effusion, currently treated as CAP. Consult placed to pulmonology for advise on need for thoracentesis.       Overview/Hospital Course:  Pt stable on 5L NC. Has stable non productive cough. On auscultation no breath sounds on left lower lung. Seen by pulmonary team and on US noted multiple small pockets of loculated effusion with areas of trapped lung. Pleural catheter placed with 400ml of fluid drained immediately. Pleural studies sent. Will continue abx at this time. Wean oxygen as tolerated.     Pleural studies suggestive of exudative effusion. Will continue abx and Pulm team ordered CT chest to determine next steps. Pt continue to be on 5L NC. Nurse noticed that pt coughed while swallowing water which pt reports has been going on at home intermittently. As such, aspiration could be the source of his pneumonia/effusion. Speech was consulted ; Aspiration Precautions:   Decrease rate of intake, decrease bolus size especially with thin liquids  Small singular cupsips as able (may use straw, must take small sips)  Upright seating during intake    Met daughter Xiao orantes who flew in from [facility]. She reports that pt often forget details as he a very poetic and abstract mind. Daughter also confirms that pt often eats very rapidly and eating slowly might help if he any aspiration is taking place.    Pulm team instilled tpa/dornase into catheter.  Will continue to monitor with ultra sound. Pulm team also touched base with CTS team at [facility] it it came to situation that pt would need vats. If at all, this would take place next week. For now, watching how pt does with lytics.     Pt was experiencing constipation (exacerbated by lying in beds with high oxygen needs  and opioids) - he had no luck with oral regimen or suppository and enema was given. While pt was maneuvering to bedside commode the pleural catheter became detached (despite being stitched on). Pulm team to reassess and decide on further management.      Rpt CT chest showed improvement and pt is having decreased oxygen requirements - 'Interval decrease in left-sided loculated pleural effusion with small loculated pleural effusion now present.  There has been interval removal of the left chest tube.  A trace left pneumothorax is identified within loculated pleural fluid along the lateral aspect of the left hemithorax.'    Per Pulm team will continue to wean oxygen and plan for discharge on oral abx with pulm follow up. In the meantime will wean patient as tolerated and PT and OT consulted. They recommended home health care. Daughters Xiao and Trinh (who arrived today) are concerned he may need more care. After discussion about patients abilities and evaluations by PT and OT,  was able to provide a list of private pay care services to which they were agreeable.     If pt stable on RA, can d/c tomorrow with [facility]. Will need outpatient oral abx and pulmonary follow up.       No new subjective & objective note has been filed under this hospital service since the last note was generated.      Assessment/Plan:      * Loculated pleural effusion   Left sided chest wall pain    Community acquired pneumonia   Acute hypoxic resp failure   - presents to the ED after primary care visit recommended patient to ED for imaging for lumbar spine   - Patient reports left back pain that radiates to left anterior chest that started six days ago, associated with a productive cough and worsening SOB.   - Stopped smoking 20 + years ago   - Leukocytosis 20K with left shift.   - CT Abdomen pelvis with contrast with a loculated left pleural effusion with compressive atelectasis of the left lower lobe, confirmed with CXR.   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, not on drugs associated with effusion (amio, hydralazine), no signs of malignancy (no weight loss, hemoptysis, night sweats - but does has hx of smoking for 20 odd years but stopped 20 years ago),   - Pt coughed during intake today says this has happened at home as well - could be that pt is aspirating and that is contributing to pneumonia and effusion  - Speech eval - Aspiration Precautions:   Decrease rate of intake, decrease bolus size especially with thin liquids  Small singular cupsips as able (may use straw, must take small sips)  Upright seating during intake    8/2- Pleural cath placed   8/4 - 1st lytic dose  8/5 - 2nd lytic dose, at night catheter detached from pt while he was maneuvering to bed side commode   8/6 - completed 5 days of Azithromycin and ceftriaxone and started on Unasyn per Pulm team. CT chest showing improvement.   8/7 - Pt weaning down oxygen. Pain is much improved.   8/8 - Stable on room air       Plan:  - Duonebs Q4 PRN wake  - Wean O2 as needed to keep Pox >90%  - Pulm consult - d/c with oral abx to complete total abx duration of 3 weeks (augmentin BID), pulmonary follow up - cont Unasyn while inpatient   - Cont tylenol, ibuprofen PRN  - Trend CBC  - Guaifenesin 200mg Q4 PRN  - Incentive spirometry   - Will need oxygen walk test prior to discharge with [facility]      Acute hypoxemic respiratory failure  See above     Constipation  - Cont senna docusate and lactulose. Monitor for diarrhea            Primary open angle glaucoma of right eye, moderate stage  Patient reports using multiple eye drops but unable to recall names of them other than "timolol TID, bimonidine TID and something at night".     - Eye drops as ordered to best of ability based on home list.       KARMA (obstructive sleep apnea)  - CPAP qhs        VTE Risk " Mitigation (From admission, onward)           Ordered     enoxaparin injection 40 mg  Daily         08/01/23 2024     IP VTE HIGH RISK PATIENT  Once         08/01/23 2024     Place sequential compression device  Until discontinued         08/01/23 2024                    Discharge Planning   YASH:      Code Status: Full Code   Is the patient medically ready for discharge?:     Reason for patient still in hospital (select all that apply): Pending disposition, monitoring off oxygen   Discharge Plan A: Home            Darin Rasheed MD  Department of Hospital Medicine   [facility])

## 2023-08-08 NOTE — PT/OT/SLP PROGRESS
Occupational Therapy   Treatment    Name: Cricket Mcdonnell  MRN: [id number]  Admitting Diagnosis:  Loculated pleural effusion       Recommendations:     Discharge Recommendations: home health OT  Discharge Equipment Recommendations:  bedside commode, TTB  Barriers to discharge:  Decreased caregiver support    Assessment:     Cricket Mcdonnell is a 82 y.o. male with a medical diagnosis of Loculated pleural effusion.  He presents with left lower back pain. Performance deficits affecting function are weakness, impaired endurance, impaired self care skills, impaired functional mobility, gait instability, impaired balance, decreased safety awareness. Patient pleasant and agreeable to therapy. Patient tolerated session well and progressing toward goals. At rest pt SpO2 93-94% and remained consistent during session with functional mobility.      Supine > Sit: SBA   Sit <> Stand: CGA   Functional Mobility: CGA with RW and increase of time  BSC Transfer: CGA and slow/steady descend/lift   Chair transfer: CGA   Grooming: CGA and RW standing at the sink  LB Dressing: Set up/SBA to don/doff B socks/shoes long sitting in bed with leg crossing tech - requiring resting break between tasks     Rehab Prognosis:  Good; patient would benefit from acute skilled OT services to address these deficits and reach maximum level of function.       Plan:     Patient to be seen 4 x/week to address the above listed problems via self-care/home management, therapeutic activities, therapeutic exercises  Plan of Care Expires: 09/06/23  Plan of Care Reviewed with: patient    Subjective     Chief Complaint: New lower back pain on the left side   Patient/Family Comments/goals: agreeable to therapy for OT intevention   Pain/Comfort:  Pain Rating 1: 4/10  Location - Side 1: Left  Location - Orientation 1: lower  Location 1: back  Pain Addressed 1: Reposition, Pre-medicate for activity, Distraction, Cessation of Activity  Pain Rating Post-Intervention 1:  2/10    Objective:     Communicated with: RN (Merlyn) prior to session.  Patient found supine with peripheral IV upon OT entry to room.    General Precautions: Standard, fall    Orthopedic Precautions:N/A  Braces: N/A  Respiratory Status: Room air     Occupational Performance:     Bed Mobility:     Supine > Sit: SBA     Functional Mobility/Transfers:  Sit <> Stand: CGA   Functional Mobility: CGA with RW and increase of time  BSC Transfer: CGA and slow/steady descend/lift   Chair transfer: CGA     Activities of Daily Living:  Grooming: CGA and RW standing at the sink for oral care and facial hygiene   LB Dressing: Set up/SBA to don/doff B socks/shoes long sitting in bed with leg crossing tech - requiring resting break between tasks         [facility] 6 Click ADL: 19    Treatment & Education:  OT role, plan of care, progression of goals, importance of continued OOB activity, ADL/functional transfer and mobility retraining, discharge recommendation, call don't fall, safety precautions, fall prevention.      Patient left up in chair with all lines intact, call button in reach, and RN present    GOALS:   Multidisciplinary Problems       Occupational Therapy Goals          Problem: Occupational Therapy    Goal Priority Disciplines Outcome Interventions   Occupational Therapy Goal     OT, PT/OT Ongoing, Progressing    Description: Goals to be met by: 8/17/2023     Patient will increase functional independence with ADLs by performing:    UE Dressing with [place].  LE Dressing with Supervision.  Grooming while standing at sink with Supervision.  Toileting from toilet with Supervision for hygiene and clothing management.   Toilet transfer to toilet with Supervision.                         Time Tracking:     OT Date of Treatment: 08/08/23  OT Start Time: 0859  OT Stop Time: 0929  OT Total Time (min): 30 min    Billable Minutes:Self Care/Home Management 30 min    OT/[place]: [place]     Number of [place] visits since last OT visit:  1    8/8/2023

## 2023-08-08 NOTE — PT/OT/SLP EVAL
Physical Therapy Evaluation and Treatment    Patient Name:  Cricket Mcdonnell   MRN:  [id number]    Recommendations:     Discharge Recommendations: home health PT, home health OT (initial 24/7 supervision from family (planning on staying for one week))   Discharge Equipment Recommendations: bedside commode, bath bench (has RW and cane at home)   Barriers to discharge: Inaccessible home    Assessment:     Cricket Mcdonnell is a 82 y.o. male admitted with a medical diagnosis of Loculated pleural effusion with acute hypoxemic respiratory failure. 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  He presents with the following impairments/functional limitations: weakness, impaired endurance, impaired self care skills, impaired sensation, impaired functional mobility, gait instability, impaired balance, decreased lower extremity function, decreased upper extremity function, decreased coordination, decreased safety awareness, pain, impaired cardiopulmonary response to activity, impaired muscle length.    Patient evaluated by PT and goals established. Patient endorses weakness that he attributes to lack of appetite impacting how much he eats (boost at bedside). Demo's gross gait instability without AD, unable to take steps without UE support. Gait training performed with and without RW and encouraged pt to continue using RW for all OOB mobility and plan for PT to address standing activities without AD to improve strength and balance. PT will continue to follow and progress as tolerated. Rec for dc to home with HH PT and OT with initiate family supervision (2 dtrs at home, one will stay until Thurs and one will stay until Tues).    Addendum 1600: Discussed with pt's dtr via phone about clarifications on pt's level of support at home - he will have his dtr Trinh present until early 8/15 then will be home alone. As his dtrs are teachers/professors, they do not have much flexibility in being able to stay with the  upcoming start of school. His son is currently sick with a respiratory illness.    Rehab Prognosis: Good; patient would benefit from acute skilled PT services to address these deficits and reach maximum level of function.    Recent Surgery: * No surgery found *      Plan:     During this hospitalization, patient to be seen 5 x/week to address the identified rehab impairments via gait training, therapeutic activities, therapeutic exercises, neuromuscular re-education and progress toward the following goals:    Plan of Care Expires:  08/22/23    Subjective     Chief Complaint: Pain in back that is worsened by decreased mobility  Patient/Family Comments/goals: Goal to regain independence, feels like he will do ok with HH and defers need for sitters; Patient agreeable to evaluation.  Pain/Comfort:  Pain Rating 1:  (unrated back pain)  Pain Addressed 1: Reposition, Distraction, Cessation of Activity, Nurse notified (encouraged increased movement and position changes)  Pain Rating Post-Intervention 1:  (denies increased pain with standing)    Patients cultural, spiritual, Gnosticism conflicts given the current situation: no    Living Environment:  Pt lives in a single story home with 3 steps to enter and  handrail(s).   Pt has a tub shower with a regular toilet.   Upon discharge, patient will have assistance from his dtrs (one staying until Thurs, one staying until Tues).  Prior level of function:  Ambulation: Indep  ADL's: Indep  IADLs: Indep  Retired creative arts professor  Falls: Reports last fall 1.5 years ago  Equipment used at home: none.  DME owned (not currently used): rolling walker and single point cane.     Objective:     Communicated with RN unavailable via phone, OT prior to session.  Patient found up in chair with peripheral IV  upon PT entry to room.    General Precautions: Standard, fall, respiratory  Orthopedic Precautions:N/A   Braces: N/A  Respiratory Status: Room air    Patient donned non slip socks  and gait belt for OOB mobility.    Exams:  Cognition:   Patient is oriented x4.  Pt follows approximately 100% of one and two step commands.    Mood: Pleasant and cooperative.   Safety Awareness: Appropriate to situation  Musculoskeletal:  BMI: 24.62  Posture:  Forward head, rounded shoulders, increased thoracic kyphosis that pt able to correct 50% without increased pain  LE ROM/Strength:   R ROM: WFL  L ROM: WFL  R Strength:   Knee extension: 5/5  L Strength:   Knee extension: 5/5  Neuromuscular:  Sensation: H/o neuropathy to B feet (pt reports worsened by bed rest)  Coordination/Tone/Reflexes: No impairments identified with functional mobility. No formal testing performed.    Balance:   Static sitting: Good  Static standing: Poor, posterior lean and unable to maintain without UE support  Visual-vestibular: No impairments identified with functional mobility. No formal testing performed.  Integument: Visible skin intact  Cardiopulmonary:  Vital signs:   Pre (sitting): SpO2 92% HR 75  During (gait): 90-94% HR max 80s  Post (sitting): quick decrease to min 77%, returned to >90% within 30 sec without intervention (maintained good pleth)   Edema: None noted    Functional Mobility:  Transfers:     Sit to Stand:  supervision and stand by assistance with no AD and rolling walker  From chair with increased time to complete    Gait: x25 ft with IV pole and furniture cruising with Atif (attempted no AD), x80 ft with RW and SBA.   Initial attempt without AD, pt with shuffling step attempts without successful excursion with high guard and pt reporting feeling of about to fall  Provided initial HHA than IV pole for uni UE support, pt with furniture cruising when available for BUE support  Slow shuffling gait with widened step width and high guard  2nd attempt with RW with immediate improvement in gait stability  Continued slow gait but improved step clearance, at times steppage gait with h/o neuropathy. Maintains forward flexion  of trunk with increased thoracic kyphosis - when cued to correct, able to correct 50% without increased pain      AM-PAC 6 CLICK MOBILITY  Total Score:18       Treatment & Education:  TA:  Instructed in safe use of DME for room navigation with nursing supervision  Instructed in appropriate posture  Instructed in frequent position changes for nonspecific back pain  PT educated patient re:   PT plan of care/role of PT  Safety with OOB mobility  Use of RW  Discharge disposition    Pt verbalized understanding       Patient left up in chair with all lines intact and call button in reach.    GOALS:   Multidisciplinary Problems       Physical Therapy Goals          Problem: Physical Therapy    Goal Priority Disciplines Outcome Goal Variances Interventions   Physical Therapy Goal     PT, PT/OT Ongoing, Progressing     Description: Goals to be met by: 23    Patient will increase functional independence with mobility by performin. Supine<>sit with supervision without use of HB features.   2. Sit<>stand with supervision with LRAD.  3. Gait x 100 feet with supervision with LRAD.  4. Ascend/descend 3 step(s) with least restrictive assistive device and uni HR with SBA.  5. TUG less than 13 sec without AD to indicate pt at low risk for falls.                        History:     Past Medical History:   Diagnosis Date    Hereditary sensory neuropathy     Sleep apnea     + CPAP       Past Surgical History:   Procedure Laterality Date    CATARACT EXTRACTION      COLONOSCOPY N/A 1/3/2019    Procedure: COLONOSCOPY;  Surgeon: Cholo Yates MD;  Location: [facility] ([address] [place] [address]);  Service: Endoscopy;  Laterality: N/A;    EYE SURGERY      galucoma surgery      HERNIA REPAIR      tonsillectomy      TONSILLECTOMY         Time Tracking:     PT Received On: 23  PT Start Time: 1058     PT Stop Time: 1122  PT Total Time (min): 24 min     Billable Minutes: Evaluation 16 and Therapeutic Activity 8      2023

## 2023-08-08 NOTE — PLAN OF CARE
Problem: Skin Injury Risk Increased  Goal: Skin Health and Integrity  Outcome: Ongoing, Progressing     Problem: Fluid Imbalance (Pneumonia)  Goal: Fluid Balance  Outcome: Ongoing, Progressing     Problem: Respiratory Compromise (Pneumonia)  Goal: Effective Oxygenation and Ventilation  Outcome: Ongoing, Progressing     Problem: Infection (Pneumonia)  Goal: Resolution of Infection Signs and Symptoms  Outcome: Ongoing, Progressing     Problem: Adult Inpatient Plan of Care  Goal: Readiness for Transition of Care  Outcome: Ongoing, Progressing

## 2023-08-08 NOTE — PROGRESS NOTES
[facility] ([place])  Pulmonology  Progress Note    Patient Name: Cricket Mcdonnell  MRN: [id number]  Admission Date: 8/1/2023  Hospital Length of Stay: 7 days  Code Status: Full Code  Attending Provider: Darin Mcguire MD  Primary Care Provider: Neo Sneed MD   Principal Problem: Loculated pleural effusion    Subjective:     Interval History: Worked with OT yesterday and recommended home health, concerns by family regarding his aability to be independent quite yet. Both daughters at bedside this morning.    Not very hungry, but still eating a small amount of food. Wanting to try ensure. Weaned off supplemental oxygen and sats were 93% on exam this morning. Had some pain yesterday/last night, but otherwise feels rather well. Continues to progress    Objective:     Vital Signs (Most Recent):  Temp: 97.5 °F (36.4 °C) (08/08/23 0727)  Pulse: 75 (08/08/23 0727)  Resp: 20 (08/08/23 0727)  BP: 128/68 (08/08/23 0727)  SpO2: (!) 93 % (08/08/23 0727) Vital Signs (24h Range):  Temp:  [97.5 °F (36.4 °C)-98.8 °F (37.1 °C)] 97.5 °F (36.4 °C)  Pulse:  [74-84] 75  Resp:  [14-20] 20  SpO2:  [93 %-97 %] 93 %  BP: (112-128)/(59-76) 128/68     Weight: 71.3 kg (157 lb 3 oz)  Body mass index is 24.62 kg/m².      Intake/Output Summary (Last 24 hours) at 8/8/2023 0921  Last data filed at 8/8/2023 0804  Gross per 24 hour   Intake 480 ml   Output 400 ml   Net 80 ml          Physical Exam  Constitutional:       Appearance: Normal appearance. He is normal weight.   HENT:      Head: Normocephalic and atraumatic.      Nose: Nose normal. No congestion.      Mouth/Throat:      Mouth: Mucous membranes are moist.      Pharynx: Oropharynx is clear.   Eyes:      Extraocular Movements: Extraocular movements intact.      Conjunctiva/sclera: Conjunctivae normal.      Pupils: Pupils are equal, round, and reactive to light.   Cardiovascular:      Pulses: Normal pulses.      Heart sounds: Normal heart sounds. No murmur  heard.  Pulmonary:      Effort: Pulmonary effort is normal. No respiratory distress.      Breath sounds: No wheezing or rhonchi.   Abdominal:      General: Abdomen is flat. Bowel sounds are normal. There is no distension.      Palpations: Abdomen is soft.      Tenderness: There is no abdominal tenderness.   Musculoskeletal:         General: Normal range of motion.      Cervical back: Normal range of motion and neck supple.      Right lower leg: No edema.      Left lower leg: No edema.   Skin:     General: Skin is warm and dry.      Capillary Refill: Capillary refill takes less than 2 seconds.   Neurological:      General: No focal deficit present.      Mental Status: He is alert and oriented to person, place, and time. Mental status is at baseline.      Cranial Nerves: No cranial nerve deficit.      Sensory: No sensory deficit.      Motor: No weakness.   Psychiatric:         Mood and Affect: Mood normal.         Behavior: Behavior normal.         Thought Content: Thought content normal.         Judgment: Judgment normal.         Vents:  Oxygen Concentration (%): 45 (08/07/23 2237)    Lines/Drains/Airways       Peripheral Intravenous Line  Duration                  Peripheral IV - Single Lumen 08/01/23 2000 20 G Anterior;Right Forearm 6 days                    Significant Labs:    CBC/Anemia Profile:  Recent Labs   Lab 08/07/23  0451 08/08/23  0455   WBC 14.01* 11.16   HGB 12.0* 11.5*   HCT 36.1* 34.0*    387   MCV 96 93   RDW 12.7 12.8          Chemistries:  Recent Labs   Lab 08/07/23  0451 08/08/23  0455   * 135*   K 3.5 3.5   CL 99 103   CO2 28 26   BUN 12 9   CREATININE 0.6 0.5   CALCIUM 8.4* 8.2*         All pertinent labs within the past 24 hours have been reviewed.    Significant Imaging:  I have reviewed all pertinent imaging results/findings within the past 24 hours.          Assessment/Plan:     Pulmonary  * Loculated pleural effusion  - Initially presented with a week of back/flank pain with  productive cough; denied any fevers or recent illnesses, weight loss. Imaging consistent with a pleural effusion, bedside ultrasound noting multiple pockets of loculated effusion and areas of trapped lung and catheter placed.  - roman catheter placed initially with 400mL of fluid evacuated, subsequently instilled 2 doses worth of tpa/dornase with additional drainage. In total 3200mL of fluid was evacuated prior to the catheter becoming inadvertently removed.  - Repeat CT with some residual effusion remaining, noted in the bases and fissure, but too small to safely intervene upon    Recommendations  - At this time, appears to continue to improve. Continue unasyn while here; at discharge can transition to augmentin BID for a total of 3 weeks of antibiotics.  - Defer any further intervention at this time, risk outweigh the benefit of another catheter placement.   - Will get him follow-up with us in 3-4 weeks with CXR at that time to evaluate for resolution.  - Continue PT/OT evaluation and out of bed. Weaned off supplemental oxygen this morning and sats were 93% on RA for at least 5 minutes during my evaluation.  - Encouraged increased caloric intake as he's been only nibbling at his food, consider ensure/boost (vanilla) between meals.    CAP (community acquired pneumonia)  - See under loculated pleural effusion    Acute hypoxemic respiratory failure  - Was on 5LNC at start, now weaned off supplemental oxygen and comfortable appearing  - Goal SpO2 88-92%; was satting 93% on room air this morning.  - Continue PT/OT evaluation; out of bed to assist with atelectasis. Continue IS  - Remainder under loculated pleural effusion      Thank you for allowing us to participate in the care of this patient, we will sign off at this time. Please let us know if there are questions or concerns.    Stoney Coon MD  Pulmonology  [facility] ([place])

## 2023-08-08 NOTE — PLAN OF CARE
Medicare Message     Important Message from Medicare regarding Discharge Appeal Rights Given to patient/caregiver; Explained to patient/caregiver; Signed/date by patient/caregiver   Date IMM was signed 8/8/2023   Time IMM was signed 1140

## 2023-08-09 ENCOUNTER — TELEPHONE (OUTPATIENT)
Dept: INTERNAL MEDICINE | Facility: CLINIC | Age: 83
End: 2023-08-09
Payer: MEDICARE

## 2023-08-09 ENCOUNTER — TELEPHONE (OUTPATIENT)
Dept: OPHTHALMOLOGY | Facility: CLINIC | Age: 83
End: 2023-08-09
Payer: MEDICARE

## 2023-08-09 VITALS
SYSTOLIC BLOOD PRESSURE: 121 MMHG | BODY MASS INDEX: 24.67 KG/M2 | DIASTOLIC BLOOD PRESSURE: 62 MMHG | HEIGHT: 67 IN | OXYGEN SATURATION: 95 % | WEIGHT: 157.19 LBS | RESPIRATION RATE: 18 BRPM | HEART RATE: 71 BPM | TEMPERATURE: 98 F

## 2023-08-09 PROCEDURE — 92526 ORAL FUNCTION THERAPY: CPT | Mod: HCNC

## 2023-08-09 PROCEDURE — 97116 GAIT TRAINING THERAPY: CPT | Mod: HCNC

## 2023-08-09 PROCEDURE — 1111F DSCHRG MED/CURRENT MED MERGE: CPT | Mod: HCNC,CPTII,, | Performed by: HOSPITALIST

## 2023-08-09 PROCEDURE — 25000003 PHARM REV CODE 250: Mod: HCNC

## 2023-08-09 PROCEDURE — 63600175 PHARM REV CODE 636 W HCPCS: Mod: HCNC | Performed by: STUDENT IN AN ORGANIZED HEALTH CARE EDUCATION/TRAINING PROGRAM

## 2023-08-09 PROCEDURE — 94761 N-INVAS EAR/PLS OXIMETRY MLT: CPT | Mod: HCNC

## 2023-08-09 PROCEDURE — 97530 THERAPEUTIC ACTIVITIES: CPT | Mod: HCNC

## 2023-08-09 PROCEDURE — 25000003 PHARM REV CODE 250: Mod: HCNC | Performed by: STUDENT IN AN ORGANIZED HEALTH CARE EDUCATION/TRAINING PROGRAM

## 2023-08-09 PROCEDURE — 1111F PR DISCHARGE MEDS RECONCILED W/ CURRENT OUTPATIENT MED LIST: ICD-10-PCS | Mod: HCNC,CPTII,, | Performed by: HOSPITALIST

## 2023-08-09 PROCEDURE — 99239 PR HOSPITAL DISCHARGE DAY,>30 MIN: ICD-10-PCS | Mod: HCNC,,, | Performed by: HOSPITALIST

## 2023-08-09 PROCEDURE — 99239 HOSP IP/OBS DSCHRG MGMT >30: CPT | Mod: HCNC,,, | Performed by: HOSPITALIST

## 2023-08-09 PROCEDURE — 97535 SELF CARE MNGMENT TRAINING: CPT | Mod: HCNC,CO

## 2023-08-09 PROCEDURE — 25000003 PHARM REV CODE 250: Mod: HCNC | Performed by: HOSPITALIST

## 2023-08-09 RX ORDER — AMOXICILLIN AND CLAVULANATE POTASSIUM 875; 125 MG/1; MG/1
1 TABLET, FILM COATED ORAL EVERY 12 HOURS
Qty: 36 TABLET | Refills: 0 | Status: SHIPPED | OUTPATIENT
Start: 2023-08-09 | End: 2023-08-10

## 2023-08-09 RX ORDER — ACETAMINOPHEN 500 MG
1000 TABLET ORAL EVERY 8 HOURS PRN
Refills: 0 | COMMUNITY
Start: 2023-08-09 | End: 2023-11-29

## 2023-08-09 RX ORDER — AMOXICILLIN AND CLAVULANATE POTASSIUM 875; 125 MG/1; MG/1
1 TABLET, FILM COATED ORAL EVERY 12 HOURS
Status: DISCONTINUED | OUTPATIENT
Start: 2023-08-09 | End: 2023-08-09 | Stop reason: HOSPADM

## 2023-08-09 RX ADMIN — TIMOLOL MALEATE 1 DROP: 2.5 SOLUTION/ DROPS OPHTHALMIC at 09:08

## 2023-08-09 RX ADMIN — BRIMONIDINE TARTRATE 1 DROP: 1.5 SOLUTION OPHTHALMIC at 09:08

## 2023-08-09 RX ADMIN — AMOXICILLIN AND CLAVULANATE POTASSIUM 1 TABLET: 875; 125 TABLET, FILM COATED ORAL at 09:08

## 2023-08-09 RX ADMIN — LACTULOSE 10 G: 20 SOLUTION ORAL at 09:08

## 2023-08-09 RX ADMIN — AMPICILLIN SODIUM AND SULBACTAM SODIUM 3 G: 2; 1 INJECTION, POWDER, FOR SOLUTION INTRAMUSCULAR; INTRAVENOUS at 04:08

## 2023-08-09 RX ADMIN — SENNOSIDES AND DOCUSATE SODIUM 2 TABLET: 50; 8.6 TABLET ORAL at 09:08

## 2023-08-09 RX ADMIN — DORZOLAMIDE HYDROCHLORIDE AND TIMOLOL MALEATE 1 DROP: 22.3; 6.8 SOLUTION/ DROPS OPHTHALMIC at 09:08

## 2023-08-09 NOTE — PLAN OF CARE
HH referral sent to [facility], pt choice,    I certify I provided patient choice and a list to the patient/family of CMS rated Home Patient/Family signed Patient's Choice Disclosure Form choosing the followin.[facility]  2.  3.      Pending acceptance

## 2023-08-09 NOTE — DISCHARGE SUMMARY
[facility] Medicine  Discharge Summary      Patient Name: Cricket Mcdonnell  MRN: [id number]  OLGA: [id number]  Patient Class: IP- Inpatient  Admission Date: 8/1/2023  Hospital Length of Stay: 8 days  Discharge Date and Time: 8/9/2023  2:39 PM  Attending Physician: Bret Miller MD  Discharging Provider: Bret Miller MD  Primary Care Provider: Neo Sneed MD      HPI:   Cricket Mcdonnell is a 82 year old gentleman with a PMHx of sleep apnea, anxiety disorder and right angle glaucoma of right eye who presents to the ED after primary care visit recommended patient to ED for imaging. Patient with left back pain that radiates to left anterior chest that started six days ago. Patient reports that he was having a productive cough then, but unable to see if it was purulent. Patient denies fever, chills but reports fatigue and loss of appetite. Patient also reports worsening shortness of breath which prompted primary care visit today. Patient reports left chest wall pain was exacerbated with coughing. Patient denies any sick close contacts nor any recent hospitalization. Patient denies nausea, vomiting, diarrhea, pedal edema. Patient also denies any new onset numbness or weakness in bilateral lower extremities.     Afebrile, , RR 20, /67, Pox 91% on arrival and placed on 4L/min O2 via NC. Leukocytosis 20K with left shift. Na 133. UA grossly non-infectious, +1 ketonuria. Blood cx were sent. CT Lumbar spine without contrast with findings of Increase in lumbar levoscoliosis. No definite acute lumbar fracture. Moderate to large left pleural effusion, which is incompletely imaged. CT Abdomen pelvis with contrast with a loculated left pleural effusion with compressive atelectasis of the left lower lobe, confirmed with CXR. Patient was started for management for CAP in ED with Ceftriaxone 1g IV and azithromycin 500mg IV. Patient also given 1L NaCl 0.9% fluid bolus. On exam,  patient was comfortable looking, using CPAP. Left chest wall tenderness is reproducible. Decreased air entry to left posterior lung base.    Patient is admitted to Hospital Medicine for management of worsening shortness of breath with hypoxia and a left lateral wall chest pain likely due to left pleural effusion, currently treated as CAP. Consult placed to pulmonology for advise on need for thoracentesis.     Hospital Course:   Patient is 82-year-old man admitted to the hospital with acute hypoxic respiratory failure secondary to pneumonia with a loculated parapneumonic effusion.    Patient treated with intravenous antibiotics oxygen to maintain reasonable oxygen saturation.  Patient initially required 5 liters/minute of supplemental oxygen.  Pulmonary critical care service consulted.    Patient underwent thoracentesis with placement of a pleural catheter with removal of pleural fluid.  Patient had placement of intrapleural tissue plasminogen activator (tPA) and dornase pawel (DNase) into pleural space with additional pleural fluid removed.  Pleural studies consistent with exudative pleural effusion.  Repeat imaging shows some residual effusion remaining however too small to intervene upon.    Patient clinically improved and weaned off all supplemental oxygen.  Pulmonary critical care service recommended patient complete 3-week course of oral antibiotics with amoxicillin/clavulanic acid to treat suspected aspiration pneumonia complicated by loculated parapneumonic effusion.    Close outpatient follow-up with Pulmonary Service along with his primary care physician advised.    Close outpatient follow-up with Ophthalmology also recommended to clarify what eye drop regimen he should be on as there is signficiant confusion regarding what his home regimen should be at this time.       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] 02/23/2022 Yes    KARMA (obstructive sleep apnea) [G47.33]  Yes      Problems Resolved During this Admission:    Diagnosis Date Noted Date Resolved POA    CAP (community acquired pneumonia) [J18.9] 08/05/2023 08/08/2023 Yes    Dysphagia [R13.10] 08/03/2023 08/08/2023 Unknown    Hypoxia [R09.02] 08/02/2023 08/02/2023 Yes    Left-sided chest wall pain [R07.89] 08/02/2023 08/08/2023 Yes       Discharged Condition: Stable    Disposition: [place]-Health Care [facility] [place]    Follow Up:   Follow-up Information     [place], [facility] - Follow up.    Specialty: Home Health Services  Why: Notified Andree at [facility] to call daughterTrinh, [phone], to arrange apts.  Contact information:  [address] [place] [address].  [address]  [place] [address] [phone]                       Patient Instructions:      COMMODE FOR HOME USE     Order Specific Question Answer Comments   Type: Standard    Height: 5' 7" (1.702 m)    Weight: 71.3 kg (157 lb 3 oz)    Does patient have medical equipment at home? none    Length of need (1-99 months): 99      TRANSFER TUB BENCH FOR HOME USE     Order Specific Question Answer Comments   Type of Transfer Tub Bench: Unpadded    Height: 5' 7" (1.702 m)    Weight: 71.3 kg (157 lb 3 oz)    Does patient have medical equipment at home? none    Length of need (1-99 months): 99    Patient notified - Not covered by insurance considered a convenience item Yes    Discussed financial responsibility with responsible party Yes      Ambulatory referral/consult to Ophthalmology   Standing " Status: Future   Referral Priority: Urgent Referral Type: Consultation   Referral Reason: Specialty Services Required   Requested Specialty: Ophthalmology   Number of Visits Requested: 1     Diet Adult Regular     Notify your health care provider if you experience any of the following:  temperature >100.4     Notify your health care provider if you experience any of the following:  persistent nausea and vomiting or diarrhea     Notify your health care provider if you experience any of the following:  severe uncontrolled pain     Notify your health care provider if you experience any of the following:  difficulty breathing or increased cough     Notify your health care provider if you experience any of the following:  severe persistent headache     Notify your health care provider if you experience any of the following:  worsening rash     Notify your health care provider if you experience any of the following:  persistent dizziness, light-headedness, or visual disturbances     Notify your health care provider if you experience any of the following:  increased confusion or weakness     Activity as tolerated        Medications:  Reconciled Home Medications:      Medication List      START taking these medications    acetaminophen 500 MG tablet  Commonly known as: TYLENOL  Take 2 tablets (1,000 mg total) by mouth every 8 (eight) hours as needed for Pain.     amoxicillin-clavulanate 875-125mg 875-125 mg per tablet  Commonly known as: AUGMENTIN  Take 1 tablet by mouth every 12 (twelve) hours. for 18 days        CHANGE how you take these medications    brimonidine 0.2% 0.2 % Drop  Commonly known as: ALPHAGAN  Instill 1 drop into left eye three times a day  What changed: Another medication with the same name was removed. Continue taking this medication, and follow the directions you see here.     dorzolamide-timolol 2-0.5% 22.3-6.8 mg/mL ophthalmic solution  Commonly known as: COSOPT  Instill 1 drop into both eyes twice a  day  What changed: Another medication with the same name was removed. Continue taking this medication, and follow the directions you see here.        CONTINUE taking these medications    erythromycin ophthalmic ointment  Commonly known as: ROMYCIN  Apply 1 a thin layer into left eye four times a day     fluorometholone 0.1% 0.1 % Drps  Commonly known as: FML  Instill 1 drop into left eye once a day     ketorolac 0.4% 0.4 % Drop  Commonly known as: ACULAR  Instill 1 drop into left eye four times a day     timolol maleate 0.25% 0.25 % Drop  Commonly known as: TIMOPTIC  1 drop once daily.        STOP taking these medications    azelastine 137 mcg (0.1 %) nasal spray  Commonly known as: ASTELIN     doxepin 10 MG capsule  Commonly known as: SINEQUAN     ferrous sulfate 325 (65 FE) MG EC tablet     FLAREX 0.1 % Drps  Generic drug: fluorometholone     fluticasone propionate 50 mcg/actuation nasal spray  Commonly known as: FLONASE     FLUZONE HIGHDOSE QUAD 22-23  mcg/0.7 mL Syrg  Generic drug: flu vacc [id number](65yr up)-PF     ibuprofen 800 MG tablet  Commonly known as: ADVIL,MOTRIN     LUMIGAN 0.01 % Drop  Generic drug: bimatoprost     MARCAINE 0.25 % (2.5 mg/mL) Soln  Generic drug: BUPivacaine 0.25% (2.5 mg/ml)     meloxicam 15 MG tablet  Commonly known as: MOBIC     methocarbamoL 500 MG Tab  Commonly known as: ROBAXIN     MODERNA COVID BIVAL(6M UP)(PF) 50 mcg/0.5 mL injection  Generic drug: sars-cov-2 (covid-19 omicron)     multivit-iron-min-folic acid 3,500-18-0.4 unit-mg-mg Chew     mupirocin 2 % ointment  Commonly known as: BACTROBAN     neomycin-polymyxin-dexamethasone 3.5 mg/g-10,000 unit/g-0.1 % Oint  Commonly known as: MAXITROL     neomycin-polymyxin-dexamethasone 3.5mg/mL-10,000 unit/mL-0.1 % Drps  Commonly known as: MAXITROL     ofloxacin 0.3 % ophthalmic solution  Commonly known as: OCUFLOX     ondansetron 4 MG Tbdl  Commonly known as: ZOFRAN-ODT     prednisoLONE acetate 1 % Drps  Commonly known as: PRED  FORTE     PREVIDENT 5000 BOOSTER PLUS 1.1 % Pste  Generic drug: fluoride (sodium)     ROCKLATAN 0.02-0.005 % Drop  Generic drug: netarsudiL-latanoprost     sildenafiL 100 MG tablet  Commonly known as: VIAGRA     testosterone 1.62 % (40.5 mg/2.5 gram) Glpk  Commonly known as: ANDROGEL     zolpidem 10 mg Tab  Commonly known as: AMBIEN            Indwelling Lines/Drains at time of discharge:   Lines/Drains/Airways     None                 Time spent on the discharge of patient: 35 minutes         Bret Miller MD  Department of Hospital Medicine  [facility] ([place])

## 2023-08-09 NOTE — TELEPHONE ENCOUNTER
----- Message from Rogerio Hastings sent at 8/9/2023  8:58 AM CDT -----  Name of Who is Calling:  Zulay DODGE calling on behalf of MALACHI GOULD [[id number]]                What is the request in detail: Zulay calling to set up a hospital follow up, next available 8/31, he need to be seen in 7 days..Please call back to further assist.                 Can the clinic reply by [facility]: No                What Number to Call Back if not in [facility]:[phone]

## 2023-08-09 NOTE — PT/OT/SLP PROGRESS
Speech Language Pathology Treatment    Patient Name:  Cricket Mcdonnell   MRN:  [id number]  Admitting Diagnosis: Loculated pleural effusion    Recommendations:               General Recommendations:    SLP to follow 3-4x/week during admit to ensure tolerance of PO diet     Diet recommendations: regular solids, thin liquids     Aspiration Precautions:   Decrease rate of intake, decrease bolus size especially with thin liquids  Small singular cupsips as able (may use straw, must take small sips)  Upright seating during intake     General Precautions: Standard,    Assessment:     Cricket Mcdonnell is a 82 y.o. male with an SLP diagnosis of signs of pharyngeal dysphagia with large volume cupsips per nursing .  He presents with functional oropharyngeal swallow this session given slow rate of intake.       Subjective     Patient awake, alert, resting in bed upon entry to room. Now with d/c orders to HH.     Pain/Comfort:   Denies    Respiratory Status: Room air    Objective:     Patient denies coughing during meals. Frequently during discussion pt denies dysphagia deficits and is avoidant of intake tasks with SLP, weary of recommendation for MBS but continues to report he will think about it. Though today he reports difficulty with pills during med pass. Discussed crushing pills and placing in puree vs placing whole singular pills in puree, washing down with singular sips vs consecutive sips- patient in agreement to trial and reports he will call daughter to obtain puree and pill  for d/c as pt set to leave this date  Again reviewed general aspiration precautions, upright seating during intake, slow rate of intake, singular bites/sips. Reviewed if signs of dysphagia persist recommend outpatient MBSS, will best guide tx services. Pt reports he understands all education    Oropharyngeal swallow function:   Consistencies Assessed:  Thin liquids via singular cupsips (patient hesitant to take inc volume, suspect due to  "known context of assessment/follow up and attempts to not cough/show signs of dec tolerance)     Oral Phase:   Adequate labial seal on cup, adequate a/p transfer and oral clearance     Pharyngeal Phase:   Single swallows per bolus   No signs of aspiration noted, no throat clearing/coughing post intake     Impressions:  Reviewed with patient risk for aspiration is present given inc level of respiratory support, mildly elevated RR, current pulmonary status. Reviewed with patient utilize strict aspiration precautions including decreased rate of intake, small singular cupsips, use of cup vs straw when able. Patient in agreement with plan. Reports he does feel like when he takes consecutive "gulps" of liquids results in coughing, reports he tries to take small sips and this mitigates his symptoms.     Patient with decreased intake of thin liquids for this assessment period, suspect attempts to limit deficits in context of evaluation. Reviewed with patient need for assessment for proper tx plan. Pt reports he will think about recommendation of MBSS as OP    Goals:   Multidisciplinary Problems       SLP Goals          Problem: SLP    Goal Priority Disciplines Outcome   SLP Goal     SLP Ongoing, Progressing   Description: 1. Patient will utilize safe swallow strategies/aspiration risk precautions (decreased rate/bolus size, slow rate of intake, upright seating) in 100% of opportunities independently to safely tolerate a regular diet with thin liquids without pulmonary compromise.     2. Patient will recall pillars of aspiration pna with 100% accuracy independently                        Plan:     Patient to be seen:   3-4x/week  Plan of Care expires:   8/10/23  Plan of Care reviewed with:    patient, RN  SLP Follow-Up:   yes       Discharge recommendations:    possible SLP at next level of care if deficits persist  Barriers to Discharge:  Level of Skilled Assistance Needed      Time Tracking:     SLP Treatment Date:   " 08/09/23  Speech Start Time:  1036  Speech Stop Time:  1049     Speech Total Time (min):  13 min    Billable Minutes: Treatment Swallowing Dysfunction 13    08/09/2023

## 2023-08-09 NOTE — PLAN OF CARE
CM met with pt and daughters for final discharge planning assessment.    Pt is ready for discharge home today. Home health arrangements complete with [facility].    Meds sent to [facility] prior to discharge.    Family will provide transportation home. Family has arranged home care sitters in addition to HH for 6 hrs/day.     notified to call pt's daughter, Trinh, [phone], to arrange visits.    Pt is ready for discharge from  perspective.   08/09/23 1411   Final Note   Assessment Type Final Discharge Note   Anticipated Discharge Disposition Home-Health   What phone number can be called within the next 1-3 days to see how you are doing after discharge? [phone]   Hospital Resources/Appts/Education Provided Provided patient/caregiver with written discharge plan information;Appointments scheduled by Navigator/Coordinator;Provided education on problems/symptoms using teachback;Appointments scheduled and added to AVS   Post-Acute Status   Post-Acute Authorization Home Health   HME Status Set-up Complete/Auth obtained   Home Health Status Set-up Complete/Auth obtained   Patient choice form signed by patient/caregiver List with quality metrics by geographic area provided;List from CMS Compare;List from System Post-Acute Care   Discharge Delays None known at this time     [facility] ([place])  Discharge Final Note    Primary Care Provider: Neo Sneed MD    Expected Discharge Date: 8/9/2023    Final Discharge Note (most recent)       Final Note - 08/09/23 1411          Final Note    Assessment Type Final Discharge Note (P)      Anticipated Discharge Disposition Home-Health Care Svc (P)      What phone number can be called within the next 1-3 days to see how you are doing after discharge? [phone] (P)      Hospital Resources/Appts/Education Provided Provided patient/caregiver with written discharge plan information;Appointments scheduled by Navigator/Coordinator;Provided education  on problems/symptoms using teachback;Appointments scheduled and added to AVS (P)         Post-Acute Status    Post-Acute Authorization Home Health (P)      HME Status Set-up Complete/Auth obtained (P)      Home Health Status Set-up Complete/Auth obtained (P)      Patient choice form signed by patient/caregiver List with quality metrics by geographic area provided;List from CMS Compare;List from System Post-Acute Care (P)      Discharge Delays None known at this time (P)                      Important Message from Medicare  Important Message from Medicare regarding Discharge Appeal Rights: Given to patient/caregiver, Explained to patient/caregiver, Signed/date by patient/caregiver     Date IMM was signed: 08/08/23  Time IMM was signed: [id number]

## 2023-08-09 NOTE — TELEPHONE ENCOUNTER
Spoke with pt daughter Trinh who stated the pt is being discharged today from the hospital. Informed Trinh 8/31 was the soonest Dr Sneed had for a hospital follow up. Trinh verbalized understanding.

## 2023-08-09 NOTE — PLAN OF CARE
Patient educated on discharge instructions and verbalized understanding.  All questions answered to patient's satisfaction.  IV removed without complication.  Patient to be transported off unit via wheelchair.     Problem: Adult Inpatient Plan of Care  Goal: Plan of Care Review  Outcome: Ongoing, Progressing  Goal: Patient-Specific Goal (Individualized)  Outcome: Ongoing, Progressing  Goal: Absence of Hospital-Acquired Illness or Injury  Outcome: Ongoing, Progressing  Goal: Optimal Comfort and Wellbeing  Outcome: Ongoing, Progressing  Goal: Readiness for Transition of Care  Outcome: Ongoing, Progressing     Problem: Skin Injury Risk Increased  Goal: Skin Health and Integrity  Outcome: Ongoing, Progressing     Problem: Fluid Imbalance (Pneumonia)  Goal: Fluid Balance  Outcome: Ongoing, Progressing     Problem: Infection (Pneumonia)  Goal: Resolution of Infection Signs and Symptoms  Outcome: Ongoing, Progressing     Problem: Respiratory Compromise (Pneumonia)  Goal: Effective Oxygenation and Ventilation  Outcome: Ongoing, Progressing

## 2023-08-09 NOTE — PLAN OF CARE
Patient is stable on RA with SPO2 stable between 93 and 95%, even though he refused to use CPAP to sleep, he slept better than the previous days. Her VS were also stable, he had a bowel movement and utilized pain medicine once during the shift. Bed is low and locked, call light within reach. Will continue to monitor patient.  Problem: Adult Inpatient Plan of Care  Goal: Plan of Care Review  Outcome: Ongoing, Progressing  Goal: Patient-Specific Goal (Individualized)  Outcome: Ongoing, Progressing  Goal: Absence of Hospital-Acquired Illness or Injury  Outcome: Ongoing, Progressing  Goal: Optimal Comfort and Wellbeing  Outcome: Ongoing, Progressing  Goal: Readiness for Transition of Care  Outcome: Ongoing, Progressing     Problem: Skin Injury Risk Increased  Goal: Skin Health and Integrity  Outcome: Ongoing, Progressing     Problem: Fluid Imbalance (Pneumonia)  Goal: Fluid Balance  Outcome: Ongoing, Progressing     Problem: Infection (Pneumonia)  Goal: Resolution of Infection Signs and Symptoms  Outcome: Ongoing, Progressing     Problem: Respiratory Compromise (Pneumonia)  Goal: Effective Oxygenation and Ventilation  Outcome: Ongoing, Progressing

## 2023-08-09 NOTE — PT/OT/SLP PROGRESS
Physical Therapy Treatment    Patient Name:  Cricket Mcdonnell   MRN:  [id number]    Recommendations:     Discharge Recommendations: home health PT, home health OT (initial 24/7 supervision from family (planning on staying for one week))  Discharge Equipment Recommendations: bedside commode, bath bench (has RW and cane at home)  Barriers to discharge: None    Assessment:     Cricket Mcdonnell is a 82 y.o. male admitted with a medical diagnosis of Loculated pleural effusion with acute hypoxemic respiratory failure. 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  He presents with the following impairments/functional limitations: weakness, impaired endurance, impaired self care skills, impaired sensation, impaired functional mobility, gait instability, impaired balance, decreased lower extremity function, decreased upper extremity function, decreased coordination, decreased safety awareness, pain, impaired cardiopulmonary response to activity, impaired muscle length.    With pending DC to home, focus of session on stair training and ensuring safety with mobility at home with RW. Patient able to complete stairs without HR with CGA and RW and ambulated >100 ft with supervision-mod(I) and RW. Rec for dc to home with HH.     Rehab Prognosis: Good; patient would benefit from acute skilled PT services to address these deficits and reach maximum level of function.    Recent Surgery: * No surgery found *      Plan:     During this hospitalization, patient to be seen 5 x/week to address the identified rehab impairments via gait training, therapeutic activities, therapeutic exercises, neuromuscular re-education and progress toward the following goals:    Plan of Care Expires:  08/22/23    Subjective     Chief Complaint: Feeling tired (still tired from hour plus nap)  Patient/Family Comments/goals: Goal to be less tired, eager for DC home; Patient agreeable to PT treatment.  Pain/Comfort:  Pain Rating 1: 0/10  Pain Rating  Post-Intervention 1: 0/10      Objective:     Communicated with RN prior to session.  Patient found up in chair with peripheral IV upon PT entry to room.     General Precautions: Standard, fall, respiratory  Orthopedic Precautions: N/A  Braces: N/A  Respiratory Status: Room air     Patient donned non slip socks and gait belt for OOB mobility.    Functional Mobility:  Bed Mobility:     Supine to Sit: modified independence  Sit to Supine: modified independence  Transfers:     Sit to Stand:  modified independence and supervision with rolling walker  From EOB and chair  Gait: x110 ft with RW and SBA progressing to supervision.   Continued forward flexion of trunk   No overt LOB or difficulty with RW management  Deferred progressing gait training off dme d/t pending DC to home and increased level of fatigue  Stairs:  Pt ascended/descended 4 stair(s) with Rolling Walker with no handrails with Contact Guard Assistance.   Instructed in use of RW folded up as hand held support, required minimal stabilization of RW   Performed with step to gait pattern and without overt LOB or difficulty with step navigation      AM-PAC 6 CLICK MOBILITY  Turning over in bed (including adjusting bedclothes, sheets and blankets)?: 4  Sitting down on and standing up from a chair with arms (e.g., wheelchair, bedside commode, etc.): 3  Moving from lying on back to sitting on the side of the bed?: 4  Moving to and from a bed to a chair (including a wheelchair)?: 3  Need to walk in hospital room?: 3  Climbing 3-5 steps with a railing?: 3  Basic Mobility Total Score: 20       Treatment & Education:  Gait, transfers, and bed mobility as above  Encouraged OOB mobility to assist with return to function     Patient left up in chair with all lines intact, call button in reach, and RN notified..    GOALS:   Multidisciplinary Problems       Physical Therapy Goals          Problem: Physical Therapy    Goal Priority Disciplines Outcome Goal Variances  Interventions   Physical Therapy Goal     PT, PT/OT Ongoing, Progressing     Description: Goals to be met by: 23    Patient will increase functional independence with mobility by performin. Supine<>sit with supervision without use of HB features.   2. Sit<>stand with supervision with LRAD.  3. Gait x 100 feet with supervision with LRAD.  4. Ascend/descend 3 step(s) with least restrictive assistive device and uni HR with SBA.  5. TUG less than 13 sec without AD to indicate pt at low risk for falls.                        Time Tracking:     PT Received On: 23  PT Start Time: 1150     PT Stop Time: 1220  PT Total Time (min): 30 min     Billable Minutes: Gait Training 15 and Therapeutic Activity 15    Treatment Type: Treatment  PT/PTA: PT     Number of PTA visits since last PT visit: 0     2023

## 2023-08-09 NOTE — PLAN OF CARE
[facility] ([place]      HOME HEALTH ORDERS  FACE TO FACE ENCOUNTER    Patient Name: Cricket Mcdonnell  YOB: 1940  PCP: Neo Sneed MD   PCP Address: [address] [facility] / [place] [address]  PCP Phone Number: [phone]  PCP Fax: [phone]    Encounter Date: 8/1/23    Admit to Home Health    Diagnoses:  Active Hospital Problems    Diagnosis  POA    *Loculated pleural effusion [J90]  Yes    Constipation [K59.00]  Unknown    Acute hypoxemic respiratory failure [J96.01]  Yes    Primary open angle glaucoma of right eye, moderate stage [H40.1112]  Yes    KARMA (obstructive sleep apnea) [G47.33]  Yes      Resolved Hospital Problems    Diagnosis Date Resolved POA    CAP (community acquired pneumonia) [J18.9] 08/08/2023 Yes    Dysphagia [R13.10] 08/08/2023 Unknown    Hypoxia [R09.02] 08/02/2023 Yes    Left-sided chest wall pain [R07.89] 08/08/2023 Yes       Follow Up Appointments:  Future Appointments   Date Time Provider Department Center   8/23/2023 10:00 AM Melinda Portillo PA-C [facility] IM [facility]   8/31/2023  2:00 PM Neo Sneed MD [facility]   9/6/2023 11:30 AM Rachelle Vincent PA-C [facility] SLEEP [facility]   11/20/2023  9:00 AM LAB, APPOINTMENT [facility]   11/24/2023 10:20 AM [facility]       Allergies:  Review of patient's allergies indicates:   Allergen Reactions    Poison ivy extract     Cooper Rash       Medications: Review discharge medications with patient and family and provide education.      Current Discharge Medication List        START taking these medications    Details   acetaminophen (TYLENOL) 500 MG tablet Take 2 tablets (1,000 mg total) by mouth every 8 (eight) hours as needed for Pain.  Refills: 0      amoxicillin-clavulanate 875-125mg (AUGMENTIN) 875-125 mg per tablet Take 1 tablet by mouth every 12 (twelve) hours. for 18 days  Qty: 36 tablet, Refills: 0            CONTINUE these medications which have NOT CHANGED    Details   brimonidine 0.2% (ALPHAGAN) 0.2 % Drop Instill 1 drop into left eye three times a day  Qty: 10 mL, Refills: 6      dorzolamide-timolol 2-0.5% (COSOPT) 22.3-6.8 mg/mL ophthalmic solution Instill 1 drop into both eyes twice a day  Qty: 10 mL, Refills: 2      erythromycin (ROMYCIN) ophthalmic ointment Apply 1 a thin layer into left eye four times a day  Qty: 3.5 g, Refills: 6    Comments: USE Rx DISCOUNT CARD: $14.04,  BIN:[id number],  PCN:ANASTACIA,  Group:[facility],  ID:[id number]      fluorometholone 0.1% (FML) 0.1 % DrpS Instill 1 drop into left eye once a day  Qty: 5 mL, Refills: 1      ketorolac 0.4% (ACULAR) 0.4 % Drop Instill 1 drop into left eye four times a day  Qty: 5 mL, Refills: 1    Comments: USE Rx DISCOUNT CARD: $49.49,  BIN:[id number],  PCN:ANASTACIA,  Group:[id number],  ID:[id number]      timolol maleate 0.25% (TIMOPTIC) 0.25 % Drop 1 drop once daily.           STOP taking these medications       azelastine (ASTELIN) 137 mcg (0.1 %) nasal spray Comments:   Reason for Stopping:         brimonidine 0.15 % OPTH DROP (ALPHAGAN) 0.15 % ophthalmic solution Comments:   Reason for Stopping:         brimonidine 0.15 % OPTH DROP (ALPHAGAN) 0.15 % ophthalmic solution Comments:   Reason for Stopping:         doxepin (SINEQUAN) 10 MG capsule Comments:   Reason for Stopping:         ferrous sulfate 325 (65 FE) MG EC tablet Comments:   Reason for Stopping:         fluorometholone (FLAREX) 0.1 % DrpS Comments:   Reason for Stopping:         fluticasone (FLONASE) 50 mcg/actuation nasal spray Comments:   Reason for Stopping:         fluticasone propionate (FLONASE) 50 mcg/actuation nasal spray Comments:   Reason for Stopping:         FLUZONE HIGHDOSE QUAD 22-23  mcg/0.7 mL Syrg Comments:   Reason for Stopping:         ibuprofen (ADVIL,MOTRIN) 800 MG tablet Comments:   Reason for Stopping:         LUMIGAN 0.01 % Drop Comments:   Reason for Stopping:         MARCAINE 0.25 %  (2.5 mg/mL) Soln Comments:   Reason for Stopping:         meloxicam (MOBIC) 15 MG tablet Comments:   Reason for Stopping:         methocarbamoL (ROBAXIN) 500 MG Tab Comments:   Reason for Stopping:         MODERNA COVID BIVAL,6Y UP,,PF, 50 mcg/0.5 mL injection Comments:   Reason for Stopping:         MULTIVIT-IRON-MIN-FOLIC ACID 3,500-18-0.4 UNIT-MG-MG ORAL CHEW Comments:   Reason for Stopping:         mupirocin (BACTROBAN) 2 % ointment Comments:   Reason for Stopping:         neomycin-polymyxin-dexamethasone (MAXITROL) 3.5 mg/g-10,000 unit/g-0.1 % Oint Comments:   Reason for Stopping:         neomycin-polymyxin-dexamethasone (MAXITROL) 3.5 mg/g-10,000 unit/g-0.1 % Oint Comments:   Reason for Stopping:         neomycin-polymyxin-dexamethasone (MAXITROL) 3.5 mg/g-10,000 unit/g-0.1 % Oint Comments:   Reason for Stopping:         neomycin-polymyxin-dexamethasone (MAXITROL) 3.5 mg/g-10,000 unit/g-0.1 % Oint Comments:   Reason for Stopping:         neomycin-polymyxin-dexamethasone (MAXITROL) 3.5 mg/g-10,000 unit/g-0.1 % Oint Comments:   Reason for Stopping:         neomycin-polymyxin-dexamethasone (MAXITROL) 3.5 mg/g-10,000 unit/g-0.1 % Oint Comments:   Reason for Stopping:         neomycin-polymyxin-dexamethasone (MAXITROL) 3.5 mg/g-10,000 unit/g-0.1 % Oint Comments:   Reason for Stopping:         neomycin-polymyxin-dexamethasone (MAXITROL) 3.5 mg/g-10,000 unit/g-0.1 % Oint Comments:   Reason for Stopping:         neomycin-polymyxin-dexamethasone (MAXITROL) 3.5 mg/g-10,000 unit/g-0.1 % Oint Comments:   Reason for Stopping:         neomycin-polymyxin-dexamethasone (MAXITROL) 3.5mg/mL-10,000 unit/mL-0.1 % DrpS Comments:   Reason for Stopping:         netarsudiL-latanoprost (ROCKLATAN) 0.02-0.005 % Drop Comments:   Reason for Stopping:         netarsudiL-latanoprost (ROCKLATAN) 0.02-0.005 % Drop Comments:   Reason for Stopping:         netarsudiL-latanoprost (ROCKLATAN) 0.02-0.005 % Drop Comments:   Reason for Stopping:          netarsudiL-latanoprost (ROCKLATAN) 0.02-0.005 % Drop Comments:   Reason for Stopping:         ofloxacin (OCUFLOX) 0.3 % ophthalmic solution Comments:   Reason for Stopping:         ondansetron (ZOFRAN-ODT) 4 MG TbDL Comments:   Reason for Stopping:         prednisoLONE acetate (PRED FORTE) 1 % DrpS Comments:   Reason for Stopping:         prednisoLONE acetate (PRED FORTE) 1 % DrpS Comments:   Reason for Stopping:         PREVIDENT 5000 BOOSTER PLUS 1.1 % Pste Comments:   Reason for Stopping:         sildenafiL (VIAGRA) 100 MG tablet Comments:   Reason for Stopping:         testosterone (ANDROGEL) 1.62 % (40.5 mg/2.5 gram) GlPk Comments:   Reason for Stopping:         testosterone (ANDROGEL) 1.62 % (40.5 mg/2.5 gram) GlPk Comments:   Reason for Stopping:         zolpidem (AMBIEN) 10 mg Tab Comments:   Reason for Stopping:                 I have seen and examined this patient within the last 30 days. My clinical findings that support the need for the home health skilled services and home bound status are the following:no   Weakness/numbness causing balance and gait disturbance due to Infection and Weakness/Debility making it taxing to leave home.     Diet:   regular diet    Referrals/ Consults  Physical Therapy to evaluate and treat. Evaluate for home safety and equipment needs; Establish/upgrade home exercise program. Perform / instruct on therapeutic exercises, gait training, transfer training, and Range of Motion.  Occupational Therapy to evaluate and treat. Evaluate home environment for safety and equipment needs. Perform/Instruct on transfers, ADL training, ROM, and therapeutic exercises.   to evaluate for community resources/long-range planning.  Aide to provide assistance with personal care, ADLs, and vital signs.    Activities:   activity as tolerated    Nursing:   Agency to admit patient within 24 hours of hospital discharge unless specified on physician order or at patient request    SN  to complete comprehensive assessment including routine vital signs. Instruct on disease process and s/s of complications to report to MD. Review/verify medication list sent home with the patient at time of discharge  and instruct patient/caregiver as needed. Frequency may be adjusted depending on start of care date.     Skilled nurse to perform up to 3 visits PRN for symptoms related to diagnosis    Notify MD if SBP > 160 or < 90; DBP > 90 or < 50; HR > 120 or < 50; Temp > 101; O2 < 88%    Ok to schedule additional visits based on staff availability and patient request on consecutive days within the home health episode.    When multiple disciplines ordered:    Start of Care occurs on Sunday - Wednesday schedule remaining discipline evaluations as ordered on separate consecutive days following the start of care.    Thursday SOC -schedule subsequent evaluations Friday and Monday the following week.     Friday - Saturday SOC - schedule subsequent discipline evaluations on consecutive days starting Monday of the following week.    For all post-discharge communication and subsequent orders please contact patient's primary care physician.    Home Health Aide:  Nursing Weekly, Physical Therapy Three times weekly, Occupational Therapy Three times weekly, Medical Social Work Weekly, and Home Health Aide Twice weekly      I certify that this patient is confined to his home and needs intermittent skilled nursing care, physical therapy, and occupational therapy.

## 2023-08-09 NOTE — PT/OT/SLP PROGRESS
Occupational Therapy   Treatment    Name: Cricket Mcdonnell  MRN: [id number]  Admitting Diagnosis:  Loculated pleural effusion       Recommendations:     Discharge Recommendations: home health OT, home health PT (initial 24/7 supervision from family (planning on staying for one week))  Discharge Equipment Recommendations:  bedside commode, bath bench (has RW and cane at home)  Barriers to discharge:  Decreased caregiver support    Assessment:     Cricket Mcdonnell is a 82 y.o. male with a medical diagnosis of Loculated pleural effusion.  He presents with no pain. Performance deficits affecting function are weakness, impaired endurance, impaired self care skills, impaired functional mobility, gait instability, impaired balance, decreased coordination, decreased lower extremity function, decreased upper extremity function, impaired sensation, decreased safety awareness, impaired cardiopulmonary response to activity, impaired muscle length. Patient pleasant and agreeable to therapy. Patient overall tolerated session well and progressing toward goals. Patient educated on energy conservation training with ADLs/iADLs.     Supine > Sit: SBA   Sit <> Stand: CGA and RW   Functional Mobility: CGA and RW with increase of time   BSC/Toilet Transfer: CGA and Grab bars with steady/safe descend/lift   Chair Transfer: CGA   Shower Transfer: Min A and grab bars   LB Dressing: Min A to don/doff B socks/shoes seated BSC with via leg tech cross tech. Min A to don/doff underwear seated on BSC   UB Dressing: Min A to don/doff gown seated on BSC   Showering: Min A seated on bench for washing and drying   Energy conservation training     Rehab Prognosis:  Good; patient would benefit from acute skilled OT services to address these deficits and reach maximum level of function.       Plan:     Patient to be seen 4 x/week to address the above listed problems via self-care/home management, therapeutic activities, therapeutic exercises  Plan of Care  "Expires: 09/06/23  Plan of Care Reviewed with: patient    Subjective     Chief Complaint: "I am ready to go home"  Patient/Family Comments/goals: "I am ready to take a shower"  Pain/Comfort:  Pain Rating 1: 0/10    Objective:     Communicated with: RN (Peggy) prior to session.  Patient found supine with peripheral IV upon OT entry to room.    General Precautions: Standard, fall, respiratory    Orthopedic Precautions:N/A  Braces: N/A  Respiratory Status: Room air     Occupational Performance:     Bed Mobility:    Supine > Sit: SBA     Functional Mobility/Transfers:  Sit <> Stand: CGA and RW   Functional Mobility: CGA and RW with increase of time   BSC/Toilet Transfer: CGA and Grab bars with steady/safe descend/lift   Chair Transfer: CGA   Shower Transfer: Min A and grab bars     Activities of Daily Living:  LB Dressing:   Min A to don/doff B socks/shoes seated BSC with via leg tech cross tech - assistance with lifting back of shoes when putting it on  Min A to don/doff underwear seated on BSC - needing assistance threading BLE in/out underwear   UB Dressing: Min A to don/doff gown seated on BSC   Showering: Min A seated on bench for washing and drying - assistance with posterior hygiene and drying BLE  Energy conservation training        [facility] 6 Click ADL: 19    Treatment & Education:  OT role, plan of care, progression of goals, importance of continued OOB activity, ADL/functional transfer and mobility retraining, discharge recommendation, call don't fall, safety precautions, fall prevention.      Patient left up in chair with all lines intact, call button in reach, and RN notified    GOALS:   Multidisciplinary Problems       Occupational Therapy Goals          Problem: Occupational Therapy    Goal Priority Disciplines Outcome Interventions   Occupational Therapy Goal     OT, PT/OT Ongoing, Progressing    Description: Goals to be met by: 8/17/2023     Patient will increase functional independence with ADLs by " performing:    UE Dressing with Era.  LE Dressing with Supervision.  Grooming while standing at sink with Supervision.  Toileting from toilet with Supervision for hygiene and clothing management.   Toilet transfer to toilet with Supervision.  Bathing with Min A.                         Time Tracking:     OT Date of Treatment: 08/09/23  OT Start Time: 0930  OT Stop Time: 1009  OT Total Time (min): 39 min    Billable Minutes:Self Care/Home Management 39 min    OT/[place]: AUSTIN     Number of [place] visits since last OT visit: 2    8/9/2023

## 2023-08-09 NOTE — PLAN OF CARE
Problem: Occupational Therapy  Goal: Occupational Therapy Goal  Description: Goals to be met by: 8/17/2023     Patient will increase functional independence with ADLs by performing:    UE Dressing with [place].  LE Dressing with Supervision.  Grooming while standing at sink with Supervision.  Toileting from toilet with Supervision for hygiene and clothing management.   Toilet transfer to toilet with Supervision.    COMPLETED GOALS   Bathing with Min A. MET 8/9/2023    Outcome: Ongoing, Progressing

## 2023-08-10 RX ORDER — AMOXICILLIN AND CLAVULANATE POTASSIUM 400; 57 MG/5ML; MG/5ML
800 POWDER, FOR SUSPENSION ORAL EVERY 12 HOURS
Qty: 400 ML | Refills: 0 | Status: SHIPPED | OUTPATIENT
Start: 2023-08-10 | End: 2023-08-30

## 2023-08-14 PROCEDURE — G0180 PR HOME HEALTH MD CERTIFICATION: ICD-10-PCS | Mod: ,,, | Performed by: HOSPITALIST

## 2023-08-14 PROCEDURE — G0180 MD CERTIFICATION HHA PATIENT: HCPCS | Mod: ,,, | Performed by: HOSPITALIST

## 2023-08-16 DIAGNOSIS — G25.81 RLS (RESTLESS LEGS SYNDROME): Primary | ICD-10-CM

## 2023-08-16 RX ORDER — GABAPENTIN 100 MG/1
100-300 CAPSULE ORAL NIGHTLY PRN
Qty: 90 CAPSULE | Refills: 1 | Status: SHIPPED | OUTPATIENT
Start: 2023-08-16 | End: 2023-11-29

## 2023-08-29 ENCOUNTER — TELEPHONE (OUTPATIENT)
Dept: INTERNAL MEDICINE | Facility: CLINIC | Age: 83
End: 2023-08-29
Payer: MEDICARE

## 2023-08-29 RX ORDER — AMITRIPTYLINE HYDROCHLORIDE 10 MG/1
10 TABLET, FILM COATED ORAL NIGHTLY PRN
Qty: 90 TABLET | Refills: 1 | Status: SHIPPED | OUTPATIENT
Start: 2023-08-29 | End: 2023-11-29

## 2023-08-29 NOTE — TELEPHONE ENCOUNTER
Spoke to patient he was told his rx has been sent to [facility] on [address]. He will call pharmacy to see if med is ready before he go to pick it up

## 2023-08-29 NOTE — TELEPHONE ENCOUNTER
----- Message from Nimo Archer sent at 8/29/2023  1:21 PM CDT -----  Contact: MALACHI GOULD [[id number]]  Type:  Patient Returning Call      Who Called:   MALACHI GOULD [[id number]]      Who Left Message for Patient: Unsure      Does the patient know what this is regarding?: Yes      Would the patient rather a call back or a response via [facility]?  Call back       Best Call Back Number: [phone]      Additional Information:

## 2023-08-29 NOTE — TELEPHONE ENCOUNTER
----- Message from Cynthia Main sent at 8/28/2023  1:37 PM CDT -----  Regarding: rx for sleep  Name of Who is Calling:MALACHI GOULD [[id number]]          What is the request in detail:pt is asking if you can prescribe him or advise him of a otc rx that helps him to sleep, but does not blur vision. Please call to assist           Can the clinic reply by [facility]:          What Number to Call Back if not in [facility]:[phone]

## 2023-08-29 NOTE — TELEPHONE ENCOUNTER
Spoke to patient he's having trouble staying asleep he's constantly waking up during the night. He's tired OTC Unisom, its been so long that he doesn't remember if it worked for him. He has reduced the amount of coffee he's drinking to 1.5 cups a day and is currently taking [id number] to help with sleep doesn't feel this medication is helping. Called today to ask Dr. Sneed if there's something else he can take to help with sleep.

## 2023-08-31 ENCOUNTER — OFFICE VISIT (OUTPATIENT)
Dept: INTERNAL MEDICINE | Facility: CLINIC | Age: 83
End: 2023-08-31
Attending: FAMILY MEDICINE
Payer: MEDICARE

## 2023-08-31 VITALS
SYSTOLIC BLOOD PRESSURE: 120 MMHG | OXYGEN SATURATION: 97 % | HEIGHT: 67 IN | HEART RATE: 66 BPM | DIASTOLIC BLOOD PRESSURE: 62 MMHG | WEIGHT: 147.25 LBS | BODY MASS INDEX: 23.11 KG/M2

## 2023-08-31 DIAGNOSIS — G47.33 OSA (OBSTRUCTIVE SLEEP APNEA): ICD-10-CM

## 2023-08-31 DIAGNOSIS — J90 LOCULATED PLEURAL EFFUSION: ICD-10-CM

## 2023-08-31 DIAGNOSIS — R53.83 FATIGUE, UNSPECIFIED TYPE: ICD-10-CM

## 2023-08-31 DIAGNOSIS — J18.9 PNEUMONIA OF LEFT LOWER LOBE DUE TO INFECTIOUS ORGANISM: Primary | ICD-10-CM

## 2023-08-31 PROCEDURE — 1160F RVW MEDS BY RX/DR IN RCRD: CPT | Mod: HCNC,CPTII,S$GLB, | Performed by: FAMILY MEDICINE

## 2023-08-31 PROCEDURE — 1101F PR PT FALLS ASSESS DOC 0-1 FALLS W/OUT INJ PAST YR: ICD-10-PCS | Mod: HCNC,CPTII,S$GLB, | Performed by: FAMILY MEDICINE

## 2023-08-31 PROCEDURE — 3074F SYST BP LT 130 MM HG: CPT | Mod: HCNC,CPTII,S$GLB, | Performed by: FAMILY MEDICINE

## 2023-08-31 PROCEDURE — 1111F DSCHRG MED/CURRENT MED MERGE: CPT | Mod: HCNC,CPTII,S$GLB, | Performed by: FAMILY MEDICINE

## 2023-08-31 PROCEDURE — 1111F PR DISCHARGE MEDS RECONCILED W/ CURRENT OUTPATIENT MED LIST: ICD-10-PCS | Mod: HCNC,CPTII,S$GLB, | Performed by: FAMILY MEDICINE

## 2023-08-31 PROCEDURE — 1126F PR PAIN SEVERITY QUANTIFIED, NO PAIN PRESENT: ICD-10-PCS | Mod: HCNC,CPTII,S$GLB, | Performed by: FAMILY MEDICINE

## 2023-08-31 PROCEDURE — 3074F PR MOST RECENT SYSTOLIC BLOOD PRESSURE < 130 MM HG: ICD-10-PCS | Mod: HCNC,CPTII,S$GLB, | Performed by: FAMILY MEDICINE

## 2023-08-31 PROCEDURE — 3078F PR MOST RECENT DIASTOLIC BLOOD PRESSURE < 80 MM HG: ICD-10-PCS | Mod: HCNC,CPTII,S$GLB, | Performed by: FAMILY MEDICINE

## 2023-08-31 PROCEDURE — 99999 PR PBB SHADOW E&M-EST. PATIENT-LVL IV: CPT | Mod: PBBFAC,HCNC,, | Performed by: FAMILY MEDICINE

## 2023-08-31 PROCEDURE — 3288F FALL RISK ASSESSMENT DOCD: CPT | Mod: HCNC,CPTII,S$GLB, | Performed by: FAMILY MEDICINE

## 2023-08-31 PROCEDURE — 99999 PR PBB SHADOW E&M-EST. PATIENT-LVL IV: ICD-10-PCS | Mod: PBBFAC,HCNC,, | Performed by: FAMILY MEDICINE

## 2023-08-31 PROCEDURE — 1159F MED LIST DOCD IN RCRD: CPT | Mod: HCNC,CPTII,S$GLB, | Performed by: FAMILY MEDICINE

## 2023-08-31 PROCEDURE — 1160F PR REVIEW ALL MEDS BY PRESCRIBER/CLIN PHARMACIST DOCUMENTED: ICD-10-PCS | Mod: HCNC,CPTII,S$GLB, | Performed by: FAMILY MEDICINE

## 2023-08-31 PROCEDURE — 3078F DIAST BP <80 MM HG: CPT | Mod: HCNC,CPTII,S$GLB, | Performed by: FAMILY MEDICINE

## 2023-08-31 PROCEDURE — 1126F AMNT PAIN NOTED NONE PRSNT: CPT | Mod: HCNC,CPTII,S$GLB, | Performed by: FAMILY MEDICINE

## 2023-08-31 PROCEDURE — 1101F PT FALLS ASSESS-DOCD LE1/YR: CPT | Mod: HCNC,CPTII,S$GLB, | Performed by: FAMILY MEDICINE

## 2023-08-31 PROCEDURE — 99215 OFFICE O/P EST HI 40 MIN: CPT | Mod: HCNC,S$GLB,, | Performed by: FAMILY MEDICINE

## 2023-08-31 PROCEDURE — 1159F PR MEDICATION LIST DOCUMENTED IN MEDICAL RECORD: ICD-10-PCS | Mod: HCNC,CPTII,S$GLB, | Performed by: FAMILY MEDICINE

## 2023-08-31 PROCEDURE — 3288F PR FALLS RISK ASSESSMENT DOCUMENTED: ICD-10-PCS | Mod: HCNC,CPTII,S$GLB, | Performed by: FAMILY MEDICINE

## 2023-08-31 PROCEDURE — 99215 PR OFFICE/OUTPT VISIT, EST, LEVL V, 40-54 MIN: ICD-10-PCS | Mod: HCNC,S$GLB,, | Performed by: FAMILY MEDICINE

## 2023-08-31 NOTE — PROGRESS NOTES
"CHIEF COMPLAINT:  Hospital follow-up in pt w/ macular degeneration and sleep apnea    HISTORY OF PRESENT ILLNESS: The patient is a generally healthy 82 year-old WM.  He was recently inpatient 8 days for community-acquired left lower lobe pneumonia with loculated effusion.  He ultimately needed chest tube drainage of the loculations and effusion    The patient has continuing neuropathic pain for which he had a complete workup in the past. He has tried multiple medications and is intolerant of all of them due to various side effects.  Currently pain worsening since starting KASHIF.    He was previously diagnosed with macular degeneration.     He does have CPAP for obstructive sleep apnea.  He has seen sleep clinic recently.  Also having leg cramps.    REVIEW OF SYSTEMS:  GENERAL: No fever, chills, weight loss.  SKIN: No rashes, itching or changes in color or texture of skin.  HEAD: No headaches or recent head trauma.  EYES:  No photophobia or diplopia.  EARS: Denies ear pain, discharge or vertigo.  NOSE: No loss of smell, no epistaxis or postnasal drip.  MOUTH & THROAT: No hoarseness or change in voice. No excessive gum bleeding.  NODES: Denies swollen glands.  CHEST: Denies SANCHES, cyanosis, wheezing, cough and sputum production.  CARDIOVASCULAR: Denies chest pain, PND, orthopnea or reduced exercise tolerance.  ABDOMEN: Appetite fine. No weight loss. Denies diarrhea, abdominal pain, hematemesis or blood in stool.  URINARY: No flank pain, dysuria or hematuria.  PERIPHERAL VASCULAR: No claudication or cyanosis.  MUSCULOSKELETAL: No joint stiffness or swelling. Denies back pain.  NEUROLOGIC: No history of seizures, paralysis, alteration of gait or coordination.    SOCIAL HISTORY: The patient does not smoke.  The patient consumes alcohol socially.  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    PHYSICAL EXAMINATION:   Blood pressure 120/62, pulse 66, height 5' 7" (1.702 m), weight " 66.8 kg (147 lb 4.3 oz), SpO2 97 %.    APPEARANCE: Well nourished, well developed, in no acute distress.    HEAD: Normocephalic, atraumatic.  EYES: PERRL. EOMI.  Conjunctivae without injection and  Anicteric  NOSE: Mucosa pink. Airway clear.  MOUTH & THROAT: No tonsillar enlargement. No pharyngeal erythema or exudate. No stridor.  NECK: Supple.   NODES: No cervical, axillary or inguinal lymph node enlargement.  CHEST: Lungs clear to auscultation.  No retractions are noted.  No rales or rhonchi are present.  CARDIOVASCULAR: Normal S1, S2. No rubs, murmurs or gallops.  ABDOMEN: Bowel sounds normal. Not distended. Soft. No tenderness or masses.  No ascites is noted.  MUSCULOSKELETAL:  There is no clubbing, cyanosis, or edema of the extremities x4.  There is full range of motion of the lumbar spine.  There is full range of motion of the extremities x4.  There is no deformity noted.    NEUROLOGIC:       Normal speech development.      Hearing normal.      Normal gait.      Motor and sensory exams grossly normal.  PSYCHIATRIC: Patient is alert and oriented x3.  Thought processes are all normal.  There is no homicidality.  There is no suicidality.  There is no evidence of psychosis.    LABORATORY/RADIOLOGY:   Chart reviewed.      ASSESSMENT:   Community-acquired left lower lobe pneumonia with pleural effusion  Idiopathic peripheral neuropathy, ari foot pain  Fatigue  Glaucoma  Concern over blood pressure  Insomnia  Macular degeneration  Snoring due to sleep study proven severe sleep apnea on CPAP but changing to BiPAP    PLAN:  Pulmonary follow-up set up today  His post infectious fatigue will take several months to resolve  Stop gabapentin  Reviewed normal BP ranges  He is aware that treating his KARMA is helpful to minimize his risk for progression of the macular degeneration.  Follow up sleep for KARMA and insomnia  Over-the-counter supplements as recommended by retinal specialist  Return to clinic in one  year.

## 2023-09-06 ENCOUNTER — OFFICE VISIT (OUTPATIENT)
Dept: SLEEP MEDICINE | Facility: CLINIC | Age: 83
End: 2023-09-06
Payer: MEDICARE

## 2023-09-06 VITALS
DIASTOLIC BLOOD PRESSURE: 80 MMHG | HEART RATE: 80 BPM | HEIGHT: 67 IN | WEIGHT: 147 LBS | BODY MASS INDEX: 23.07 KG/M2 | SYSTOLIC BLOOD PRESSURE: 131 MMHG

## 2023-09-06 DIAGNOSIS — G47.33 OSA (OBSTRUCTIVE SLEEP APNEA): Primary | ICD-10-CM

## 2023-09-06 PROCEDURE — 99999 PR PBB SHADOW E&M-EST. PATIENT-LVL IV: CPT | Mod: PBBFAC,HCNC,, | Performed by: PHYSICIAN ASSISTANT

## 2023-09-06 PROCEDURE — 3079F DIAST BP 80-89 MM HG: CPT | Mod: HCNC,CPTII,S$GLB, | Performed by: PHYSICIAN ASSISTANT

## 2023-09-06 PROCEDURE — 1125F AMNT PAIN NOTED PAIN PRSNT: CPT | Mod: HCNC,CPTII,S$GLB, | Performed by: PHYSICIAN ASSISTANT

## 2023-09-06 PROCEDURE — 1160F RVW MEDS BY RX/DR IN RCRD: CPT | Mod: HCNC,CPTII,S$GLB, | Performed by: PHYSICIAN ASSISTANT

## 2023-09-06 PROCEDURE — 99999 PR PBB SHADOW E&M-EST. PATIENT-LVL IV: ICD-10-PCS | Mod: PBBFAC,HCNC,, | Performed by: PHYSICIAN ASSISTANT

## 2023-09-06 PROCEDURE — 1111F PR DISCHARGE MEDS RECONCILED W/ CURRENT OUTPATIENT MED LIST: ICD-10-PCS | Mod: HCNC,CPTII,S$GLB, | Performed by: PHYSICIAN ASSISTANT

## 2023-09-06 PROCEDURE — 1159F PR MEDICATION LIST DOCUMENTED IN MEDICAL RECORD: ICD-10-PCS | Mod: HCNC,CPTII,S$GLB, | Performed by: PHYSICIAN ASSISTANT

## 2023-09-06 PROCEDURE — 3075F PR MOST RECENT SYSTOLIC BLOOD PRESS GE 130-139MM HG: ICD-10-PCS | Mod: HCNC,CPTII,S$GLB, | Performed by: PHYSICIAN ASSISTANT

## 2023-09-06 PROCEDURE — 1125F PR PAIN SEVERITY QUANTIFIED, PAIN PRESENT: ICD-10-PCS | Mod: HCNC,CPTII,S$GLB, | Performed by: PHYSICIAN ASSISTANT

## 2023-09-06 PROCEDURE — 3079F PR MOST RECENT DIASTOLIC BLOOD PRESSURE 80-89 MM HG: ICD-10-PCS | Mod: HCNC,CPTII,S$GLB, | Performed by: PHYSICIAN ASSISTANT

## 2023-09-06 PROCEDURE — 1111F DSCHRG MED/CURRENT MED MERGE: CPT | Mod: HCNC,CPTII,S$GLB, | Performed by: PHYSICIAN ASSISTANT

## 2023-09-06 PROCEDURE — 1160F PR REVIEW ALL MEDS BY PRESCRIBER/CLIN PHARMACIST DOCUMENTED: ICD-10-PCS | Mod: HCNC,CPTII,S$GLB, | Performed by: PHYSICIAN ASSISTANT

## 2023-09-06 PROCEDURE — 99214 PR OFFICE/OUTPT VISIT, EST, LEVL IV, 30-39 MIN: ICD-10-PCS | Mod: HCNC,S$GLB,, | Performed by: PHYSICIAN ASSISTANT

## 2023-09-06 PROCEDURE — 3075F SYST BP GE 130 - 139MM HG: CPT | Mod: HCNC,CPTII,S$GLB, | Performed by: PHYSICIAN ASSISTANT

## 2023-09-06 PROCEDURE — 99214 OFFICE O/P EST MOD 30 MIN: CPT | Mod: HCNC,S$GLB,, | Performed by: PHYSICIAN ASSISTANT

## 2023-09-06 PROCEDURE — 1159F MED LIST DOCD IN RCRD: CPT | Mod: HCNC,CPTII,S$GLB, | Performed by: PHYSICIAN ASSISTANT

## 2023-09-06 NOTE — PROGRESS NOTES
Referred by No ref. provider found     ESTABLISHED PATIENT VISIT    Cricket Mcdonnell  is a pleasant 82 y.o. male  with PMH significant for BPH, chronic rhinitis, deviated nasal septum, open angle glaucoma, MD of right eye, anxiety, peripheral neuropathy, restless legs, insomnia, KARMA       Here today for : follow up    PLAN last visit 7/24/23:   -using and benefiting from BiPAP therapy  -advised KARMA is still not well controlled  -recommended to consider inspire as possible treatment option due to residual AHI >10 despite nightly BiPAP usage  -discussed inspire in detail, including need for repeat HST and exam with ENT to determine eligibility   -patient would like to follow up in 2 months and see progress as he is still working on mask fit/leak issues, then make final determination  -continue BiPAP nightly in the interim   -discussed KARMA and BiPAP with patient in detail, including possible complications of untreated/under treated KARMA like heart attack/stroke  -advised on strict driving precautions; advised never to drive drowsy      Since last visit:   Recently had pneumonia and was hospitalized for 8 days. No longer on oxygen, but states extremely fatigue since dx. States PCP advised several week recovery time.     Also reports recently changing back to nasal mask, and reports mask leak has significantly improved since this change.      PAP history   Problems    Mask Nasal mask   Pressure BiPAP EPAP 10, PS 4, IPAPmax 20, ramp 6 x 10min; tolerating well   DME HME   Machine age AirCurve 10 Vauto 6/16/23   Download 9/6/23: 22/30 x 3hrs 17mins, (IPAP Max 20, EPAP min 10, PS 4), leak (3.8/14.8/29.7), AHI 9.3       SLEEP SCHEDULE   Environment     Bed Time 10:15-10:30P               Sleep Latency 20 min   Arousals 3-4   Nocturia 3   Back to sleep 5 min   Wake time 6 AM   Naps At 8-9A, sometimes in afternoon   Work              Past Medical History:   Diagnosis Date    Hereditary sensory neuropathy     Sleep apnea     +  CPAP     Patient Active Problem List   Diagnosis    Inability to maintain erection    Nocturia    Benign prostatic hyperplasia    Hereditary sensory neuropathy    Libido, decreased    Posterior rhinorrhea    Family history of prostate cancer    KARMA (obstructive sleep apnea)    Exudative age-related macular degeneration of right eye    Chronic ankle pain    Chronic rhinitis    Deviated nasal septum    Hypertrophy of nasal turbinates    Nasal obstruction    Anxiety disorder    Body mass index (BMI) of 20 to 24    Testosterone deficiency in male    Closed nondisplaced transverse fracture of left patella with routine healing    Fatigue    Peripheral neuropathy    Aortic atherosclerosis    Primary open angle glaucoma of right eye, moderate stage    Loculated pleural effusion    Acute hypoxemic respiratory failure    Constipation       Current Outpatient Medications:     acetaminophen (TYLENOL) 500 MG tablet, Take 2 tablets (1,000 mg total) by mouth every 8 (eight) hours as needed for Pain. (Patient not taking: Reported on 8/31/2023), Disp: , Rfl: 0    amitriptyline (ELAVIL) 10 MG tablet, Take 1 tablet (10 mg total) by mouth nightly as needed for Insomnia., Disp: 90 tablet, Rfl: 1    brimonidine 0.2% (ALPHAGAN) 0.2 % Drop, Instill 1 drop into left eye three times a day, Disp: 10 mL, Rfl: 6    brimonidine 0.2% (ALPHAGAN) 0.2 % Drop, Instill 1 drop into left eye three times a day, Disp: 10 mL, Rfl: 6    dorzolamide-timolol 2-0.5% (COSOPT) 22.3-6.8 mg/mL ophthalmic solution, Instill 1 drop into both eyes twice a day, Disp: 10 mL, Rfl: 2    dorzolamide-timolol 2-0.5% (COSOPT) 22.3-6.8 mg/mL ophthalmic solution, Instill 1 drop into left eye twice a day, Disp: 10 mL, Rfl: 6    erythromycin (ROMYCIN) ophthalmic ointment, Apply 1 a thin layer into left eye four times a day, Disp: 3.5 g, Rfl: 6    fluorometholone 0.1% (FML) 0.1 % DrpS, Instill 1 drop into left eye once a day, Disp: 5 mL, Rfl: 1    gabapentin (NEURONTIN) 100 MG  "capsule, Take 1-3 capsules (100-300 mg total) by mouth nightly as needed (restless legs  (note: take 1-2 hours prior to usual onset of symptoms))., Disp: 90 capsule, Rfl: 1    ketorolac 0.4% (ACULAR) 0.4 % Drop, Instill 1 drop into left eye four times a day, Disp: 5 mL, Rfl: 1    neomycin-polymyxin-dexamethasone (MAXITROL) 3.5 mg/g-10,000 unit/g-0.1 % Oint, Apply 0.25 inch Both Eyes 3 times a day X 3 DAYS THEN STOP, Disp: 3.5 g, Rfl: 1    netarsudiL-latanoprost (ROCKLATAN) 0.02-0.005 % Drop, Instill 1 drop into both eyes at bedtime, Disp: 2.5 mL, Rfl: 6    timolol maleate 0.25% (TIMOPTIC) 0.25 % Drop, 1 drop once daily., Disp: , Rfl:        Vitals:    09/06/23 1125   BP: 131/80   BP Location: Right arm   Patient Position: Sitting   BP Method: Small (Automatic)   Pulse: 80   Weight: 66.7 kg (147 lb)   Height: 5' 7" (1.702 m)     Physical Exam:    GEN:   Well-appearing  Psych:  Appropriate affect, demonstrates insight  SKIN:  No rash on the face or bridge of the nose      LABS:   Lab Results   Component Value Date    HGB 11.5 (L) 08/08/2023    CO2 26 08/08/2023       RECORDS REVIEWED PREVIOUSLY:    Baseline Sleep Study: 06/09/2017 HST The overall AHI was 45 and overall RDI was 46. The oxygen lisa was 70.8% and % time < 90% SpO2 was 37.7%.      CPAP titration 5.12.22: CPAP =9 supine SWS, some sREM CPAP =20.  no lateral sleep.;  Rec CPAP =9 cwp + side sleeping + FFM.  June 22-wants sleeping pill-Trz 50mg     BiPAP titration 5.20.23: 14/10 + lat N2 (apneas supine), 16/12 (sleSimplus FFM- medium sizeep onset obstructives supine)  Rx BiPAP-> EPAP 10, PS 4, IPAPmax 20, ramp 6 x 10min    ASSESSMENT      PROBLEM DESCRIPTION/ Sx on Presentation Interval Hx STATUS   Severe KARMA    HEENT:            MP4, + mild micrognathia  2017 AHI 45 Good usage, residual AHI 9.3 on download (improved from 12.3 last visit 7/24/23, 16.6 visit 6/26/23 and improved from 25 on CPAP; 45 on dx study); will work on improving mask fit and increasing " pressures as tolerated uncontrolled   Daytime Sx    Occasional sleepiness when inactive   Needing short naps during the day  denies sleepiness when driving   ESS n/a/24 on intake Does report sleep feels more restorative; however, still quite sleepy Mild improvement   Insomnia    Trouble falling asleep: not usually  Maintenance:         waking frequently  Prior hypnotics:      trazodone, gabapentin  Current hypnotics: elavil (makes him tired) Sleeping 8-9 hours on elavil, feels sleep is restorative  Improved with CPAP, but still occurs   Nocturia    x 2-3 per sleep period persists persists    Chronic Rhinitis Claritin daily  Was on flonase in past, not currently   stable stable      Restless Legs Sx of restless legs at night     improved with gabapentin 100-300mg prescribed at last visit. States this is the only medication he has tried that has not caused lingering drowsiness the following day.  Gabapentin is now causing fatigue so he is no longer taking any medications; not interested in alternative medication management  persists   Other issues:     PLAN     -using and benefiting from BiPAP therapy  -continue BiPAP nightly  -adjusted BiPAP pressures to IPAP max 25, EPAP min 12, PS4 (since mask leak has significantly improved)  -still considering inspire if residual AHI does not improve following adjustments  -discussed KARMA and CPAP with patient in detail, including possible complications of untreated KARMA like heart attack/stroke  -advised on strict driving precautions; advised never to drive drowsy    -follow up with PCP/pulm for further evaluation and management for post pneumonia fatigue     Advised on plan of care. Answered all patient questions. Patient verbalized understanding and voiced agreement with plan of care.       RTC 6-8 weeks or as needed       The patient was given open opportunity to ask questions and/or express concerns about treatment plan.  All questions/concerns were discussed.     Two patient  identifiers used prior to evaluation.

## 2023-09-13 ENCOUNTER — EXTERNAL HOME HEALTH (OUTPATIENT)
Dept: HOME HEALTH SERVICES | Facility: HOSPITAL | Age: 83
End: 2023-09-13
Payer: MEDICARE

## 2023-09-18 DIAGNOSIS — E29.1 MALE HYPOGONADISM: ICD-10-CM

## 2023-09-18 RX ORDER — TESTOSTERONE 40.5 MG/2.5G
2.5 GEL TOPICAL DAILY
Qty: 75 G | Refills: 5 | Status: CANCELLED | OUTPATIENT
Start: 2023-09-18 | End: 2024-03-17

## 2023-09-20 DIAGNOSIS — E29.1 MALE HYPOGONADISM: ICD-10-CM

## 2023-09-21 ENCOUNTER — DOCUMENT SCAN (OUTPATIENT)
Dept: HOME HEALTH SERVICES | Facility: HOSPITAL | Age: 83
End: 2023-09-21
Payer: MEDICARE

## 2023-09-21 RX ORDER — TESTOSTERONE 40.5 MG/2.5G
2.5 GEL TOPICAL DAILY
Qty: 75 G | Refills: 5 | OUTPATIENT
Start: 2023-09-21 | End: 2024-03-20

## 2023-10-02 NOTE — PROGRESS NOTES
Subjective:      Patient ID: Cricket Mcdonnell is a 82 y.o. male.    Chief Complaint: Pneumonia    Pt is an 83 yo CM pmh recent hospitalization due to left sided pneumonia with associated loculated pleural effusion. Had pleural catheter placed and 2 doses of lytics administered prior to incidental removal of chest tube.     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  Exposure hx:   Inhaler use: none  PRN inhaler use: none  Hx of lung dz: complicated parapneumonic effusion requiring chest tube.  Family hx of lung dz: none    Review of Systems   Constitutional:  Positive for weight gain and fatigue. Negative for activity change.   HENT:  Positive for postnasal drip. Negative for congestion.    Respiratory:  Positive for cough. Negative for sputum production, shortness of breath, wheezing, dyspnea on extertion and use of rescue inhaler.    Cardiovascular:  Negative for chest pain, palpitations and leg swelling.   Gastrointestinal:  Negative for nausea, vomiting and acid reflux.   Neurological:  Negative for dizziness, syncope and light-headedness.   Psychiatric/Behavioral:  Negative for confusion and sleep disturbance. The patient is not nervous/anxious.      Objective:     Physical Exam   Constitutional: He is oriented to person, place, and time. He appears well-developed and well-nourished. He appears not cachectic. He is not obese.   HENT:   Head: Normocephalic.   Mouth/Throat: Mallampati Score: I.   Cardiovascular: Normal rate, regular rhythm and normal heart sounds.   Pulmonary/Chest: Normal expansion, symmetric chest wall expansion and effort normal. No stridor. No respiratory distress. He has no wheezes. He has no rhonchi. He has no rales.   Diminished left lower lung field breath sounds.      Abdominal: Soft.   Musculoskeletal:         General: No edema.   Neurological: He is alert and oriented to person, place, and time. Gait normal.   Skin: No cyanosis.  "Nails show no clubbing.   Psychiatric: He has a normal mood and affect. His behavior is normal. Judgment and thought content normal.   Vitals reviewed.    Personal Diagnostic Review    CT of chest performed on 8/6/23 without contrast revealed residual pleural effusion in medial lower lung zone with associated consolidation, atelectasis and pleural thickening. Left lateral entrapped PTX.        9/6/2023    11:25 AM 8/31/2023     2:03 PM 8/9/2023    11:59 AM 8/9/2023     7:31 AM 8/9/2023     3:40 AM 8/8/2023    11:07 PM 8/8/2023     7:10 PM   Pulmonary Function Tests   SpO2  97 % 95 % 91 % 94 % 92 % 92 %   Height 5' 7" (1.702 m) 5' 7" (1.702 m)        Weight 66.7 kg (147 lb) 66.8 kg (147 lb 4.3 oz)        BMI (Calculated) 23 23.1          Assessment:     1. Loculated pleural effusion    2. Pneumonia of left lower lobe due to infectious organism    3. Healthcare maintenance      Outpatient Encounter Medications as of 10/3/2023   Medication Sig Dispense Refill    acetaminophen (TYLENOL) 500 MG tablet Take 2 tablets (1,000 mg total) by mouth every 8 (eight) hours as needed for Pain. (Patient not taking: Reported on 8/31/2023)  0    amitriptyline (ELAVIL) 10 MG tablet Take 1 tablet (10 mg total) by mouth nightly as needed for Insomnia. 90 tablet 1    brimonidine 0.2% (ALPHAGAN) 0.2 % Drop Instill 1 drop into left eye three times a day 10 mL 6    brimonidine 0.2% (ALPHAGAN) 0.2 % Drop Instill 1 drop into left eye three times a day 10 mL 6    brimonidine 0.2% (ALPHAGAN) 0.2 % Drop Instill 1 drop into Left Eye 3 times a day 15 mL 1    dorzolamide-timolol 2-0.5% (COSOPT) 22.3-6.8 mg/mL ophthalmic solution Instill 1 drop into both eyes twice a day 10 mL 2    dorzolamide-timolol 2-0.5% (COSOPT) 22.3-6.8 mg/mL ophthalmic solution Instill 1 drop into left eye twice a day 10 mL 6    dorzolamide-timolol 2-0.5% (COSOPT) 22.3-6.8 mg/mL ophthalmic solution Instill 1 drop into Both Eyes twice a day 30 mL 1    erythromycin (ROMYCIN) " ophthalmic ointment Apply 1 a thin layer into left eye four times a day 3.5 g 6    fluorometholone 0.1% (FML) 0.1 % DrpS Instill 1 drop into left eye once a day 5 mL 1    gabapentin (NEURONTIN) 100 MG capsule Take 1-3 capsules (100-300 mg total) by mouth nightly as needed (restless legs  (note: take 1-2 hours prior to usual onset of symptoms)). 90 capsule 1    ketorolac 0.4% (ACULAR) 0.4 % Drop Instill 1 drop into left eye four times a day 5 mL 1    neomycin-polymyxin-dexamethasone (MAXITROL) 3.5 mg/g-10,000 unit/g-0.1 % Oint Apply 0.25 inch into right eye 3 times a day X 3 DAYS THEN STOP 3.5 g 0    netarsudiL-latanoprost (ROCKLATAN) 0.02-0.005 % Drop Instill 1 drop into both eyes at bedtime 2.5 mL 6    testosterone (ANDROGEL) 1.62 % (40.5 mg/2.5 gram) GlPk Place 2.5 g (1 packet) onto the skin once daily. 75 g 5    timolol maleate 0.25% (TIMOPTIC) 0.25 % Drop 1 drop once daily.      [DISCONTINUED] neomycin-polymyxin-dexamethasone (MAXITROL) 3.5 mg/g-10,000 unit/g-0.1 % Oint Apply 0.25 inch Both Eyes 3 times a day X 3 DAYS THEN STOP 3.5 g 1     No facility-administered encounter medications on file as of 10/3/2023.     Orders Placed This Encounter   Procedures    X-Ray Chest PA And Lateral     Standing Status:   Future     Standing Expiration Date:   10/3/2024     Order Specific Question:   May the Radiologist modify the order per protocol to meet the clinical needs of the patient?     Answer:   Yes     Order Specific Question:   Release to patient     Answer:   Immediate    RSVPreF Recombinant (Arexvy)    Influenza - High Dose (65+) (PF) (IM)       Plan:     Loculated pleural effusion  S/p chest tube with drainage and antibiotics. Feels like he is doing well. Continues to have some fatigue. Denies fevers, chills, back pain. Obtain CXR in 1 months for evaluation. No need for further evaluation of effusion as likely has some residual fluid and pleural thickening. This may reduce as inflammation improves, but with  fenestrations from previous infection, will likely have abnormal CXR.     Pneumonia of left lower lobe due to infectious organism  S/p treatment with antibiotics. Improved and approaching baseline.     Healthcare maintenance  Flu and RSV vaccines are ordered.     Follow up PRN pending CXR.     Jj Waters MD  [facility]

## 2023-10-03 ENCOUNTER — OFFICE VISIT (OUTPATIENT)
Dept: PULMONOLOGY | Facility: CLINIC | Age: 83
End: 2023-10-03
Payer: MEDICARE

## 2023-10-03 VITALS
WEIGHT: 153.69 LBS | SYSTOLIC BLOOD PRESSURE: 132 MMHG | HEART RATE: 81 BPM | DIASTOLIC BLOOD PRESSURE: 76 MMHG | OXYGEN SATURATION: 97 % | HEIGHT: 67 IN | BODY MASS INDEX: 24.12 KG/M2

## 2023-10-03 DIAGNOSIS — J18.9 PNEUMONIA OF LEFT LOWER LOBE DUE TO INFECTIOUS ORGANISM: ICD-10-CM

## 2023-10-03 DIAGNOSIS — Z00.00 HEALTHCARE MAINTENANCE: ICD-10-CM

## 2023-10-03 DIAGNOSIS — J90 LOCULATED PLEURAL EFFUSION: Primary | ICD-10-CM

## 2023-10-03 PROCEDURE — 1159F MED LIST DOCD IN RCRD: CPT | Mod: HCNC,CPTII,S$GLB, | Performed by: INTERNAL MEDICINE

## 2023-10-03 PROCEDURE — 1101F PT FALLS ASSESS-DOCD LE1/YR: CPT | Mod: HCNC,CPTII,S$GLB, | Performed by: INTERNAL MEDICINE

## 2023-10-03 PROCEDURE — 99999 PR PBB SHADOW E&M-EST. PATIENT-LVL V: CPT | Mod: PBBFAC,HCNC,, | Performed by: INTERNAL MEDICINE

## 2023-10-03 PROCEDURE — 3078F DIAST BP <80 MM HG: CPT | Mod: HCNC,CPTII,S$GLB, | Performed by: INTERNAL MEDICINE

## 2023-10-03 PROCEDURE — 1101F PR PT FALLS ASSESS DOC 0-1 FALLS W/OUT INJ PAST YR: ICD-10-PCS | Mod: HCNC,CPTII,S$GLB, | Performed by: INTERNAL MEDICINE

## 2023-10-03 PROCEDURE — 99213 OFFICE O/P EST LOW 20 MIN: CPT | Mod: HCNC,S$GLB,, | Performed by: INTERNAL MEDICINE

## 2023-10-03 PROCEDURE — 3288F FALL RISK ASSESSMENT DOCD: CPT | Mod: HCNC,CPTII,S$GLB, | Performed by: INTERNAL MEDICINE

## 2023-10-03 PROCEDURE — 3288F PR FALLS RISK ASSESSMENT DOCUMENTED: ICD-10-PCS | Mod: HCNC,CPTII,S$GLB, | Performed by: INTERNAL MEDICINE

## 2023-10-03 PROCEDURE — 99999 PR PBB SHADOW E&M-EST. PATIENT-LVL V: ICD-10-PCS | Mod: PBBFAC,HCNC,, | Performed by: INTERNAL MEDICINE

## 2023-10-03 PROCEDURE — 3078F PR MOST RECENT DIASTOLIC BLOOD PRESSURE < 80 MM HG: ICD-10-PCS | Mod: HCNC,CPTII,S$GLB, | Performed by: INTERNAL MEDICINE

## 2023-10-03 PROCEDURE — 99213 PR OFFICE/OUTPT VISIT, EST, LEVL III, 20-29 MIN: ICD-10-PCS | Mod: HCNC,S$GLB,, | Performed by: INTERNAL MEDICINE

## 2023-10-03 PROCEDURE — 1159F PR MEDICATION LIST DOCUMENTED IN MEDICAL RECORD: ICD-10-PCS | Mod: HCNC,CPTII,S$GLB, | Performed by: INTERNAL MEDICINE

## 2023-10-03 PROCEDURE — 3075F SYST BP GE 130 - 139MM HG: CPT | Mod: HCNC,CPTII,S$GLB, | Performed by: INTERNAL MEDICINE

## 2023-10-03 PROCEDURE — 3075F PR MOST RECENT SYSTOLIC BLOOD PRESS GE 130-139MM HG: ICD-10-PCS | Mod: HCNC,CPTII,S$GLB, | Performed by: INTERNAL MEDICINE

## 2023-10-03 NOTE — ASSESSMENT & PLAN NOTE
S/p chest tube with drainage and antibiotics. Feels like he is doing well. Continues to have some fatigue. Denies fevers, chills, back pain. Obtain CXR in 1 months for evaluation. No need for further evaluation of effusion as likely has some residual fluid and pleural thickening. This may reduce as inflammation improves, but with fenestrations from previous infection, will likely have abnormal CXR.

## 2023-10-09 ENCOUNTER — HOSPITAL ENCOUNTER (OUTPATIENT)
Dept: RADIOLOGY | Facility: HOSPITAL | Age: 83
Discharge: HOME | End: 2023-10-09
Attending: INTERNAL MEDICINE
Payer: MEDICARE

## 2023-10-09 DIAGNOSIS — J18.9 PNEUMONIA OF LEFT LOWER LOBE DUE TO INFECTIOUS ORGANISM: ICD-10-CM

## 2023-10-09 PROCEDURE — 71046 XR CHEST PA AND LATERAL: ICD-10-PCS | Mod: 26,HCNC,, | Performed by: RADIOLOGY

## 2023-10-09 PROCEDURE — 71046 X-RAY EXAM CHEST 2 VIEWS: CPT | Mod: 26,HCNC,, | Performed by: RADIOLOGY

## 2023-10-09 PROCEDURE — 71046 X-RAY EXAM CHEST 2 VIEWS: CPT | Mod: TC,HCNC

## 2023-10-23 ENCOUNTER — OFFICE VISIT (OUTPATIENT)
Dept: SLEEP MEDICINE | Facility: CLINIC | Age: 83
End: 2023-10-23
Payer: MEDICARE

## 2023-10-23 VITALS
BODY MASS INDEX: 24.01 KG/M2 | WEIGHT: 153 LBS | SYSTOLIC BLOOD PRESSURE: 122 MMHG | HEART RATE: 59 BPM | HEIGHT: 67 IN | DIASTOLIC BLOOD PRESSURE: 79 MMHG

## 2023-10-23 DIAGNOSIS — G47.33 OSA (OBSTRUCTIVE SLEEP APNEA): Primary | ICD-10-CM

## 2023-10-23 PROCEDURE — 1160F PR REVIEW ALL MEDS BY PRESCRIBER/CLIN PHARMACIST DOCUMENTED: ICD-10-PCS | Mod: HCNC,CPTII,S$GLB, | Performed by: PHYSICIAN ASSISTANT

## 2023-10-23 PROCEDURE — 3078F DIAST BP <80 MM HG: CPT | Mod: HCNC,CPTII,S$GLB, | Performed by: PHYSICIAN ASSISTANT

## 2023-10-23 PROCEDURE — 1159F MED LIST DOCD IN RCRD: CPT | Mod: HCNC,CPTII,S$GLB, | Performed by: PHYSICIAN ASSISTANT

## 2023-10-23 PROCEDURE — 3074F PR MOST RECENT SYSTOLIC BLOOD PRESSURE < 130 MM HG: ICD-10-PCS | Mod: HCNC,CPTII,S$GLB, | Performed by: PHYSICIAN ASSISTANT

## 2023-10-23 PROCEDURE — 1126F PR PAIN SEVERITY QUANTIFIED, NO PAIN PRESENT: ICD-10-PCS | Mod: HCNC,CPTII,S$GLB, | Performed by: PHYSICIAN ASSISTANT

## 2023-10-23 PROCEDURE — 1160F RVW MEDS BY RX/DR IN RCRD: CPT | Mod: HCNC,CPTII,S$GLB, | Performed by: PHYSICIAN ASSISTANT

## 2023-10-23 PROCEDURE — 3078F PR MOST RECENT DIASTOLIC BLOOD PRESSURE < 80 MM HG: ICD-10-PCS | Mod: HCNC,CPTII,S$GLB, | Performed by: PHYSICIAN ASSISTANT

## 2023-10-23 PROCEDURE — 3074F SYST BP LT 130 MM HG: CPT | Mod: HCNC,CPTII,S$GLB, | Performed by: PHYSICIAN ASSISTANT

## 2023-10-23 PROCEDURE — 1126F AMNT PAIN NOTED NONE PRSNT: CPT | Mod: HCNC,CPTII,S$GLB, | Performed by: PHYSICIAN ASSISTANT

## 2023-10-23 PROCEDURE — 99999 PR PBB SHADOW E&M-EST. PATIENT-LVL III: ICD-10-PCS | Mod: PBBFAC,HCNC,, | Performed by: PHYSICIAN ASSISTANT

## 2023-10-23 PROCEDURE — 99214 PR OFFICE/OUTPT VISIT, EST, LEVL IV, 30-39 MIN: ICD-10-PCS | Mod: HCNC,S$GLB,, | Performed by: PHYSICIAN ASSISTANT

## 2023-10-23 PROCEDURE — 99214 OFFICE O/P EST MOD 30 MIN: CPT | Mod: HCNC,S$GLB,, | Performed by: PHYSICIAN ASSISTANT

## 2023-10-23 PROCEDURE — 99999 PR PBB SHADOW E&M-EST. PATIENT-LVL III: CPT | Mod: PBBFAC,HCNC,, | Performed by: PHYSICIAN ASSISTANT

## 2023-10-23 PROCEDURE — 1159F PR MEDICATION LIST DOCUMENTED IN MEDICAL RECORD: ICD-10-PCS | Mod: HCNC,CPTII,S$GLB, | Performed by: PHYSICIAN ASSISTANT

## 2023-10-23 NOTE — PROGRESS NOTES
Referred by No ref. provider found     ESTABLISHED PATIENT VISIT    Cricket Mcdonnell  is a pleasant 82 y.o. male  with PMH significant for BPH, chronic rhinitis, deviated nasal septum, open angle glaucoma, MD of right eye, anxiety, peripheral neuropathy, restless legs, insomnia, KARMA       Here today for: CPAP follow-up     PLAN last visit 9/6/23:   -using and benefiting from BiPAP therapy  -continue BiPAP nightly  -adjusted BiPAP pressures to IPAP max 25, EPAP min 12, PS4 (since mask leak has significantly improved)  -still considering inspire if residual AHI does not improve following adjustments  -discussed KARMA and CPAP with patient in detail, including possible complications of untreated KARMA like heart attack/stroke  -advised on strict driving precautions; advised never to drive drowsy  -follow up with PCP/pulm for further evaluation and management for post pneumonia fatigue       Since last visit:   Using nightly, feels things are finally going well. Getting good control of symptoms and not longer experiencing mask leak. Feels chin strap has made a big difference.       PAP history   Problems     Mask Nasal mask   Pressure BiPAP 25/12 PS 4   DME HME   Machine age AirCurve 10 Vauto 6/16/23   Download 10/23/23: 30/30 x 9hrs 4mins, 25/12 PS 4, leak (18.2/36.9/63.8), AHI 4.6        SLEEP SCHEDULE   Environment     Bed Time 10:15-10:30P               Sleep Latency 20 min   Arousals 3-4   Nocturia 3   Back to sleep 5 min   Wake time 6 AM   Naps At 8-9A, sometimes in afternoon   Work        .    Past Medical History:   Diagnosis Date    Hereditary sensory neuropathy     Sleep apnea     + CPAP     Patient Active Problem List   Diagnosis    Inability to maintain erection    Nocturia    Benign prostatic hyperplasia    Hereditary sensory neuropathy    Libido, decreased    Posterior rhinorrhea    Family history of prostate cancer    KARMA (obstructive sleep apnea)    Exudative age-related macular degeneration of right eye     Chronic ankle pain    Chronic rhinitis    Deviated nasal septum    Hypertrophy of nasal turbinates    Nasal obstruction    Healthcare maintenance    Anxiety disorder    Body mass index (BMI) of 20 to 24    Testosterone deficiency in male    Closed nondisplaced transverse fracture of left patella with routine healing    Fatigue    Peripheral neuropathy    Aortic atherosclerosis    Primary open angle glaucoma of right eye, moderate stage    Loculated pleural effusion    Acute hypoxemic respiratory failure    Constipation    Pneumonia of left lower lobe due to infectious organism       Current Outpatient Medications:     acetaminophen (TYLENOL) 500 MG tablet, Take 2 tablets (1,000 mg total) by mouth every 8 (eight) hours as needed for Pain., Disp: , Rfl: 0    amitriptyline (ELAVIL) 10 MG tablet, Take 1 tablet (10 mg total) by mouth nightly as needed for Insomnia., Disp: 90 tablet, Rfl: 1    brimonidine 0.2% (ALPHAGAN) 0.2 % Drop, Instill 1 drop into left eye three times a day, Disp: 10 mL, Rfl: 6    brimonidine 0.2% (ALPHAGAN) 0.2 % Drop, Instill 1 drop into left eye three times a day, Disp: 10 mL, Rfl: 6    brimonidine 0.2% (ALPHAGAN) 0.2 % Drop, Instill 1 drop into Left Eye 3 times a day, Disp: 15 mL, Rfl: 1    dorzolamide-timolol 2-0.5% (COSOPT) 22.3-6.8 mg/mL ophthalmic solution, Instill 1 drop into both eyes twice a day, Disp: 10 mL, Rfl: 2    dorzolamide-timolol 2-0.5% (COSOPT) 22.3-6.8 mg/mL ophthalmic solution, Instill 1 drop into left eye twice a day, Disp: 10 mL, Rfl: 6    dorzolamide-timolol 2-0.5% (COSOPT) 22.3-6.8 mg/mL ophthalmic solution, Instill 1 drop into Both Eyes twice a day, Disp: 30 mL, Rfl: 1    erythromycin (ROMYCIN) ophthalmic ointment, Apply 1 a thin layer into left eye four times a day, Disp: 3.5 g, Rfl: 6    fluorometholone 0.1% (FML) 0.1 % DrpS, Instill 1 drop into left eye once a day, Disp: 5 mL, Rfl: 1    gabapentin (NEURONTIN) 100 MG capsule, Take 1-3 capsules (100-300 mg total) by  mouth nightly as needed (restless legs  (note: take 1-2 hours prior to usual onset of symptoms))., Disp: 90 capsule, Rfl: 1    ketorolac 0.4% (ACULAR) 0.4 % Drop, Instill 1 drop into left eye four times a day, Disp: 5 mL, Rfl: 1    neomycin-polymyxin-dexamethasone (MAXITROL) 3.5 mg/g-10,000 unit/g-0.1 % Oint, Apply 0.25 inch into right eye 3 times a day X 3 DAYS THEN STOP, Disp: 3.5 g, Rfl: 0    netarsudiL-latanoprost (ROCKLATAN) 0.02-0.005 % Drop, Instill 1 drop into both eyes at bedtime, Disp: 2.5 mL, Rfl: 6    testosterone (ANDROGEL) 1.62 % (40.5 mg/2.5 gram) GlPk, Place 2.5 g (1 packet) onto the skin once daily., Disp: 75 g, Rfl: 5    timolol maleate 0.25% (TIMOPTIC) 0.25 % Drop, 1 drop once daily., Disp: , Rfl:      There were no vitals filed for this visit.    Physical Exam:    GEN:   Well-appearing  Psych:  Appropriate affect, demonstrates insight  SKIN:  No rash on the face or bridge of the nose      LABS:   Lab Results   Component Value Date    HGB 11.5 (L) 08/08/2023    CO2 26 08/08/2023       RECORDS REVIEWED PREVIOUSLY:    Baseline Sleep Study: 06/09/2017 HST The overall AHI was 45 and overall RDI was 46. The oxygen lisa was 70.8% and % time < 90% SpO2 was 37.7%.      CPAP titration 5.12.22: CPAP =9 supine SWS, some sREM CPAP =20.  no lateral sleep.;  Rec CPAP =9 cwp + side sleeping + FFM.  June 22-wants sleeping pill-Trz 50mg     BiPAP titration 5.20.23: 14/10 + lat N2 (apneas supine), 16/12 (sleSimplus FFM- medium sizeep onset obstructives supine)  Rx BiPAP-> EPAP 10, PS 4, IPAPmax 20, ramp 6 x 10min    ASSESSMENT    PROBLEM DESCRIPTION/ Sx on Presentation Interval Hx STATUS   Severe KARMA    HEENT:            MP4, + mild micrognathia  2017 AHI 45 Good usage and efficiency  controlled   Daytime Sx    Occasional sleepiness when inactive   Needing short naps during the day  denies sleepiness when driving   ESS n/a/24 on intake Does report sleep feels more restorative; ESS today 3/24 Mild improvement    Insomnia    Trouble falling asleep: not usually  Maintenance:         waking frequently  Prior hypnotics:      trazodone, gabapentin  Current hypnotics: elavil (makes him tired) Sleeping 8-9 hours on elavil, feels sleep is restorative  Improved with CPAP, but still occurs   Nocturia    x 2-3 per sleep period persists persists    Chronic Rhinitis Claritin daily  Was on flonase in past, not currently   stable stable      Restless Legs Sx of restless legs at night     improved with gabapentin 100-300mg prescribed at last visit. States this is the only medication he has tried that has not caused lingering drowsiness the following day.  Gabapentin is now causing fatigue so he is no longer taking any medications; not interested in alternative medication management  persists   Other issues:       PLAN     -using and benefiting from BiPAP therapy  -continue BiPAP nightly  -BiPAP supplies ordered  -discussed KARMA and CPAP with patient in detail, including possible complications of untreated KARMA like heart attack/stroke  -advised on strict driving precautions; advised never to drive drowsy    Advised on plan of care. Answered all patient questions. Patient verbalized understanding and voiced agreement with plan of care.       RTC 12 months or as needed     The patient was given open opportunity to ask questions and/or express concerns about treatment plan. All questions/concerns were discussed.     Two patient identifiers used prior to evaluation.

## 2023-11-06 PROBLEM — J96.01 ACUTE HYPOXEMIC RESPIRATORY FAILURE: Status: RESOLVED | Noted: 2023-08-02 | Resolved: 2023-11-06

## 2023-11-14 ENCOUNTER — TELEPHONE (OUTPATIENT)
Dept: INTERNAL MEDICINE | Facility: CLINIC | Age: 83
End: 2023-11-14
Payer: MEDICARE

## 2023-11-14 DIAGNOSIS — R53.83 FATIGUE, UNSPECIFIED TYPE: Primary | ICD-10-CM

## 2023-11-14 NOTE — TELEPHONE ENCOUNTER
----- Message from Barak La sent at 11/13/2023  4:34 PM CST -----   Name of Who is Calling:     What is the request in detail:  patient request  call back in reference to feeling fatigued want to know if can put in lab orders  Please contact to further discuss and advise      Can the clinic reply by [facility]:     What Number to Call Back if not in [facility]:   [phone]

## 2023-11-20 ENCOUNTER — LAB VISIT (OUTPATIENT)
Dept: LAB | Facility: HOSPITAL | Age: 83
End: 2023-11-20
Payer: MEDICARE

## 2023-11-20 ENCOUNTER — TELEPHONE (OUTPATIENT)
Dept: INTERNAL MEDICINE | Facility: CLINIC | Age: 83
End: 2023-11-20
Payer: MEDICARE

## 2023-11-20 DIAGNOSIS — E29.1 PRIMARY MALE HYPOGONADISM: ICD-10-CM

## 2023-11-20 DIAGNOSIS — N40.1 BPH WITH URINARY OBSTRUCTION: ICD-10-CM

## 2023-11-20 DIAGNOSIS — R53.83 FATIGUE, UNSPECIFIED TYPE: ICD-10-CM

## 2023-11-20 DIAGNOSIS — E78.5 DYSLIPIDEMIA: ICD-10-CM

## 2023-11-20 DIAGNOSIS — N13.8 BPH WITH URINARY OBSTRUCTION: ICD-10-CM

## 2023-11-20 DIAGNOSIS — G47.30 SLEEP APNEA, UNSPECIFIED TYPE: ICD-10-CM

## 2023-11-20 DIAGNOSIS — R97.20 ELEVATED PSA: ICD-10-CM

## 2023-11-20 LAB
ALBUMIN SERPL BCP-MCNC: 4 G/DL (ref 3.5–5.2)
ALP SERPL-CCNC: 61 U/L (ref 55–135)
ALT SERPL W/O P-5'-P-CCNC: 21 U/L (ref 10–44)
AST SERPL-CCNC: 23 U/L (ref 10–40)
BASOPHILS # BLD AUTO: 0.04 K/UL (ref 0–0.2)
BASOPHILS NFR BLD: 0.7 % (ref 0–1.9)
BILIRUB DIRECT SERPL-MCNC: 0.2 MG/DL (ref 0.1–0.3)
BILIRUB SERPL-MCNC: 0.5 MG/DL (ref 0.1–1)
CHOLEST SERPL-MCNC: 198 MG/DL (ref 120–199)
CHOLEST/HDLC SERPL: 3.4 {RATIO} (ref 2–5)
COMPLEXED PSA SERPL-MCNC: 6.2 NG/ML (ref 0–4)
CREAT SERPL-MCNC: 0.7 MG/DL (ref 0.5–1.4)
DIFFERENTIAL METHOD: ABNORMAL
EOSINOPHIL # BLD AUTO: 0.1 K/UL (ref 0–0.5)
EOSINOPHIL NFR BLD: 2.4 % (ref 0–8)
ERYTHROCYTE [DISTWIDTH] IN BLOOD BY AUTOMATED COUNT: 13.1 % (ref 11.5–14.5)
EST. GFR  (NO RACE VARIABLE): >60 ML/MIN/1.73 M^2
HCT VFR BLD AUTO: 42.1 % (ref 40–54)
HDLC SERPL-MCNC: 58 MG/DL (ref 40–75)
HDLC SERPL: 29.3 % (ref 20–50)
HGB BLD-MCNC: 14.2 G/DL (ref 14–18)
IMM GRANULOCYTES # BLD AUTO: 0.02 K/UL (ref 0–0.04)
IMM GRANULOCYTES NFR BLD AUTO: 0.4 % (ref 0–0.5)
LDLC SERPL CALC-MCNC: 120 MG/DL (ref 63–159)
LYMPHOCYTES # BLD AUTO: 1.4 K/UL (ref 1–4.8)
LYMPHOCYTES NFR BLD: 26.5 % (ref 18–48)
MCH RBC QN AUTO: 32.4 PG (ref 27–31)
MCHC RBC AUTO-ENTMCNC: 33.7 G/DL (ref 32–36)
MCV RBC AUTO: 96 FL (ref 82–98)
MONOCYTES # BLD AUTO: 0.6 K/UL (ref 0.3–1)
MONOCYTES NFR BLD: 11.2 % (ref 4–15)
NEUTROPHILS # BLD AUTO: 3.2 K/UL (ref 1.8–7.7)
NEUTROPHILS NFR BLD: 58.8 % (ref 38–73)
NONHDLC SERPL-MCNC: 140 MG/DL
NRBC BLD-RTO: 0 /100 WBC
PLATELET # BLD AUTO: 218 K/UL (ref 150–450)
PMV BLD AUTO: 9.3 FL (ref 9.2–12.9)
PROT SERPL-MCNC: 7.1 G/DL (ref 6–8.4)
RBC # BLD AUTO: 4.38 M/UL (ref 4.6–6.2)
TESTOST SERPL-MCNC: 417 NG/DL (ref 304–1227)
TRIGL SERPL-MCNC: 100 MG/DL (ref 30–150)
WBC # BLD AUTO: 5.36 K/UL (ref 3.9–12.7)

## 2023-11-20 PROCEDURE — 80076 HEPATIC FUNCTION PANEL: CPT | Mod: HCNC | Performed by: NURSE PRACTITIONER

## 2023-11-20 PROCEDURE — 84153 ASSAY OF PSA TOTAL: CPT | Mod: HCNC | Performed by: NURSE PRACTITIONER

## 2023-11-20 PROCEDURE — 36415 COLL VENOUS BLD VENIPUNCTURE: CPT | Mod: HCNC | Performed by: NURSE PRACTITIONER

## 2023-11-20 PROCEDURE — 82565 ASSAY OF CREATININE: CPT | Mod: HCNC | Performed by: NURSE PRACTITIONER

## 2023-11-20 PROCEDURE — 80061 LIPID PANEL: CPT | Mod: HCNC | Performed by: NURSE PRACTITIONER

## 2023-11-20 PROCEDURE — 84403 ASSAY OF TOTAL TESTOSTERONE: CPT | Mod: HCNC | Performed by: NURSE PRACTITIONER

## 2023-11-20 PROCEDURE — 85025 COMPLETE CBC W/AUTO DIFF WBC: CPT | Mod: HCNC | Performed by: NURSE PRACTITIONER

## 2023-11-20 NOTE — TELEPHONE ENCOUNTER
----- Message from Dulce Rico MA sent at 11/20/2023 11:34 AM CST -----  Name of Who is Calling:MALACHI GOULD [[id number]]                What is the request in detail: Pt is requesting a call back to discuss his lab results. Please assist.                Can the clinic reply by [facility]: No                What Number [facility] Back [facility]: [phone]

## 2023-11-21 NOTE — TELEPHONE ENCOUNTER
It appears that urology ordered a number of labs.  I am sure they will review the results with him.

## 2023-11-21 NOTE — TELEPHONE ENCOUNTER
PAYALM to inform the pt of Dr Sneed advice on labs      It appears that urology ordered a number of labs.  I am sure they will review the results with him.

## 2023-11-22 ENCOUNTER — TELEPHONE (OUTPATIENT)
Dept: INTERNAL MEDICINE | Facility: CLINIC | Age: 83
End: 2023-11-22
Payer: MEDICARE

## 2023-11-22 NOTE — TELEPHONE ENCOUNTER
----- Message from Wen Figueredo sent at 11/21/2023  3:54 PM CST -----  Regarding: RETURN CALL  Who Called: MALACHI GOULD [[id number]]        Who Left Message for Patient:rm        Does the patient know what this is regarding?yes        Best Call Back Number:[phone]        Additional Information:

## 2023-11-29 ENCOUNTER — OFFICE VISIT (OUTPATIENT)
Dept: UROLOGY | Facility: CLINIC | Age: 83
End: 2023-11-29
Payer: MEDICARE

## 2023-11-29 VITALS
BODY MASS INDEX: 24.47 KG/M2 | SYSTOLIC BLOOD PRESSURE: 122 MMHG | HEART RATE: 60 BPM | HEIGHT: 67 IN | DIASTOLIC BLOOD PRESSURE: 77 MMHG | WEIGHT: 155.88 LBS

## 2023-11-29 DIAGNOSIS — N13.8 BPH WITH URINARY OBSTRUCTION: ICD-10-CM

## 2023-11-29 DIAGNOSIS — R53.83 FATIGUE, UNSPECIFIED TYPE: ICD-10-CM

## 2023-11-29 DIAGNOSIS — N40.1 BPH WITH URINARY OBSTRUCTION: ICD-10-CM

## 2023-11-29 DIAGNOSIS — N52.9 ED (ERECTILE DYSFUNCTION) OF ORGANIC ORIGIN: ICD-10-CM

## 2023-11-29 DIAGNOSIS — E29.1 PRIMARY MALE HYPOGONADISM: Primary | ICD-10-CM

## 2023-11-29 DIAGNOSIS — R97.20 ELEVATED PSA: ICD-10-CM

## 2023-11-29 PROCEDURE — 1126F PR PAIN SEVERITY QUANTIFIED, NO PAIN PRESENT: ICD-10-PCS | Mod: HCNC,CPTII,S$GLB, | Performed by: NURSE PRACTITIONER

## 2023-11-29 PROCEDURE — 1126F AMNT PAIN NOTED NONE PRSNT: CPT | Mod: HCNC,CPTII,S$GLB, | Performed by: NURSE PRACTITIONER

## 2023-11-29 PROCEDURE — 3078F DIAST BP <80 MM HG: CPT | Mod: HCNC,CPTII,S$GLB, | Performed by: NURSE PRACTITIONER

## 2023-11-29 PROCEDURE — 1101F PT FALLS ASSESS-DOCD LE1/YR: CPT | Mod: HCNC,CPTII,S$GLB, | Performed by: NURSE PRACTITIONER

## 2023-11-29 PROCEDURE — 99999 PR PBB SHADOW E&M-EST. PATIENT-LVL III: ICD-10-PCS | Mod: PBBFAC,HCNC,, | Performed by: NURSE PRACTITIONER

## 2023-11-29 PROCEDURE — 1159F MED LIST DOCD IN RCRD: CPT | Mod: HCNC,CPTII,S$GLB, | Performed by: NURSE PRACTITIONER

## 2023-11-29 PROCEDURE — 99999 PR PBB SHADOW E&M-EST. PATIENT-LVL III: CPT | Mod: PBBFAC,HCNC,, | Performed by: NURSE PRACTITIONER

## 2023-11-29 PROCEDURE — 3288F PR FALLS RISK ASSESSMENT DOCUMENTED: ICD-10-PCS | Mod: HCNC,CPTII,S$GLB, | Performed by: NURSE PRACTITIONER

## 2023-11-29 PROCEDURE — 1101F PR PT FALLS ASSESS DOC 0-1 FALLS W/OUT INJ PAST YR: ICD-10-PCS | Mod: HCNC,CPTII,S$GLB, | Performed by: NURSE PRACTITIONER

## 2023-11-29 PROCEDURE — 3078F PR MOST RECENT DIASTOLIC BLOOD PRESSURE < 80 MM HG: ICD-10-PCS | Mod: HCNC,CPTII,S$GLB, | Performed by: NURSE PRACTITIONER

## 2023-11-29 PROCEDURE — 1160F RVW MEDS BY RX/DR IN RCRD: CPT | Mod: HCNC,CPTII,S$GLB, | Performed by: NURSE PRACTITIONER

## 2023-11-29 PROCEDURE — 1159F PR MEDICATION LIST DOCUMENTED IN MEDICAL RECORD: ICD-10-PCS | Mod: HCNC,CPTII,S$GLB, | Performed by: NURSE PRACTITIONER

## 2023-11-29 PROCEDURE — 1160F PR REVIEW ALL MEDS BY PRESCRIBER/CLIN PHARMACIST DOCUMENTED: ICD-10-PCS | Mod: HCNC,CPTII,S$GLB, | Performed by: NURSE PRACTITIONER

## 2023-11-29 PROCEDURE — 3288F FALL RISK ASSESSMENT DOCD: CPT | Mod: HCNC,CPTII,S$GLB, | Performed by: NURSE PRACTITIONER

## 2023-11-29 PROCEDURE — 99214 PR OFFICE/OUTPT VISIT, EST, LEVL IV, 30-39 MIN: ICD-10-PCS | Mod: HCNC,S$GLB,, | Performed by: NURSE PRACTITIONER

## 2023-11-29 PROCEDURE — 99214 OFFICE O/P EST MOD 30 MIN: CPT | Mod: HCNC,S$GLB,, | Performed by: NURSE PRACTITIONER

## 2023-11-29 PROCEDURE — 3074F PR MOST RECENT SYSTOLIC BLOOD PRESSURE < 130 MM HG: ICD-10-PCS | Mod: HCNC,CPTII,S$GLB, | Performed by: NURSE PRACTITIONER

## 2023-11-29 PROCEDURE — 3074F SYST BP LT 130 MM HG: CPT | Mod: HCNC,CPTII,S$GLB, | Performed by: NURSE PRACTITIONER

## 2023-11-29 RX ORDER — SILDENAFIL 100 MG/1
100 TABLET, FILM COATED ORAL DAILY PRN
Qty: 10 TABLET | Refills: 12 | Status: SHIPPED | OUTPATIENT
Start: 2023-11-29 | End: 2024-11-28

## 2023-11-29 RX ORDER — TESTOSTERONE 40.5 MG/2.5G
2.5 GEL TOPICAL DAILY
Qty: 75 G | Refills: 5 | Status: SHIPPED | OUTPATIENT
Start: 2023-11-29 | End: 2024-05-28

## 2023-11-29 NOTE — PROGRESS NOTES
CHIEF COMPLAINT:    Cricket Mcdonnell is a 83 y.o. male presents today for Hypogonadism.     HISTORY OF PRESENTING ILLINESS:    Cricket Mcdonnell is a 83 y.o. male with a history of low T and fatigue and ED  01/16/2020 T was 126  01/20/2020 T was 105  He was started on Androgel 1.62%/2.5gm  Report feeling better; more energy with TRT.   Last seen clinic  11/07/2022.      PSA was 7.0.   11/07/2022 MRI of Prostate was clear of any areas of concern.      Recently had another sleep study.   Bought a sleep mask; similar to the ones used during the study.  Getting use to it. .   Coffee drinker in the afternoon  Melatonin not working for him  Sildenafil 100mg PRN helps with the ED but he also have trouble achieving orgasm    Today for f/u visit.  No urinary complaints. Getting up 2-3x.  He reports a lot of fatigue.   Has not been himself since the time he was admitted for Pneumonia.      TRT labs completed 11/20/2023:  - T was 417  -PSA was 6.2 (stable)  -HCT was 42.1 (stable)  -normal lipids/LFT's     No family history of Prostate Cancer. Father & mother lived to .   His sister passed away from emphysema          REVIEW OF SYSTEMS:  Review of Systems   Constitutional:  Positive for malaise/fatigue. Negative for chills and fever.   Eyes:  Negative for double vision.   Respiratory:  Negative for cough and shortness of breath.         (+) sleep apnea; uses CPAP  Sleep score was a 97 out of 100.      Cardiovascular:  Negative for chest pain.   Gastrointestinal:  Negative for abdominal pain, constipation, diarrhea, nausea and vomiting.   Genitourinary: Negative.  Negative for dysuria, flank pain and hematuria.        Ok with urination  Nocturia   Neurological:  Negative for dizziness and seizures.   Endo/Heme/Allergies:  Negative for polydipsia.         PATIENT HISTORY:    Past Medical History:   Diagnosis Date    Hereditary sensory neuropathy     Sleep apnea     + CPAP       Past Surgical History:   Procedure Laterality  Date    CATARACT EXTRACTION      COLONOSCOPY N/A 1/3/2019    Procedure: COLONOSCOPY;  Surgeon: Cholo Yates MD;  Location: [facility] ([address] [place] [address]);  Service: Endoscopy;  Laterality: N/A;    EYE SURGERY      galucoma surgery      HERNIA REPAIR      tonsillectomy      TONSILLECTOMY         Family History   Problem Relation Age of Onset    COPD Sister     Cancer Sister         lung cancer from smoking    No Known Problems Son     No Known Problems Daughter        Social History     Socioeconomic History    Marital status:    Occupational History     Employer: [employer]   Tobacco Use    Smoking status: Former     Current packs/day: 0.00     Average packs/day: 0.3 packs/day for 30.0 years (7.5 ttl pk-yrs)     Types: Cigarettes     Start date: 1971     Quit date: 2001     Years since quittin.3     Passive exposure: Past    Smokeless tobacco: Never    Tobacco comments:     Quit 26 year ago   Substance and Sexual Activity    Alcohol use: Not Currently     Comment: I gave up drinking 23 years ago    Drug use: No    Sexual activity: Yes     Partners: Female     Birth control/protection: Diaphragm     Social Determinants of Health     Food Insecurity: No Food Insecurity (2/15/2023)    Hunger Vital Sign     Worried About Running Out of Food in the Last Year: Never true     Ran Out of Food in the Last Year: Never true   Transportation Needs: No Transportation Needs (2/15/2023)    PRAPARE - Transportation     Lack of Transportation (Medical): No     Lack of Transportation (Non-Medical): No   Physical Activity: Sufficiently Active (2/15/2023)    Exercise Vital Sign     Days of Exercise per Week: 7 days     Minutes of Exercise per Session: 40 min   Stress: No Stress Concern Present (2/15/2023)    [place] [employer] - Occupational Stress Questionnaire     Feeling of Stress : Only a little   Social Connections: Moderately Integrated (2/15/2023)    Social Connection and  Isolation Panel [NHANES]     Frequency of Communication with Friends and Family: More than three times a week     Frequency of Social Gatherings with Friends and Family: Once a week     Attends Mandaen Services: More than 4 times per year     Active Member of Clubs or Organizations: Yes     Attends Club or Organization Meetings: More than 4 times per year     Marital Status:    Housing Stability: Low Risk  (2/15/2023)    Housing Stability Vital Sign     Unable to Pay for Housing in the Last Year: No     Number of Places Lived in the Last Year: 1     Unstable Housing in the Last Year: No       Allergies:  Poison ivy extract and Cooper    Medications:    Current Outpatient Medications:     brimonidine 0.2% (ALPHAGAN) 0.2 % Drop, Instill 1 drop into left eye three times a day, Disp: 10 mL, Rfl: 6    brimonidine 0.2% (ALPHAGAN) 0.2 % Drop, Instill 1 drop into left eye three times a day, Disp: 10 mL, Rfl: 6    dorzolamide-timolol 2-0.5% (COSOPT) 22.3-6.8 mg/mL ophthalmic solution, Instill 1 drop into both eyes twice a day, Disp: 10 mL, Rfl: 2    dorzolamide-timolol 2-0.5% (COSOPT) 22.3-6.8 mg/mL ophthalmic solution, Instill 1 drop into Both Eyes twice a day, Disp: 30 mL, Rfl: 1    erythromycin (ROMYCIN) ophthalmic ointment, Apply 1 a thin layer into left eye four times a day, Disp: 3.5 g, Rfl: 6    fluorometholone 0.1% (FML) 0.1 % DrpS, Instill 1 drop into left eye once a day, Disp: 5 mL, Rfl: 1    ketorolac 0.4% (ACULAR) 0.4 % Drop, Instill 1 drop into left eye four times a day, Disp: 5 mL, Rfl: 1    neomycin-polymyxin-dexamethasone (MAXITROL) 3.5 mg/g-10,000 unit/g-0.1 % Oint, 1/4 inch into right eye three times a day, Disp: 3.5 g, Rfl: 0    netarsudiL-latanoprost (ROCKLATAN) 0.02-0.005 % Drop, Instill 1 drop into both eyes at bedtime, Disp: 2.5 mL, Rfl: 6    timolol maleate 0.25% (TIMOPTIC) 0.25 % Drop, 1 drop once daily., Disp: , Rfl:     sildenafiL (VIAGRA) 100 MG tablet, Take 1 tablet (100 mg total) by  mouth daily as needed for Erectile Dysfunction (take on an empty stomach 30-60 minutes before)., Disp: 10 tablet, Rfl: 12    testosterone (ANDROGEL) 1.62 % (40.5 mg/2.5 gram) GlPk, Place 2.5 g (1 packet) onto the skin once daily., Disp: 75 g, Rfl: 5    PHYSICAL EXAMINATION:  Physical Exam  Vitals and nursing note reviewed.   Constitutional:       General: He is awake.      Appearance: Normal appearance.   HENT:      Head: Normocephalic.      Right Ear: External ear normal.      Left Ear: External ear normal.      Nose: Nose normal.   Cardiovascular:      Rate and Rhythm: Normal rate.   Pulmonary:      Effort: Pulmonary effort is normal. No respiratory distress.   Abdominal:      Tenderness: There is no abdominal tenderness. There is no right CVA tenderness or left CVA tenderness.   Genitourinary:     Penis: Normal.       Testes: Normal.      Prostate: Enlarged (~35gms; smooth). Not tender and no nodules present.      Rectum: Normal.   Musculoskeletal:         General: Normal range of motion.      Cervical back: Normal range of motion.   Skin:     General: Skin is warm and dry.   Neurological:      General: No focal deficit present.      Mental Status: He is alert and oriented to person, place, and time.   Psychiatric:         Mood and Affect: Mood normal.         Behavior: Behavior is cooperative.           LABS:          Lab Results   Component Value Date    PSA 2.1 05/21/2018    PSA 1.8 03/27/2017    PSA 1.82 05/31/2013    PSADIAG 6.2 (H) 11/20/2023    PSADIAG 6.0 (H) 05/22/2023    PSADIAG 7.0 (H) 11/30/2022    PSATOTAL 2.1 07/06/2015       Lab Results   Component Value Date    CREATININE 0.7 11/20/2023    EGFRNORACEVR >60.0 11/20/2023             IMPRESSION:    Encounter Diagnoses   Name Primary?    Primary male hypogonadism Yes    Fatigue, unspecified type     BPH with urinary obstruction     Elevated PSA     ED (erectile dysfunction) of organic origin          Assessment:       1. Primary male hypogonadism     2. Fatigue, unspecified type    3. BPH with urinary obstruction    4. Elevated PSA    5. ED (erectile dysfunction) of organic origin        Plan:         I spent 30 minutes with the patient of which more than half was spent in direct consultation with the patient in regards to our treatment and plan.  We addressed the office findings and recent labs.   Education and recommendations of today's plan of care including home remedies and needed follow up with PCP.   We discussed the chief complaint; reviewed the LUTS and the possible contributory factors.   Reassurance his Testosterone is normal and not a factor for his fatigue.   His labs have improved since being in the hospital.   Reviewed management  Recommended lifestyle modifications with a proper, healthy diet, good hydration but during the day. Reducing bladder irritants.   Benefits of regular exercise.  Refilled his AndroGel and Sildenafil per request  F/U with PCP  RTC 6 months with full labs

## 2024-01-02 ENCOUNTER — TELEPHONE (OUTPATIENT)
Dept: INTERNAL MEDICINE | Facility: CLINIC | Age: 84
End: 2024-01-02
Payer: MEDICARE

## 2024-01-02 DIAGNOSIS — L70.9 ACNE, UNSPECIFIED ACNE TYPE: Primary | ICD-10-CM

## 2024-01-02 NOTE — TELEPHONE ENCOUNTER
----- Message from Lillie Foy sent at 1/2/2024  3:40 PM CST -----  Regarding: self .[phone]  .Type:  Patient Requesting Referral    Who Called: self     Referral to What Specialty: Dermatology     Reason for Referral: pimple on side of face     Does the patient want the referral with a specific physician?: no     [facility]     Would the patient rather a call back or a response via [facility]?  Call back     Best Call Back Number .[phone]

## 2024-01-08 PROBLEM — Z00.00 HEALTHCARE MAINTENANCE: Status: RESOLVED | Noted: 2019-01-03 | Resolved: 2024-01-08

## 2024-01-08 PROBLEM — J18.9 PNEUMONIA OF LEFT LOWER LOBE DUE TO INFECTIOUS ORGANISM: Status: RESOLVED | Noted: 2023-08-05 | Resolved: 2024-01-08

## 2024-01-30 ENCOUNTER — TELEPHONE (OUTPATIENT)
Dept: DERMATOLOGY | Facility: CLINIC | Age: 84
End: 2024-01-30
Payer: MEDICARE

## 2024-01-30 NOTE — TELEPHONE ENCOUNTER
NP is a referral from Dr. Crespo with [facility] on [facility]. Pt is scheduled for same day mohs on 3/13 at 1130 am. Over the phone consult completed. Pt confirmed date, time, and location. New patient packet sent in the mail.

## 2024-02-26 ENCOUNTER — OFFICE VISIT (OUTPATIENT)
Dept: INTERNAL MEDICINE | Facility: CLINIC | Age: 84
End: 2024-02-26
Payer: MEDICARE

## 2024-02-26 VITALS
HEART RATE: 63 BPM | OXYGEN SATURATION: 96 % | HEIGHT: 67 IN | SYSTOLIC BLOOD PRESSURE: 124 MMHG | BODY MASS INDEX: 25.22 KG/M2 | WEIGHT: 160.69 LBS | DIASTOLIC BLOOD PRESSURE: 80 MMHG

## 2024-02-26 DIAGNOSIS — G47.33 OSA (OBSTRUCTIVE SLEEP APNEA): ICD-10-CM

## 2024-02-26 DIAGNOSIS — F41.9 ANXIETY DISORDER, UNSPECIFIED TYPE: ICD-10-CM

## 2024-02-26 DIAGNOSIS — H35.413 LATTICE DEGENERATION OF RETINA, BILATERAL: ICD-10-CM

## 2024-02-26 DIAGNOSIS — N40.0 BENIGN PROSTATIC HYPERPLASIA, UNSPECIFIED WHETHER LOWER URINARY TRACT SYMPTOMS PRESENT: ICD-10-CM

## 2024-02-26 DIAGNOSIS — M54.50 LOW BACK PAIN, UNSPECIFIED BACK PAIN LATERALITY, UNSPECIFIED CHRONICITY, UNSPECIFIED WHETHER SCIATICA PRESENT: ICD-10-CM

## 2024-02-26 DIAGNOSIS — M46.00 SPINAL ENTHESOPATHY: ICD-10-CM

## 2024-02-26 DIAGNOSIS — H40.1112 PRIMARY OPEN ANGLE GLAUCOMA OF RIGHT EYE, MODERATE STAGE: ICD-10-CM

## 2024-02-26 DIAGNOSIS — G62.89 OTHER POLYNEUROPATHY: ICD-10-CM

## 2024-02-26 DIAGNOSIS — H35.3231 EXUDATIVE AGE-RELATED MACULAR DEGENERATION, BILATERAL, WITH ACTIVE CHOROIDAL NEOVASCULARIZATION: ICD-10-CM

## 2024-02-26 DIAGNOSIS — Z74.09 OTHER REDUCED MOBILITY: ICD-10-CM

## 2024-02-26 DIAGNOSIS — D09.9 SQUAMOUS CELL CARCINOMA IN SITU: ICD-10-CM

## 2024-02-26 DIAGNOSIS — E29.1 TESTOSTERONE DEFICIENCY IN MALE: ICD-10-CM

## 2024-02-26 DIAGNOSIS — Z00.00 ENCOUNTER FOR PREVENTIVE HEALTH EXAMINATION: Primary | ICD-10-CM

## 2024-02-26 DIAGNOSIS — K59.00 CONSTIPATION, UNSPECIFIED CONSTIPATION TYPE: ICD-10-CM

## 2024-02-26 DIAGNOSIS — I70.0 AORTIC ATHEROSCLEROSIS: ICD-10-CM

## 2024-02-26 DIAGNOSIS — J31.0 CHRONIC RHINITIS: ICD-10-CM

## 2024-02-26 PROCEDURE — 3079F DIAST BP 80-89 MM HG: CPT | Mod: HCNC,CPTII,S$GLB, | Performed by: NURSE PRACTITIONER

## 2024-02-26 PROCEDURE — 1170F FXNL STATUS ASSESSED: CPT | Mod: HCNC,CPTII,S$GLB, | Performed by: NURSE PRACTITIONER

## 2024-02-26 PROCEDURE — 1101F PT FALLS ASSESS-DOCD LE1/YR: CPT | Mod: HCNC,CPTII,S$GLB, | Performed by: NURSE PRACTITIONER

## 2024-02-26 PROCEDURE — 3074F SYST BP LT 130 MM HG: CPT | Mod: HCNC,CPTII,S$GLB, | Performed by: NURSE PRACTITIONER

## 2024-02-26 PROCEDURE — 1125F AMNT PAIN NOTED PAIN PRSNT: CPT | Mod: HCNC,CPTII,S$GLB, | Performed by: NURSE PRACTITIONER

## 2024-02-26 PROCEDURE — 1159F MED LIST DOCD IN RCRD: CPT | Mod: HCNC,CPTII,S$GLB, | Performed by: NURSE PRACTITIONER

## 2024-02-26 PROCEDURE — 3288F FALL RISK ASSESSMENT DOCD: CPT | Mod: HCNC,CPTII,S$GLB, | Performed by: NURSE PRACTITIONER

## 2024-02-26 PROCEDURE — G0439 PPPS, SUBSEQ VISIT: HCPCS | Mod: HCNC,S$GLB,, | Performed by: NURSE PRACTITIONER

## 2024-02-26 PROCEDURE — 99999 PR PBB SHADOW E&M-EST. PATIENT-LVL V: CPT | Mod: PBBFAC,HCNC,, | Performed by: NURSE PRACTITIONER

## 2024-02-26 PROCEDURE — 1160F RVW MEDS BY RX/DR IN RCRD: CPT | Mod: HCNC,CPTII,S$GLB, | Performed by: NURSE PRACTITIONER

## 2024-02-26 NOTE — PROGRESS NOTES
"Cricket Mcdonnell presented for a follow-up Medicare AWV today. The following components were reviewed and updated:    Medical history  Family History  Social history  Allergies and Current Medications  Health Risk Assessment  Health Maintenance  Care Team    **See Completed Assessments for Annual Wellness visit with in the encounter summary    The following assessments were completed:  Depression Screening  Cognitive function Screening    Timed Get Up Test - antalgic gait  Whisper Test - N/A hearing impairment, has hearing aids      Opioid documentation:      Patient does not have a current opioid prescription.          Vitals:    02/26/24 1322   BP: 124/80   BP Location: Left arm   Patient Position: Sitting   Pulse: 63   SpO2: 96%   Weight: 72.9 kg (160 lb 11.5 oz)   Height: 5' 7" (1.702 m)     Body mass index is 25.17 kg/m².       Physical Exam  Vitals reviewed.   Constitutional:       Appearance: Normal appearance.   HENT:      Head: Normocephalic.   Cardiovascular:      Rate and Rhythm: Normal rate.   Pulmonary:      Effort: Pulmonary effort is normal.   Abdominal:      General: Bowel sounds are normal.   Musculoskeletal:      Right lower leg: No edema.      Left lower leg: No edema.      Comments: Antalgic gait   Skin:     General: Skin is warm and dry.      Capillary Refill: Capillary refill takes less than 2 seconds.   Neurological:      Mental Status: He is alert and oriented to person, place, and time.   Psychiatric:         Mood and Affect: Mood normal.         Behavior: Behavior normal.         Thought Content: Thought content normal.         Judgment: Judgment normal.           Diagnoses and health risks identified today and associated recommendations/orders:  1. Encounter for preventive health examination  Assessments completed.  HM recommendations reviewed.   F/u with PCP as instructed.    2. Exudative age-related macular degeneration, bilateral, with active choroidal neovascularization  Chronic, " stable on current regimen. Followed by outside ophthalmology.    3. Aortic atherosclerosis  Chronic, stable on current regimen. Followed by PCP. Not on statin.    4. Spinal enthesopathy  Chronic, stable on current regimen. Followed by PCP.    5. Lattice degeneration of retina, bilateral  Chronic, stable on current regimen. Followed by outside ophthalmology.    6. Primary open angle glaucoma of right eye, moderate stage  Chronic, stable on current regimen. Followed by outside ophthalmology.    7. Low back pain, unspecified back pain laterality, unspecified chronicity, unspecified whether sciatica present  Chronic, stable on current regimen. Followed by PCP.    8. Other polyneuropathy  Chronic, stable on current regimen. Followed by PCP / podiatry.    9. Anxiety disorder, unspecified type  Chronic, stable on current regimen. Followed by PCP.    10. Constipation, unspecified constipation type  Chronic, stable on current regimen. Followed by PCP.    11. Chronic rhinitis  Chronic, stable on current regimen. Followed by ENT.    12. Benign prostatic hyperplasia, unspecified whether lower urinary tract symptoms present  Chronic, stable on current regimen. Followed by urology.    13. Testosterone deficiency in male  Chronic, stable on current regimen. Followed by urology.    14. KARMA (obstructive sleep apnea)  Chronic, stable on current regimen. Followed by sleep medicine.    15. Squamous cell carcinoma in situ  Chronic, stable on current regimen. Followed by derm / establishing with MOHs clinic.    16. Other reduced mobility  Chronic, stable. No recent falls. Antalgic gait. Followed by PCP.       Provided Cricket Morrow with a 5-10 year written screening schedule and personal prevention plan. Recommendations were developed using the USPSTF age appropriate recommendations. Education, counseling, and referrals were provided as needed.  After Visit Summary printed and given to patient which includes a list of additional  screenings\tests needed.    Follow up in about 1 year (around 2/26/2025) for Medicare AWV and with PCP as instructed.       Aline Contreras NP    I offered to discuss advanced care planning, including how to pick a person who would make decisions for you if you were unable to make them for yourself, called a health care power of , and what kind of decisions you might make such as use of life sustaining treatments such as ventilators and tube feeding when faced with a life limiting illness recorded on a living will that they will need to know. (How you want to be cared for as you near the end of your natural life)     X  Patient has advanced directives written and agrees to provide copies to the institution.

## 2024-02-26 NOTE — PATIENT INSTRUCTIONS
1. Next annual with Dr. Sneed, Neo CUEVAS MD around August 2025.    2. Can upload living will documents to patient portal under menu option Advance care planning or bring copy to drs visit for us to upload.    Counseling and Referral of Other Preventative  (Italic type indicates deductible and co-insurance are waived)    Patient Name: Cricket Mcdonnell  Today's Date: 2/26/2024    Health Maintenance         Date Due Completion Date    Colonoscopy 01/03/2026 1/3/2019    TETANUS VACCINE 07/12/2026 7/12/2016    Lipid Panel 11/20/2028 11/20/2023          No orders of the defined types were placed in this encounter.      The following information is provided to all patients.  This information is to help you find resources for any of the problems found today that may be affecting your health:                  Living healthy guide: www.[facility].[place].gov      Understanding Diabetes: www.diabetes.org      Eating healthy: www.cdc.gov/healthyweight      [facility] home safety checklist: www.cdc.gov/steadi/patient.html      Agency on Aging: www.goea.[place].[facility]      Alcoholics anonymous (AA): www.aa.org      Physical Activity: www.librado.nih.gov/te7pftq      Tobacco use: www.quitwithusla.org

## 2024-02-27 ENCOUNTER — TELEPHONE (OUTPATIENT)
Dept: INTERNAL MEDICINE | Facility: CLINIC | Age: 84
End: 2024-02-27

## 2024-02-27 NOTE — TELEPHONE ENCOUNTER
"----- Message from Mary Kate Freeman sent at 2/26/2024  4:37 PM CST -----  Regarding: Prescriptions  "Type:  Patient Call Back    Who Called:PT    What is the reqeust in detail:Requesting call back in regards to prescribing some vitamins for him being fatigue. Please advise    Can the clinic reply by [facility]?no     Best Call Back Number:[phone]      Additional Information:[facility]                "

## 2024-02-29 ENCOUNTER — TELEPHONE (OUTPATIENT)
Dept: INTERNAL MEDICINE | Facility: CLINIC | Age: 84
End: 2024-02-29
Payer: MEDICARE

## 2024-02-29 NOTE — TELEPHONE ENCOUNTER
----- Message from Gabriellemelody Jez sent at 2/29/2024  2:46 PM CST -----  Regarding: Blood Orders  Name of Who is Calling:  Patient          What is the request in detail:  Patient would like to know can he get blood work orders to see why he's always tired. Please contact when the orders are put in            Can the clinic reply by [facility]: No            What Number to Call Back if not in [facility]: [phone]

## 2024-03-13 ENCOUNTER — PROCEDURE VISIT (OUTPATIENT)
Dept: DERMATOLOGY | Facility: CLINIC | Age: 84
End: 2024-03-13
Payer: MEDICARE

## 2024-03-13 VITALS
DIASTOLIC BLOOD PRESSURE: 75 MMHG | HEIGHT: 67 IN | HEART RATE: 56 BPM | SYSTOLIC BLOOD PRESSURE: 130 MMHG | WEIGHT: 160.69 LBS | BODY MASS INDEX: 25.22 KG/M2

## 2024-03-13 DIAGNOSIS — D04.39 SQUAMOUS CELL CARCINOMA IN SITU OF SKIN OF LEFT CHEEK: Primary | ICD-10-CM

## 2024-03-13 PROCEDURE — 13132 CMPLX RPR F/C/C/M/N/AX/G/H/F: CPT | Mod: HCNC,S$GLB,, | Performed by: DERMATOLOGY

## 2024-03-13 PROCEDURE — 99499 UNLISTED E&M SERVICE: CPT | Mod: HCNC,S$GLB,, | Performed by: DERMATOLOGY

## 2024-03-13 PROCEDURE — 17311 MOHS 1 STAGE H/N/HF/G: CPT | Mod: HCNC,51,S$GLB, | Performed by: DERMATOLOGY

## 2024-03-13 NOTE — PROGRESS NOTES
New patient with a 5 months h/o lesion on the L central zygoma. Patient first noticed it in October 2023. Never painful. Denies bleeding, scabbing. Biopsy c/w SCCIS. No prior treatment.    Physical Exam   HENT:   Head:         L central zygoma with a 6 x 7 mm pink bx site located 3 cm anteriorly from the left inferior lobe attachment and 1.5 cm superiorly.    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  Diagnosis, photograph, and pathology report were reviewed with patient.  Discussed risks, benefits, and alternatives of Mohs surgery.  Discussed repair options including complex closure, skin flap, skin graft, and second intention healing.  Patient agreed to proceed with Mohs surgery today.      PROCEDURE: Mohs' Micrographic Surgery    INDICATION: Location in non-mask areas of face where maximum conservation of tumor-free tissue is needed. Biopsy-proven skin cancer of cosmetically and functionally important areas, including head, neck, genital, hand, foot, or areas known for having difficulty in healing, such as the lower anterior legs. Tumor with ill-defined borders.    REFERRING PROVIDER:  Eloisa Crespo MD    CASE NUMBER:     ANESTHETIC: 2.5 cc 0.5% Lidocaine with Epi 1:200,000 mixed 1:1 with 0.5% Bupivacaine    SURGICAL PREP: Hibiclens    SURGEON: Irma Louie MD    ASSISTANTS: Juanita Davenport PA-C and Sandra Rene MA    PREOPERATIVE DIAGNOSIS: squamous cell carcinoma in situ    POSTOPERATIVE DIAGNOSIS: squamous cell carcinoma in situ    PATHOLOGIC DIAGNOSIS: squamous cell carcinoma in situ    HISTOLOGY OF SPECIMENS IN FIRST STAGE:   Tumor Type:  No tumor seen.    STAGES OF MOHS' SURGERY PERFORMED: 1    TUMOR-FREE PLANE ACHIEVED: Yes    HEMOSTASIS: electrocoagulation     SPECIMENS: 2     LOCATION: left central zygoma. Location verified with Dr. Crespo's clinical photograph. Patient also verified location with hand held mirror.    INITIAL LESION SIZE: 0.6 x  0.7 cm    FINAL DEFECT SIZE: 1.0 x 1.1 cm    WOUND REPAIR/DISPOSITION: The patient tolerated Mohs' Micrographic Surgery for a squamous cell carcinoma in situ very well. When the tumor was completely removed, a repair of the surgical defect was undertaken.        PROCEDURE: Complex Linear Repair    INDICATION: Status post Mohs' Micrographic Surgery for squamous cell carcinoma in situ.    CASE NUMBER:     SURGEON: Irma Louie MD    ASSISTANTS: Juanita Davenport PA-C and Wanda Salazar Surg Tech    ANESTHETIC: 1 cc 1% Lidocaine with Epinephrine 1:100,000    SURGICAL PREP: Hibiclens, prepped by Wanda Salazar Surg Tech    LOCATION: left central zygoma    DEFECT SIZE: 1.0 x 1.1 cm    WOUND REPAIR/DISPOSITION:  After the patient's carcinoma had been completely removed with Mohs' Micrographic Surgery, a repair of the surgical defect was undertaken. The patient was returned to the operating suite where the area of left central zygoma was prepped, draped, and anesthetized in the usual sterile fashion. The wound was widely undermined in all directions. The wound was undermined to a distance at least the maximum width of the defect as measured perpendicular to the closure line along at least one entire edge of the defect, in this case 1.5 cm. Then, electrocoagulation was used to obtain meticulous hemostasis. 5-0 Vicryl buried vertical mattress sutures were placed into the subcutaneous and dermal plane to close the wound and concepción the cutaneous wound edge. Bilateral dog ears were identified and were removed by a standard Burow's triangle technique. The cutaneous wound edges were closed using running 5-0 Prolene suture.    The patient tolerated the procedure well.    The area was cleaned and dressed appropriately and the patient was given wound care instructions, as well as appointment for follow-up evaluation and suture removal in 7 days.    LENGTH OF REPAIR: 3 cm    Vitals:    03/13/24 1117 03/13/24 1311   BP: 115/69 130/75  "  BP Location: Left arm Right arm   Patient Position: Sitting Sitting   BP Method: Medium (Automatic)    Pulse: 62 (!) 56   Weight: 72.9 kg (160 lb 11.5 oz)    Height: 5' 7" (1.702 m)          "

## 2024-03-19 ENCOUNTER — TELEPHONE (OUTPATIENT)
Dept: INTERNAL MEDICINE | Facility: CLINIC | Age: 84
End: 2024-03-19

## 2024-03-19 NOTE — TELEPHONE ENCOUNTER
----- Message from Sharron Arce sent at 3/19/2024  2:02 PM CDT -----  Regarding: orders  Name of caller: beulah       What is the requesting detail: pt states he sleeps 9 hours eat night but is still fatigued. Wondering if he needs to have labs done.Please give him a call back to further discuss.       Can the clinic reply by [facility]:       What number to call back:[phone]           no

## 2024-03-20 ENCOUNTER — OFFICE VISIT (OUTPATIENT)
Dept: DERMATOLOGY | Facility: CLINIC | Age: 84
End: 2024-03-20
Payer: MEDICARE

## 2024-03-20 DIAGNOSIS — Z09 POSTOP CHECK: Primary | ICD-10-CM

## 2024-03-20 PROCEDURE — 1101F PT FALLS ASSESS-DOCD LE1/YR: CPT | Mod: HCNC,CPTII,S$GLB, | Performed by: DERMATOLOGY

## 2024-03-20 PROCEDURE — 99999 PR PBB SHADOW E&M-EST. PATIENT-LVL II: CPT | Mod: PBBFAC,HCNC,, | Performed by: DERMATOLOGY

## 2024-03-20 PROCEDURE — 1126F AMNT PAIN NOTED NONE PRSNT: CPT | Mod: HCNC,CPTII,S$GLB, | Performed by: DERMATOLOGY

## 2024-03-20 PROCEDURE — 3288F FALL RISK ASSESSMENT DOCD: CPT | Mod: HCNC,CPTII,S$GLB, | Performed by: DERMATOLOGY

## 2024-03-20 PROCEDURE — 99024 POSTOP FOLLOW-UP VISIT: CPT | Mod: HCNC,S$GLB,, | Performed by: DERMATOLOGY

## 2024-03-20 NOTE — PROGRESS NOTES
83 y.o. male patient is here for suture removal following Mohs' surgery.    Patient reports no problems with left central zygoma.    WOUND PE:  The left central zygoma sutures intact. Wound healing well. Good skin edges. No signs or symptoms of infection.    IMPRESSION:  Healing operative site from Mohs' surgery SCCIS, left central zygoma s/p Mohs with CLC, postop day # 7.    PLAN:  Sutures removed today by  Micaela Kenny MD . Steri-strips applied.  Continue wound care.  Keep moist with Aquaphor.    RTC:  In 3-6 months with  Eloisa Crespo MD  for skin check or sooner if new concern arises.

## 2024-03-21 ENCOUNTER — TELEPHONE (OUTPATIENT)
Dept: INTERNAL MEDICINE | Facility: CLINIC | Age: 84
End: 2024-03-21
Payer: MEDICARE

## 2024-03-21 NOTE — TELEPHONE ENCOUNTER
----- Message from Bryanna Bello sent at 3/21/2024  2:00 PM CDT -----  Name of Who is Calling:MALACHI GOULD [[id number]]         What is the request in detail:PT would like a call back about his fatigue he wants some blood test put in to see what's wrong he has been calling for a few days now and would like a call back please assist         Can the clinic reply by [facility]: No       What Number to Call Back if not in [facility]:[phone]

## 2024-03-21 NOTE — TELEPHONE ENCOUNTER
Returned call to Mr. Mcdonnell. Would like to be evaluated for his fatigue with blood work. States has problems with vision when virtual visit offered. Given tomorrow  at 1030  with Mr. Daly who is Dr. Sneed's colleague.

## 2024-03-22 ENCOUNTER — OFFICE VISIT (OUTPATIENT)
Dept: INTERNAL MEDICINE | Facility: CLINIC | Age: 84
End: 2024-03-22
Payer: MEDICARE

## 2024-03-22 ENCOUNTER — LAB VISIT (OUTPATIENT)
Dept: LAB | Facility: OTHER | Age: 84
End: 2024-03-22
Attending: FAMILY MEDICINE
Payer: MEDICARE

## 2024-03-22 VITALS
BODY MASS INDEX: 24.29 KG/M2 | HEART RATE: 58 BPM | OXYGEN SATURATION: 96 % | WEIGHT: 154.75 LBS | HEIGHT: 67 IN | DIASTOLIC BLOOD PRESSURE: 70 MMHG | SYSTOLIC BLOOD PRESSURE: 130 MMHG

## 2024-03-22 DIAGNOSIS — R41.9 UNSPECIFIED SYMPTOMS AND SIGNS INVOLVING COGNITIVE FUNCTIONS AND AWARENESS: ICD-10-CM

## 2024-03-22 DIAGNOSIS — E55.9 VITAMIN D DEFICIENCY, UNSPECIFIED: ICD-10-CM

## 2024-03-22 DIAGNOSIS — R53.83 FATIGUE, UNSPECIFIED TYPE: ICD-10-CM

## 2024-03-22 DIAGNOSIS — R53.83 FATIGUE, UNSPECIFIED TYPE: Primary | ICD-10-CM

## 2024-03-22 LAB
ALBUMIN SERPL BCP-MCNC: 4.1 G/DL (ref 3.5–5.2)
ALP SERPL-CCNC: 52 U/L (ref 55–135)
ALT SERPL W/O P-5'-P-CCNC: 22 U/L (ref 10–44)
ANION GAP SERPL CALC-SCNC: 7 MMOL/L (ref 8–16)
AST SERPL-CCNC: 23 U/L (ref 10–40)
BASOPHILS # BLD AUTO: 0.02 K/UL (ref 0–0.2)
BASOPHILS NFR BLD: 0.4 % (ref 0–1.9)
BILIRUB SERPL-MCNC: 0.6 MG/DL (ref 0.1–1)
BUN SERPL-MCNC: 12 MG/DL (ref 8–23)
CALCIUM SERPL-MCNC: 9.6 MG/DL (ref 8.7–10.5)
CHLORIDE SERPL-SCNC: 102 MMOL/L (ref 95–110)
CO2 SERPL-SCNC: 26 MMOL/L (ref 23–29)
CREAT SERPL-MCNC: 0.7 MG/DL (ref 0.5–1.4)
DIFFERENTIAL METHOD BLD: ABNORMAL
EOSINOPHIL # BLD AUTO: 0.1 K/UL (ref 0–0.5)
EOSINOPHIL NFR BLD: 1.2 % (ref 0–8)
ERYTHROCYTE [DISTWIDTH] IN BLOOD BY AUTOMATED COUNT: 12.3 % (ref 11.5–14.5)
EST. GFR  (NO RACE VARIABLE): >60 ML/MIN/1.73 M^2
GLUCOSE SERPL-MCNC: 85 MG/DL (ref 70–110)
HCT VFR BLD AUTO: 40.5 % (ref 40–54)
HGB BLD-MCNC: 13.8 G/DL (ref 14–18)
IMM GRANULOCYTES # BLD AUTO: 0.01 K/UL (ref 0–0.04)
IMM GRANULOCYTES NFR BLD AUTO: 0.2 % (ref 0–0.5)
LYMPHOCYTES # BLD AUTO: 1.4 K/UL (ref 1–4.8)
LYMPHOCYTES NFR BLD: 28.9 % (ref 18–48)
MCH RBC QN AUTO: 32.7 PG (ref 27–31)
MCHC RBC AUTO-ENTMCNC: 34.1 G/DL (ref 32–36)
MCV RBC AUTO: 96 FL (ref 82–98)
MONOCYTES # BLD AUTO: 0.6 K/UL (ref 0.3–1)
MONOCYTES NFR BLD: 12.6 % (ref 4–15)
NEUTROPHILS # BLD AUTO: 2.7 K/UL (ref 1.8–7.7)
NEUTROPHILS NFR BLD: 56.7 % (ref 38–73)
NRBC BLD-RTO: 0 /100 WBC
PLATELET # BLD AUTO: 216 K/UL (ref 150–450)
PMV BLD AUTO: 9.3 FL (ref 9.2–12.9)
POTASSIUM SERPL-SCNC: 4.2 MMOL/L (ref 3.5–5.1)
PROT SERPL-MCNC: 7 G/DL (ref 6–8.4)
RBC # BLD AUTO: 4.22 M/UL (ref 4.6–6.2)
SODIUM SERPL-SCNC: 135 MMOL/L (ref 136–145)
TSH SERPL DL<=0.005 MIU/L-ACNC: 1.59 UIU/ML (ref 0.4–4)
VIT B12 SERPL-MCNC: >2000 PG/ML (ref 210–950)
WBC # BLD AUTO: 4.84 K/UL (ref 3.9–12.7)

## 2024-03-22 PROCEDURE — 82607 VITAMIN B-12: CPT | Mod: HCNC

## 2024-03-22 PROCEDURE — 3288F FALL RISK ASSESSMENT DOCD: CPT | Mod: HCNC,CPTII,S$GLB,

## 2024-03-22 PROCEDURE — 85025 COMPLETE CBC W/AUTO DIFF WBC: CPT | Mod: HCNC

## 2024-03-22 PROCEDURE — 1101F PT FALLS ASSESS-DOCD LE1/YR: CPT | Mod: HCNC,CPTII,S$GLB,

## 2024-03-22 PROCEDURE — 82652 VIT D 1 25-DIHYDROXY: CPT | Mod: HCNC

## 2024-03-22 PROCEDURE — 3078F DIAST BP <80 MM HG: CPT | Mod: HCNC,CPTII,S$GLB,

## 2024-03-22 PROCEDURE — 99213 OFFICE O/P EST LOW 20 MIN: CPT | Mod: HCNC,S$GLB,,

## 2024-03-22 PROCEDURE — 36415 COLL VENOUS BLD VENIPUNCTURE: CPT | Mod: HCNC

## 2024-03-22 PROCEDURE — 3075F SYST BP GE 130 - 139MM HG: CPT | Mod: HCNC,CPTII,S$GLB,

## 2024-03-22 PROCEDURE — 99999 PR PBB SHADOW E&M-EST. PATIENT-LVL IV: CPT | Mod: PBBFAC,HCNC,,

## 2024-03-22 PROCEDURE — 80053 COMPREHEN METABOLIC PANEL: CPT | Mod: HCNC

## 2024-03-22 PROCEDURE — 84443 ASSAY THYROID STIM HORMONE: CPT | Mod: HCNC

## 2024-03-22 PROCEDURE — 1126F AMNT PAIN NOTED NONE PRSNT: CPT | Mod: HCNC,CPTII,S$GLB,

## 2024-03-22 PROCEDURE — 1159F MED LIST DOCD IN RCRD: CPT | Mod: HCNC,CPTII,S$GLB,

## 2024-03-22 NOTE — PROGRESS NOTES
CHIEF COMPLAINT     Chief Complaint   Patient presents with    Fatigue       HPI     Cricket Mcdonnell is a 83 y.o. male who presents for fatigue today.    PCP is Neo Sneed MD, patient is new to me. Reports fatigue for the last several years. Has been using CPAP for the last 2.5 years which has helped him sleep better, but is still fatigued. Exercises daily and eats a balanced diet. Reports the fatigue really got worse after a bout with PNA last August.    Past Medical History:  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 Authorizing Provider   brimonidine 0.2% (ALPHAGAN) 0.2 % Drop Instill 1 drop into left eye three times a day 1/19/23  Yes    brimonidine 0.2% (ALPHAGAN) 0.2 % Drop Instill 1 drop into left eye three times a day 8/24/23  Yes    brimonidine 0.2% (ALPHAGAN) 0.2 % Drop Instill 1 drop into left eye three times a day 1/25/24  Yes    dorzolamide-timolol 2-0.5% (COSOPT) 22.3-6.8 mg/mL ophthalmic solution Instill 1 drop into both eyes twice a day 11/17/21  Yes    dorzolamide-timolol 2-0.5% (COSOPT) 22.3-6.8 mg/mL ophthalmic solution Instill 1 drop into Both Eyes twice a day 9/28/23  Yes    dorzolamide-timolol 2-0.5% (COSOPT) 22.3-6.8 mg/mL ophthalmic solution Instill 1 drop into both eyes twice a day 1/25/24  Yes    erythromycin (ROMYCIN) ophthalmic ointment Apply 1 a thin layer into left eye four times a day 6/22/23  Yes    hydrocortisone 2.5 % cream Apply twice daily for rash flare on face for up to 2 weeks/month 1/12/24  Yes    ketoconazole (NIZORAL) 2 % cream Apply daily to rash on face 1/12/24  Yes    netarsudiL-latanoprost (ROCKLATAN) 0.02-0.005 % Drop Instill 1 drop into both eyes at bedtime  "8/24/23  Yes    netarsudiL-latanoprost (ROCKLATAN) 0.02-0.005 % Drop Instill 1 drop into both eyes at bedtime 1/25/24  Yes    sildenafiL (VIAGRA) 100 MG tablet Take 1 tablet (100 mg total) by mouth daily as needed for Erectile Dysfunction (take on an empty stomach 30-60 minutes before). 11/29/23 11/28/24 Yes Joy Hernandez, CLAY   testosterone (ANDROGEL) 1.62 % (40.5 mg/2.5 gram) GlPk Place 2.5 g (1 packet) onto the skin once daily. 11/29/23 5/28/24 Yes Joy Hernandez NP   triamcinolone acetonide 0.1% (KENALOG) 0.1 % ointment Apply twice daily as needed to affected area 1/12/24  Yes    ketorolac 0.4% (ACULAR) 0.4 % Drop Instill 1 drop into left eye four times a day  Patient not taking: Reported on 2/26/2024 6/1/23      neomycin-polymyxin-dexamethasone (MAXITROL) 3.5 mg/g-10,000 unit/g-0.1 % Oint Place 1/4 inch into left eye three times a day  Patient not taking: Reported on 2/26/2024 12/11/23      neomycin-polymyxin-dexamethasone (MAXITROL) 3.5 mg/g-10,000 unit/g-0.1 % Oint 1/4 inch into right eye three times a day  Patient not taking: Reported on 2/26/2024 12/18/23      fluorometholone 0.1% (FML) 0.1 % DrpS Instill 1 drop into left eye once a day 5/12/22 3/22/24         Review of Systems:  Review of Systems   Constitutional:  Positive for fatigue.   Respiratory: Negative.     Cardiovascular: Negative.        Health Maintainence:   Immunizations:  Health Maintenance         Date Due Completion Date    Colonoscopy 01/03/2026 1/3/2019    TETANUS VACCINE 07/12/2026 7/12/2016    Lipid Panel 11/20/2028 11/20/2023             PHYSICAL EXAM     /70   Pulse (!) 58   Ht 5' 7" (1.702 m)   Wt 70.2 kg (154 lb 12.2 oz)   SpO2 96%   BMI 24.24 kg/m²     Physical Exam  Vitals reviewed.   Constitutional:       Appearance: He is well-developed.   HENT:      Head: Normocephalic and atraumatic.   Eyes:      Conjunctiva/sclera: Conjunctivae normal.   Cardiovascular:      Rate and Rhythm: Normal rate.   Pulmonary:      " Effort: Pulmonary effort is normal. No respiratory distress.   Skin:     General: Skin is warm and dry.      Findings: No rash.   Neurological:      Mental Status: He is alert and oriented to person, place, and time.      Coordination: Coordination normal.   Psychiatric:         Behavior: Behavior normal.         LABS     Lab Results   Component Value Date    HGBA1C 5.5 03/10/2023     CMP  Sodium   Date Value Ref Range Status   08/08/2023 135 (L) 136 - 145 mmol/L Final     Potassium   Date Value Ref Range Status   08/08/2023 3.5 3.5 - 5.1 mmol/L Final     Chloride   Date Value Ref Range Status   08/08/2023 103 95 - 110 mmol/L Final     CO2   Date Value Ref Range Status   08/08/2023 26 23 - 29 mmol/L Final     Glucose   Date Value Ref Range Status   08/08/2023 100 70 - 110 mg/dL Final     BUN   Date Value Ref Range Status   08/08/2023 9 8 - 23 mg/dL Final     Creatinine   Date Value Ref Range Status   11/20/2023 0.7 0.5 - 1.4 mg/dL Final     Calcium   Date Value Ref Range Status   08/08/2023 8.2 (L) 8.7 - 10.5 mg/dL Final     Total Protein   Date Value Ref Range Status   11/20/2023 7.1 6.0 - 8.4 g/dL Final     Albumin   Date Value Ref Range Status   11/20/2023 4.0 3.5 - 5.2 g/dL Final     Total Bilirubin   Date Value Ref Range Status   11/20/2023 0.5 0.1 - 1.0 mg/dL Final     Comment:     For infants and newborns, interpretation of results should be based  on gestational age, weight and in agreement with clinical  observations.    Premature Infant recommended reference ranges:  Up to 24 hours.............<8.0 mg/dL  Up to 48 hours............<12.0 mg/dL  3-5 days..................<15.0 mg/dL  6-29 days.................<15.0 mg/dL       Alkaline Phosphatase   Date Value Ref Range Status   11/20/2023 61 55 - 135 U/L Final     AST   Date Value Ref Range Status   11/20/2023 23 10 - 40 U/L Final     ALT   Date Value Ref Range Status   11/20/2023 21 10 - 44 U/L Final     Anion Gap   Date Value Ref Range Status   08/08/2023  6 (L) 8 - 16 mmol/L Final     eGFR if    Date Value Ref Range Status   05/25/2022 >60.0 >60 mL/min/1.73 m^2 Final     eGFR if non    Date Value Ref Range Status   05/25/2022 >60.0 >60 mL/min/1.73 m^2 Final     Comment:     Calculation used to obtain the estimated glomerular filtration  rate (eGFR) is the CKD-EPI equation.        Lab Results   Component Value Date    WBC 5.36 11/20/2023    HGB 14.2 11/20/2023    HCT 42.1 11/20/2023    MCV 96 11/20/2023     11/20/2023     Lab Results   Component Value Date    CHOL 198 11/20/2023    CHOL 189 05/22/2023    CHOL 133 11/04/2022     Lab Results   Component Value Date    HDL 58 11/20/2023    HDL 51 05/22/2023    HDL 40 11/04/2022     Lab Results   Component Value Date    LDLCALC 120.0 11/20/2023    LDLCALC 115.0 05/22/2023    LDLCALC 80.0 11/04/2022     Lab Results   Component Value Date    TRIG 100 11/20/2023    TRIG 115 05/22/2023    TRIG 65 11/04/2022     Lab Results   Component Value Date    CHOLHDL 29.3 11/20/2023    CHOLHDL 27.0 05/22/2023    CHOLHDL 30.1 11/04/2022     Lab Results   Component Value Date    TSH 1.383 08/03/2023       ASSESSMENT/PLAN   1. Fatigue, unspecified type  -     CBC Auto Differential; Future; Expected date: 03/22/2024  -     Comprehensive Metabolic Panel; Future; Expected date: 03/22/2024  -     Calcitriol; Future; Expected date: 03/22/2024  -     TSH; Future; Expected date: 03/22/2024  -     VITAMIN B12; Future; Expected date: 03/22/2024    2. Vitamin D deficiency, unspecified  -     Calcitriol; Future; Expected date: 03/22/2024    3. Unspecified symptoms and signs involving cognitive functions and awareness  -     VITAMIN B12; Future; Expected date: 03/22/2024             Ernesto Daly NP   Department of Internal Medicine [facility]  10:42 AM

## 2024-03-25 ENCOUNTER — NURSE TRIAGE (OUTPATIENT)
Dept: ADMINISTRATIVE | Facility: CLINIC | Age: 84
End: 2024-03-25
Payer: MEDICARE

## 2024-03-25 LAB — 1,25(OH)2D3 SERPL-MCNC: 79 PG/ML (ref 20–79)

## 2024-03-25 NOTE — TELEPHONE ENCOUNTER
Pt is transferred to this NT via central scheduling. Pt has been suffering from chronic fatigue. He was hospitalized in August for pneumonia and since then has felt exhausted. Last three weeks he states that the feeling of exhaustion has increased. He notes intermittent dizziness but denies fainting.    Care Advice recommends that pt be seen in office now. Pt states that his PCP is out of the office for the day. UCC and ED discussed as alternative care options. Pt states that he does not feel that he has to go to UCC or ED but would like this information passed along to Dr. Sneed's office. Pt requests a direct callback. Pt is instructed to call back with any new/worsening sxs, questions, or concerns.   Reason for Disposition   MODERATE weakness (i.e., interferes with work, school, normal activities) and cause unknown (Exceptions: Weakness from acute minor illness or from poor fluid intake; weakness is chronic and not worse.)    Additional Information   Negative: SEVERE difficulty breathing (e.g., struggling for each breath, speaks in single words)   Negative: Shock suspected (e.g., cold/pale/clammy skin, too weak to stand, low BP, rapid pulse)   Negative: Difficult to awaken or acting confused (e.g., disoriented, slurred speech)   Negative: Fainted > 15 minutes ago and still feels too weak or dizzy to stand   Negative: SEVERE weakness (i.e., unable to walk or barely able to walk, requires support) and new-onset or worsening   Negative: Sounds like a life-threatening emergency to the triager   Negative: Difficulty breathing   Negative: Heart beating < 50 beats per minute OR > 140 beats per minute   Negative: Extra heartbeats, irregular heart beating, or heart is beating very fast (i.e., 'palpitations')   Negative: Follows large amount of bleeding (e.g., from vomiting, rectum, vagina) (Exception: Small transient weakness from sight of a small amount blood.)   Negative: Black or tarry bowel movements   Negative:  MODERATE weakness or fatigue from poor fluid intake with no improvement after 2 hours of rest and fluids   Negative: Drinking very little and dehydration suspected (e.g., no urine > 12 hours, very dry mouth, very lightheaded)   Negative: Patient sounds very sick or weak to the triager    Protocols used: Weakness (Generalized) and Fatigue-A-OH

## 2024-03-26 NOTE — TELEPHONE ENCOUNTER
Called and spoke to Harjit Sathya. He would like to speak to his MD  about his fatigue, but he has a hard time using the computer. Given 03/27/24 2:30 Audio visit

## 2024-03-28 ENCOUNTER — TELEPHONE (OUTPATIENT)
Dept: INTERNAL MEDICINE | Facility: CLINIC | Age: 84
End: 2024-03-28
Payer: MEDICARE

## 2024-03-28 ENCOUNTER — TELEPHONE (OUTPATIENT)
Dept: INTERNAL MEDICINE | Facility: CLINIC | Age: 84
End: 2024-03-28

## 2024-03-28 NOTE — TELEPHONE ENCOUNTER
----- Message from Katieisabella Calhounling sent at 3/28/2024  4:11 PM CDT -----  Regarding: P/t advice  Type: Patient Call Back    Who called: p/t     What is the request in detail: p/t is calling b/c he wants to know will a mild stimulant help with his exhaustion. Please call to assist.     Can the clinic reply by [facility]? No     Would the patient rather a call back or a response via [facility]? Call back     Best call back number:[phone]     Additional Information:

## 2024-03-28 NOTE — TELEPHONE ENCOUNTER
Message sent to overbooking to change virtual  AUDIO 04/03  at 3 PM  to Audio visit only. Patient informed and states appointment on yesterday for Audio was CX by MD

## 2024-03-28 NOTE — TELEPHONE ENCOUNTER
----- Message from Bryanna Bello sent at 3/28/2024  9:58 AM CDT -----  Name of Who is Calling:MALACHI GOULD [[id number]]      What is the request in detail: PT would like a audio visit on 4/3 instead of virtual     Can the clinic reply by [facility]: No    What Number to Call Back if not in [facility]: [phone]

## 2024-03-29 ENCOUNTER — PATIENT MESSAGE (OUTPATIENT)
Dept: INTERNAL MEDICINE | Facility: CLINIC | Age: 84
End: 2024-03-29
Payer: MEDICARE

## 2024-04-03 ENCOUNTER — TELEPHONE (OUTPATIENT)
Dept: INTERNAL MEDICINE | Facility: CLINIC | Age: 84
End: 2024-04-03
Payer: MEDICARE

## 2024-04-03 ENCOUNTER — PATIENT MESSAGE (OUTPATIENT)
Dept: INTERNAL MEDICINE | Facility: CLINIC | Age: 84
End: 2024-04-03

## 2024-04-03 NOTE — TELEPHONE ENCOUNTER
----- Message from Janae Beachody sent at 4/3/2024  3:23 PM CDT -----  Regarding: call back  Type: Patient Call Back    Who called: pt    What is the request in detail: requesting a call to discuss his appt today, states provider never called or possibly called a different number     Can the clinic reply by [facility]?no    Would the patient rather a call back or a response via [facility]? call    Best call back number: [phone]     Additional Information:

## 2024-04-03 NOTE — TELEPHONE ENCOUNTER
Spoke c Pt. Able to reschedule for Mon. 04/08/24 @ 0930 for VV AUDIO ONLY.    CALL [phone] (Home Phone) FOR VV AUDIO ONLY VISIT.

## 2024-04-08 ENCOUNTER — OFFICE VISIT (OUTPATIENT)
Dept: INTERNAL MEDICINE | Facility: CLINIC | Age: 84
End: 2024-04-08
Attending: FAMILY MEDICINE
Payer: MEDICARE

## 2024-04-08 DIAGNOSIS — G47.33 OSA (OBSTRUCTIVE SLEEP APNEA): ICD-10-CM

## 2024-04-08 DIAGNOSIS — R53.83 FATIGUE, UNSPECIFIED TYPE: Primary | ICD-10-CM

## 2024-04-08 PROCEDURE — 3288F FALL RISK ASSESSMENT DOCD: CPT | Mod: CPTII,95,, | Performed by: FAMILY MEDICINE

## 2024-04-08 PROCEDURE — 1101F PT FALLS ASSESS-DOCD LE1/YR: CPT | Mod: CPTII,95,, | Performed by: FAMILY MEDICINE

## 2024-04-08 PROCEDURE — 1126F AMNT PAIN NOTED NONE PRSNT: CPT | Mod: CPTII,95,, | Performed by: FAMILY MEDICINE

## 2024-04-08 PROCEDURE — 1160F RVW MEDS BY RX/DR IN RCRD: CPT | Mod: CPTII,95,, | Performed by: FAMILY MEDICINE

## 2024-04-08 PROCEDURE — 99443 PR PHYSICIAN TELEPHONE EVALUATION 21-30 MIN: CPT | Mod: 95,,, | Performed by: FAMILY MEDICINE

## 2024-04-08 PROCEDURE — 1159F MED LIST DOCD IN RCRD: CPT | Mod: CPTII,95,, | Performed by: FAMILY MEDICINE

## 2024-04-08 NOTE — PROGRESS NOTES
Established Patient - Audio Only Telehealth Visit     The patient location is:  Home  The chief complaint leading to consultation is:  Fatigue  Visit type: Virtual visit with audio only (telephone)  Total time spent with patient:  25 minutes       The reason for the audio only service rather than synchronous audio and video virtual visit was related to technical difficulties or patient preference/necessity.     Each patient to whom I provide medical services by telemedicine is:  (1) informed of the relationship between the physician and patient and the respective role of any other health care provider with respect to management of the patient; and (2) notified that they may decline to receive medical services by telemedicine and may withdraw from such care at any time. Patient verbally consented to receive this service via voice-only telephone call.       HPI:  Reviewed recent and remote laboratory and imaging data with patient.  No obvious cause for fatigue is identified.  Patient requests chest x-ray.  This is ordered today     Assessment and plan:  Continued fatigue.  Follow-up chest x-ray this week                        This service was not originating from a related E/M service provided within the previous 7 days nor will  to an E/M service or procedure within the next 24 hours or my soonest available appointment.  Prevailing standard of care was able to be met in this audio-only visit.

## 2024-04-11 ENCOUNTER — HOSPITAL ENCOUNTER (OUTPATIENT)
Dept: RADIOLOGY | Facility: HOSPITAL | Age: 84
Discharge: HOME | End: 2024-04-11
Attending: FAMILY MEDICINE
Payer: MEDICARE

## 2024-04-11 ENCOUNTER — PATIENT MESSAGE (OUTPATIENT)
Dept: INTERNAL MEDICINE | Facility: CLINIC | Age: 84
End: 2024-04-11
Payer: MEDICARE

## 2024-04-11 DIAGNOSIS — R53.83 FATIGUE, UNSPECIFIED TYPE: ICD-10-CM

## 2024-04-11 DIAGNOSIS — G47.33 OSA (OBSTRUCTIVE SLEEP APNEA): ICD-10-CM

## 2024-04-11 PROCEDURE — 71046 X-RAY EXAM CHEST 2 VIEWS: CPT | Mod: TC

## 2024-04-11 PROCEDURE — 71046 X-RAY EXAM CHEST 2 VIEWS: CPT | Mod: 26,,, | Performed by: RADIOLOGY

## 2024-04-11 NOTE — TELEPHONE ENCOUNTER
Left Voicemail for patient to call back regarding PCP stating that he should be seen with Dr. Fernandez Monday April 15 at 3:45pm.

## 2024-04-15 ENCOUNTER — OFFICE VISIT (OUTPATIENT)
Dept: INTERNAL MEDICINE | Facility: CLINIC | Age: 84
End: 2024-04-15
Payer: MEDICARE

## 2024-04-15 VITALS
HEART RATE: 64 BPM | BODY MASS INDEX: 25.33 KG/M2 | DIASTOLIC BLOOD PRESSURE: 68 MMHG | OXYGEN SATURATION: 97 % | WEIGHT: 161.38 LBS | HEIGHT: 67 IN | SYSTOLIC BLOOD PRESSURE: 138 MMHG

## 2024-04-15 DIAGNOSIS — E87.1 HYPONATREMIA: ICD-10-CM

## 2024-04-15 DIAGNOSIS — R53.83 FATIGUE, UNSPECIFIED TYPE: ICD-10-CM

## 2024-04-15 DIAGNOSIS — G47.33 OSA (OBSTRUCTIVE SLEEP APNEA): Primary | ICD-10-CM

## 2024-04-15 PROCEDURE — 1126F AMNT PAIN NOTED NONE PRSNT: CPT | Mod: CPTII,S$GLB,, | Performed by: STUDENT IN AN ORGANIZED HEALTH CARE EDUCATION/TRAINING PROGRAM

## 2024-04-15 PROCEDURE — 3078F DIAST BP <80 MM HG: CPT | Mod: CPTII,S$GLB,, | Performed by: STUDENT IN AN ORGANIZED HEALTH CARE EDUCATION/TRAINING PROGRAM

## 2024-04-15 PROCEDURE — 99214 OFFICE O/P EST MOD 30 MIN: CPT | Mod: S$GLB,,, | Performed by: STUDENT IN AN ORGANIZED HEALTH CARE EDUCATION/TRAINING PROGRAM

## 2024-04-15 PROCEDURE — 3075F SYST BP GE 130 - 139MM HG: CPT | Mod: CPTII,S$GLB,, | Performed by: STUDENT IN AN ORGANIZED HEALTH CARE EDUCATION/TRAINING PROGRAM

## 2024-04-15 PROCEDURE — 1101F PT FALLS ASSESS-DOCD LE1/YR: CPT | Mod: CPTII,S$GLB,, | Performed by: STUDENT IN AN ORGANIZED HEALTH CARE EDUCATION/TRAINING PROGRAM

## 2024-04-15 PROCEDURE — 3288F FALL RISK ASSESSMENT DOCD: CPT | Mod: CPTII,S$GLB,, | Performed by: STUDENT IN AN ORGANIZED HEALTH CARE EDUCATION/TRAINING PROGRAM

## 2024-04-15 PROCEDURE — 1159F MED LIST DOCD IN RCRD: CPT | Mod: CPTII,S$GLB,, | Performed by: STUDENT IN AN ORGANIZED HEALTH CARE EDUCATION/TRAINING PROGRAM

## 2024-04-15 PROCEDURE — 99999 PR PBB SHADOW E&M-EST. PATIENT-LVL III: CPT | Mod: PBBFAC,,, | Performed by: STUDENT IN AN ORGANIZED HEALTH CARE EDUCATION/TRAINING PROGRAM

## 2024-04-15 NOTE — PROGRESS NOTES
[facility]  Subjective:     Patient ID: Cricket Mcdonnell is a 83 y.o. male.  Chief Complaint: continuing chronic fatigue.   HPI:  Patient is a 83 y.o. 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  who presents for the above chief complaint.     Gets so tired in the morning. Yesterday he was very tired in the morning that he was unable to go mass. By 1pm had enough energy to attend an online meeting. By 3pm had enough energy to go to an in person poetry meeting from 3-6pm.    Does have sleep apnea. Has the resmed machine. The night prior to that episode of fatigue it showed a good score. He feels like his sleep is improved but the quality of his sleep is not yet good.  Last follow up with sleep clinic was October of 2023.    Takes advil daily for neuropathy in his feet.    Gets lightheaded when he stands up and when he turs around when standing, for example when turning to the microwave from the sink.     His tiredness and dizziness has gotten worse in the past 6 weeks or so    Current Outpatient Medications   Medication Instructions    brimonidine 0.2% (ALPHAGAN) 0.2 % Drop Instill 1 drop into left eye three times a day    brimonidine 0.2% (ALPHAGAN) 0.2 % Drop Instill 1 drop into left eye three times a day    brimonidine 0.2% (ALPHAGAN) 0.2 % Drop Instill 1 drop into left eye three times a day    dorzolamide-timolol 2-0.5% (COSOPT) 22.3-6.8 mg/mL ophthalmic solution Instill 1 drop into both eyes twice a day    dorzolamide-timolol 2-0.5% (COSOPT) 22.3-6.8 mg/mL ophthalmic solution Instill 1 drop into Both Eyes twice a day    dorzolamide-timolol 2-0.5% (COSOPT) 22.3-6.8 mg/mL ophthalmic solution Instill 1 drop into both eyes twice a day    erythromycin (ROMYCIN) ophthalmic ointment Apply 1 a thin layer into left eye four times a day     "hydrocortisone 2.5 % cream Apply twice daily for rash flare on face for up to 2 weeks/month    ketoconazole (NIZORAL) 2 % cream Apply daily to rash on face    ketorolac 0.4% (ACULAR) 0.4 % Drop Instill 1 drop into left eye four times a day    neomycin-polymyxin-dexamethasone (MAXITROL) 3.5 mg/g-10,000 unit/g-0.1 % Oint Place 1/4 inch into left eye three times a day    neomycin-polymyxin-dexamethasone (MAXITROL) 3.5 mg/g-10,000 unit/g-0.1 % Oint 1/4 inch into right eye three times a day    neomycin-polymyxin-dexamethasone (MAXITROL) 3.5 mg/g-10,000 unit/g-0.1 % Oint Apply 1/4 inch into left eye three times a day    netarsudiL-latanoprost (ROCKLATAN) 0.02-0.005 % Drop Instill 1 drop into both eyes at bedtime    netarsudiL-latanoprost (ROCKLATAN) 0.02-0.005 % Drop Instill 1 drop into both eyes at bedtime    sildenafiL (VIAGRA) 100 mg, Oral, Daily PRN    testosterone (ANDROGEL) 1.62 % (40.5 mg/2.5 gram) GlPk Place 2.5 g (1 packet) onto the skin once daily.    triamcinolone acetonide 0.1% (KENALOG) 0.1 % ointment Apply twice daily as needed to affected area     Objective:      Body mass index is 25.28 kg/m².  Vitals:    04/15/24 1117   BP: 138/68   Pulse: 64   SpO2: 97%   Weight: 73.2 kg (161 lb 6 oz)   Height: 5' 7" (1.702 m)   PainSc: 0-No pain     Physical Exam  Constitutional:       Appearance: Normal appearance.      Comments: Patient easily able to rise from a chair and walk briskly down the hallway unassisted.    Cardiovascular:      Rate and Rhythm: Normal rate.      Pulses: Normal pulses.      Heart sounds: No murmur heard.  Pulmonary:      Effort: Pulmonary effort is normal. No respiratory distress.      Comments: Left basilar rales which improved after coughing and deep breaths  Abdominal:      General: Abdomen is flat.   Musculoskeletal:      Right lower leg: No edema.      Left lower leg: No edema.   Neurological:      Mental Status: He is alert.   Psychiatric:         Mood and Affect: Mood normal.         " Speech: Speech normal.         Behavior: Behavior normal.         Thought Content: Thought content normal.         Cognition and Memory: Cognition normal.      Comments: Speech is fluent and not delayed           Assessment:       1. KARMA (obstructive sleep apnea)    2. Fatigue, unspecified type    3. Hyponatremia        Plan:   There is no obvious etiology of patient's fatigue.  Given his symptoms are worse in the morning I would like him to follow up with sleep clinic to see if you would benefit from any further titration of his BiPAP to further improve quality of sleep.     Review of recent labs shows a borderline hemoglobin of 13.8 with normal hematocrit in the MCV.  Normal white count.  CMP shows sodium 135 but is otherwise unremarkable.  This mild hyponatremia is nonspecific.  He is not on any meds which would contributed.  I do not think this alone is etiology of his symptoms.    Patient is resting heart rate is in the low-normal range.  He did demonstrate chronotropic competence with heart rate that increase to 77 with ambulation therefore fatigue is less likely due to a bradyarrhythmia.     Case discussed with patient's PCP he who is in agreement.    KARMA (obstructive sleep apnea)    Fatigue, unspecified type    Hyponatremia      All of your core healthy metrics are met.    No follow-ups on file. or sooner prn (as needed)          Juan Fernandez  [facility]  [address] [address]  Phone [phone]  Fax [phone]    This note is dictated using the M*Modal Fluency Direct word recognition program. It may contain word recognition mistakes or wrong word substitutions that were missed on review.

## 2024-04-17 ENCOUNTER — PATIENT MESSAGE (OUTPATIENT)
Dept: INTERNAL MEDICINE | Facility: CLINIC | Age: 84
End: 2024-04-17
Payer: MEDICARE

## 2024-04-19 ENCOUNTER — PATIENT MESSAGE (OUTPATIENT)
Dept: INTERNAL MEDICINE | Facility: CLINIC | Age: 84
End: 2024-04-19
Payer: MEDICARE

## 2024-05-01 NOTE — PROGRESS NOTES
ESTABLISHED PATIENT VISIT    Cricket Mcdonnell  is a pleasant 83 y.o. male  with PMH significant for BPH, chronic rhinitis, deviated nasal septum, open angle glaucoma, MD of right eye, anxiety, peripheral neuropathy, restless legs, insomnia, KARMA     Here today for: CPAP follow-up     Since last visit:   See assessment below    .  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 03/13/2024    L central zygoma    Squamous cell carcinoma of skin      Patient Active Problem List   Diagnosis    Inability to maintain erection    Nocturia    Benign prostatic hyperplasia    Hereditary sensory neuropathy    Libido, decreased    Posterior rhinorrhea    Family history of prostate cancer    KARMA (obstructive sleep apnea)    Exudative age-related macular degeneration, bilateral, with active choroidal neovascularization    Chronic ankle pain    Chronic rhinitis    Deviated nasal septum    Hypertrophy of nasal turbinates    Nasal obstruction    Anxiety disorder    Body mass index (BMI) of 20 to 24    Testosterone deficiency in male    Closed nondisplaced transverse fracture of left patella with routine healing    Fatigue    Peripheral neuropathy    Aortic atherosclerosis    Primary open angle glaucoma of right eye, moderate stage    Loculated pleural effusion    Constipation    Lattice degeneration of retina, bilateral       Current Outpatient Medications:     brimonidine 0.2% (ALPHAGAN) 0.2 % Drop, Instill 1 drop into left eye three times a day, Disp: 10 mL, Rfl: 6    brimonidine 0.2% (ALPHAGAN) 0.2 % Drop, Instill 1 drop into left eye three times a day, Disp: 10 mL, Rfl: 6    brimonidine 0.2% (ALPHAGAN) 0.2 % Drop, Instill 1 drop into left eye three times a day, Disp: 10 mL, Rfl: 6    brimonidine 0.2% (ALPHAGAN) 0.2 % Drop, Instill 1 drop into both eyes three times a day, Disp: 10 mL, Rfl: 6    dorzolamide-timolol  2-0.5% (COSOPT) 22.3-6.8 mg/mL ophthalmic solution, Instill 1 drop into both eyes twice a day, Disp: 10 mL, Rfl: 2    dorzolamide-timolol 2-0.5% (COSOPT) 22.3-6.8 mg/mL ophthalmic solution, Instill 1 drop into Both Eyes twice a day, Disp: 30 mL, Rfl: 1    dorzolamide-timolol 2-0.5% (COSOPT) 22.3-6.8 mg/mL ophthalmic solution, Instill 1 drop into both eyes twice a day, Disp: 10 mL, Rfl: 6    dorzolamide-timolol 2-0.5% (COSOPT) 22.3-6.8 mg/mL ophthalmic solution, Instill 1 drop into both eyes twice a day, Disp: 10 mL, Rfl: 6    erythromycin (ROMYCIN) ophthalmic ointment, Apply 1 a thin layer into left eye four times a day (Patient not taking: Reported on 4/15/2024), Disp: 3.5 g, Rfl: 6    hydrocortisone 2.5 % cream, Apply twice daily for rash flare on face for up to 2 weeks/month, Disp: 20 g, Rfl: 6    ketoconazole (NIZORAL) 2 % cream, Apply daily to rash on face, Disp: 30 g, Rfl: 6    ketorolac 0.4% (ACULAR) 0.4 % Drop, Instill 1 drop into left eye four times a day (Patient not taking: Reported on 2/26/2024), Disp: 5 mL, Rfl: 1    neomycin-polymyxin-dexamethasone (MAXITROL) 3.5 mg/g-10,000 unit/g-0.1 % Oint, Place 1/4 inch into left eye three times a day (Patient not taking: Reported on 2/26/2024), Disp: 3.5 g, Rfl: 0    neomycin-polymyxin-dexamethasone (MAXITROL) 3.5 mg/g-10,000 unit/g-0.1 % Oint, 1/4 inch into right eye three times a day (Patient not taking: Reported on 2/26/2024), Disp: 3.5 g, Rfl: 0    neomycin-polymyxin-dexamethasone (MAXITROL) 3.5 mg/g-10,000 unit/g-0.1 % Oint, Place 1/4 inch into left eye three times a day, Disp: 3.5 g, Rfl: 0    netarsudiL-latanoprost (ROCKLATAN) 0.02-0.005 % Drop, Instill 1 drop into both eyes at bedtime, Disp: 2.5 mL, Rfl: 6    netarsudiL-latanoprost (ROCKLATAN) 0.02-0.005 % Drop, Instill 1 drop into both eyes at bedtime, Disp: 2.5 mL, Rfl: 6    netarsudiL-latanoprost (ROCKLATAN) 0.02-0.005 % Drop, Instill 1 drop into both eyes at bedtime, Disp: 2.5 mL, Rfl: 6    sildenafiL  "(VIAGRA) 100 MG tablet, Take 1 tablet (100 mg total) by mouth daily as needed for Erectile Dysfunction (take on an empty stomach 30-60 minutes before)., Disp: 10 tablet, Rfl: 12    testosterone (ANDROGEL) 1.62 % (40.5 mg/2.5 gram) GlPk, Place 2.5 g (1 packet) onto the skin once daily., Disp: 75 g, Rfl: 5    triamcinolone acetonide 0.1% (KENALOG) 0.1 % ointment, Apply twice daily as needed to affected area, Disp: 80 g, Rfl: 2     Vitals:    05/02/24 1425   BP: 136/82   BP Location: Left arm   Patient Position: Sitting   Pulse: 67   Weight: 70.3 kg (155 lb)   Height: 5' 7" (1.702 m)      Physical Exam:    GEN:   Well-appearing  Psych:  Appropriate affect, demonstrates insight  SKIN:  No rash on the face or bridge of the nose      LABS:   Lab Results   Component Value Date    HGB 13.8 (L) 03/22/2024    CO2 26 03/22/2024       RECORDS REVIEWED PREVIOUSLY:    Baseline Sleep Study: 06/09/2017 HST The overall AHI was 45 and overall RDI was 46. The oxygen lisa was 70.8% and % time < 90% SpO2 was 37.7%.      CPAP titration 5.12.22: CPAP =9 supine SWS, some sREM CPAP =20.  no lateral sleep.;  Rec CPAP =9 cwp + side sleeping + FFM.  June 22-wants sleeping pill-Trz 50mg     BiPAP titration 5.20.23: 14/10 + lat N2 (apneas supine), 16/12 (sleSimplus FFM- medium sizeep onset obstructives supine)  Rx BiPAP-> EPAP 10, PS 4, IPAPmax 20, ramp 6 x 10min    DOWNLOADS:  10/23/23: 30/30 x 9hrs 4mins, 25/12 PS 4, leak (18.2/36.9/63.8), AHI 4.6    ASSESSMENT    PROBLEM DESCRIPTION/ Sx on Presentation Interval Hx STATUS PLAN   Severe KARMA    HEENT:            MP4, + mild micrognathia  2017 AHI 45    PAP history   Problems     Mask Nasal mask   Pressure BiPAP 25/12 PS 4   DME HME   Machine age AirCurve 10 Vauto 6/16/23                  5.3.24: 30/30 x 8h 38min, nelly 12, ps 4, Max 25 (e 11.9/13.6/16.3), Leak 9/28/64     controlled   PAP PLAN   EPAP min Continue nelly 12 cwp   IPAP max Continue 25 cwp   PS    RAMP    EPR    Mask    Other Trying " to sleep on his side   Altn.        --the patient is using and benefiting from PAP therapy         Fatigue/ Daytime Sx    Occasional sleepiness when inactive   Needing short naps during the day  denies sleepiness when driving   ESS n/a/24 on intake   Continues to feel fatigue in AM and in afternoon Mild improvement   -continue PAP    -discussed trial of modafinil or solriamfetol.  He will check with his retina specialist to ensure it is ok for him to take with glaucoma and MD   Insomnia      SLEEP SCHEDULE   Environment     Prior meds Trazodone  Gabapentin  Elavil (makes him tired)  Other medications affect his glaucoma and MD   Hypnotics none   Bed Time 10:15-10:30P               Sleep Latency 20 min   Arousals 3-4   Nocturia    Back to sleep 5 min   Wake time 7AM   Naps    Work          Anxious about getting enough sleep  Wakes about 4 AM, worried he won't get back to sleep, but he often does          Still waking at 4AM, hard to get back to sleep     uncontrolled     -agree with avoiding hypnotics    -we discussed CBTi   Restless Legs / cramps Sx of restless legs at night     Neurontin helped but caused drowsiness     Stable     stable   -no neurontin due to drowsiness  -stretchign helps with leg cramps   Other issues: nocturia x 3, chronic rhinitis       RTC 3-4 months

## 2024-05-02 ENCOUNTER — OFFICE VISIT (OUTPATIENT)
Dept: SLEEP MEDICINE | Facility: CLINIC | Age: 84
End: 2024-05-02
Payer: MEDICARE

## 2024-05-02 VITALS
DIASTOLIC BLOOD PRESSURE: 82 MMHG | WEIGHT: 155 LBS | HEART RATE: 67 BPM | HEIGHT: 67 IN | BODY MASS INDEX: 24.33 KG/M2 | SYSTOLIC BLOOD PRESSURE: 136 MMHG

## 2024-05-02 DIAGNOSIS — F51.09 OTHER INSOMNIA NOT DUE TO A SUBSTANCE OR KNOWN PHYSIOLOGICAL CONDITION: Primary | ICD-10-CM

## 2024-05-02 DIAGNOSIS — G47.33 OSA (OBSTRUCTIVE SLEEP APNEA): ICD-10-CM

## 2024-05-02 PROCEDURE — 3075F SYST BP GE 130 - 139MM HG: CPT | Mod: CPTII,S$GLB,, | Performed by: INTERNAL MEDICINE

## 2024-05-02 PROCEDURE — 1159F MED LIST DOCD IN RCRD: CPT | Mod: CPTII,S$GLB,, | Performed by: INTERNAL MEDICINE

## 2024-05-02 PROCEDURE — 99999 PR PBB SHADOW E&M-EST. PATIENT-LVL III: CPT | Mod: PBBFAC,,, | Performed by: INTERNAL MEDICINE

## 2024-05-02 PROCEDURE — 1101F PT FALLS ASSESS-DOCD LE1/YR: CPT | Mod: CPTII,S$GLB,, | Performed by: INTERNAL MEDICINE

## 2024-05-02 PROCEDURE — 99214 OFFICE O/P EST MOD 30 MIN: CPT | Mod: S$GLB,,, | Performed by: INTERNAL MEDICINE

## 2024-05-02 PROCEDURE — 3288F FALL RISK ASSESSMENT DOCD: CPT | Mod: CPTII,S$GLB,, | Performed by: INTERNAL MEDICINE

## 2024-05-02 PROCEDURE — 1126F AMNT PAIN NOTED NONE PRSNT: CPT | Mod: CPTII,S$GLB,, | Performed by: INTERNAL MEDICINE

## 2024-05-02 PROCEDURE — 3079F DIAST BP 80-89 MM HG: CPT | Mod: CPTII,S$GLB,, | Performed by: INTERNAL MEDICINE

## 2024-05-06 ENCOUNTER — PATIENT MESSAGE (OUTPATIENT)
Dept: PSYCHIATRY | Facility: CLINIC | Age: 84
End: 2024-05-06
Payer: MEDICARE

## 2024-05-22 DIAGNOSIS — G47.33 OSA (OBSTRUCTIVE SLEEP APNEA): Primary | ICD-10-CM

## 2024-05-24 ENCOUNTER — OFFICE VISIT (OUTPATIENT)
Dept: PSYCHIATRY | Facility: CLINIC | Age: 84
End: 2024-05-24
Payer: MEDICARE

## 2024-05-24 DIAGNOSIS — F41.9 ANXIETY DISORDER, UNSPECIFIED TYPE: ICD-10-CM

## 2024-05-24 DIAGNOSIS — F51.05 INSOMNIA DUE TO OTHER MENTAL DISORDER: Primary | ICD-10-CM

## 2024-05-24 DIAGNOSIS — F99 INSOMNIA DUE TO OTHER MENTAL DISORDER: Primary | ICD-10-CM

## 2024-05-24 PROCEDURE — 90791 PSYCH DIAGNOSTIC EVALUATION: CPT | Mod: HCNC,S$GLB,, | Performed by: PSYCHOLOGIST

## 2024-05-24 NOTE — PROGRESS NOTES
"[facility] Psychiatry Initial Visit (PhD/LCSW)      Patient Name:  Cricket Mcdonnell  Date:   05/24/2024  Site:  [facility]   Referral source:  Juan Bustos MD       Chief complaint/reason for encounter:  Psychological Evaluation to assess suitability for admission to the [facility]   Clinical status of patient:  Outpatient   Met with:  Patient   CPT Code: 92223      Before this evaluation was initiated, the purposes and process of the assessment and the limits of confidentiality were discussed with the patient who expressed understanding of these issues and verbally consented to proceed with the evaluation.      History of present illness:  Mr. Cricket Mcdonnell is a 83-year-old male who is pursuing psychotherapy to improve insomnia.  Patient states, "I am 83 years old and I am completely healthy other than my vision issues and my sleep issues. I have had sleep problems my whole life but I feel like I am starting to have sleep anxiety. I have a regular time that I go to sleep and a consistent time I wake up. I wear the sleep apnea machine and it works, but I still feel tired all day. I always feel like I don't fall asleep but then I wake up from a crazy dream or something and realize I must have been sleeping. I get in bed at a quarter to 10. I keep looking at the clock, I see that its 11, and then the anxiety starts. I tell myself oh my gosh, I'm not going to get any sleep tonight...I get ready for bed around 9:10. I get into bed around 9:45. I am in and out of light sleep all night it feels like. I usually wake up at 6 and force myself to stay in bed. Sometimes I am able to fall back asleep until 7. I'm still tired when I wake up so I take what I call "zaps" which are where I close my eyes for about 10 minutes and just try to clear my head. I don't call it a nap because I don't actually fall asleep. But it helps me feel a little more rested."       Medical history:    Patient Active Problem List "   Diagnosis    Inability to maintain erection    Nocturia    Benign prostatic hyperplasia    Hereditary sensory neuropathy    Libido, decreased    Posterior rhinorrhea    Family history of prostate cancer    KARMA (obstructive sleep apnea)    Exudative age-related macular degeneration, bilateral, with active choroidal neovascularization    Chronic ankle pain    Chronic rhinitis    Deviated nasal septum    Hypertrophy of nasal turbinates    Nasal obstruction    Anxiety disorder    Body mass index (BMI) of 20 to 24    Testosterone deficiency in male    Closed nondisplaced transverse fracture of left patella with routine healing    Fatigue    Peripheral neuropathy    Aortic atherosclerosis    Primary open angle glaucoma of right eye, moderate stage    Loculated pleural effusion    Constipation    Lattice degeneration of retina, bilateral          Psychiatric symptoms:   Depression - Denied depressed mood, loss of interest in pleasurable activities, anhedonia, sleep changes, decreased motivation, decreased concentration, feelings of excessive or irrational guilt, helplessness, hopelessness, increased or decreased appetite, weight changes, increased or decreased motor activity, decreased energy, suicidal ideations/thoughts of death.  Arti/Hypomania - Denied increased goal directed activity, decreased need for sleep, pressured speech or increased talkative, racing thoughts, increased risk-taking behavior, episodic elevated or irritable mood, flight of ideas, distractibility, inflated self-esteem, grandiosity  Anxiety - Endorsed difficulty controlling worrying at bedtime only  Panic Attacks- Denied palpitations, sweating, trembling, dyspnea, choking sensation, chest pain/discomfort, nausea, dizziness, chills or hot flashes, tingling, derealization, fear of losing control, fear of dying.  Thoughts - Denied any AVH, paranoia, delusions, ideas of reference, thought insertion or thought broadcasting  Suicidal thoughts/behaviors  "- denied passive or active SI, denied suicidal plans or intent  Self-injury - denied.  Substance abuse - denied abuse or dependence.   Sleep - Endorsed - see above     Current psychosocial stressors:  none- "everything is fine in my life. My children are happy and healthy, I am healthy, I don't stress about money."  Report of coping skills:  writing, going for a walk  Support system:  children     Current and past substance use:   Alcohol:  Denied current use.  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  Drugs:  Denied current use.  Denied history of abuse or dependency.   Tobacco:  Denied current use.   Caffeine:  3-4 cups of coffee daily, sometimes as late as 4pm       Current Psychiatric Treatment:   Medications:  none  Psychotherapy:  none     Psychiatric history:   Previous diagnosis:  anxiety  Previous hospitalizations:  denies  History of outpatient treatment:  denies  Previous suicide attempt:  Denied.   Family history of psychiatric illness:  denies     Trauma history:  Denied.      Social history:  Mr. Mcdonnell was born and raised in [place].  He described his childhood as average.  He denied childhood trauma, abuse, and neglect.  He has a PhD degree in creative writing.  He is currently retired and continues to write.  He denied  service.  He is not on disability.  He is  for 6 years.  He has adult 3 children.  He currently lives alone.      Legal history:  He denied history of arrests and convictions.  He is not currently involved in civil or criminal litigation.      Mental Status Exam:   General appearance:  Appears stated age, neatly dressed, well groomed   Speech:  Normal rate, normal tone, normal pitch, normal volume   Level of cooperation:  Cooperative   Thought processes:  Logical, goal-directed   Mood:  Euthymic   Thought content:  No illusions, no visual hallucinations, no auditory hallucinations, no delusions, no active or passive homicidal thoughts, no active or passive " suicidal ideation, no obsessions, no compulsions, no violence   Affect:  Appropriate   Orientation:  Oriented to person, place, and date   Memory:   Recent memory:  Intact  Remote memory: Intact   Attention span and concentration:  Appropriate  Fund of general knowledge: Appropriate  Judgment and insight: Fair   Language:  Intact      Diagnostic impression:     ICD-10-CM ICD-9-CM   1. Insomnia due to other mental disorder  F51.05 300.9    F99 327.02   2. Anxiety disorder, unspecified type  F41.9 300.00        Plan:  Mr. Cricket Mcdonnell will be admitted to the [facility].  He understood [facility] guidelines and signed the [facility]formed Consent and [facility] Agreement.  He was provided with information about [facility] treatments and will proceed with CBT-I.

## 2024-05-28 ENCOUNTER — LAB VISIT (OUTPATIENT)
Dept: LAB | Facility: HOSPITAL | Age: 84
End: 2024-05-28
Payer: MEDICARE

## 2024-05-28 DIAGNOSIS — N40.1 BPH WITH URINARY OBSTRUCTION: ICD-10-CM

## 2024-05-28 DIAGNOSIS — R97.20 ELEVATED PSA: ICD-10-CM

## 2024-05-28 DIAGNOSIS — R53.83 FATIGUE, UNSPECIFIED TYPE: ICD-10-CM

## 2024-05-28 DIAGNOSIS — E29.1 PRIMARY MALE HYPOGONADISM: ICD-10-CM

## 2024-05-28 DIAGNOSIS — N52.9 ED (ERECTILE DYSFUNCTION) OF ORGANIC ORIGIN: ICD-10-CM

## 2024-05-28 DIAGNOSIS — N13.8 BPH WITH URINARY OBSTRUCTION: ICD-10-CM

## 2024-05-28 LAB
ALBUMIN SERPL BCP-MCNC: 3.8 G/DL (ref 3.5–5.2)
ALP SERPL-CCNC: 56 U/L (ref 55–135)
ALT SERPL W/O P-5'-P-CCNC: 25 U/L (ref 10–44)
ANION GAP SERPL CALC-SCNC: 8 MMOL/L (ref 8–16)
AST SERPL-CCNC: 31 U/L (ref 10–40)
BASOPHILS # BLD AUTO: 0.03 K/UL (ref 0–0.2)
BASOPHILS NFR BLD: 0.6 % (ref 0–1.9)
BILIRUB SERPL-MCNC: 0.4 MG/DL (ref 0.1–1)
BUN SERPL-MCNC: 11 MG/DL (ref 8–23)
CALCIUM SERPL-MCNC: 9.7 MG/DL (ref 8.7–10.5)
CHLORIDE SERPL-SCNC: 106 MMOL/L (ref 95–110)
CHOLEST SERPL-MCNC: 185 MG/DL (ref 120–199)
CHOLEST/HDLC SERPL: 3.1 {RATIO} (ref 2–5)
CO2 SERPL-SCNC: 24 MMOL/L (ref 23–29)
COMPLEXED PSA SERPL-MCNC: 6.4 NG/ML (ref 0–4)
CREAT SERPL-MCNC: 0.7 MG/DL (ref 0.5–1.4)
DIFFERENTIAL METHOD BLD: ABNORMAL
EOSINOPHIL # BLD AUTO: 0.1 K/UL (ref 0–0.5)
EOSINOPHIL NFR BLD: 1.5 % (ref 0–8)
ERYTHROCYTE [DISTWIDTH] IN BLOOD BY AUTOMATED COUNT: 12.7 % (ref 11.5–14.5)
EST. GFR  (NO RACE VARIABLE): >60 ML/MIN/1.73 M^2
GLUCOSE SERPL-MCNC: 102 MG/DL (ref 70–110)
HCT VFR BLD AUTO: 41.5 % (ref 40–54)
HDLC SERPL-MCNC: 60 MG/DL (ref 40–75)
HDLC SERPL: 32.4 % (ref 20–50)
HGB BLD-MCNC: 13.8 G/DL (ref 14–18)
IMM GRANULOCYTES # BLD AUTO: 0.01 K/UL (ref 0–0.04)
IMM GRANULOCYTES NFR BLD AUTO: 0.2 % (ref 0–0.5)
LDLC SERPL CALC-MCNC: 112.2 MG/DL (ref 63–159)
LYMPHOCYTES # BLD AUTO: 1.6 K/UL (ref 1–4.8)
LYMPHOCYTES NFR BLD: 30.1 % (ref 18–48)
MCH RBC QN AUTO: 32.5 PG (ref 27–31)
MCHC RBC AUTO-ENTMCNC: 33.3 G/DL (ref 32–36)
MCV RBC AUTO: 98 FL (ref 82–98)
MONOCYTES # BLD AUTO: 0.6 K/UL (ref 0.3–1)
MONOCYTES NFR BLD: 11.9 % (ref 4–15)
NEUTROPHILS # BLD AUTO: 2.9 K/UL (ref 1.8–7.7)
NEUTROPHILS NFR BLD: 55.7 % (ref 38–73)
NONHDLC SERPL-MCNC: 125 MG/DL
NRBC BLD-RTO: 0 /100 WBC
PLATELET # BLD AUTO: 274 K/UL (ref 150–450)
PMV BLD AUTO: 9.2 FL (ref 9.2–12.9)
POTASSIUM SERPL-SCNC: 4.3 MMOL/L (ref 3.5–5.1)
PROT SERPL-MCNC: 6.9 G/DL (ref 6–8.4)
RBC # BLD AUTO: 4.24 M/UL (ref 4.6–6.2)
SODIUM SERPL-SCNC: 138 MMOL/L (ref 136–145)
TESTOST SERPL-MCNC: 476 NG/DL (ref 304–1227)
TRIGL SERPL-MCNC: 64 MG/DL (ref 30–150)
WBC # BLD AUTO: 5.19 K/UL (ref 3.9–12.7)

## 2024-05-28 PROCEDURE — 85025 COMPLETE CBC W/AUTO DIFF WBC: CPT | Mod: HCNC | Performed by: NURSE PRACTITIONER

## 2024-05-28 PROCEDURE — 80061 LIPID PANEL: CPT | Mod: HCNC | Performed by: NURSE PRACTITIONER

## 2024-05-28 PROCEDURE — 36415 COLL VENOUS BLD VENIPUNCTURE: CPT | Mod: HCNC | Performed by: NURSE PRACTITIONER

## 2024-05-28 PROCEDURE — 84153 ASSAY OF PSA TOTAL: CPT | Mod: HCNC | Performed by: NURSE PRACTITIONER

## 2024-05-28 PROCEDURE — 84403 ASSAY OF TOTAL TESTOSTERONE: CPT | Mod: HCNC | Performed by: NURSE PRACTITIONER

## 2024-05-28 PROCEDURE — 80053 COMPREHEN METABOLIC PANEL: CPT | Mod: HCNC | Performed by: NURSE PRACTITIONER

## 2024-05-28 RX ORDER — TESTOSTERONE 40.5 MG/2.5G
2.5 GEL TOPICAL DAILY
Qty: 75 G | Refills: 5 | OUTPATIENT
Start: 2024-05-28 | End: 2024-11-25

## 2024-05-29 ENCOUNTER — OFFICE VISIT (OUTPATIENT)
Dept: UROLOGY | Facility: CLINIC | Age: 84
End: 2024-05-29
Payer: MEDICARE

## 2024-05-29 VITALS
DIASTOLIC BLOOD PRESSURE: 76 MMHG | HEART RATE: 60 BPM | BODY MASS INDEX: 24.22 KG/M2 | WEIGHT: 154.31 LBS | SYSTOLIC BLOOD PRESSURE: 126 MMHG | HEIGHT: 67 IN

## 2024-05-29 DIAGNOSIS — R53.83 FATIGUE, UNSPECIFIED TYPE: ICD-10-CM

## 2024-05-29 DIAGNOSIS — N52.9 ED (ERECTILE DYSFUNCTION) OF ORGANIC ORIGIN: ICD-10-CM

## 2024-05-29 DIAGNOSIS — R97.20 ELEVATED PSA: ICD-10-CM

## 2024-05-29 DIAGNOSIS — E29.1 PRIMARY MALE HYPOGONADISM: Primary | ICD-10-CM

## 2024-05-29 DIAGNOSIS — N40.1 BPH WITH URINARY OBSTRUCTION: ICD-10-CM

## 2024-05-29 DIAGNOSIS — E78.5 DYSLIPIDEMIA: ICD-10-CM

## 2024-05-29 DIAGNOSIS — N13.8 BPH WITH URINARY OBSTRUCTION: ICD-10-CM

## 2024-05-29 PROCEDURE — 99214 OFFICE O/P EST MOD 30 MIN: CPT | Mod: HCNC,S$GLB,, | Performed by: NURSE PRACTITIONER

## 2024-05-29 PROCEDURE — 1159F MED LIST DOCD IN RCRD: CPT | Mod: HCNC,CPTII,S$GLB, | Performed by: NURSE PRACTITIONER

## 2024-05-29 PROCEDURE — 99999 PR PBB SHADOW E&M-EST. PATIENT-LVL IV: CPT | Mod: PBBFAC,HCNC,, | Performed by: NURSE PRACTITIONER

## 2024-05-29 PROCEDURE — 1125F AMNT PAIN NOTED PAIN PRSNT: CPT | Mod: HCNC,CPTII,S$GLB, | Performed by: NURSE PRACTITIONER

## 2024-05-29 PROCEDURE — 3288F FALL RISK ASSESSMENT DOCD: CPT | Mod: HCNC,CPTII,S$GLB, | Performed by: NURSE PRACTITIONER

## 2024-05-29 PROCEDURE — 1160F RVW MEDS BY RX/DR IN RCRD: CPT | Mod: HCNC,CPTII,S$GLB, | Performed by: NURSE PRACTITIONER

## 2024-05-29 PROCEDURE — 3078F DIAST BP <80 MM HG: CPT | Mod: HCNC,CPTII,S$GLB, | Performed by: NURSE PRACTITIONER

## 2024-05-29 PROCEDURE — 1101F PT FALLS ASSESS-DOCD LE1/YR: CPT | Mod: HCNC,CPTII,S$GLB, | Performed by: NURSE PRACTITIONER

## 2024-05-29 PROCEDURE — 3074F SYST BP LT 130 MM HG: CPT | Mod: HCNC,CPTII,S$GLB, | Performed by: NURSE PRACTITIONER

## 2024-05-29 RX ORDER — TESTOSTERONE 40.5 MG/2.5G
2.5 GEL TOPICAL DAILY
Qty: 75 G | Refills: 5 | Status: SHIPPED | OUTPATIENT
Start: 2024-05-29 | End: 2024-11-26

## 2024-05-29 RX ORDER — SILDENAFIL 100 MG/1
100 TABLET, FILM COATED ORAL DAILY PRN
Qty: 10 TABLET | Refills: 12 | Status: SHIPPED | OUTPATIENT
Start: 2024-05-29 | End: 2025-05-29

## 2024-05-29 NOTE — PROGRESS NOTES
CHIEF COMPLAINT:    Cricket Mcdonnell is a 83 y.o. male presents today for Low Testosterone.     HISTORY OF PRESENTING ILLINESS:    Cricket Mcdonnell is a 83 y.o. male with a history of low T and fatigue and ED  01/16/2020 T was 126  01/20/2020 T was 105  He was started on Androgel 1.62%/2.5gm  More energy with TRT.      PSA was 7.0.   11/07/2022 MRI of Prostate was clear of any areas of concern.      He has been having issues with fatigue; repeated a sleep study.   Issues with Sleep.   Bought a sleep mask; similar to the ones used during the study.  Getting use to it. .   Coffee drinker in the afternoon  Sildenafil 100mg PRN helps with the ED but he also have trouble achieving orgasm     Today for f/u visit.  No urinary complaints. Getting up 2-3x.  He reports a lot of fatigue, but T is normal. PCP told him it was also age related.   Has not been himself since the time he was admitted for Pneumonia 08/2023.   His children have concern with is current labs. Low ferritin. ?idiopathic anemia.     TRT labs completed 05/28/2024:  - T was 476  -PSA was 6.4 (stable)  -HCT was 41.5 (stable)  -normal lipids/LFT's     No family history of Prostate Cancer. Father & mother lived to .   His sister passed away from emphysema           REVIEW OF SYSTEMS:  Review of Systems   Constitutional:  Positive for malaise/fatigue. Negative for chills and fever.   Eyes:  Negative for double vision.   Respiratory:  Negative for cough and shortness of breath.    Cardiovascular:  Negative for chest pain and palpitations.   Gastrointestinal:  Negative for abdominal pain, constipation, diarrhea, nausea and vomiting.   Genitourinary:  Positive for frequency. Negative for dysuria, flank pain and hematuria.        See HPI   Musculoskeletal:  Negative for falls.   Neurological:  Negative for dizziness and seizures.   Endo/Heme/Allergies:  Negative for polydipsia.         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    L central zygoma    Squamous cell carcinoma of skin        Past Surgical History:   Procedure Laterality Date    CATARACT EXTRACTION      COLONOSCOPY N/A 1/3/2019    Procedure: COLONOSCOPY;  Surgeon: Cholo Yates MD;  Location: [facility] ([address] [place] [address]);  Service: Endoscopy;  Laterality: N/A;    EYE SURGERY      galucoma surgery      HERNIA REPAIR      tonsillectomy      TONSILLECTOMY         Family History   Problem Relation Name Age of Onset    COPD Sister Shaylee Mcdonnell     Cancer Sister Shaylee Mcdonnell         lung cancer from smoking    No Known Problems Son x1     No Known Problems Daughter x2        Social History     Socioeconomic History    Marital status:    Occupational History     Employer: [employer]   Tobacco Use    Smoking status: Former     Current packs/day: 0.00     Average packs/day: 0.3 packs/day for 30.0 years (7.5 ttl pk-yrs)     Types: Cigarettes     Start date: 1971     Quit date: 2001     Years since quittin.8     Passive exposure: Past    Smokeless tobacco: Never    Tobacco comments:     Quit 26 year ago   Substance and Sexual Activity    Alcohol use: Not Currently     Comment: I gave up drinking 23 years ago    Drug use: No    Sexual activity: Yes     Partners: Female     Birth control/protection: Diaphragm     Social Determinants of Health     Financial Resource Strain: Low Risk  (2024)    Overall Financial Resource Strain (CARDIA)     Difficulty of Paying Living Expenses: Not hard at all   Food Insecurity: No Food Insecurity (2024)    Hunger Vital Sign     Worried About Running Out of Food in the Last Year: Never true     Ran Out of Food in the Last Year: Never true   Transportation Needs: No Transportation Needs (2024)    PRAPARE - Transportation     Lack of Transportation (Medical): No     Lack of Transportation  (Non-Medical): No   Physical Activity: Insufficiently Active (5/21/2024)    Exercise Vital Sign     Days of Exercise per Week: 6 days     Minutes of Exercise per Session: 20 min   Stress: Stress Concern Present (2/26/2024)    [employer] - Occupational Stress Questionnaire     Feeling of Stress : Very much   Housing Stability: Low Risk  (2/26/2024)    Housing Stability Vital Sign     Unable to Pay for Housing in the Last Year: No     Number of Places Lived in the Last Year: 1     Unstable Housing in the Last Year: No       Allergies:  Poison ivy extract and Cooper    Medications:    Current Outpatient Medications:     brimonidine 0.2% (ALPHAGAN) 0.2 % Drop, Instill 1 drop into left eye three times a day, Disp: 10 mL, Rfl: 6    brimonidine 0.2% (ALPHAGAN) 0.2 % Drop, Instill 1 drop into left eye three times a day, Disp: 10 mL, Rfl: 6    brimonidine 0.2% (ALPHAGAN) 0.2 % Drop, Instill 1 drop into left eye three times a day, Disp: 10 mL, Rfl: 6    brimonidine 0.2% (ALPHAGAN) 0.2 % Drop, Instill 1 drop into both eyes three times a day, Disp: 10 mL, Rfl: 6    dorzolamide-timolol 2-0.5% (COSOPT) 22.3-6.8 mg/mL ophthalmic solution, Instill 1 drop into both eyes twice a day, Disp: 10 mL, Rfl: 2    dorzolamide-timolol 2-0.5% (COSOPT) 22.3-6.8 mg/mL ophthalmic solution, Instill 1 drop into Both Eyes twice a day, Disp: 30 mL, Rfl: 1    dorzolamide-timolol 2-0.5% (COSOPT) 22.3-6.8 mg/mL ophthalmic solution, Instill 1 drop into both eyes twice a day, Disp: 10 mL, Rfl: 6    dorzolamide-timolol 2-0.5% (COSOPT) 22.3-6.8 mg/mL ophthalmic solution, Instill 1 drop into both eyes twice a day, Disp: 10 mL, Rfl: 6    erythromycin (ROMYCIN) ophthalmic ointment, Apply 1 a thin layer into left eye four times a day, Disp: 3.5 g, Rfl: 6    hydrocortisone 2.5 % cream, Apply twice daily for rash flare on face for up to 2 weeks/month, Disp: 20 g, Rfl: 6    ketoconazole (NIZORAL) 2 % cream, Apply daily to rash on face,  Disp: 30 g, Rfl: 6    ketorolac 0.4% (ACULAR) 0.4 % Drop, Instill 1 drop into left eye four times a day, Disp: 5 mL, Rfl: 1    neomycin-polymyxin-dexamethasone (MAXITROL) 3.5 mg/g-10,000 unit/g-0.1 % Oint, Place 1/4 inch into left eye three times a day, Disp: 3.5 g, Rfl: 0    neomycin-polymyxin-dexamethasone (MAXITROL) 3.5 mg/g-10,000 unit/g-0.1 % Oint, 1/4 inch into right eye three times a day, Disp: 3.5 g, Rfl: 0    neomycin-polymyxin-dexamethasone (MAXITROL) 3.5 mg/g-10,000 unit/g-0.1 % Oint, Apply 0.25 inch Right Eye 3 times a day X 3 DAYS THEN STOP, Disp: 3.5 g, Rfl: 0    netarsudiL-latanoprost (ROCKLATAN) 0.02-0.005 % Drop, Instill 1 drop into both eyes at bedtime, Disp: 2.5 mL, Rfl: 6    netarsudiL-latanoprost (ROCKLATAN) 0.02-0.005 % Drop, Instill 1 drop into both eyes at bedtime, Disp: 2.5 mL, Rfl: 6    netarsudiL-latanoprost (ROCKLATAN) 0.02-0.005 % Drop, Instill 1 drop into both eyes at bedtime, Disp: 2.5 mL, Rfl: 6    triamcinolone acetonide 0.1% (KENALOG) 0.1 % ointment, Apply twice daily as needed to affected area, Disp: 80 g, Rfl: 2    sildenafiL (VIAGRA) 100 MG tablet, Take 1 tablet (100 mg total) by mouth daily as needed for Erectile Dysfunction (take on an empty stomach 30-60 minutes before)., Disp: 10 tablet, Rfl: 12    testosterone (ANDROGEL) 1.62 % (40.5 mg/2.5 gram) GlPk, Place 2.5 g (1 packet) onto the skin once daily., Disp: 75 g, Rfl: 5    PHYSICAL EXAMINATION:  Physical Exam  Vitals and nursing note reviewed.   Constitutional:       General: He is awake.      Appearance: Normal appearance.   HENT:      Head: Normocephalic.      Right Ear: External ear normal.      Left Ear: External ear normal.      Nose: Nose normal.   Cardiovascular:      Rate and Rhythm: Normal rate.   Pulmonary:      Effort: Pulmonary effort is normal. No respiratory distress.   Abdominal:      Tenderness: There is no abdominal tenderness. There is no right CVA tenderness or left CVA tenderness.   Genitourinary:      Penis: Normal. No hypospadias.       Testes: Normal.      Prostate: Enlarged (~40gm). Not tender and no nodules present.   Musculoskeletal:         General: Normal range of motion.      Cervical back: Normal range of motion.   Skin:     General: Skin is warm and dry.   Neurological:      General: No focal deficit present.      Mental Status: He is alert and oriented to person, place, and time.   Psychiatric:         Mood and Affect: Mood normal.         Behavior: Behavior is cooperative.           LABS:          Lab Results   Component Value Date    PSA 2.1 05/21/2018    PSA 1.8 03/27/2017    PSA 1.82 05/31/2013    PSADIAG 6.4 (H) 05/28/2024    PSADIAG 6.2 (H) 11/20/2023    PSADIAG 6.0 (H) 05/22/2023    PSATOTAL 2.1 07/06/2015       Lab Results   Component Value Date    CREATININE 0.7 05/28/2024    EGFRNORACEVR >60.0 05/28/2024               IMPRESSION:    Encounter Diagnoses   Name Primary?    Primary male hypogonadism Yes    Fatigue, unspecified type     BPH with urinary obstruction     Elevated PSA     ED (erectile dysfunction) of organic origin     Dyslipidemia          Assessment:       1. Primary male hypogonadism    2. Fatigue, unspecified type    3. BPH with urinary obstruction    4. Elevated PSA    5. ED (erectile dysfunction) of organic origin    6. Dyslipidemia        Plan:          I spent 30 minutes with the patient of which more than half was spent in direct consultation with the patient in regards to our treatment and plan.  We addressed the office findings and recent labs. His PCP has also reviewed his labs.   Education and recommendations of today's plan of care including home remedies and needed follow up with PCP.   We discussed the chief complaint; reviewed the LUTS and the possible contributory factors.   Reassurance his Testosterone is normal and not a factor for his fatigue.   Reassurance with his PSA; discussed expectation as we age;   His labs have improved since being in the hospital.    Reviewed management  Recommended lifestyle modifications with a proper, healthy diet, good hydration but during the day. Reducing bladder irritants.   Benefits of regular exercise.  Refilled his AndroGel and Sildenafil per request  F/U with PCP; ?idiopathic anemia  RTC 6 months with full labs

## 2024-06-12 ENCOUNTER — OFFICE VISIT (OUTPATIENT)
Dept: PSYCHIATRY | Facility: CLINIC | Age: 84
End: 2024-06-12
Payer: MEDICARE

## 2024-06-12 DIAGNOSIS — F51.05 INSOMNIA DUE TO OTHER MENTAL DISORDER: Primary | ICD-10-CM

## 2024-06-12 DIAGNOSIS — F99 INSOMNIA DUE TO OTHER MENTAL DISORDER: Primary | ICD-10-CM

## 2024-06-12 DIAGNOSIS — F41.9 ANXIETY DISORDER, UNSPECIFIED TYPE: ICD-10-CM

## 2024-06-12 PROCEDURE — 90834 PSYTX W PT 45 MINUTES: CPT | Mod: HCNC,S$GLB,, | Performed by: PSYCHOLOGIST

## 2024-06-19 ENCOUNTER — OFFICE VISIT (OUTPATIENT)
Dept: PSYCHIATRY | Facility: CLINIC | Age: 84
End: 2024-06-19
Payer: MEDICARE

## 2024-06-19 DIAGNOSIS — F51.05 INSOMNIA DUE TO OTHER MENTAL DISORDER: Primary | ICD-10-CM

## 2024-06-19 DIAGNOSIS — F99 INSOMNIA DUE TO OTHER MENTAL DISORDER: Primary | ICD-10-CM

## 2024-06-19 DIAGNOSIS — F41.9 ANXIETY DISORDER, UNSPECIFIED TYPE: ICD-10-CM

## 2024-06-19 PROCEDURE — 90834 PSYTX W PT 45 MINUTES: CPT | Mod: HCNC,S$GLB,, | Performed by: PSYCHOLOGIST

## 2024-06-19 NOTE — PROGRESS NOTES
Individual Psychotherapy (PhD/LCSW)    6/19/2024    Site:  [facility]         Therapeutic Intervention: Met with patient.  Outpatient - Insight oriented psychotherapy 45 min - CPT code 71377 and Outpatient - Behavior modifying psychotherapy 45 min - CPT code 65679    Chief complaint/reason for encounter: sleep     Interval history and content of current session:   Cognitive Behavioral Therapy for Insomnia (CBT-i), Session #3  Sleep Diary completed?  Yes  # of days completed:   SE: 88%  TST:  5.59  TIB: 6.72       Session Focus:  Summarize and graph Sleep Diary  Discuss Sleep Restriction/Compression  Prescribed TTB:  10:30  Prescribed TOB:  5:30  Review progress with Stimulus Control and Sleep Hygiene  Introduce Relaxation Training     Practice Assignments:  1) Review treatment materials as needed.  2) Complete the Sleep Diary every day.  Fill it out within two hours of getting up.  3) Implement Stimulus Control and Sleep Hygiene strategies  4) Implement Sleep Restriction/Compression  5) Practice Relaxation Training at least 20 minutes per day    Treatment plan:  Target symptoms:  sleep  Why chosen therapy is appropriate versus another modality: relevant to diagnosis, evidence based practice  Outcome monitoring methods: self-report, checklist/rating scale  Therapeutic intervention type: insight oriented psychotherapy, behavior modifying psychotherapy    Risk parameters:  Patient reports no suicidal ideation  Patient reports no homicidal ideation  Patient reports no self-injurious behavior  Patient reports no violent behavior    Verbal deficits: None    Patient's response to intervention:  The patient's response to intervention is accepting.    Progress toward goals and other mental status changes:  The patient's progress toward goals is fair .    Diagnosis:     ICD-10-CM ICD-9-CM   1. Insomnia due to other mental disorder  F51.05 300.9    F99 327.02   2. Anxiety disorder, unspecified type  F41.9 300.00          Plan:  individual psychotherapy    Return to clinic: 1 week    Length of Service (minutes): 45

## 2024-06-26 ENCOUNTER — OFFICE VISIT (OUTPATIENT)
Dept: PSYCHIATRY | Facility: CLINIC | Age: 84
End: 2024-06-26
Payer: MEDICARE

## 2024-06-26 DIAGNOSIS — F99 INSOMNIA DUE TO OTHER MENTAL DISORDER: Primary | ICD-10-CM

## 2024-06-26 DIAGNOSIS — F51.05 INSOMNIA DUE TO OTHER MENTAL DISORDER: Primary | ICD-10-CM

## 2024-06-26 DIAGNOSIS — F41.9 ANXIETY DISORDER, UNSPECIFIED TYPE: ICD-10-CM

## 2024-06-26 PROCEDURE — 90834 PSYTX W PT 45 MINUTES: CPT | Mod: HCNC,S$GLB,, | Performed by: PSYCHOLOGIST

## 2024-06-26 NOTE — PROGRESS NOTES
Individual Psychotherapy (PhD/LCSW)    6/26/2024    Site:  [facility]         Therapeutic Intervention: Met with patient.  Outpatient - Insight oriented psychotherapy 45 min - CPT code 67952 and Outpatient - Behavior modifying psychotherapy 45 min - CPT code 99894    Chief complaint/reason for encounter: sleep     Interval history and content of current session:   Cognitive Behavioral Therapy for Insomnia (CBT-i), Session #4  Sleep Diary completed?  Yes  # of days completed:    SE:  80%  TST: 6.7  TIB: 8.32      Session Focus:  Summarize and graph Sleep Diary  Discuss Sleep Restriction/Compression  Prescribed TTB:    Prescribed TOB  Review progress with Stimulus Control and Sleep Hygiene  Review progress with Relaxation Training  Review Cognitive Restructuring     Practice Assignment:  1) Review treatment materials as needed.  2) Complete the Sleep Diary every day.  Fill it out within two hours of getting up.  3) Implement Stimulus Control and Sleep Hygiene strategies  4) Implement Sleep Restriction/Compression  5) Practice Relaxation Training at least 20 minutes per day  6) Practice Cognitive Restructuring    Treatment plan:  Target symptoms:  sleep  Why chosen therapy is appropriate versus another modality: relevant to diagnosis, evidence based practice  Outcome monitoring methods: self-report, checklist/rating scale  Therapeutic intervention type: insight oriented psychotherapy, behavior modifying psychotherapy    Risk parameters:  Patient reports no suicidal ideation  Patient reports no homicidal ideation  Patient reports no self-injurious behavior  Patient reports no violent behavior    Verbal deficits: None    Patient's response to intervention:  The patient's response to intervention is accepting.    Progress toward goals and other mental status changes:  The patient's progress toward goals is fair .    Diagnosis:     ICD-10-CM ICD-9-CM   1. Insomnia due to other mental disorder  F51.05 300.9    F99  327.02   2. Anxiety disorder, unspecified type  F41.9 300.00           Plan:  individual psychotherapy    Return to clinic: 1 week    Length of Service (minutes): 45

## 2024-06-29 ENCOUNTER — PATIENT MESSAGE (OUTPATIENT)
Dept: INTERNAL MEDICINE | Facility: CLINIC | Age: 84
End: 2024-06-29
Payer: MEDICARE

## 2024-06-29 DIAGNOSIS — R26.81 UNSTEADY GAIT WHEN WALKING: ICD-10-CM

## 2024-06-29 DIAGNOSIS — H35.3231 EXUDATIVE AGE-RELATED MACULAR DEGENERATION, BILATERAL, WITH ACTIVE CHOROIDAL NEOVASCULARIZATION: Primary | ICD-10-CM

## 2024-07-02 ENCOUNTER — TELEPHONE (OUTPATIENT)
Dept: INTERNAL MEDICINE | Facility: CLINIC | Age: 84
End: 2024-07-02
Payer: MEDICARE

## 2024-07-02 DIAGNOSIS — R26.81 UNSTEADY GAIT WHEN WALKING: Primary | ICD-10-CM

## 2024-07-02 NOTE — TELEPHONE ENCOUNTER
----- Message from Kasia Vega sent at 7/2/2024  2:28 PM CDT -----  Regarding: physical therapy orders  Good afternoon,    This patient called this afternoon stating that he would much rather do home health instead of going to a facility. Please advise.     Thanks,    Kasia Vega  ____________________________  Access Navigator  [facility]  p: [phone]    f: [phone]  [email or web address]

## 2024-07-03 ENCOUNTER — OFFICE VISIT (OUTPATIENT)
Dept: PSYCHIATRY | Facility: CLINIC | Age: 84
End: 2024-07-03
Payer: MEDICARE

## 2024-07-03 DIAGNOSIS — F99 INSOMNIA DUE TO OTHER MENTAL DISORDER: Primary | ICD-10-CM

## 2024-07-03 DIAGNOSIS — F41.9 ANXIETY DISORDER, UNSPECIFIED TYPE: ICD-10-CM

## 2024-07-03 DIAGNOSIS — F51.05 INSOMNIA DUE TO OTHER MENTAL DISORDER: Primary | ICD-10-CM

## 2024-07-03 PROCEDURE — 90834 PSYTX W PT 45 MINUTES: CPT | Mod: HCNC,S$GLB,, | Performed by: PSYCHOLOGIST

## 2024-07-03 NOTE — PROGRESS NOTES
Individual Psychotherapy (PhD/LCSW)    7/3/2024    Site:  [facility]         Therapeutic Intervention: Met with patient.  Outpatient - Insight oriented psychotherapy 45 min - CPT code 93006 and Outpatient - Behavior modifying psychotherapy 45 min - CPT code 59729    Chief complaint/reason for encounter: sleep     Interval history and content of current session:   Cognitive Behavioral Therapy for Insomnia (CBT-i), Session #5  Sleep Diary completed?  Yes  # of days completed:    SE: 86%  Session Focus:  Summarize and graph Sleep Diary  Discuss Sleep Restriction/Compression  Review progress with Stimulus Control and Sleep Hygiene  Review progress with Relaxation Training  Review Cognitive Restructuring  Discuss Relapse Prevention    Treatment plan:  Target symptoms:  sleep  Why chosen therapy is appropriate versus another modality: relevant to diagnosis, evidence based practice  Outcome monitoring methods: self-report, checklist/rating scale  Therapeutic intervention type: insight oriented psychotherapy, behavior modifying psychotherapy    Risk parameters:  Patient reports no suicidal ideation  Patient reports no homicidal ideation  Patient reports no self-injurious behavior  Patient reports no violent behavior    Verbal deficits: None    Patient's response to intervention:  The patient's response to intervention is accepting.    Progress toward goals and other mental status changes:  The patient's progress toward goals is fair .    Diagnosis:     ICD-10-CM ICD-9-CM   1. Insomnia due to other mental disorder  F51.05 300.9    F99 327.02   2. Anxiety disorder, unspecified type  F41.9 300.00       Plan:  1) Review and complete treatment materials as needed.  2) Contact Dr. Mai or other provider for booster sessions if needed.  3) Treatment termination.  Follow-up with referring provider.    Return to clinic: as scheduled    Length of Service (minutes): 45

## 2024-07-03 NOTE — TELEPHONE ENCOUNTER
Please call [facility] or [facility] to enroll for diagnosis of vertigo and gait instability.  I believe he wants home PT

## 2024-07-09 ENCOUNTER — TELEPHONE (OUTPATIENT)
Dept: INTERNAL MEDICINE | Facility: CLINIC | Age: 84
End: 2024-07-09
Payer: MEDICARE

## 2024-07-09 NOTE — TELEPHONE ENCOUNTER
----- Message from Dulce Rico MA sent at 7/9/2024  1:51 PM CDT -----  Name of Who is Calling: Rohan with [facility] calling on behalf of MALACHI GOULD [[id number]]                What is the request in detail: They need clarification on some orders to admit pt. Please assist.                Can the clinic reply by [facility]: No                [facility] Back [facility] [phone] If she is not there you can for Andrzej Melo

## 2024-07-09 NOTE — TELEPHONE ENCOUNTER
----- Message from Janae Oswald sent at 7/9/2024  9:20 AM CDT -----  Regarding: call back  Type: Patient Call Back    Who called: [facility]     What is the request in detail: calling to inform clinic she has received faxed referrals for home health but needs pt care notes can be faxed to [phone]    Can the clinic reply by [facility]?no    Would the patient rather a call back or a response via [facility]? call    Best call back number: [phone]    Additional Information:

## 2024-07-11 PROCEDURE — G0180 MD CERTIFICATION HHA PATIENT: HCPCS | Mod: ,,, | Performed by: FAMILY MEDICINE

## 2024-07-31 ENCOUNTER — TELEPHONE (OUTPATIENT)
Dept: INTERNAL MEDICINE | Facility: CLINIC | Age: 84
End: 2024-07-31
Payer: MEDICARE

## 2024-07-31 NOTE — TELEPHONE ENCOUNTER
----- Message from Katieisabella Max sent at 7/31/2024 10:27 AM CDT -----  Regarding: Covid positive  Type: Patient Call Back    Who called:    What is the request in detail: p/t has tested positive for COVID and would like a call to discuss the best treatment. He is having symptoms runny nose, tiredness, achy body and sore throat. Please call to assist.     Can the clinic reply by [facility]? NO     Would the patient rather a call back or a response via [facility]?     Best call back number:  [phone]    Additional Information:

## 2024-08-01 ENCOUNTER — OFFICE VISIT (OUTPATIENT)
Dept: INTERNAL MEDICINE | Facility: CLINIC | Age: 84
End: 2024-08-01
Attending: FAMILY MEDICINE
Payer: MEDICARE

## 2024-08-01 VITALS
SYSTOLIC BLOOD PRESSURE: 126 MMHG | HEIGHT: 67 IN | BODY MASS INDEX: 24.22 KG/M2 | WEIGHT: 154.31 LBS | DIASTOLIC BLOOD PRESSURE: 76 MMHG | HEART RATE: 60 BPM

## 2024-08-01 DIAGNOSIS — U07.1 COVID-19: Primary | ICD-10-CM

## 2024-08-01 PROCEDURE — 3288F FALL RISK ASSESSMENT DOCD: CPT | Mod: HCNC,CPTII,95, | Performed by: FAMILY MEDICINE

## 2024-08-01 PROCEDURE — 3074F SYST BP LT 130 MM HG: CPT | Mod: HCNC,CPTII,95, | Performed by: FAMILY MEDICINE

## 2024-08-01 PROCEDURE — 99442 PR PHYSICIAN TELEPHONE EVALUATION 11-20 MIN: CPT | Mod: HCNC,95,, | Performed by: FAMILY MEDICINE

## 2024-08-01 PROCEDURE — 1159F MED LIST DOCD IN RCRD: CPT | Mod: HCNC,CPTII,95, | Performed by: FAMILY MEDICINE

## 2024-08-01 PROCEDURE — 1101F PT FALLS ASSESS-DOCD LE1/YR: CPT | Mod: HCNC,CPTII,95, | Performed by: FAMILY MEDICINE

## 2024-08-01 PROCEDURE — 3078F DIAST BP <80 MM HG: CPT | Mod: HCNC,CPTII,95, | Performed by: FAMILY MEDICINE

## 2024-08-01 NOTE — PROGRESS NOTES
Established Patient - Audio Only Telehealth Visit     The patient location is:  Home  The chief complaint leading to consultation is:  COVID-19  Visit type: Virtual visit with audio only (telephone)  Total time spent with patient:  15 minutes       The reason for the audio only service rather than synchronous audio and video virtual visit was related to technical difficulties or patient preference/necessity.     Each patient to whom I provide medical services by telemedicine is:  (1) informed of the relationship between the physician and patient and the respective role of any other health care provider with respect to management of the patient; and (2) notified that they may decline to receive medical services by telemedicine and may withdraw from such care at any time. Patient verbally consented to receive this service via voice-only telephone call.       HPI: COVID-19.  Patient was recently at a large gathering.  Both he and his daughter have developed COVID.  His primary symptoms are cough sore throat and sinus congestion.  He feels better today even than yesterday.  He continues to take no medications other than numerous eye drops and over-the-counter supplements.  He is content with symptomatic treatment     Assessment and plan:  COVID-19  Maintain hydration.  Cough can be managed with Robitussin DM.  Congestion can be managed with Sudafed.                        This service was not originating from a related E/M service provided within the previous 7 days nor will  to an E/M service or procedure within the next 24 hours or my soonest available appointment.  Prevailing standard of care was able to be met in this audio-only visit.

## 2024-08-02 ENCOUNTER — TELEPHONE (OUTPATIENT)
Dept: SLEEP MEDICINE | Facility: CLINIC | Age: 84
End: 2024-08-02
Payer: MEDICARE

## 2024-08-02 NOTE — TELEPHONE ENCOUNTER
Lvm to reschedule      ----- Message from Janae Oswald sent at 8/2/2024  9:43 AM CDT -----  Regarding: call back  Type: Patient Call Back    Who called: pt     What is the request in detail: requesting call to reschedule his upcoming appt, contracted covid recently and cannot come in. Epic not showing any provider availability.     Can the clinic reply by [facility]?no    Would the patient rather a call back or a response via [facility]? call    Best call back number: [phone]     Additional Information:

## 2024-08-07 ENCOUNTER — DOCUMENT SCAN (OUTPATIENT)
Dept: HOME HEALTH SERVICES | Facility: HOSPITAL | Age: 84
End: 2024-08-07
Payer: MEDICARE

## 2024-08-22 ENCOUNTER — OFFICE VISIT (OUTPATIENT)
Dept: INTERNAL MEDICINE | Facility: CLINIC | Age: 84
End: 2024-08-22
Attending: FAMILY MEDICINE
Payer: MEDICARE

## 2024-08-22 DIAGNOSIS — R53.83 FATIGUE, UNSPECIFIED TYPE: Primary | ICD-10-CM

## 2024-08-22 DIAGNOSIS — G47.33 OSA (OBSTRUCTIVE SLEEP APNEA): ICD-10-CM

## 2024-08-22 PROCEDURE — 99442 PR PHYSICIAN TELEPHONE EVALUATION 11-20 MIN: CPT | Mod: HCNC,95,, | Performed by: FAMILY MEDICINE

## 2024-08-22 NOTE — PROGRESS NOTES
Established Patient - Audio Only Telehealth Visit     The patient location is:  Home  The chief complaint leading to consultation is:  Fatigue  Visit type: Virtual visit with audio only (telephone)  Total time spent with patient:  12 minutes       The reason for the audio only service rather than synchronous audio and video virtual visit was related to technical difficulties or patient preference/necessity.     Each patient to whom I provide medical services by telemedicine is:  (1) informed of the relationship between the physician and patient and the respective role of any other health care provider with respect to management of the patient; and (2) notified that they may decline to receive medical services by telemedicine and may withdraw from such care at any time. Patient verbally consented to receive this service via voice-only telephone call.       HPI:  Patient was seen by his retina specialist and dentist recently.  On both occasions machine blood pressure readings were about 110/80.  He is concerned that this may be contributing to his ongoing fatigue.     Assessment and plan:  Fatigue     His reported blood pressures are not inconsistent with previous blood pressures.  It seems very unlikely the blood pressure is a factor in his fatigue.                   This service was not originating from a related E/M service provided within the previous 7 days nor will  to an E/M service or procedure within the next 24 hours or my soonest available appointment.  Prevailing standard of care was able to be met in this audio-only visit.

## 2024-08-24 ENCOUNTER — DOCUMENT SCAN (OUTPATIENT)
Dept: HOME HEALTH SERVICES | Facility: HOSPITAL | Age: 84
End: 2024-08-24
Payer: MEDICARE

## 2024-08-26 ENCOUNTER — EXTERNAL HOME HEALTH (OUTPATIENT)
Dept: HOME HEALTH SERVICES | Facility: HOSPITAL | Age: 84
End: 2024-08-26
Payer: MEDICARE

## 2024-09-10 ENCOUNTER — DOCUMENT SCAN (OUTPATIENT)
Dept: HOME HEALTH SERVICES | Facility: HOSPITAL | Age: 84
End: 2024-09-10
Payer: MEDICARE

## 2024-10-03 ENCOUNTER — CLINICAL SUPPORT (OUTPATIENT)
Dept: AUDIOLOGY | Facility: CLINIC | Age: 84
End: 2024-10-03
Payer: MEDICARE

## 2024-10-03 ENCOUNTER — OFFICE VISIT (OUTPATIENT)
Dept: OTOLARYNGOLOGY | Facility: CLINIC | Age: 84
End: 2024-10-03
Payer: MEDICARE

## 2024-10-03 DIAGNOSIS — H35.3231 EXUDATIVE AGE-RELATED MACULAR DEGENERATION, BILATERAL, WITH ACTIVE CHOROIDAL NEOVASCULARIZATION: ICD-10-CM

## 2024-10-03 DIAGNOSIS — H91.13 PRESBYCUSIS OF BOTH EARS: Primary | ICD-10-CM

## 2024-10-03 DIAGNOSIS — H90.3 SENSORINEURAL HEARING LOSS (SNHL) OF BOTH EARS: Primary | ICD-10-CM

## 2024-10-03 DIAGNOSIS — H61.23 BILATERAL IMPACTED CERUMEN: ICD-10-CM

## 2024-10-03 PROCEDURE — 99999 PR PBB SHADOW E&M-EST. PATIENT-LVL II: CPT | Mod: PBBFAC,HCNC,, | Performed by: AUDIOLOGIST

## 2024-10-03 PROCEDURE — 99999 PR PBB SHADOW E&M-EST. PATIENT-LVL III: CPT | Mod: PBBFAC,HCNC,, | Performed by: OTOLARYNGOLOGY

## 2024-10-03 PROCEDURE — 92567 TYMPANOMETRY: CPT | Mod: HCNC,S$GLB,, | Performed by: AUDIOLOGIST

## 2024-10-03 PROCEDURE — 92557 COMPREHENSIVE HEARING TEST: CPT | Mod: HCNC,S$GLB,, | Performed by: AUDIOLOGIST

## 2024-10-03 NOTE — PROGRESS NOTES
Subjective     Patient ID: Cricket Mcdonnell is a 83 y.o. male.    Chief Complaint: Hearing Loss    HPI: Hx of Dimitrios Prog HL.    Pres for 1 yr.    Pos Fam Hx.    No sig Tinn/ Dizz.    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    Past Surgical History: Patient has a past surgical history that includes Hernia repair; tonsillectomy; Cataract extraction; galucoma surgery; Colonoscopy (N/A, 1/3/2019); Tonsillectomy; and Eye surgery.    Social History: Patient reports that he quit smoking about 23 years ago. His smoking use included cigarettes. He started smoking about 53 years ago. He has a 7.5 pack-year smoking history. He has been exposed to tobacco smoke. He has never used smokeless tobacco. He reports that he does not currently use alcohol. He reports that he does not use drugs.    Family History: family history includes COPD in his sister; Cancer in his sister; No Known Problems in his daughter and son.    Medications:   Current Outpatient Medications   Medication Sig    brimonidine 0.2% (ALPHAGAN) 0.2 % Drop Instill 1 drop into left eye three times a day    brimonidine 0.2% (ALPHAGAN) 0.2 % Drop Instill 1 drop into left eye three times a day    brimonidine 0.2% (ALPHAGAN) 0.2 % Drop Instill 1 drop into left eye three times a day    brimonidine 0.2% (ALPHAGAN) 0.2 % Drop Instill 1 drop into both eyes three times a day    brimonidine 0.2% (ALPHAGAN) 0.2 % Drop Instill 1 drop into both eyes three times a day    brimonidine 0.2% (ALPHAGAN) 0.2 % Drop Instill 1 drop Left Eye 3 times a day    dorzolamide-timolol 2-0.5% (COSOPT) 22.3-6.8 mg/mL ophthalmic solution Instill 1 drop into both eyes twice a day    dorzolamide-timolol 2-0.5% (COSOPT) 22.3-6.8 mg/mL ophthalmic solution Instill 1 drop into Both Eyes twice a day    dorzolamide-timolol 2-0.5%  (COSOPT) 22.3-6.8 mg/mL ophthalmic solution Instill 1 drop into both eyes twice a day    dorzolamide-timolol 2-0.5% (COSOPT) 22.3-6.8 mg/mL ophthalmic solution Instill 1 drop into both eyes twice a day    dorzolamide-timolol 2-0.5% (COSOPT) 22.3-6.8 mg/mL ophthalmic solution Instill 1 drop into both eyes twice a day    dorzolamide-timolol 2-0.5% (COSOPT) 22.3-6.8 mg/mL ophthalmic solution Instill 1 drop Both Eyes 3 times a day    erythromycin (ROMYCIN) ophthalmic ointment Apply 1 a thin layer into left eye four times a day    hydrocortisone 2.5 % cream Apply twice daily for rash flare on face for up to 2 weeks/month    ketoconazole (NIZORAL) 2 % cream Apply daily to rash on face    ketorolac 0.4% (ACULAR) 0.4 % Drop Instill 1 drop into left eye four times a day    neomycin-polymyxin-dexamethasone (MAXITROL) 3.5 mg/g-10,000 unit/g-0.1 % Oint Place 1/4 inch into left eye three times a day    neomycin-polymyxin-dexamethasone (MAXITROL) 3.5 mg/g-10,000 unit/g-0.1 % Oint 1/4 inch into right eye three times a day    neomycin-polymyxin-dexamethasone (MAXITROL) 3.5 mg/g-10,000 unit/g-0.1 % Oint Apply 1/4 inch into left eye three times a day X 3 DAYS THEN STOP    netarsudiL-latanoprost (ROCKLATAN) 0.02-0.005 % Drop Instill 1 drop into both eyes at bedtime    netarsudiL-latanoprost (ROCKLATAN) 0.02-0.005 % Drop Instill 1 drop into both eyes at bedtime    netarsudiL-latanoprost (ROCKLATAN) 0.02-0.005 % Drop Instill 1 drop into both eyes at bedtime    netarsudiL-latanoprost (ROCKLATAN) 0.02-0.005 % Drop Instill 1 drop into both eyes at bedtime    sildenafiL (VIAGRA) 100 MG tablet Take 1 tablet (100 mg total) by mouth daily as needed for Erectile Dysfunction (take on an empty stomach 30-60 minutes before).    testosterone (ANDROGEL) 1.62 % (40.5 mg/2.5 gram) GlPk Place 2.5 g (1 packet) onto the skin once daily.    triamcinolone acetonide 0.1% (KENALOG) 0.1 % ointment Apply to rash on body twice daily for up to two weeks per  month     No current facility-administered medications for this visit.       Allergies: Patient is allergic to poison ivy extract and cam.    Review of Systems   Constitutional:  Negative for activity change, appetite change, chills, diaphoresis, fatigue, fever and unexpected weight change.   HENT:  Positive for hearing loss. Negative for nasal congestion, ear discharge, ear pain, facial swelling, nosebleeds, postnasal drip, rhinorrhea, sinus pressure/congestion, sneezing, sore throat, tinnitus, trouble swallowing and voice change.    Eyes:  Positive for photophobia. Negative for pain, discharge, redness and visual disturbance.   Respiratory: Negative.  Negative for cough, chest tightness, shortness of breath and wheezing.    Cardiovascular: Negative.  Negative for chest pain and palpitations.   Gastrointestinal: Negative.  Negative for abdominal pain, constipation, diarrhea and nausea.   Endocrine: Negative.    Genitourinary: Negative.  Negative for dysuria and frequency.   Musculoskeletal: Negative.  Negative for arthralgias, back pain, gait problem, joint swelling, myalgias, neck pain and neck stiffness.   Integumentary:  Negative for color change, pallor and rash. Negative.   Allergic/Immunologic: Negative.    Neurological: Negative.  Negative for dizziness, tremors, seizures, syncope, facial asymmetry, speech difficulty, weakness, light-headedness, numbness and headaches.   Hematological: Negative.  Negative for adenopathy. Does not bruise/bleed easily.   Psychiatric/Behavioral:  Positive for sleep disturbance. Negative for agitation, confusion, decreased concentration and dysphoric mood. The patient is not nervous/anxious and is not hyperactive.           Objective     Physical Exam  Constitutional:       General: He is not in acute distress.     Appearance: Normal appearance. He is well-developed. He is not ill-appearing, toxic-appearing or diaphoretic.   HENT:      Head: Not macrocephalic and not  microcephalic. No raccoon eyes, Raygoza's sign, abrasion, contusion, right periorbital erythema, left periorbital erythema or laceration. Hair is normal.      Jaw: No trismus.      Right Ear: Decreased hearing noted. No laceration, drainage, swelling or tenderness. No middle ear effusion. There is impacted cerumen. No foreign body. No mastoid tenderness. No hemotympanum. Tympanic membrane is not injected, scarred, perforated, erythematous, retracted or bulging. Tympanic membrane has normal mobility.      Left Ear: Decreased hearing noted. No laceration, drainage, swelling or tenderness.  No middle ear effusion. There is impacted cerumen. No foreign body. No mastoid tenderness. No hemotympanum. Tympanic membrane is not injected, scarred, perforated, erythematous, retracted or bulging. Tympanic membrane has normal mobility.      Ears:      Comments: Procedure Note:    Patient was brought to the minor procedure room and using the operating microscope the right ear canal  was cleaned of ceruminous debris. There was a significant cerumen impaction.  The forceps and suction were both used to perform this. Tympanic membrane intact. Pt tolerated well. There were no complications.    Procedure Note:    The patient was brought to the minor procedure room and placed under the operating microscope. Using a combination of suction, curettes and cup forceps the patient's cerumen impaction was removed. The tympanic membrane was evaluated and was unremarkable. The patient tolerated the procedure well. There were no complications.       Nose: No nasal deformity, septal deviation, laceration, mucosal edema or rhinorrhea.      Right Sinus: No maxillary sinus tenderness.      Left Sinus: No maxillary sinus tenderness.      Mouth/Throat:      Mouth: Mucous membranes are not pale, not dry and not cyanotic. No lacerations or oral lesions.      Dentition: Normal dentition. No dental caries or dental abscesses.      Pharynx: Uvula midline. No  oropharyngeal exudate or uvula swelling.      Tonsils: No tonsillar abscesses.   Eyes:      General: No scleral icterus.        Right eye: No foreign body or discharge.         Left eye: No foreign body or discharge.      Extraocular Movements:      Right eye: Normal extraocular motion and no nystagmus.      Left eye: Normal extraocular motion and no nystagmus.      Conjunctiva/sclera:      Right eye: Right conjunctiva is not injected. No chemosis, exudate or hemorrhage.     Left eye: Left conjunctiva is not injected. No chemosis, exudate or hemorrhage.     Pupils: Pupils are equal.      Right eye: Pupil is round and reactive.      Left eye: Pupil is round and reactive.   Neck:      Thyroid: No thyroid mass or thyromegaly.      Vascular: No JVD.      Trachea: No tracheal tenderness or tracheal deviation.   Cardiovascular:      Rate and Rhythm: Normal rate and regular rhythm.   Pulmonary:      Effort: No tachypnea, bradypnea, accessory muscle usage or respiratory distress.      Breath sounds: Normal breath sounds. No stridor.   Abdominal:      Palpations: Abdomen is soft.   Musculoskeletal:         General: Normal range of motion.      Cervical back: No edema, erythema or rigidity. No muscular tenderness. Normal range of motion.   Lymphadenopathy:      Head:      Right side of head: No submental, submandibular, tonsillar, preauricular, posterior auricular or occipital adenopathy.      Left side of head: No submental, submandibular, tonsillar, preauricular, posterior auricular or occipital adenopathy.      Cervical: No cervical adenopathy.      Right cervical: No superficial, deep or posterior cervical adenopathy.     Left cervical: No superficial, deep or posterior cervical adenopathy.   Skin:     Coloration: Skin is not pale.      Findings: No abrasion, bruising, burn, ecchymosis, erythema, laceration, lesion or rash.   Neurological:      Mental Status: He is alert and oriented to person, place, and time. He is not  disoriented.      Cranial Nerves: No cranial nerve deficit.      Sensory: No sensory deficit.      Motor: No tremor, atrophy, abnormal muscle tone or seizure activity.   Psychiatric:         Mood and Affect: Mood is not anxious or depressed. Affect is not labile, blunt, angry or inappropriate.         Speech: He is communicative. Speech is not rapid and pressured, delayed, slurred or tangential.         Behavior: Behavior normal. Behavior is not agitated, aggressive, withdrawn, hyperactive or combative.         Thought Content: Thought content normal.         Cognition and Memory: Memory is not impaired. He does not exhibit impaired recent memory or impaired remote memory.              Assessment and Plan     1. Presbycusis of both ears    2. Bilateral impacted cerumen        Patient to see Audiology for hearing aid evaluation.    F/U prn           No follow-ups on file.

## 2024-10-03 NOTE — PROGRESS NOTES
Audiologic Evaluation 10/3/2024:       Cricket Mcdonnell, a 83 y.o. male, was seen today in the clinic for an audiologic evaluation for hearing loss.  Mr. Mcdonnell reported decreased hearing and trouble understanding speech from a distance. He denied tinnitus and otalgia. Mr. Mcdonnell noted imbalance, but attributes it to his macular degeneration and vision problems. Mr. Mcdonnell wears binaural hearing aids that were purchased at an outside clinic.      Tympanometry revealed Type A tympanogram in the right ear and Type A tympanogram in the left ear. Audiogram results revealed mild sloping to moderately severe sensorineural hearing loss in the right ear and slight sloping to severe sensorineural hearing loss in the left ear.  Speech reception thresholds were noted at 35 dB in the right ear and 35 dB in the left ear.  Speech discrimination scores were 88% in the right ear and 88% in the left ear.    Recommendations:  Otologic evaluation  Daily use of hearing aids  F/u with hearing aid audiologist as needed  Annual audiogram  Hearing protection when in noise

## 2024-10-09 NOTE — PROGRESS NOTES
ESTABLISHED PATIENT VISIT    Cricket Mcdonnell  is a pleasant 83 y.o. male  established with the [facility] sleep medicine clinic    LOV: 10/10/24    Here today for: CPAP follow-up     Since last visit:   See assessment below    .  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 03/13/2024    L central zygoma    Squamous cell carcinoma of skin      Patient Active Problem List   Diagnosis    Inability to maintain erection    Nocturia    Benign prostatic hyperplasia    Hereditary sensory neuropathy    Libido, decreased    Posterior rhinorrhea    Family history of prostate cancer    KARMA (obstructive sleep apnea)    Exudative age-related macular degeneration, bilateral, with active choroidal neovascularization    Chronic ankle pain    Chronic rhinitis    Deviated nasal septum    Hypertrophy of nasal turbinates    Nasal obstruction    Anxiety disorder    Body mass index (BMI) of 20 to 24    Testosterone deficiency in male    Closed nondisplaced transverse fracture of left patella with routine healing    Fatigue    Peripheral neuropathy    Aortic atherosclerosis    Primary open angle glaucoma of right eye, moderate stage    Loculated pleural effusion    Constipation    Lattice degeneration of retina, bilateral       Current Outpatient Medications:     brimonidine 0.2% (ALPHAGAN) 0.2 % Drop, Instill 1 drop into left eye three times a day, Disp: 10 mL, Rfl: 6    brimonidine 0.2% (ALPHAGAN) 0.2 % Drop, Instill 1 drop into left eye three times a day, Disp: 10 mL, Rfl: 6    brimonidine 0.2% (ALPHAGAN) 0.2 % Drop, Instill 1 drop into left eye three times a day, Disp: 10 mL, Rfl: 6    brimonidine 0.2% (ALPHAGAN) 0.2 % Drop, Instill 1 drop into both eyes three times a day, Disp: 10 mL, Rfl: 6    brimonidine 0.2% (ALPHAGAN) 0.2 % Drop, Instill 1 drop into both eyes three times a day, Disp: 10 mL, Rfl: 6    brimonidine  0.2% (ALPHAGAN) 0.2 % Drop, Instill 1 drop Left Eye 3 times a day, Disp: 10 mL, Rfl: 0    dorzolamide-timolol 2-0.5% (COSOPT) 22.3-6.8 mg/mL ophthalmic solution, Instill 1 drop into both eyes twice a day, Disp: 10 mL, Rfl: 2    dorzolamide-timolol 2-0.5% (COSOPT) 22.3-6.8 mg/mL ophthalmic solution, Instill 1 drop into Both Eyes twice a day, Disp: 30 mL, Rfl: 1    dorzolamide-timolol 2-0.5% (COSOPT) 22.3-6.8 mg/mL ophthalmic solution, Instill 1 drop into both eyes twice a day, Disp: 10 mL, Rfl: 6    dorzolamide-timolol 2-0.5% (COSOPT) 22.3-6.8 mg/mL ophthalmic solution, Instill 1 drop into both eyes twice a day, Disp: 10 mL, Rfl: 6    dorzolamide-timolol 2-0.5% (COSOPT) 22.3-6.8 mg/mL ophthalmic solution, Instill 1 drop into both eyes twice a day, Disp: 10 mL, Rfl: 6    dorzolamide-timolol 2-0.5% (COSOPT) 22.3-6.8 mg/mL ophthalmic solution, Instill 1 drop Both Eyes 3 times a day, Disp: 10 mL, Rfl: 0    erythromycin (ROMYCIN) ophthalmic ointment, Apply 1 a thin layer into left eye four times a day, Disp: 3.5 g, Rfl: 6    hydrocortisone 2.5 % cream, Apply twice daily for rash flare on face for up to 2 weeks/month, Disp: 20 g, Rfl: 6    ketoconazole (NIZORAL) 2 % cream, Apply daily to rash on face, Disp: 30 g, Rfl: 6    ketorolac 0.4% (ACULAR) 0.4 % Drop, Instill 1 drop into left eye four times a day, Disp: 5 mL, Rfl: 1    neomycin-polymyxin-dexamethasone (MAXITROL) 3.5 mg/g-10,000 unit/g-0.1 % Oint, Place 1/4 inch into left eye three times a day, Disp: 3.5 g, Rfl: 0    neomycin-polymyxin-dexamethasone (MAXITROL) 3.5 mg/g-10,000 unit/g-0.1 % Oint, 1/4 inch into right eye three times a day, Disp: 3.5 g, Rfl: 0    neomycin-polymyxin-dexamethasone (MAXITROL) 3.5 mg/g-10,000 unit/g-0.1 % Oint, Apply 1/4 inch into left eye three times a day X 3 DAYS THEN STOP, Disp: 3.5 g, Rfl: 0    netarsudiL-latanoprost (ROCKLATAN) 0.02-0.005 % Drop, Instill 1 drop into both eyes at bedtime, Disp: 2.5 mL, Rfl: 6    netarsudiL-latanoprost  (ROCKLATAN) 0.02-0.005 % Drop, Instill 1 drop into both eyes at bedtime, Disp: 2.5 mL, Rfl: 6    netarsudiL-latanoprost (ROCKLATAN) 0.02-0.005 % Drop, Instill 1 drop into both eyes at bedtime, Disp: 2.5 mL, Rfl: 6    netarsudiL-latanoprost (ROCKLATAN) 0.02-0.005 % Drop, Instill 1 drop into both eyes at bedtime, Disp: 2.5 mL, Rfl: 6    sildenafiL (VIAGRA) 100 MG tablet, Take 1 tablet (100 mg total) by mouth daily as needed for Erectile Dysfunction (take on an empty stomach 30-60 minutes before)., Disp: 10 tablet, Rfl: 12    testosterone (ANDROGEL) 1.62 % (40.5 mg/2.5 gram) GlPk, Place 2.5 g (1 packet) onto the skin once daily., Disp: 75 g, Rfl: 5    triamcinolone acetonide 0.1% (KENALOG) 0.1 % ointment, Apply to rash on body twice daily for up to two weeks per month, Disp: 80 g, Rfl: 11     There were no vitals filed for this visit.     Physical Exam:    GEN:   Well-appearing  Psych:  Appropriate affect, demonstrates insight  SKIN:  No rash on the face or bridge of the nose      LABS:   Lab Results   Component Value Date    HGB 13.8 (L) 05/28/2024    CO2 24 05/28/2024       RECORDS REVIEWED PREVIOUSLY:    Baseline Sleep Study: 06/09/2017 HST The overall AHI was 45 and overall RDI was 46. The oxygen lisa was 70.8% and % time < 90% SpO2 was 37.7%.      CPAP titration 5.12.22: CPAP =9 supine SWS, some sREM CPAP =20.  no lateral sleep.;  Rec CPAP =9 cwp + side sleeping + FFM.  June 22-wants sleeping pill-Trz 50mg     BiPAP titration 5.20.23: 14/10 + lat N2 (apneas supine), 16/12 (sleSimplus FFM- medium sizeep onset obstructives supine)  Rx BiPAP-> EPAP 10, PS 4, IPAPmax 20, ramp 6 x 10min    DOWNLOADS:  10/23/23: 30/30 x 9hrs 4mins, 25/12 PS 4, leak (18.2/36.9/63.8), AHI 4.6  5.3.24: 30/30 x 8h 38min, nelly 12, ps 4, Max 25 (e 11.9/13.6/16.3), Leak 9/28/64    ASSESSMENT      PMH significant for BPH, chronic rhinitis, deviated nasal septum, open angle glaucoma, MD of right eye, anxiety, peripheral neuropathy, restless  legs, insomnia, KARMA   PROBLEM DESCRIPTION/ Sx on Presentation Interval Hx STATUS PLAN   Severe KARMA    Presentation:    PAP history   Dx Study   2017 AHI 45   Mask Nasal mask   DME HME   My Air    CPAP age AirCurve 10 Vauto 6/16/23   PAP altn    Benefits    PROBS           Since last visit:       10.8.24: 29/30 x [id number], V auto nelly 12, ps 4, max 25, Leak 25/71/86, AHI 6.4 (c 3.2, o 0.6)    Using nightly, sleeping longer with the machine     largely controlled     PAP PLAN   EPAP min Continue nelly 12 cwp (avoid increasing until mask leak improved)   IPAP max Continue 25 cwp   PS    RAMP    EPR    Mask    Other Trying to sleep on his side   Altn.        --the patient is using and benefiting from PAP therapy       Fatigue/ Daytime Sx    Occasional sleepiness when inactive   Needing short naps during the day  denies sleepiness when driving   ESS n/a/24 on intake    ESS 0/24    Worse after hospitalization for pneumonia in 2023   Still fatigued throughout the day    Denies sleepiness   persists     -consider scheduled naps at noon and 4pm     -consider modafinil if ok per ophtho    -    Insomnia      SLEEP SCHEDULE   Duration    Wind- down    Envmnt    CBTi   Went through CBTi with Dr. Mai which was helpful   Meds prior Trazodone  Gabapentin  Elavil (makes him tired)  Other medications affect his glaucoma and MD   Meds now none   Bed Time 9:30-9:45P   Lights out    Latency 15-30   Arousals 4-5   Back to sleep 5 min   Stim. ctrl    Wake time 6:30 - 7AM   Caffeine    Naps Rests x 1 hr but does not sleep    Nocturia 3   Work         Anxious about getting enough sleep  Wakes about 4 AM, worried he won't get back to sleep, but he often does          Still waking at 4AM, hard to get back to sleep    Went through CBTi with Dr. Mai which was helpful   persists         Trial of Belsomra in light of failure with previous trials of:  Ambien (had adverse events)  Lunesta (hangover)  trazodone  (worsened his vision)    Restless Legs / cramps Sx of restless legs at night     Neurontin helped but caused drowsiness     Did not address     N/A     -continue stretching before bed for leg cramps    -avoid neurontin due to drowsiness with it         Other issues: nocturia x 3, chronic rhinitis       RTC:  in approximately 6 months

## 2024-10-10 ENCOUNTER — OFFICE VISIT (OUTPATIENT)
Dept: SLEEP MEDICINE | Facility: CLINIC | Age: 84
End: 2024-10-10
Payer: MEDICARE

## 2024-10-10 VITALS
SYSTOLIC BLOOD PRESSURE: 134 MMHG | HEART RATE: 70 BPM | DIASTOLIC BLOOD PRESSURE: 75 MMHG | BODY MASS INDEX: 24.22 KG/M2 | WEIGHT: 154.31 LBS | HEIGHT: 67 IN

## 2024-10-10 DIAGNOSIS — G47.33 OSA (OBSTRUCTIVE SLEEP APNEA): ICD-10-CM

## 2024-10-10 DIAGNOSIS — G47.09 OTHER INSOMNIA: Primary | ICD-10-CM

## 2024-10-10 PROCEDURE — 99999 PR PBB SHADOW E&M-EST. PATIENT-LVL IV: CPT | Mod: PBBFAC,HCNC,, | Performed by: INTERNAL MEDICINE

## 2024-10-10 PROCEDURE — 3075F SYST BP GE 130 - 139MM HG: CPT | Mod: HCNC,CPTII,S$GLB, | Performed by: INTERNAL MEDICINE

## 2024-10-10 PROCEDURE — 99214 OFFICE O/P EST MOD 30 MIN: CPT | Mod: HCNC,S$GLB,, | Performed by: INTERNAL MEDICINE

## 2024-10-10 PROCEDURE — 1101F PT FALLS ASSESS-DOCD LE1/YR: CPT | Mod: HCNC,CPTII,S$GLB, | Performed by: INTERNAL MEDICINE

## 2024-10-10 PROCEDURE — 1159F MED LIST DOCD IN RCRD: CPT | Mod: HCNC,CPTII,S$GLB, | Performed by: INTERNAL MEDICINE

## 2024-10-10 PROCEDURE — 3288F FALL RISK ASSESSMENT DOCD: CPT | Mod: HCNC,CPTII,S$GLB, | Performed by: INTERNAL MEDICINE

## 2024-10-10 PROCEDURE — 3078F DIAST BP <80 MM HG: CPT | Mod: HCNC,CPTII,S$GLB, | Performed by: INTERNAL MEDICINE

## 2024-10-23 ENCOUNTER — IMMUNIZATION (OUTPATIENT)
Dept: INTERNAL MEDICINE | Facility: CLINIC | Age: 84
End: 2024-10-23
Payer: MEDICARE

## 2024-10-23 DIAGNOSIS — Z23 NEED FOR VACCINATION: Primary | ICD-10-CM

## 2024-10-23 PROCEDURE — 90653 IIV ADJUVANT VACCINE IM: CPT | Mod: HCNC,S$GLB,, | Performed by: INTERNAL MEDICINE

## 2024-10-23 PROCEDURE — G0008 ADMIN INFLUENZA VIRUS VAC: HCPCS | Mod: HCNC,S$GLB,, | Performed by: INTERNAL MEDICINE

## 2024-12-18 ENCOUNTER — NURSE TRIAGE (OUTPATIENT)
Dept: ADMINISTRATIVE | Facility: CLINIC | Age: 84
End: 2024-12-18
Payer: MEDICARE

## 2024-12-18 ENCOUNTER — OCHSNER VIRTUAL EMERGENCY DEPARTMENT (OUTPATIENT)
Facility: CLINIC | Age: 84
End: 2024-12-18
Payer: MEDICARE

## 2024-12-18 NOTE — TELEPHONE ENCOUNTER
Just checking for this pt c/o weakness and fatigue and its been going on for several weeks and getting worse and doesn't have any acute illness at this time cough or cold etc and pt triaged and its to go to office now since admits to moderate weakness and pt has appt for in the am . Pt wants to know if able to keep that appt. Shakeel referred and agreed pt can keep appt. Pt will call back if any other questions or concerns                  Reason for Disposition   MODERATE weakness (e.g., interferes with work, school, normal activities) and cause unknown (Exceptions: Weakness from acute minor illness or from poor fluid intake; weakness is chronic and not worse.)    Additional Information   Negative: Shock suspected (e.g., cold/pale/clammy skin, too weak to stand, low BP, rapid pulse)   Negative: SEVERE difficulty breathing (e.g., struggling for each breath, speaks in single words)   Negative: Difficult to awaken or acting confused (e.g., disoriented, slurred speech)   Negative: Fainted > 15 minutes ago and still feels too weak or dizzy to stand   Negative: SEVERE weakness (e.g., unable to walk or barely able to walk, requires support) and new-onset or getting worse   Negative: Sounds like a life-threatening emergency to the triager   Negative: Difficulty breathing   Negative: Heart beating < 50 beats per minute OR > 140 beats per minute   Negative: Extra heartbeats, irregular heart beating, or heart is beating very fast (i.e., 'palpitations')   Negative: Follows large amount of bleeding (e.g., from vomiting, rectum, vagina) (Exception: Small transient weakness from sight of a small amount blood.)   Negative: Black or tarry bowel movements   Negative: MODERATE weakness or fatigue from poor fluid intake with no improvement after 2 hours of rest and fluids   Negative: Drinking very little and dehydration suspected (e.g., no urine > 12 hours, very dry mouth, very lightheaded)   Negative: Patient sounds very sick or weak to  the triager    Protocols used: Weakness (Generalized) and Fatigue-A-OH

## 2024-12-19 ENCOUNTER — OFFICE VISIT (OUTPATIENT)
Dept: INTERNAL MEDICINE | Facility: CLINIC | Age: 84
End: 2024-12-19
Payer: MEDICARE

## 2024-12-19 ENCOUNTER — LAB VISIT (OUTPATIENT)
Dept: LAB | Facility: OTHER | Age: 84
End: 2024-12-19
Payer: MEDICARE

## 2024-12-19 VITALS
HEIGHT: 67 IN | BODY MASS INDEX: 25.74 KG/M2 | WEIGHT: 164 LBS | DIASTOLIC BLOOD PRESSURE: 62 MMHG | HEART RATE: 69 BPM | OXYGEN SATURATION: 96 % | SYSTOLIC BLOOD PRESSURE: 120 MMHG

## 2024-12-19 DIAGNOSIS — G47.33 OSA (OBSTRUCTIVE SLEEP APNEA): ICD-10-CM

## 2024-12-19 DIAGNOSIS — R53.83 FATIGUE, UNSPECIFIED TYPE: ICD-10-CM

## 2024-12-19 DIAGNOSIS — D64.9 ANEMIA, UNSPECIFIED TYPE: ICD-10-CM

## 2024-12-19 DIAGNOSIS — R53.83 FATIGUE, UNSPECIFIED TYPE: Primary | ICD-10-CM

## 2024-12-19 LAB
ALBUMIN SERPL BCP-MCNC: 4 G/DL (ref 3.5–5.2)
ALP SERPL-CCNC: 57 U/L (ref 40–150)
ALT SERPL W/O P-5'-P-CCNC: 25 U/L (ref 10–44)
ANION GAP SERPL CALC-SCNC: 7 MMOL/L (ref 8–16)
AST SERPL-CCNC: 24 U/L (ref 10–40)
BASOPHILS # BLD AUTO: 0.03 K/UL (ref 0–0.2)
BASOPHILS NFR BLD: 0.6 % (ref 0–1.9)
BILIRUB SERPL-MCNC: 0.4 MG/DL (ref 0.1–1)
BUN SERPL-MCNC: 12 MG/DL (ref 8–23)
CALCIUM SERPL-MCNC: 9.6 MG/DL (ref 8.7–10.5)
CHLORIDE SERPL-SCNC: 104 MMOL/L (ref 95–110)
CO2 SERPL-SCNC: 25 MMOL/L (ref 23–29)
CREAT SERPL-MCNC: 0.8 MG/DL (ref 0.5–1.4)
DIFFERENTIAL METHOD BLD: ABNORMAL
EOSINOPHIL # BLD AUTO: 0.1 K/UL (ref 0–0.5)
EOSINOPHIL NFR BLD: 2 % (ref 0–8)
ERYTHROCYTE [DISTWIDTH] IN BLOOD BY AUTOMATED COUNT: 13 % (ref 11.5–14.5)
EST. GFR  (NO RACE VARIABLE): >60 ML/MIN/1.73 M^2
GLUCOSE SERPL-MCNC: 102 MG/DL (ref 70–110)
HCT VFR BLD AUTO: 39.5 % (ref 40–54)
HGB BLD-MCNC: 14 G/DL (ref 14–18)
IMM GRANULOCYTES # BLD AUTO: 0.01 K/UL (ref 0–0.04)
IMM GRANULOCYTES NFR BLD AUTO: 0.2 % (ref 0–0.5)
LYMPHOCYTES # BLD AUTO: 1.2 K/UL (ref 1–4.8)
LYMPHOCYTES NFR BLD: 24.2 % (ref 18–48)
MCH RBC QN AUTO: 33.7 PG (ref 27–31)
MCHC RBC AUTO-ENTMCNC: 35.4 G/DL (ref 32–36)
MCV RBC AUTO: 95 FL (ref 82–98)
MONOCYTES # BLD AUTO: 0.6 K/UL (ref 0.3–1)
MONOCYTES NFR BLD: 12.9 % (ref 4–15)
NEUTROPHILS # BLD AUTO: 3 K/UL (ref 1.8–7.7)
NEUTROPHILS NFR BLD: 60.1 % (ref 38–73)
NRBC BLD-RTO: 0 /100 WBC
PLATELET # BLD AUTO: 220 K/UL (ref 150–450)
PMV BLD AUTO: 8.8 FL (ref 9.2–12.9)
POTASSIUM SERPL-SCNC: 4.3 MMOL/L (ref 3.5–5.1)
PROT SERPL-MCNC: 6.8 G/DL (ref 6–8.4)
RBC # BLD AUTO: 4.16 M/UL (ref 4.6–6.2)
SODIUM SERPL-SCNC: 136 MMOL/L (ref 136–145)
VIT B12 SERPL-MCNC: 1347 PG/ML (ref 210–950)
WBC # BLD AUTO: 4.95 K/UL (ref 3.9–12.7)

## 2024-12-19 PROCEDURE — 99999 PR PBB SHADOW E&M-EST. PATIENT-LVL V: CPT | Mod: PBBFAC,,,

## 2024-12-19 PROCEDURE — 85025 COMPLETE CBC W/AUTO DIFF WBC: CPT | Mod: HCNC

## 2024-12-19 PROCEDURE — 99214 OFFICE O/P EST MOD 30 MIN: CPT | Mod: HCNC,S$GLB,,

## 2024-12-19 PROCEDURE — 99215 OFFICE O/P EST HI 40 MIN: CPT | Mod: PBBFAC

## 2024-12-19 PROCEDURE — 82652 VIT D 1 25-DIHYDROXY: CPT | Mod: HCNC

## 2024-12-19 PROCEDURE — 82607 VITAMIN B-12: CPT | Mod: HCNC

## 2024-12-19 PROCEDURE — 80053 COMPREHEN METABOLIC PANEL: CPT | Mod: HCNC

## 2024-12-19 NOTE — PROGRESS NOTES
CHIEF COMPLAINT     Chief Complaint   Patient presents with    Fatigue       HPI     Cricket Mcdonnell is a 84 y.o. male who presents for xxx today.    PCP is Neo Sneed MD, patient is known to me. This note was generated with the assistance of ambient listening technology. Verbal consent was obtained by the patient and accompanying visitor(s) for the recording of patient appointment to facilitate this note. I attest to having reviewed and edited the generated note for accuracy, though some syntax or spelling errors may persist. Please contact the author of this note for any clarification.     History of Present Illness    CHIEF COMPLAINT:  Patient presents today with continuous fatigue.    SLEEP APNEA:  He reports experiencing continuous fatigue despite using a BiPAP machine. He had three incidents of apnea last night despite sleeping for nine hours. He feels very tired despite having good blood pressure.    MEDICATIONS AND SUPPLEMENTS:  He uses testosterone cream and takes vitamins for management of glaucoma and macular degeneration.    LABS:  Blood counts, metabolic panel, testosterone, B12, and thyroid studies were normal 6 months ago.    SOCIAL HISTORY:  He is retired and remains active in creative writing groups, [employer], and a book club.      ROS:  General: -fever, -chills, +fatigue, -weight gain, -weight loss  Eyes: -vision changes, -redness, -discharge  ENT: -ear pain, -nasal congestion, -sore throat  Cardiovascular: -chest pain, -palpitations, -lower extremity edema  Respiratory: -cough, -shortness of breath  Gastrointestinal: -abdominal pain, -nausea, -vomiting, -diarrhea, -constipation, -blood in stool  Genitourinary: -dysuria, -hematuria, -frequency  Musculoskeletal: -joint pain, -muscle pain  Skin: -rash, -lesion  Neurological: -headache, -dizziness, -numbness, -tingling  Psychiatric: -anxiety, -depression, +sleep difficulty          Past Medical History:  Past Medical History:    Diagnosis Date    Hereditary sensory neuropathy     Sleep apnea     + CPAP    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 Authorizing Provider   ALPRAZolam (XANAX) 0.5 MG tablet Take 1 tablet by mouth 1 hour prior to dental procedure, bring the other tablet to office 11/21/24  Yes    amoxicillin-clavulanate 875-125mg (AUGMENTIN) 875-125 mg per tablet take 1 tablet twice a day until gone 11/18/24  Yes Zulema Rosa DDS   amoxicillin-clavulanate 875-125mg (AUGMENTIN) 875-125 mg per tablet Take 1 tablet by mouth 2 times daily until gone starting 1 day prior to procedure 11/21/24  Yes    brimonidine 0.2% (ALPHAGAN) 0.2 % Drop Instill 1 drop into left eye three times a day 1/19/23  Yes    brimonidine 0.2% (ALPHAGAN) 0.2 % Drop Instill 1 drop into left eye three times a day 8/24/23  Yes    brimonidine 0.2% (ALPHAGAN) 0.2 % Drop Instill 1 drop into left eye three times a day 1/25/24  Yes    brimonidine 0.2% (ALPHAGAN) 0.2 % Drop Instill 1 drop into both eyes three times a day 4/25/24  Yes    brimonidine 0.2% (ALPHAGAN) 0.2 % Drop Instill 1 drop into both eyes three times a day 7/25/24  Yes    brimonidine 0.2% (ALPHAGAN) 0.2 % Drop Instill 1 drop Left Eye 3 times a day 8/19/24  Yes    brimonidine 0.2% (ALPHAGAN) 0.2 % Drop Instill 1 drop into both eyes three times a day 12/9/24  Yes    chlorhexidine (PERIDEX) 0.12 % solution Dip sponge into liquid and wipe onto surgerized area twice daily. Do not rinse afterwards for 30 minutes 10/30/24  Yes    dorzolamide-timolol 2-0.5% (COSOPT) 22.3-6.8 mg/mL ophthalmic solution Instill 1 drop into both eyes twice a day 11/17/21  Yes    dorzolamide-timolol 2-0.5% (COSOPT) 22.3-6.8 mg/mL ophthalmic solution Instill 1 drop into Both Eyes twice a day 9/28/23  Yes    dorzolamide-timolol 2-0.5% (COSOPT)  22.3-6.8 mg/mL ophthalmic solution Instill 1 drop into both eyes twice a day 1/25/24  Yes    dorzolamide-timolol 2-0.5% (COSOPT) 22.3-6.8 mg/mL ophthalmic solution Instill 1 drop into both eyes twice a day 4/25/24  Yes    dorzolamide-timolol 2-0.5% (COSOPT) 22.3-6.8 mg/mL ophthalmic solution Instill 1 drop into both eyes twice a day 7/25/24  Yes    dorzolamide-timolol 2-0.5% (COSOPT) 22.3-6.8 mg/mL ophthalmic solution Instill 1 drop Both Eyes 3 times a day 8/19/24  Yes    dorzolamide-timolol 2-0.5% (COSOPT) 22.3-6.8 mg/mL ophthalmic solution Use 1 drop into left eye twice a day 12/9/24  Yes    erythromycin (ROMYCIN) ophthalmic ointment Apply 1 a thin layer into left eye four times a day 6/22/23  Yes    HYDROcodone-acetaminophen (NORCO) 7.5-325 mg per tablet Take 1 tablet by mouth every 4 to 6 hours as needed for pain 10/30/24  Yes    HYDROcodone-acetaminophen (NORCO) 7.5-325 mg per tablet take 1 tablet by mouth every 4-6 hoursas needed for pain 11/18/24  Yes Zulema Rosa, PRIETO   hydrocortisone 2.5 % cream Apply twice daily for rash flare on face for up to 2 weeks/month 1/12/24  Yes    ketoconazole (NIZORAL) 2 % cream Apply daily to rash on face 1/12/24  Yes    ketorolac 0.4% (ACULAR) 0.4 % Drop Instill 1 drop into left eye four times a day 6/1/23  Yes    neomycin-polymyxin-dexamethasone (MAXITROL) 3.5 mg/g-10,000 unit/g-0.1 % Oint Place 1/4 inch into left eye three times a day 12/11/23  Yes    neomycin-polymyxin-dexamethasone (MAXITROL) 3.5 mg/g-10,000 unit/g-0.1 % Oint 1/4 inch into right eye three times a day 12/18/23  Yes    neomycin-polymyxin-dexamethasone (MAXITROL) 3.5 mg/g-10,000 unit/g-0.1 % Oint Apply 1/4 inch into left eye three times a day X 3 DAYS THEN STOP 8/19/24  Yes    neomycin-polymyxin-dexamethasone (MAXITROL) 3.5 mg/g-10,000 unit/g-0.1 % Oint Apply 0.25 inch Left Eye 3 times a day X 3 DAYS THEN STOP 12/2/24  Yes    netarsudiL-latanoprost (ROCKLATAN) 0.02-0.005 % Drop Instill 1 drop into both  "eyes at bedtime 8/24/23  Yes    netarsudiL-latanoprost (ROCKLATAN) 0.02-0.005 % Drop Instill 1 drop into both eyes at bedtime 1/25/24  Yes    netarsudiL-latanoprost (ROCKLATAN) 0.02-0.005 % Drop Instill 1 drop into both eyes at bedtime 4/25/24  Yes    netarsudiL-latanoprost (ROCKLATAN) 0.02-0.005 % Drop Instill 1 drop into both eyes at bedtime 7/25/24  Yes    netarsudiL-latanoprost (ROCKLATAN) 0.02-0.005 % Drop Use 1 drop into left eye every night at bedtime 12/9/24  Yes    sildenafiL (VIAGRA) 100 MG tablet Take 1 tablet (100 mg total) by mouth daily as needed for Erectile Dysfunction (take on an empty stomach 30-60 minutes before). 5/29/24 5/29/25 Yes Joy Hernandez NP   suvorexant (BELSOMRA) 20 mg Tab Take 10-20 mg by mouth nightly as needed (trouble sleeping). 10/10/24  Yes Juan Bustos MD   testosterone (ANDROGEL) 1.62 % (40.5 mg/2.5 gram) GlPk Place 2.5 g (1 packet) onto the skin once daily. 5/29/24 12/25/24 Yes Joy Hernandez NP   triamcinolone acetonide 0.1% (KENALOG) 0.1 % ointment Apply to rash on body twice daily for up to two weeks per month 8/16/24  Yes        Review of Systems:  Review of Systems   Constitutional:  Positive for fatigue.   Respiratory: Negative.     Cardiovascular: Negative.        Health Maintainence:   Immunizations:  Health Maintenance         Date Due Completion Date    Colonoscopy 01/03/2026 1/3/2019    TETANUS VACCINE 07/12/2026 7/12/2016    Lipid Panel 05/28/2029 5/28/2024             PHYSICAL EXAM     /62   Pulse 69   Ht 5' 7" (1.702 m)   Wt 74.4 kg (164 lb 0.4 oz)   SpO2 96%   BMI 25.69 kg/m²     Physical Exam  Vitals reviewed.   Constitutional:       Appearance: He is well-developed.   HENT:      Head: Normocephalic and atraumatic.   Eyes:      Conjunctiva/sclera: Conjunctivae normal.   Cardiovascular:      Rate and Rhythm: Normal rate.   Pulmonary:      Effort: Pulmonary effort is normal. No respiratory distress.   Skin:     General: Skin is warm and " "dry.      Findings: No rash.   Neurological:      Mental Status: He is alert and oriented to person, place, and time.      Coordination: Coordination normal.   Psychiatric:         Behavior: Behavior normal.           ASSESSMENT/PLAN   Assessment & Plan    IMPRESSION:  - Reviewed patient's ongoing complaint of persistent fatigue despite adequate sleep and normal blood pressure  - Considered recent lab results from 6 months ago, which showed normal blood counts, metabolic panel, testosterone, B12, and thyroid function  - Will recheck labs to rule out any new abnormalities that could explain fatigue  - Plan to review results and consult with Dr. Singh if necessary    HYPOPITUITARISM:  - Continued testosterone cream at current dose.  - Ordered testosterone level.    GENERAL HEALTH MONITORING:  - Ordered CBC to check for anemia.  - Ordered metabolic panel to assess kidney, liver, and electrolyte function.  - Ordered B12 and vitamin D levels.    FOLLOW-UP AND RESULTS MANAGEMENT:  - Will review lab results when available, likely by this afternoon.  - Contact the office if any new concerns arise before upcoming travel on Saturday.  - Will call patient directly if there are concerning findings.  - Medical assistant will call patient if all results are normal.          Cricket Barahona" was seen today for fatigue.    Diagnoses and all orders for this visit:    Fatigue, unspecified type  -     CBC Auto Differential; Future  -     Comprehensive Metabolic Panel; Future  -     VITAMIN B12; Future  -     Calcitriol; Future    Anemia, unspecified type  -     CBC Auto Differential; Future    KARMA (obstructive sleep apnea)  -     CBC Auto Differential; Future  -     Comprehensive Metabolic Panel; Future          Ernesto Daly NP   Department of Internal Medicine - [facility] [place]  9:23 AM  "

## 2024-12-19 NOTE — PLAN OF CARE-OVED
[facility] Plan of Care Note    Referral source: Nurse On-Call      Reason for consult: Weakness      Additional History: 84 y M c/o weakness and fatigue and its been going on for several weeks and getting worse and doesn't have any acute illness at this time cough or cold etc.  Pt has appt w/ pcp in the am.  Given no acute symptoms ok to f/u w/ pcp in am.       Disposition recommended: Primary Care      Additional Recommendations: no

## 2024-12-20 ENCOUNTER — OFFICE VISIT (OUTPATIENT)
Dept: INTERNAL MEDICINE | Facility: CLINIC | Age: 84
End: 2024-12-20
Payer: MEDICARE

## 2024-12-20 ENCOUNTER — HOSPITAL ENCOUNTER (OUTPATIENT)
Dept: RADIOLOGY | Facility: OTHER | Age: 84
Discharge: HOME | End: 2024-12-20
Payer: MEDICARE

## 2024-12-20 ENCOUNTER — TELEPHONE (OUTPATIENT)
Dept: INTERNAL MEDICINE | Facility: CLINIC | Age: 84
End: 2024-12-20
Payer: MEDICARE

## 2024-12-20 ENCOUNTER — TELEPHONE (OUTPATIENT)
Dept: PRIMARY CARE CLINIC | Facility: CLINIC | Age: 84
End: 2024-12-20
Payer: MEDICARE

## 2024-12-20 VITALS
WEIGHT: 166.88 LBS | HEIGHT: 67 IN | BODY MASS INDEX: 26.19 KG/M2 | DIASTOLIC BLOOD PRESSURE: 70 MMHG | HEART RATE: 79 BPM | SYSTOLIC BLOOD PRESSURE: 130 MMHG | OXYGEN SATURATION: 93 %

## 2024-12-20 DIAGNOSIS — R53.83 FATIGUE, UNSPECIFIED TYPE: Primary | ICD-10-CM

## 2024-12-20 DIAGNOSIS — R53.83 FATIGUE, UNSPECIFIED TYPE: ICD-10-CM

## 2024-12-20 DIAGNOSIS — Z86.39 H/O IRON DEFICIENCY: ICD-10-CM

## 2024-12-20 PROCEDURE — 99999 PR PBB SHADOW E&M-EST. PATIENT-LVL V: CPT | Mod: PBBFAC,HCNC,,

## 2024-12-20 PROCEDURE — 71046 X-RAY EXAM CHEST 2 VIEWS: CPT | Mod: TC,HCNC,FY

## 2024-12-20 PROCEDURE — 71046 X-RAY EXAM CHEST 2 VIEWS: CPT | Mod: 26,HCNC,, | Performed by: RADIOLOGY

## 2024-12-20 NOTE — TELEPHONE ENCOUNTER
Spoke to patient, unable to give patient answers on what the results. Assured patient that we will reach out to give results once we have all results in.

## 2024-12-20 NOTE — TELEPHONE ENCOUNTER
----- Message from Deisy sent at 12/20/2024 11:27 AM CST -----  Type: Patient call    Who called:Patient    Does the patient know what this is regarding? Requesting a call back in regards to needing results of blood work he received on 12/19 ; had pneumonia last year and afraid he's having the same symptoms again; please advise      Would the patient rather a call back or response via [facility]? Call    Best call back number: [phone]      Additional information:

## 2024-12-20 NOTE — TELEPHONE ENCOUNTER
----- Message from Deisy sent at 12/20/2024 11:27 AM CST -----  Type: Patient call    Who called:Patient    Does the patient know what this is regarding? Requesting a call back in regards to needing results of blood work he received on 12/19 ; had pneumonia last year and afraid he's having the same symptoms again; please advise      Would the patient rather a call back or response via [facility]? Call    Best call back number: [phone]      Additional information:

## 2024-12-20 NOTE — TELEPHONE ENCOUNTER
Contacted and spoke to patient, informed him that Ernesto is out of the clinic today, however his inquiring has been presented to covering provider for review and next steps.      Sathya verbalized understanding and informed staff that he would be available for a phone call the rest of the day.

## 2024-12-20 NOTE — TELEPHONE ENCOUNTER
----- Message from Opal sent at 12/19/2024  4:26 PM CST -----  Regarding: Results  Name of Who is Calling:Cricket           What is the request in detail:Pt is requesting a call back because he would like to have his results for the test.            Can the clinic reply by [facility]:No            [facility]: [phone]

## 2024-12-20 NOTE — TELEPHONE ENCOUNTER
Please let pt know that RJ is out today, but I looked over this results.  Nothing concerning.  Labs look good.    He should be evaluated for his symptoms concerning him for recurrence of pneumonia, though.  Somewhere were they can do a lung exam and potential chest xray.  Perhaps help him find a same day appt.    Thanks    Dr. Real

## 2024-12-20 NOTE — PROGRESS NOTES
"    CHIEF COMPLAINT     Chief Complaint   Patient presents with    Fatigue     Fatigue with Pneumonia       HPI     Cricket Mcdonnell is a 84 y.o. male who presents for chronic fatigue today.    PCP is Neo Sneed MD, patient is new to me. Pt consents to  recording of visit for documentation purposes.     History of Present Illness    CHIEF COMPLAINT:  Patient presents today for continuous fatigue.    HISTORY OF PRESENT ILLNESS:  He reports experiencing continuous fatigue since being hospitalized for pneumonia in August 2023 for 7-8 days. Patient states he was previously seen by provider in clinic and underwent testing for vitamin deficiencies. Patient worried that he may be having new onset pneumonia and would like chest xray for piece of mind. Patient denies any respiratory symptoms today, fevers, chills, nausea, vomiting. Patient states he feels at his "baseline" which is chronic fatigue.     SLEEP:  He uses a BiPAP machine and gets 9 hours of sleep with reduced waking episodes from 45 to 3-4 per hour. He previously tried Balsamal which caused extreme tiredness.    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    MEDICATIONS:  He takes iron supplements.    LABS:  Vitamin B12 levels were above normal range. Comprehensive metabolic panel was normal. Thyroid tests in March and August were normal.          Home Medications:  Prior to Admission medications    Medication Sig Start Date End Date Taking? Authorizing Provider   brimonidine 0.2% (ALPHAGAN) 0.2 % Drop Instill 1 drop into left eye three times a day 1/19/23  Yes    brimonidine 0.2% (ALPHAGAN) 0.2 % Drop Instill 1 drop into left eye three times a day 8/24/23  Yes    brimonidine 0.2% (ALPHAGAN) 0.2 % Drop Instill 1 drop into left eye three times a day 1/25/24  Yes    brimonidine 0.2% (ALPHAGAN) 0.2 % Drop Instill 1 drop into both eyes three times a day 4/25/24  Yes    brimonidine 0.2% (ALPHAGAN) 0.2 % Drop " Instill 1 drop into both eyes three times a day 7/25/24  Yes    brimonidine 0.2% (ALPHAGAN) 0.2 % Drop Instill 1 drop Left Eye 3 times a day 8/19/24  Yes    brimonidine 0.2% (ALPHAGAN) 0.2 % Drop Instill 1 drop into both eyes three times a day 12/9/24  Yes    chlorhexidine (PERIDEX) 0.12 % solution Dip sponge into liquid and wipe onto surgerized area twice daily. Do not rinse afterwards for 30 minutes 10/30/24  Yes    dorzolamide-timolol 2-0.5% (COSOPT) 22.3-6.8 mg/mL ophthalmic solution Instill 1 drop into both eyes twice a day 11/17/21  Yes    dorzolamide-timolol 2-0.5% (COSOPT) 22.3-6.8 mg/mL ophthalmic solution Instill 1 drop into Both Eyes twice a day 9/28/23  Yes    dorzolamide-timolol 2-0.5% (COSOPT) 22.3-6.8 mg/mL ophthalmic solution Instill 1 drop into both eyes twice a day 1/25/24  Yes    dorzolamide-timolol 2-0.5% (COSOPT) 22.3-6.8 mg/mL ophthalmic solution Instill 1 drop into both eyes twice a day 4/25/24  Yes    dorzolamide-timolol 2-0.5% (COSOPT) 22.3-6.8 mg/mL ophthalmic solution Instill 1 drop into both eyes twice a day 7/25/24  Yes    dorzolamide-timolol 2-0.5% (COSOPT) 22.3-6.8 mg/mL ophthalmic solution Instill 1 drop Both Eyes 3 times a day 8/19/24  Yes    dorzolamide-timolol 2-0.5% (COSOPT) 22.3-6.8 mg/mL ophthalmic solution Use 1 drop into left eye twice a day 12/9/24  Yes    erythromycin (ROMYCIN) ophthalmic ointment Apply 1 a thin layer into left eye four times a day 6/22/23  Yes    HYDROcodone-acetaminophen (NORCO) 7.5-325 mg per tablet Take 1 tablet by mouth every 4 to 6 hours as needed for pain 10/30/24  Yes    HYDROcodone-acetaminophen (NORCO) 7.5-325 mg per tablet take 1 tablet by mouth every 4-6 hoursas needed for pain 11/18/24  Yes Zulema Rosa, DDS   hydrocortisone 2.5 % cream Apply twice daily for rash flare on face for up to 2 weeks/month 1/12/24  Yes    ketoconazole (NIZORAL) 2 % cream Apply daily to rash on face 1/12/24  Yes    ketorolac 0.4% (ACULAR) 0.4 % Drop Instill 1 drop  into left eye four times a day 6/1/23  Yes    neomycin-polymyxin-dexamethasone (MAXITROL) 3.5 mg/g-10,000 unit/g-0.1 % Oint Place 1/4 inch into left eye three times a day 12/11/23  Yes    neomycin-polymyxin-dexamethasone (MAXITROL) 3.5 mg/g-10,000 unit/g-0.1 % Oint 1/4 inch into right eye three times a day 12/18/23  Yes    neomycin-polymyxin-dexamethasone (MAXITROL) 3.5 mg/g-10,000 unit/g-0.1 % Oint Apply 1/4 inch into left eye three times a day X 3 DAYS THEN STOP 8/19/24  Yes    neomycin-polymyxin-dexamethasone (MAXITROL) 3.5 mg/g-10,000 unit/g-0.1 % Oint Apply 0.25 inch Left Eye 3 times a day X 3 DAYS THEN STOP 12/2/24  Yes    netarsudiL-latanoprost (ROCKLATAN) 0.02-0.005 % Drop Instill 1 drop into both eyes at bedtime 8/24/23  Yes    netarsudiL-latanoprost (ROCKLATAN) 0.02-0.005 % Drop Instill 1 drop into both eyes at bedtime 1/25/24  Yes    netarsudiL-latanoprost (ROCKLATAN) 0.02-0.005 % Drop Instill 1 drop into both eyes at bedtime 4/25/24  Yes    netarsudiL-latanoprost (ROCKLATAN) 0.02-0.005 % Drop Instill 1 drop into both eyes at bedtime 7/25/24  Yes    netarsudiL-latanoprost (ROCKLATAN) 0.02-0.005 % Drop Use 1 drop into left eye every night at bedtime 12/9/24  Yes    sildenafiL (VIAGRA) 100 MG tablet Take 1 tablet (100 mg total) by mouth daily as needed for Erectile Dysfunction (take on an empty stomach 30-60 minutes before). 5/29/24 5/29/25 Yes Joy Hernandez NP   suvorexant (BELSOMRA) 20 mg Tab Take 10-20 mg by mouth nightly as needed (trouble sleeping). 10/10/24  Yes Juan Bustos MD   testosterone (ANDROGEL) 1.62 % (40.5 mg/2.5 gram) GlPk Place 2.5 g (1 packet) onto the skin once daily. 5/29/24 12/25/24 Yes Joy Hernandez NP   triamcinolone acetonide 0.1% (KENALOG) 0.1 % ointment Apply to rash on body twice daily for up to two weeks per month 8/16/24  Yes    ALPRAZolam (XANAX) 0.5 MG tablet Take 1 tablet by mouth 1 hour prior to dental procedure, bring the other tablet to office 11/21/24     "  amoxicillin-clavulanate 875-125mg (AUGMENTIN) 875-125 mg per tablet take 1 tablet twice a day until gone 11/18/24   Zulema Rosa DDS   amoxicillin-clavulanate 875-125mg (AUGMENTIN) 875-125 mg per tablet Take 1 tablet by mouth 2 times daily until gone starting 1 day prior to procedure 11/21/24            Health Maintainence:   Immunizations:  Health Maintenance         Date Due Completion Date    Colonoscopy 01/03/2026 1/3/2019    TETANUS VACCINE 07/12/2026 7/12/2016    Lipid Panel 05/28/2029 5/28/2024             PHYSICAL EXAM     /70 (BP Location: Left arm, Patient Position: Sitting)   Pulse 79   Ht 5' 7" (1.702 m)   Wt 75.7 kg (166 lb 14.2 oz)   SpO2 (!) 93%   BMI 26.14 kg/m²     Physical Exam  Constitutional:       General: He is not in acute distress.     Appearance: Normal appearance. He is not toxic-appearing.   HENT:      Head: Normocephalic and atraumatic.      Right Ear: External ear normal.      Left Ear: External ear normal.      Nose: Nose normal.      Mouth/Throat:      Mouth: Mucous membranes are moist.   Eyes:      Extraocular Movements: Extraocular movements intact.   Cardiovascular:      Rate and Rhythm: Normal rate and regular rhythm.      Pulses: Normal pulses.      Heart sounds: Normal heart sounds.   Pulmonary:      Effort: Pulmonary effort is normal. No respiratory distress.   Abdominal:      General: Abdomen is flat.      Palpations: Abdomen is soft.      Tenderness: There is no abdominal tenderness.   Musculoskeletal:      Cervical back: Normal range of motion and neck supple.   Skin:     General: Skin is warm.      Findings: No bruising or erythema.   Neurological:      General: No focal deficit present.      Mental Status: He is alert.   Psychiatric:         Mood and Affect: Mood normal.         LABS     Lab Results   Component Value Date    HGBA1C 5.5 03/10/2023     CMP  Sodium   Date Value Ref Range Status   12/19/2024 136 136 - 145 mmol/L Final     Potassium   Date " Value Ref Range Status   12/19/2024 4.3 3.5 - 5.1 mmol/L Final     Chloride   Date Value Ref Range Status   12/19/2024 104 95 - 110 mmol/L Final     CO2   Date Value Ref Range Status   12/19/2024 25 23 - 29 mmol/L Final     Glucose   Date Value Ref Range Status   12/19/2024 102 70 - 110 mg/dL Final     BUN   Date Value Ref Range Status   12/19/2024 12 8 - 23 mg/dL Final     Creatinine   Date Value Ref Range Status   12/19/2024 0.8 0.5 - 1.4 mg/dL Final     Calcium   Date Value Ref Range Status   12/19/2024 9.6 8.7 - 10.5 mg/dL Final     Total Protein   Date Value Ref Range Status   12/19/2024 6.8 6.0 - 8.4 g/dL Final     Albumin   Date Value Ref Range Status   12/19/2024 4.0 3.5 - 5.2 g/dL Final     Total Bilirubin   Date Value Ref Range Status   12/19/2024 0.4 0.1 - 1.0 mg/dL Final     Comment:     For infants and newborns, interpretation of results should be based  on gestational age, weight and in agreement with clinical  observations.    Premature Infant recommended reference ranges:  Up to 24 hours.............<8.0 mg/dL  Up to 48 hours............<12.0 mg/dL  3-5 days..................<15.0 mg/dL  6-29 days.................<15.0 mg/dL       Alkaline Phosphatase   Date Value Ref Range Status   12/19/2024 57 40 - 150 U/L Final     AST   Date Value Ref Range Status   12/19/2024 24 10 - 40 U/L Final     ALT   Date Value Ref Range Status   12/19/2024 25 10 - 44 U/L Final     Anion Gap   Date Value Ref Range Status   12/19/2024 7 (L) 8 - 16 mmol/L Final     eGFR if    Date Value Ref Range Status   05/25/2022 >60.0 >60 mL/min/1.73 m^2 Final     eGFR if non    Date Value Ref Range Status   05/25/2022 >60.0 >60 mL/min/1.73 m^2 Final     Comment:     Calculation used to obtain the estimated glomerular filtration  rate (eGFR) is the CKD-EPI equation.        Lab Results   Component Value Date    WBC 4.95 12/19/2024    HGB 14.0 12/19/2024    HCT 39.5 (L) 12/19/2024    MCV 95 12/19/2024     " 12/19/2024     Lab Results   Component Value Date    CHOL 185 05/28/2024    CHOL 198 11/20/2023    CHOL 189 05/22/2023     Lab Results   Component Value Date    HDL 60 05/28/2024    HDL 58 11/20/2023    HDL 51 05/22/2023     Lab Results   Component Value Date    LDLCALC 112.2 05/28/2024    LDLCALC 120.0 11/20/2023    LDLCALC 115.0 05/22/2023     Lab Results   Component Value Date    TRIG 64 05/28/2024    TRIG 100 11/20/2023    TRIG 115 05/22/2023     Lab Results   Component Value Date    CHOLHDL 32.4 05/28/2024    CHOLHDL 29.3 11/20/2023    CHOLHDL 27.0 05/22/2023     Lab Results   Component Value Date    TSH 1.589 03/22/2024       ASSESSMENT/PLAN   Assessment & Plan    Evaluated chronic fatigue symptoms, noting no significant changes since previous day's visit, benign PE   Reviewed recent lab results (vitamin B12, comprehensive metabolic panel, blood count), finding no abnormalities to explain fatigue  Considered age-related factors as potential contributor to fatigue, given normal test results  Assessed sleep quality, noting improvement with BiPAP machine use (episodes reduced from 45 to 3-4 per hour)  Planned additional testing to rule out pneumonia and nutritional deficiencies, will also obtain chest XR to r/o PNA    PLAN SUMMARY:  Ordered folate blood test  Ordered iron studies  Ordered chest XR to rule out pneumonia  Patient to attempt getting chest XR done today  Follow up after obtaining chest XR results  Contact office if unable to get chest XR today      FOLLOW-UP:  Follow up after obtaining chest XR results.         Cricket Morrow "Cricket" was seen today for fatigue.    Diagnoses and all orders for this visit:    Fatigue, unspecified type  -     X-Ray Chest PA And Lateral; Future  -     FOLATE; Future  -     IRON AND TIBC; Future  -     FERRITIN; Future    H/O iron deficiency  -     FOLATE; Future  -     IRON AND TIBC; Future  -     FERRITIN; Future          Cristhian Kramer MD   Department of Internal " [facility]  2:38 PM

## 2024-12-20 NOTE — TELEPHONE ENCOUNTER
Spoke with patient stating message below:    Antione Real MD Physician Signed1:05 PM       Please let pt know that ENDY is out today, but I looked over this results.  Nothing concerning.  Labs look good.     He should be evaluated for his symptoms concerning him for recurrence of pneumonia, though.  Somewhere were they can do a lung exam and potential chest xray.  Perhaps help him find a same day appt.     Thanks     Dr. Real          Same day appointment scheduled below:    Appointment Information   Name: MALACHI GOULD MRN: [id number]   Date: 12/20/2024 Status: [facility]   Appt Time: 2:20 PM Length: 20   Visit Type: EP - PRIMARY CARE ([facility]) [486] Copay: $0.00   Provider: Cristhian Kramer MD Dept: [facility], Internal Medicine       Dept Address: [address] [place] [id number]       Dept Phone Number: [phone]   Referring Provider:   FREDY: [id number]   Appt Phone:     Notes: recurrence of pneumonia,   Made On: 12/20/2024 1:19 PM By: ERASMO DIAZ [[id number]] (ES)

## 2024-12-23 LAB — 1,25(OH)2D3 SERPL-MCNC: 63 PG/ML (ref 20–79)

## 2024-12-28 DIAGNOSIS — N13.8 BPH WITH URINARY OBSTRUCTION: ICD-10-CM

## 2024-12-28 DIAGNOSIS — R97.20 ELEVATED PSA: ICD-10-CM

## 2024-12-28 DIAGNOSIS — N40.1 BPH WITH URINARY OBSTRUCTION: ICD-10-CM

## 2024-12-28 DIAGNOSIS — R53.83 FATIGUE, UNSPECIFIED TYPE: ICD-10-CM

## 2024-12-28 DIAGNOSIS — N52.9 ED (ERECTILE DYSFUNCTION) OF ORGANIC ORIGIN: ICD-10-CM

## 2024-12-28 DIAGNOSIS — E29.1 PRIMARY MALE HYPOGONADISM: ICD-10-CM

## 2024-12-30 RX ORDER — TESTOSTERONE 40.5 MG/2.5G
2.5 GEL TOPICAL DAILY
Qty: 75 G | Refills: 5 | OUTPATIENT
Start: 2024-12-30 | End: 2025-06-29

## 2025-01-24 DIAGNOSIS — R53.83 FATIGUE, UNSPECIFIED TYPE: ICD-10-CM

## 2025-01-24 DIAGNOSIS — E29.1 PRIMARY MALE HYPOGONADISM: ICD-10-CM

## 2025-01-24 DIAGNOSIS — N52.9 ED (ERECTILE DYSFUNCTION) OF ORGANIC ORIGIN: ICD-10-CM

## 2025-01-24 DIAGNOSIS — N40.1 BPH WITH URINARY OBSTRUCTION: ICD-10-CM

## 2025-01-24 DIAGNOSIS — N13.8 BPH WITH URINARY OBSTRUCTION: ICD-10-CM

## 2025-01-24 DIAGNOSIS — R97.20 ELEVATED PSA: ICD-10-CM

## 2025-01-24 RX ORDER — TESTOSTERONE 40.5 MG/2.5G
2.5 GEL TOPICAL DAILY
Qty: 75 G | Refills: 5 | OUTPATIENT
Start: 2025-01-24 | End: 2025-07-24

## 2025-01-27 DIAGNOSIS — N40.1 BPH WITH URINARY OBSTRUCTION: ICD-10-CM

## 2025-01-27 DIAGNOSIS — E29.1 PRIMARY MALE HYPOGONADISM: ICD-10-CM

## 2025-01-27 DIAGNOSIS — R97.20 ELEVATED PSA: ICD-10-CM

## 2025-01-27 DIAGNOSIS — N52.9 ED (ERECTILE DYSFUNCTION) OF ORGANIC ORIGIN: ICD-10-CM

## 2025-01-27 DIAGNOSIS — N13.8 BPH WITH URINARY OBSTRUCTION: ICD-10-CM

## 2025-01-27 DIAGNOSIS — R53.83 FATIGUE, UNSPECIFIED TYPE: ICD-10-CM

## 2025-01-28 RX ORDER — TESTOSTERONE 40.5 MG/2.5G
2.5 GEL TOPICAL DAILY
Qty: 75 G | Refills: 5 | OUTPATIENT
Start: 2025-01-28 | End: 2025-07-28

## 2025-01-29 ENCOUNTER — PATIENT MESSAGE (OUTPATIENT)
Dept: UROLOGY | Facility: CLINIC | Age: 85
End: 2025-01-29
Payer: MEDICARE

## 2025-01-29 ENCOUNTER — LAB VISIT (OUTPATIENT)
Dept: LAB | Facility: HOSPITAL | Age: 85
End: 2025-01-29
Payer: MEDICARE

## 2025-01-29 DIAGNOSIS — E29.1 PRIMARY MALE HYPOGONADISM: ICD-10-CM

## 2025-01-29 DIAGNOSIS — R97.20 ELEVATED PSA: ICD-10-CM

## 2025-01-29 DIAGNOSIS — N13.8 BPH WITH URINARY OBSTRUCTION: ICD-10-CM

## 2025-01-29 DIAGNOSIS — R53.83 FATIGUE, UNSPECIFIED TYPE: ICD-10-CM

## 2025-01-29 DIAGNOSIS — E78.5 DYSLIPIDEMIA: ICD-10-CM

## 2025-01-29 DIAGNOSIS — N52.9 ED (ERECTILE DYSFUNCTION) OF ORGANIC ORIGIN: ICD-10-CM

## 2025-01-29 DIAGNOSIS — N40.1 BPH WITH URINARY OBSTRUCTION: ICD-10-CM

## 2025-01-29 DIAGNOSIS — Z51.81 ENCOUNTER FOR MONITORING TESTOSTERONE REPLACEMENT THERAPY: ICD-10-CM

## 2025-01-29 DIAGNOSIS — Z79.890 ENCOUNTER FOR MONITORING TESTOSTERONE REPLACEMENT THERAPY: ICD-10-CM

## 2025-01-29 DIAGNOSIS — E29.1 PRIMARY MALE HYPOGONADISM: Primary | ICD-10-CM

## 2025-01-29 LAB
ALBUMIN SERPL BCP-MCNC: 3.9 G/DL (ref 3.5–5.2)
ALP SERPL-CCNC: 56 U/L (ref 40–150)
ALT SERPL W/O P-5'-P-CCNC: 22 U/L (ref 10–44)
ANION GAP SERPL CALC-SCNC: 11 MMOL/L (ref 8–16)
AST SERPL-CCNC: 21 U/L (ref 10–40)
BASOPHILS # BLD AUTO: 0.04 K/UL (ref 0–0.2)
BASOPHILS NFR BLD: 0.6 % (ref 0–1.9)
BILIRUB SERPL-MCNC: 0.4 MG/DL (ref 0.1–1)
BUN SERPL-MCNC: 19 MG/DL (ref 8–23)
CALCIUM SERPL-MCNC: 9.2 MG/DL (ref 8.7–10.5)
CHLORIDE SERPL-SCNC: 106 MMOL/L (ref 95–110)
CHOLEST SERPL-MCNC: 192 MG/DL (ref 120–199)
CHOLEST/HDLC SERPL: 3.6 {RATIO} (ref 2–5)
CO2 SERPL-SCNC: 21 MMOL/L (ref 23–29)
COMPLEXED PSA SERPL-MCNC: 5 NG/ML (ref 0–4)
CREAT SERPL-MCNC: 0.7 MG/DL (ref 0.5–1.4)
DIFFERENTIAL METHOD BLD: ABNORMAL
EOSINOPHIL # BLD AUTO: 0.1 K/UL (ref 0–0.5)
EOSINOPHIL NFR BLD: 1.7 % (ref 0–8)
ERYTHROCYTE [DISTWIDTH] IN BLOOD BY AUTOMATED COUNT: 12.7 % (ref 11.5–14.5)
EST. GFR  (NO RACE VARIABLE): >60 ML/MIN/1.73 M^2
GLUCOSE SERPL-MCNC: 95 MG/DL (ref 70–110)
HCT VFR BLD AUTO: 40.4 % (ref 40–54)
HDLC SERPL-MCNC: 54 MG/DL (ref 40–75)
HDLC SERPL: 28.1 % (ref 20–50)
HGB BLD-MCNC: 13.8 G/DL (ref 14–18)
IMM GRANULOCYTES # BLD AUTO: 0.02 K/UL (ref 0–0.04)
IMM GRANULOCYTES NFR BLD AUTO: 0.3 % (ref 0–0.5)
LDLC SERPL CALC-MCNC: 121.4 MG/DL (ref 63–159)
LYMPHOCYTES # BLD AUTO: 1.6 K/UL (ref 1–4.8)
LYMPHOCYTES NFR BLD: 24 % (ref 18–48)
MCH RBC QN AUTO: 33.1 PG (ref 27–31)
MCHC RBC AUTO-ENTMCNC: 34.2 G/DL (ref 32–36)
MCV RBC AUTO: 97 FL (ref 82–98)
MONOCYTES # BLD AUTO: 0.8 K/UL (ref 0.3–1)
MONOCYTES NFR BLD: 12.4 % (ref 4–15)
NEUTROPHILS # BLD AUTO: 4 K/UL (ref 1.8–7.7)
NEUTROPHILS NFR BLD: 61 % (ref 38–73)
NONHDLC SERPL-MCNC: 138 MG/DL
NRBC BLD-RTO: 0 /100 WBC
PLATELET # BLD AUTO: 234 K/UL (ref 150–450)
PMV BLD AUTO: 9.2 FL (ref 9.2–12.9)
POTASSIUM SERPL-SCNC: 4.1 MMOL/L (ref 3.5–5.1)
PROT SERPL-MCNC: 7.2 G/DL (ref 6–8.4)
RBC # BLD AUTO: 4.17 M/UL (ref 4.6–6.2)
SODIUM SERPL-SCNC: 138 MMOL/L (ref 136–145)
TESTOST SERPL-MCNC: 129 NG/DL (ref 304–1227)
TRIGL SERPL-MCNC: 83 MG/DL (ref 30–150)
WBC # BLD AUTO: 6.62 K/UL (ref 3.9–12.7)

## 2025-01-29 PROCEDURE — 84403 ASSAY OF TOTAL TESTOSTERONE: CPT | Mod: HCNC | Performed by: NURSE PRACTITIONER

## 2025-01-29 PROCEDURE — 80053 COMPREHEN METABOLIC PANEL: CPT | Mod: HCNC | Performed by: NURSE PRACTITIONER

## 2025-01-29 PROCEDURE — 84153 ASSAY OF PSA TOTAL: CPT | Mod: HCNC | Performed by: NURSE PRACTITIONER

## 2025-01-29 PROCEDURE — 36415 COLL VENOUS BLD VENIPUNCTURE: CPT | Mod: HCNC | Performed by: NURSE PRACTITIONER

## 2025-01-29 PROCEDURE — 85025 COMPLETE CBC W/AUTO DIFF WBC: CPT | Mod: HCNC | Performed by: NURSE PRACTITIONER

## 2025-01-29 PROCEDURE — 80061 LIPID PANEL: CPT | Mod: HCNC | Performed by: NURSE PRACTITIONER

## 2025-01-29 RX ORDER — TESTOSTERONE 40.5 MG/2.5G
2.5 GEL TOPICAL DAILY
Qty: 75 G | Refills: 5 | Status: SHIPPED | OUTPATIENT
Start: 2025-01-29 | End: 2025-07-28

## 2025-01-30 ENCOUNTER — OFFICE VISIT (OUTPATIENT)
Dept: UROLOGY | Facility: CLINIC | Age: 85
End: 2025-01-30
Payer: MEDICARE

## 2025-01-30 VITALS
BODY MASS INDEX: 26.19 KG/M2 | DIASTOLIC BLOOD PRESSURE: 81 MMHG | SYSTOLIC BLOOD PRESSURE: 143 MMHG | WEIGHT: 166.88 LBS | HEART RATE: 72 BPM | HEIGHT: 67 IN

## 2025-01-30 DIAGNOSIS — Z79.890 ENCOUNTER FOR MONITORING TESTOSTERONE REPLACEMENT THERAPY: ICD-10-CM

## 2025-01-30 DIAGNOSIS — R53.83 FATIGUE, UNSPECIFIED TYPE: ICD-10-CM

## 2025-01-30 DIAGNOSIS — R79.89 LOW TESTOSTERONE LEVEL IN MALE: ICD-10-CM

## 2025-01-30 DIAGNOSIS — E29.1 PRIMARY MALE HYPOGONADISM: Primary | ICD-10-CM

## 2025-01-30 DIAGNOSIS — Z51.81 ENCOUNTER FOR MONITORING TESTOSTERONE REPLACEMENT THERAPY: ICD-10-CM

## 2025-01-30 PROCEDURE — 1101F PT FALLS ASSESS-DOCD LE1/YR: CPT | Mod: HCNC,CPTII,S$GLB, | Performed by: NURSE PRACTITIONER

## 2025-01-30 PROCEDURE — 1126F AMNT PAIN NOTED NONE PRSNT: CPT | Mod: HCNC,CPTII,S$GLB, | Performed by: NURSE PRACTITIONER

## 2025-01-30 PROCEDURE — 99499 UNLISTED E&M SERVICE: CPT | Mod: HCNC,S$GLB,, | Performed by: NURSE PRACTITIONER

## 2025-01-30 PROCEDURE — 1160F RVW MEDS BY RX/DR IN RCRD: CPT | Mod: HCNC,CPTII,S$GLB, | Performed by: NURSE PRACTITIONER

## 2025-01-30 PROCEDURE — 3079F DIAST BP 80-89 MM HG: CPT | Mod: HCNC,CPTII,S$GLB, | Performed by: NURSE PRACTITIONER

## 2025-01-30 PROCEDURE — 96372 THER/PROPH/DIAG INJ SC/IM: CPT | Mod: HCNC,S$GLB,, | Performed by: NURSE PRACTITIONER

## 2025-01-30 PROCEDURE — 1159F MED LIST DOCD IN RCRD: CPT | Mod: HCNC,CPTII,S$GLB, | Performed by: NURSE PRACTITIONER

## 2025-01-30 PROCEDURE — 3288F FALL RISK ASSESSMENT DOCD: CPT | Mod: HCNC,CPTII,S$GLB, | Performed by: NURSE PRACTITIONER

## 2025-01-30 PROCEDURE — 99999 PR PBB SHADOW E&M-EST. PATIENT-LVL V: CPT | Mod: PBBFAC,HCNC,, | Performed by: NURSE PRACTITIONER

## 2025-01-30 PROCEDURE — 3077F SYST BP >= 140 MM HG: CPT | Mod: HCNC,CPTII,S$GLB, | Performed by: NURSE PRACTITIONER

## 2025-01-30 RX ORDER — TESTOSTERONE CYPIONATE 200 MG/ML
200 INJECTION, SOLUTION INTRAMUSCULAR ONCE
Status: COMPLETED | OUTPATIENT
Start: 2025-01-30 | End: 2025-01-30

## 2025-01-30 RX ADMIN — TESTOSTERONE CYPIONATE 200 MG: 200 INJECTION, SOLUTION INTRAMUSCULAR at 03:01

## 2025-01-30 NOTE — PROGRESS NOTES
CHIEF COMPLAINT:    Cricket Mcdonnell is a 84 y.o. male presents today Low Testosterone.     HISTORY OF PRESENTING ILLINESS:    Cricket Mcdonnell is a 84 y.o. male with a history of low T and fatigue and ED  01/16/2020 T was 126  01/20/2020 T was 105  He was started on Androgel 1.62%/2.5gm  More energy with TRT.      PSA was 7.0.   11/07/2022 MRI of Prostate was clear of any areas of concern.      He has been having issues with fatigue; repeated a sleep study.   Issues with Sleep.   Bought a sleep mask; similar to the ones used during the study.  Getting use to it. .   Coffee drinker in the afternoon  Sildenafil 100mg PRN helps with the ED but he also have trouble achieving orgasm     Today for f/u visit.  No urinary complaints. Getting up 2-3x.  He reports a lot of fatigue, but T is normal. PCP told him it was also age related.   Has not been himself since the time he was admitted for Pneumonia 08/2023.   His children have concern with is current labs. Low ferritin. ?idiopathic anemia.      TRT labs completed 01/29/2025:   - T was 129 (out of medication)  -PSA was 5.0 (stable)  -HCT was 40.4 (stable)  -normal lipids/LFT's     No family history of Prostate Cancer. Father & mother lived to .   His sister passed away from emphysema         REVIEW OF SYSTEMS:  Review of Systems   Constitutional: Negative.  Negative for chills and fever.   Eyes:  Negative for double vision.   Respiratory:  Negative for cough and shortness of breath.    Cardiovascular:  Negative for chest pain and palpitations.   Gastrointestinal:  Negative for abdominal pain, constipation, diarrhea, nausea and vomiting.   Genitourinary:  Positive for frequency. Negative for dysuria, hematuria and urgency.        See HPI   Musculoskeletal:  Negative for falls.   Neurological:  Negative for dizziness and seizures.   Endo/Heme/Allergies:  Negative for polydipsia.         PATIENT HISTORY:    Past Medical History:   Diagnosis Date    Hereditary sensory  Discharge Summary - [facility]   Baby Kate Zafar 1 days female MRN: [id number]  Unit/Bed#: [facility] 779-24 Encounter: [id number]    Admission Date and Time: 2019  1:15 AM   Discharge Date: 2019  Admitting Diagnosis: Single liveborn infant, delivered vaginally [Z38 00]  Discharge Diagnosis: Term     HPI: [de-identified] Girl  El Zafar is a 3232 g (7 lb 2 oz) AGA female born to a 21 y o   [id number]  mother at Gestational Age: 38w3d  Discharge Weight:  Weight: 3090 g (6 lb 13 oz)   Pct Wt Change: -4 39 %  Route of delivery: Vaginal, Spontaneous  Procedures Performed: No orders of the defined types were placed in this encounter  Hospital Course: Baby doing well and feeds established with nursing and Similac  [facility] 6 [facility]  Much anticipatory guidance given  Follow up on  with [facility]  Highlights of Hospital Stay:   Hearing screen:  Hearing Screen  Risk factors: No risk factors present  Parents informed: Yes  Initial DANIA screening results  Initial Hearing Screen Results Left Ear: Pass  Initial Hearing Screen Results Right Ear: Pass  Hearing Screen Date: 19    Hepatitis B vaccination:   Immunization History   Administered Date(s) Administered    Hep B, Adolescent or Pediatric 2019     Feedings (last 2 days)     None        SAT after 24 hours: Pulse Ox Screen: Initial  Preductal Sensor %: 97 %  Preductal Sensor Site: R Upper Extremity  Postductal Sensor % : 99 %  Postductal Sensor Site: R Lower Extremity  CCHD Negative Screen: Pass - No Further Intervention Needed    Mother's blood type:   Information for the patient's mother:  Gisselle Watson [[id number]]     Lab Results   Component Value Date/Time    ABO Grouping O 2019 12:51 AM    ABO Grouping O 07/15/2015 04:52 PM    Rh Factor Positive 2019 12:51 AM    Rh Factor Positive 07/15/2015 04:52 PM    Antibody Screen Negative 07/15/2015 04:52 PM     Baby's blood type:   ABO Grouping Date Value Ref Range Status   2019 O  Final     Rh Factor   Date Value Ref Range Status   2019 Positive  Final     Campos:   Results from last 7 days   Lab Units 19  0308   CELESTINA IGG  Negative       Bilirubin:   Results from last 7 days   Lab Units 19  0815   TOTAL BILIRUBIN mg/dL 6 06      Metabolic Screen Date:  (19 0800 : Alea Potts RN)    Vitals:   Temperature: 98 7 °F (37 1 °C)  Pulse: 158  Respirations: 45  Length: 19 5" (49 5 cm)  Weight: 3090 g (6 lb 13 oz)  Pct Wt Change: -4 39 %    Physical Exam:General Appearance:  Alert, active, no distress  Head:  Normocephalic, AFOF                             Eyes:  Conjunctiva clear, +RR  Ears:  Normally placed, no anomalies  Nose: nares patent                           Mouth:  Palate intact  Respiratory:  No grunting, flaring, retractions, breath sounds clear and equal  Cardiovascular:  Regular rate and rhythm  No murmur  Adequate perfusion/capillary refill  Femoral pulses present   Abdomen:   Soft, non-distended, no masses, bowel sounds present, no HSM  Genitourinary:  Normal genitalia  Spine:  No hair francisco javier, dimples  Musculoskeletal:  Normal hips  Skin/Hair/Nails:   Skin warm, dry, and intact, erythema toxicum               Neurologic:   Normal tone and reflexes    Discharge instructions/Information to patient and family:   See after visit summary for information provided to patient and family  Provisions for Follow-Up Care:  See after visit summary for information related to follow-up care and any pertinent home health orders  Disposition: Home    Discharge Medications:  See after visit summary for reconciled discharge medications provided to patient and family  neuropathy     Sleep apnea     + CPAP    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    L central zygoma    Squamous cell carcinoma of skin        Past Surgical History:   Procedure Laterality Date    CATARACT EXTRACTION      COLONOSCOPY N/A 1/3/2019    Procedure: COLONOSCOPY;  Surgeon: Cholo Yates MD;  Location: [facility] ([address] [place] [address]);  Service: Endoscopy;  Laterality: N/A;    EYE SURGERY      galucoma surgery      HERNIA REPAIR      tonsillectomy      TONSILLECTOMY         Family History   Problem Relation Name Age of Onset    COPD Sister Shaylee Mcdonnlel     Cancer Sister Shaylee Mcdonnell         lung cancer from smoking    No Known Problems Son x1     No Known Problems Daughter x2        Social History     Socioeconomic History    Marital status:    Occupational History     Employer: [employer]   Tobacco Use    Smoking status: Former     Current packs/day: 0.00     Average packs/day: 0.3 packs/day for 30.0 years (7.5 ttl pk-yrs)     Types: Cigarettes     Start date: 1971     Quit date: 2001     Years since quittin.5     Passive exposure: Past    Smokeless tobacco: Never    Tobacco comments:     Quit 26 year ago   Substance and Sexual Activity    Alcohol use: Not Currently     Comment: I gave up drinking 23 years ago    Drug use: No    Sexual activity: Yes     Partners: Female     Birth control/protection: Diaphragm     Social Drivers of Health     Financial Resource Strain: Low Risk  (2024)    Overall Financial Resource Strain (CARDIA)     Difficulty of Paying Living Expenses: Not hard at all   Food Insecurity: No Food Insecurity (2024)    Hunger Vital Sign     Worried About Running Out of Food in the Last Year: Never true     Ran Out of Food in the Last Year: Never true   Transportation Needs: No Transportation Needs (2024)    PRAPARE - Transportation     Lack of Transportation (Medical): No     Lack of Transportation (Non-Medical): No    Physical Activity: Insufficiently Active (5/21/2024)    Exercise Vital Sign     Days of Exercise per Week: 6 days     Minutes of Exercise per Session: 20 min   Stress: Stress Concern Present (2/26/2024)    [employer] [employer] [employer] - Occupational Stress Questionnaire     Feeling of Stress : Very much   Housing Stability: Low Risk  (2/26/2024)    Housing Stability Vital Sign     Unable to Pay for Housing in the Last Year: No     Number of Places Lived in the Last Year: 1     Unstable Housing in the Last Year: No       Allergies:  Poison ivy extract and [place]    Medications:    Current Outpatient Medications:     brimonidine 0.2% (ALPHAGAN) 0.2 % Drop, Instill 1 drop into left eye three times a day, Disp: 10 mL, Rfl: 6    brimonidine 0.2% (ALPHAGAN) 0.2 % Drop, Instill 1 drop into left eye three times a day, Disp: 10 mL, Rfl: 6    brimonidine 0.2% (ALPHAGAN) 0.2 % Drop, Instill 1 drop into left eye three times a day, Disp: 10 mL, Rfl: 6    brimonidine 0.2% (ALPHAGAN) 0.2 % Drop, Instill 1 drop into both eyes three times a day, Disp: 10 mL, Rfl: 6    brimonidine 0.2% (ALPHAGAN) 0.2 % Drop, Instill 1 drop into both eyes three times a day, Disp: 10 mL, Rfl: 6    brimonidine 0.2% (ALPHAGAN) 0.2 % Drop, Instill 1 drop Left Eye 3 times a day, Disp: 10 mL, Rfl: 0    brimonidine 0.2% (ALPHAGAN) 0.2 % Drop, Instill 1 drop into both eyes three times a day, Disp: 10 mL, Rfl: 6    dorzolamide-timolol 2-0.5% (COSOPT) 22.3-6.8 mg/mL ophthalmic solution, Instill 1 drop into both eyes twice a day, Disp: 10 mL, Rfl: 2    dorzolamide-timolol 2-0.5% (COSOPT) 22.3-6.8 mg/mL ophthalmic solution, Instill 1 drop into Both Eyes twice a day, Disp: 30 mL, Rfl: 1    dorzolamide-timolol 2-0.5% (COSOPT) 22.3-6.8 mg/mL ophthalmic solution, Instill 1 drop into both eyes twice a day, Disp: 10 mL, Rfl: 6    dorzolamide-timolol 2-0.5% (COSOPT) 22.3-6.8 mg/mL ophthalmic solution, Instill 1 drop into both eyes twice a day, Disp: 10 mL,  Rfl: 6    dorzolamide-timolol 2-0.5% (COSOPT) 22.3-6.8 mg/mL ophthalmic solution, Instill 1 drop into both eyes twice a day, Disp: 10 mL, Rfl: 6    dorzolamide-timolol 2-0.5% (COSOPT) 22.3-6.8 mg/mL ophthalmic solution, Instill 1 drop Both Eyes 3 times a day, Disp: 10 mL, Rfl: 0    dorzolamide-timolol 2-0.5% (COSOPT) 22.3-6.8 mg/mL ophthalmic solution, Use 1 drop into left eye twice a day, Disp: 10 mL, Rfl: 4    erythromycin (ROMYCIN) ophthalmic ointment, Apply 1 a thin layer into left eye four times a day, Disp: 3.5 g, Rfl: 6    hydrocortisone 2.5 % cream, Apply twice daily for rash flare on face for up to 2 weeks/month, Disp: 20 g, Rfl: 6    ketoconazole (NIZORAL) 2 % cream, Apply daily to rash on face, Disp: 30 g, Rfl: 6    ketorolac 0.4% (ACULAR) 0.4 % Drop, Instill 1 drop into left eye four times a day, Disp: 5 mL, Rfl: 1    neomycin-polymyxin-dexamethasone (MAXITROL) 3.5 mg/g-10,000 unit/g-0.1 % Oint, Place 1/4 inch into left eye three times a day, Disp: 3.5 g, Rfl: 0    neomycin-polymyxin-dexamethasone (MAXITROL) 3.5 mg/g-10,000 unit/g-0.1 % Oint, 1/4 inch into right eye three times a day, Disp: 3.5 g, Rfl: 0    neomycin-polymyxin-dexamethasone (MAXITROL) 3.5 mg/g-10,000 unit/g-0.1 % Oint, Apply 1/4 inch into left eye three times a day X 3 DAYS THEN STOP, Disp: 3.5 g, Rfl: 0    neomycin-polymyxin-dexamethasone (MAXITROL) 3.5 mg/g-10,000 unit/g-0.1 % Oint, Apply 0.25 inch Left Eye 3 times a day X 3 DAYS THEN STOP, Disp: 3.5 g, Rfl: 0    netarsudiL-latanoprost (ROCKLATAN) 0.02-0.005 % Drop, Instill 1 drop into both eyes at bedtime, Disp: 2.5 mL, Rfl: 6    netarsudiL-latanoprost (ROCKLATAN) 0.02-0.005 % Drop, Instill 1 drop into both eyes at bedtime, Disp: 2.5 mL, Rfl: 6    netarsudiL-latanoprost (ROCKLATAN) 0.02-0.005 % Drop, Instill 1 drop into both eyes at bedtime, Disp: 2.5 mL, Rfl: 6    netarsudiL-latanoprost (ROCKLATAN) 0.02-0.005 % Drop, Instill 1 drop into both eyes at bedtime, Disp: 2.5 mL, Rfl: 6     netarsudiL-latanoprost (ROCKLATAN) 0.02-0.005 % Drop, Use 1 drop into left eye every night at bedtime, Disp: 2.5 mL, Rfl: 4    sildenafiL (VIAGRA) 100 MG tablet, Take 1 tablet (100 mg total) by mouth daily as needed for Erectile Dysfunction (take on an empty stomach 30-60 minutes before)., Disp: 10 tablet, Rfl: 12    suvorexant (BELSOMRA) 20 mg Tab, Take 10-20 mg by mouth nightly as needed (trouble sleeping)., Disp: 30 tablet, Rfl: 5    testosterone (ANDROGEL) 1.62 % (40.5 mg/2.5 gram) GlPk, Place 2.5 g onto the skin Daily., Disp: 75 g, Rfl: 5    triamcinolone acetonide 0.1% (KENALOG) 0.1 % ointment, Apply to rash on body twice daily for up to two weeks per month, Disp: 80 g, Rfl: 11  No current facility-administered medications for this visit.    PHYSICAL EXAMINATION:  Physical Exam  Vitals and nursing note reviewed.   Constitutional:       General: He is awake.      Appearance: Normal appearance.   HENT:      Head: Normocephalic.      Right Ear: External ear normal.      Left Ear: External ear normal.      Nose: Nose normal.   Cardiovascular:      Rate and Rhythm: Normal rate.   Pulmonary:      Effort: Pulmonary effort is normal. No respiratory distress.   Abdominal:      Tenderness: There is no abdominal tenderness. There is no right CVA tenderness or left CVA tenderness.   Genitourinary:     Penis: Normal.       Testes: Normal.   Musculoskeletal:         General: Normal range of motion.      Cervical back: Normal range of motion.   Skin:     General: Skin is warm and dry.   Neurological:      General: No focal deficit present.      Mental Status: He is alert and oriented to person, place, and time.   Psychiatric:         Mood and Affect: Mood normal.         Behavior: Behavior is cooperative.           LABS:          Lab Results   Component Value Date    PSA 2.1 05/21/2018    PSA 1.8 03/27/2017    PSA 1.82 05/31/2013    PSADIAG 5.0 (H) 01/29/2025    PSADIAG 6.4 (H) 05/28/2024    PSADIAG 6.2 (H) 11/20/2023     PSATOTAL 2.1 07/06/2015       Lab Results   Component Value Date    CREATININE 0.7 01/29/2025    EGFRNORACEVR >60.0 01/29/2025           IMPRESSION:    Encounter Diagnoses   Name Primary?    Primary male hypogonadism Yes    Fatigue, unspecified type     Encounter for monitoring testosterone replacement therapy     Low testosterone level in male          Assessment:       1. Primary male hypogonadism    2. Fatigue, unspecified type    3. Encounter for monitoring testosterone replacement therapy    4. Low testosterone level in male        Plan:         I spent a total of 30 minutes on the day of the visit. This includes face to face time and non-face to face time preparing to see the patient (eg, review of tests), obtaining and/or reviewing separately obtained history, documenting clinical information in the electronic or other health record, independently interpreting results and communicating results to the patient/family/caregiver, or care coordinator  Reviewed the possible contributory factors.  Reassurance with today's labs.   Reviewed management;  Recommendations for PCP follow up visit; any need to go to the ER.    Recommended lifestyle modifications with a proper, healthy diet, good hydration but during the day. Reducing bladder irritants.   Benefits of regular exercise   Testosterone 200mg IM today to cover until AndroGel filled  RTC 6 months full TRT labs

## 2025-02-22 DIAGNOSIS — Z00.00 ENCOUNTER FOR MEDICARE ANNUAL WELLNESS EXAM: ICD-10-CM

## 2025-04-09 ENCOUNTER — OFFICE VISIT (OUTPATIENT)
Dept: SLEEP MEDICINE | Facility: CLINIC | Age: 85
End: 2025-04-09
Payer: MEDICARE

## 2025-04-09 VITALS
HEIGHT: 67 IN | SYSTOLIC BLOOD PRESSURE: 124 MMHG | BODY MASS INDEX: 26.19 KG/M2 | DIASTOLIC BLOOD PRESSURE: 74 MMHG | WEIGHT: 166.88 LBS | HEART RATE: 64 BPM

## 2025-04-09 DIAGNOSIS — G47.33 OSA (OBSTRUCTIVE SLEEP APNEA): Primary | ICD-10-CM

## 2025-04-09 DIAGNOSIS — R53.83 FATIGUE, UNSPECIFIED TYPE: ICD-10-CM

## 2025-04-09 DIAGNOSIS — F51.09 OTHER INSOMNIA NOT DUE TO A SUBSTANCE OR KNOWN PHYSIOLOGICAL CONDITION: ICD-10-CM

## 2025-04-09 PROCEDURE — 99214 OFFICE O/P EST MOD 30 MIN: CPT | Mod: HCNC,S$GLB,, | Performed by: INTERNAL MEDICINE

## 2025-04-09 PROCEDURE — 99999 PR PBB SHADOW E&M-EST. PATIENT-LVL IV: CPT | Mod: PBBFAC,HCNC,, | Performed by: INTERNAL MEDICINE

## 2025-04-09 PROCEDURE — 3078F DIAST BP <80 MM HG: CPT | Mod: HCNC,CPTII,S$GLB, | Performed by: INTERNAL MEDICINE

## 2025-04-09 PROCEDURE — 3074F SYST BP LT 130 MM HG: CPT | Mod: HCNC,CPTII,S$GLB, | Performed by: INTERNAL MEDICINE

## 2025-04-09 PROCEDURE — 1159F MED LIST DOCD IN RCRD: CPT | Mod: HCNC,CPTII,S$GLB, | Performed by: INTERNAL MEDICINE

## 2025-04-09 NOTE — PROGRESS NOTES
"ESTABLISHED PATIENT VISIT    Cricket Mcdonnell  is a pleasant 84 y.o. male  established with the [facility], [place], operated by [facility] sleep medicine clinic    Here today for: CPAP follow-up     Since last visit:   See assessment below    .  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 03/13/2024    L central zygoma    Squamous cell carcinoma of skin      Vitals:    04/09/25 1354   BP: 124/74   BP Location: Left arm   Patient Position: Sitting   Pulse: 64   Weight: 75.7 kg (166 lb 14.2 oz)   Height: 5' 7" (1.702 m)       Physical Exam:    GEN:   Well-appearing  Psych:  Appropriate affect, demonstrates insight  SKIN:  No rash on the face or bridge of the nose      LABS:   Lab Results   Component Value Date    HGB 13.8 (L) 01/29/2025    CO2 21 (L) 01/29/2025       RECORDS REVIEWED PREVIOUSLY:    Baseline Sleep Study: 06/09/2017 HST The overall AHI was 45 and overall RDI was 46. The oxygen lisa was 70.8% and % time < 90% SpO2 was 37.7%.      CPAP titration 5.12.22: CPAP =9 supine SWS, some sREM CPAP =20.  no lateral sleep.;  Rec CPAP =9 cwp + side sleeping + FFM.  June 22-wants sleeping pill-Trz 50mg     BiPAP titration 5.20.23: 14/10 + lat N2 (apneas supine), 16/12 (sleSimplus FFM- medium sizeep onset obstructives supine)  Rx BiPAP-> EPAP 10, PS 4, IPAPmax 20, ramp 6 x 10min    DOWNLOADS:  10/23/23: 30/30 x 9hrs 4mins, 25/12 PS 4, leak (18.2/36.9/63.8), AHI 4.6  5.3.24: 30/30 x 8h 38min, nelly 12, ps 4, Max 25 (e 11.9/13.6/16.3), Leak 9/28/64    10.8.24: 29/30 x 1f83mgc, V auto nelly 12, ps 4, max 25, Leak 25/71/86, AHI 6.4 (c 3.2, o 0.6)    ASSESSMENT      PMH significant for BPH, chronic rhinitis, deviated nasal septum, open angle glaucoma, MD of right eye, anxiety, peripheral neuropathy, restless legs, insomnia, KARMA     PROBLEM DESCRIPTION/ Sx on Presentation Interval Hx STATUS PLAN   Severe KARMA    Presentation:    PAP history "   Dx Study   2017 AHI 45   Mask Nasal mask   DME HME   My Air    CPAP age AirCurve 10 Vauto 6/16/23   PAP altn    Benefits Sleeps longer with PAP  Concerned about eye health (glaucoma and MD)   PROBS         LOV 10.10.24 Akash    11.16.24: pt requested pressures be lowered    11.16.24: Nelly 12 (12/12.9/13.6), ps 4, max 25, leak 20/69/87, AHI 6.4 (c 3.3, u 2.4)  MD->e min 10, ps 4, max16, ramp 5cwp x 10 min    11.20.24: pt waking up too much-change pressures back   Is leakage a problem? Is bipap an option?  11.20.24: MD-> pressure changed back to 12 ps 4, max 20, 6 x 10      4.7.25: 30/30 x 6g03dfi, V auto nelly 12 (12/13/13.7), ps 4, imax 20, Leak 10.3/35/45, AHI 2.7,     Using nightly   largely controlled     PAP PLAN   EPAP min Continue nelly 12 cwp (avoid increasing until mask leak improved)   IPAP max Continue 25 cwp   PS    RAMP    EPR    Mask    Other Trying to sleep on his side   Altn.            --the patient is using and benefiting from PAP therapy    CHECKOUT   Recall yearly or sooner if problems arise      DME    Other           Fatigue    Occasional sleepiness when inactive   Needing short naps during the day  denies sleepiness when driving     ESS n/a/24 on intake    ESS 0/24    Worse after hospitalization for pneumonia in 2023     Still fatigued throughout the day    Denies sleepiness   persists     -continue afternoon schedule short naps    -discussed trying to maximize QOL     -consider modafinil if ok per ophtho     Insomnia      SLEEP SCHEDULE   Duration    Wind- down    Envmnt    CBTi   Went through CBTi with Dr. Mai which was helpful       Meds prior Trazodone  Gabapentin  Elavil (makes him tired)  Other medications affect his glaucoma and MD  Belsomra   Meds now none   Bed Time 9:30-9:45P   Lights out    Latency 15-30   Arousals 4-5   Back to sleep 5 min   Stim. ctrl    Wake time 6:30 - 7AM   Caffeine    Naps Rests x 1 hr but does not sleep    Nocturia 3   Work     Now getting enough sleep ,  getting back to sleep better    Wakes about 4 AM, worried he won't get back to sleep, but he often does    Still has some insomnophobia        Belsomra trial --made him groggy    Sleeping better with PAP     Getting back to sleep ok   persists     -discontinue belsomra due to hangover       Other issues: nocturia x 3, chronic rhinitis, RLS (neurontin caused drowsiness), Leg cramps (recommended stretching)

## 2025-04-11 ENCOUNTER — TELEPHONE (OUTPATIENT)
Dept: INTERNAL MEDICINE | Facility: CLINIC | Age: 85
End: 2025-04-11
Payer: MEDICARE

## 2025-04-11 NOTE — TELEPHONE ENCOUNTER
----- Message from Junaid Dominguez sent at 4/10/2025  4:35 PM CDT -----  Name of Who is Calling: MALACHI GOULD [[id number]]   What is the request in detail: Pt states she needs an order for a Lab placed in the system. Please contact to further discuss and advise.   Can the clinic reply by [facility]: No   What Number to Call Back if not in [facility]:   [phone]

## 2025-04-11 NOTE — TELEPHONE ENCOUNTER
----- Message from Barak sent at 4/11/2025 10:08 AM CDT -----   Name of Who is Calling: What is the request in detail:patient request call back in reference to want to know if labs are needed for scheduled appointment Please contact to further discuss and advise  Can the clinic reply by [facility]: What Number to Call Back if not in [facility]:   [phone]

## 2025-04-11 NOTE — TELEPHONE ENCOUNTER
Spoke to patient to let him know that I see he has had labs done on 01/29/2025. He is coming in on 04/14 @ 11:20am for paperwork. I informed him that I do not know for sure if he has to do labs again, but Dr Sneed would be making that decision.

## 2025-04-14 ENCOUNTER — OFFICE VISIT (OUTPATIENT)
Dept: INTERNAL MEDICINE | Facility: CLINIC | Age: 85
End: 2025-04-14
Attending: FAMILY MEDICINE
Payer: MEDICARE

## 2025-04-14 VITALS
HEART RATE: 65 BPM | SYSTOLIC BLOOD PRESSURE: 110 MMHG | HEIGHT: 67 IN | BODY MASS INDEX: 26.23 KG/M2 | WEIGHT: 167.13 LBS | OXYGEN SATURATION: 96 % | DIASTOLIC BLOOD PRESSURE: 58 MMHG

## 2025-04-14 DIAGNOSIS — R35.0 BENIGN PROSTATIC HYPERPLASIA WITH URINARY FREQUENCY: ICD-10-CM

## 2025-04-14 DIAGNOSIS — G47.33 OSA (OBSTRUCTIVE SLEEP APNEA): Primary | ICD-10-CM

## 2025-04-14 DIAGNOSIS — N40.1 BENIGN PROSTATIC HYPERPLASIA WITH URINARY FREQUENCY: ICD-10-CM

## 2025-04-14 DIAGNOSIS — R53.83 FATIGUE, UNSPECIFIED TYPE: ICD-10-CM

## 2025-04-14 PROCEDURE — 99999 PR PBB SHADOW E&M-EST. PATIENT-LVL V: CPT | Mod: PBBFAC,HCNC,, | Performed by: FAMILY MEDICINE

## 2025-04-14 NOTE — PROGRESS NOTES
"CHIEF COMPLAINT:  Preop clearance in pt w/ macular degeneration and sleep apnea    HISTORY OF PRESENT ILLNESS: The patient is a generally healthy 84 year-old WM.  Scheduled for cataract surgery soon at [facility]    The patient has continuing neuropathic pain for which he had a complete workup in the past. He has tried multiple medications and is intolerant of all of them due to various side effects.  Currently pain worsening since starting KASHIF.    He was previously diagnosed with macular degeneration.     He does have CPAP for obstructive sleep apnea.  He has seen sleep clinic recently.  Also having leg cramps.    REVIEW OF SYSTEMS:  GENERAL: No fever, chills, weight loss.  SKIN: No rashes, itching or changes in color or texture of skin.  HEAD: No headaches or recent head trauma.  EYES:  No photophobia or diplopia.  EARS: Denies ear pain, discharge or vertigo.  NOSE: No loss of smell, no epistaxis or postnasal drip.  MOUTH & THROAT: No hoarseness or change in voice. No excessive gum bleeding.  NODES: Denies swollen glands.  CHEST: Denies SANCHES, cyanosis, wheezing, cough and sputum production.  CARDIOVASCULAR: Denies chest pain, PND, orthopnea or reduced exercise tolerance.  ABDOMEN: Appetite fine. No weight loss. Denies diarrhea, abdominal pain, hematemesis or blood in stool.  URINARY: No flank pain, dysuria or hematuria.  PERIPHERAL VASCULAR: No claudication or cyanosis.  MUSCULOSKELETAL: No joint stiffness or swelling. Denies back pain.  NEUROLOGIC: No history of seizures, paralysis, alteration of gait or coordination.    SOCIAL HISTORY: The patient does not smoke.  The patient consumes alcohol socially.  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    PHYSICAL EXAMINATION:   Blood pressure (!) 110/58, pulse 65, height 5' 7" (1.702 m), weight 75.8 kg (167 lb 1.7 oz), SpO2 96%.    APPEARANCE: Well nourished, well developed, in no acute distress.    HEAD: Normocephalic, " atraumatic.  EYES: PERRL. EOMI.  Conjunctivae without injection and  Anicteric  NOSE: Mucosa pink. Airway clear.  MOUTH & THROAT: No tonsillar enlargement. No pharyngeal erythema or exudate. No stridor.  NECK: Supple.   NODES: No cervical, axillary or inguinal lymph node enlargement.  CHEST: Lungs clear to auscultation.  No retractions are noted.  No rales or rhonchi are present.  CARDIOVASCULAR: Normal S1, S2. No rubs, murmurs or gallops.  ABDOMEN: Bowel sounds normal. Not distended. Soft. No tenderness or masses.  No ascites is noted.  MUSCULOSKELETAL:  There is no clubbing, cyanosis, or edema of the extremities x4.  There is full range of motion of the lumbar spine.  There is full range of motion of the extremities x4.  There is no deformity noted.    NEUROLOGIC:       Normal speech development.      Hearing normal.      Normal gait.      Motor and sensory exams grossly normal.  PSYCHIATRIC: Patient is alert and oriented x3.  Thought processes are all normal.  There is no homicidality.  There is no suicidality.  There is no evidence of psychosis.    LABORATORY/RADIOLOGY:   Chart reviewed.      ASSESSMENT:   Cataract surgery  Idiopathic peripheral neuropathy, ari foot pain  Fatigue  Glaucoma  Concern over blood pressure  Insomnia  Macular degeneration  Snoring due to sleep study proven severe sleep apnea on CPAP but changing to BiPAP    PLAN:  Cleared for cataract surgery  Pulmonary follow-up set up today  Reviewed normal BP ranges  He is aware that treating his KARMA is helpful to minimize his risk for progression of the macular degeneration.  Follow up sleep for KARMA and insomnia  Over-the-counter supplements as recommended by retinal specialist  Return to clinic in one year.

## 2025-06-04 ENCOUNTER — PATIENT OUTREACH (OUTPATIENT)
Dept: ADMINISTRATIVE | Facility: HOSPITAL | Age: 85
End: 2025-06-04
Payer: MEDICARE

## 2025-06-09 ENCOUNTER — TELEPHONE (OUTPATIENT)
Dept: INTERNAL MEDICINE | Facility: CLINIC | Age: 85
End: 2025-06-09
Payer: MEDICARE

## 2025-06-09 NOTE — TELEPHONE ENCOUNTER
Called pt to inform pt that he does not need an order to get his covid vaccine.     Can go to any pharmacy to get it done.

## 2025-06-09 NOTE — TELEPHONE ENCOUNTER
----- Message from Suzanne sent at 6/6/2025  4:49 PM CDT -----    Regarding: Cricket    Who called: Cricket    What is the request in detail: Patient would like to know if the doctor can prescribe a Covid vaccine for him please reach out to the patient. Patient says he lives walking distance from [place] and can come get it today    Can the clinic reply by [facility]? No    Would the patient rather a call back or a response via [facility]? Callback    Best call back number:.[phone]    Additional Information:

## 2025-07-18 ENCOUNTER — LAB VISIT (OUTPATIENT)
Dept: LAB | Facility: HOSPITAL | Age: 85
End: 2025-07-18
Attending: NURSE PRACTITIONER
Payer: MEDICARE

## 2025-07-18 DIAGNOSIS — E78.5 DYSLIPIDEMIA: ICD-10-CM

## 2025-07-18 DIAGNOSIS — N40.1 BPH WITH URINARY OBSTRUCTION: ICD-10-CM

## 2025-07-18 DIAGNOSIS — Z51.81 ENCOUNTER FOR MONITORING TESTOSTERONE REPLACEMENT THERAPY: ICD-10-CM

## 2025-07-18 DIAGNOSIS — N52.9 ED (ERECTILE DYSFUNCTION) OF ORGANIC ORIGIN: ICD-10-CM

## 2025-07-18 DIAGNOSIS — Z79.890 ENCOUNTER FOR MONITORING TESTOSTERONE REPLACEMENT THERAPY: ICD-10-CM

## 2025-07-18 DIAGNOSIS — E29.1 PRIMARY MALE HYPOGONADISM: ICD-10-CM

## 2025-07-18 DIAGNOSIS — R53.83 FATIGUE, UNSPECIFIED TYPE: ICD-10-CM

## 2025-07-18 DIAGNOSIS — N13.8 BPH WITH URINARY OBSTRUCTION: ICD-10-CM

## 2025-07-18 DIAGNOSIS — R97.20 ELEVATED PSA: ICD-10-CM

## 2025-07-18 LAB
ABSOLUTE EOSINOPHIL (OHS): 0.1 K/UL
ABSOLUTE MONOCYTE (OHS): 0.62 K/UL (ref 0.3–1)
ABSOLUTE NEUTROPHIL COUNT (OHS): 2.74 K/UL (ref 1.8–7.7)
ALBUMIN SERPL BCP-MCNC: 4.3 G/DL (ref 3.5–5.2)
ALP SERPL-CCNC: 53 UNIT/L (ref 40–150)
ALT SERPL W/O P-5'-P-CCNC: 23 UNIT/L (ref 10–44)
ANION GAP (OHS): 7 MMOL/L (ref 8–16)
AST SERPL-CCNC: 29 UNIT/L (ref 11–45)
BASOPHILS # BLD AUTO: 0.03 K/UL
BASOPHILS NFR BLD AUTO: 0.6 %
BILIRUB SERPL-MCNC: 0.5 MG/DL (ref 0.1–1)
BUN SERPL-MCNC: 12 MG/DL (ref 8–23)
CALCIUM SERPL-MCNC: 9.4 MG/DL (ref 8.7–10.5)
CHLORIDE SERPL-SCNC: 103 MMOL/L (ref 95–110)
CHOLEST SERPL-MCNC: 191 MG/DL (ref 120–199)
CHOLEST/HDLC SERPL: 3.4 {RATIO} (ref 2–5)
CO2 SERPL-SCNC: 27 MMOL/L (ref 23–29)
CREAT SERPL-MCNC: 0.7 MG/DL (ref 0.5–1.4)
ERYTHROCYTE [DISTWIDTH] IN BLOOD BY AUTOMATED COUNT: 12.5 % (ref 11.5–14.5)
GFR SERPLBLD CREATININE-BSD FMLA CKD-EPI: >60 ML/MIN/1.73/M2
GLUCOSE SERPL-MCNC: 103 MG/DL (ref 70–110)
HCT VFR BLD AUTO: 41.2 % (ref 40–54)
HDLC SERPL-MCNC: 57 MG/DL (ref 40–75)
HDLC SERPL: 29.8 % (ref 20–50)
HGB BLD-MCNC: 14.2 GM/DL (ref 14–18)
IMM GRANULOCYTES # BLD AUTO: 0.01 K/UL (ref 0–0.04)
IMM GRANULOCYTES NFR BLD AUTO: 0.2 % (ref 0–0.5)
LDLC SERPL CALC-MCNC: 115.2 MG/DL (ref 63–159)
LYMPHOCYTES # BLD AUTO: 1.66 K/UL (ref 1–4.8)
MCH RBC QN AUTO: 33.6 PG (ref 27–31)
MCHC RBC AUTO-ENTMCNC: 34.5 G/DL (ref 32–36)
MCV RBC AUTO: 98 FL (ref 82–98)
NONHDLC SERPL-MCNC: 134 MG/DL
NUCLEATED RBC (/100WBC) (OHS): 0 /100 WBC
PLATELET # BLD AUTO: 226 K/UL (ref 150–450)
PMV BLD AUTO: 9.1 FL (ref 9.2–12.9)
POTASSIUM SERPL-SCNC: 4.1 MMOL/L (ref 3.5–5.1)
PROT SERPL-MCNC: 7.1 GM/DL (ref 6–8.4)
PSA SERPL-MCNC: 7.97 NG/ML
RBC # BLD AUTO: 4.22 M/UL (ref 4.6–6.2)
RELATIVE EOSINOPHIL (OHS): 1.9 %
RELATIVE LYMPHOCYTE (OHS): 32.2 % (ref 18–48)
RELATIVE MONOCYTE (OHS): 12 % (ref 4–15)
RELATIVE NEUTROPHIL (OHS): 53.1 % (ref 38–73)
SODIUM SERPL-SCNC: 137 MMOL/L (ref 136–145)
TESTOST SERPL-MCNC: 639 NG/DL (ref 304–1227)
TRIGL SERPL-MCNC: 94 MG/DL (ref 30–150)
WBC # BLD AUTO: 5.16 K/UL (ref 3.9–12.7)

## 2025-07-18 PROCEDURE — 80061 LIPID PANEL: CPT | Mod: HCNC

## 2025-07-18 PROCEDURE — 36415 COLL VENOUS BLD VENIPUNCTURE: CPT | Mod: HCNC

## 2025-07-18 PROCEDURE — 84460 ALANINE AMINO (ALT) (SGPT): CPT | Mod: HCNC

## 2025-07-18 PROCEDURE — 84153 ASSAY OF PSA TOTAL: CPT | Mod: HCNC

## 2025-07-18 PROCEDURE — 85025 COMPLETE CBC W/AUTO DIFF WBC: CPT | Mod: HCNC

## 2025-07-18 PROCEDURE — 84403 ASSAY OF TOTAL TESTOSTERONE: CPT | Mod: HCNC

## 2025-07-21 ENCOUNTER — OFFICE VISIT (OUTPATIENT)
Dept: UROLOGY | Facility: CLINIC | Age: 85
End: 2025-07-21
Payer: MEDICARE

## 2025-07-21 VITALS
SYSTOLIC BLOOD PRESSURE: 118 MMHG | HEIGHT: 67 IN | BODY MASS INDEX: 26.39 KG/M2 | DIASTOLIC BLOOD PRESSURE: 71 MMHG | HEART RATE: 62 BPM | WEIGHT: 168.13 LBS

## 2025-07-21 DIAGNOSIS — N52.9 ED (ERECTILE DYSFUNCTION) OF ORGANIC ORIGIN: ICD-10-CM

## 2025-07-21 DIAGNOSIS — N13.8 BPH WITH URINARY OBSTRUCTION: ICD-10-CM

## 2025-07-21 DIAGNOSIS — N40.1 BPH WITH URINARY OBSTRUCTION: ICD-10-CM

## 2025-07-21 DIAGNOSIS — R97.20 ELEVATED PSA: ICD-10-CM

## 2025-07-21 DIAGNOSIS — R53.83 FATIGUE, UNSPECIFIED TYPE: ICD-10-CM

## 2025-07-21 DIAGNOSIS — Z51.81 ENCOUNTER FOR MONITORING TESTOSTERONE REPLACEMENT THERAPY: ICD-10-CM

## 2025-07-21 DIAGNOSIS — Z79.890 ENCOUNTER FOR MONITORING TESTOSTERONE REPLACEMENT THERAPY: ICD-10-CM

## 2025-07-21 DIAGNOSIS — E29.1 PRIMARY MALE HYPOGONADISM: Primary | ICD-10-CM

## 2025-07-21 DIAGNOSIS — E78.5 DYSLIPIDEMIA: ICD-10-CM

## 2025-07-21 LAB
BILIRUBIN, UA POC OHS: NEGATIVE
BLOOD, UA POC OHS: NEGATIVE
CLARITY, UA POC OHS: CLEAR
COLOR, UA POC OHS: YELLOW
GLUCOSE, UA POC OHS: NEGATIVE
KETONES, UA POC OHS: NEGATIVE
LEUKOCYTES, UA POC OHS: NEGATIVE
NITRITE, UA POC OHS: NEGATIVE
PH, UA POC OHS: 7
PROTEIN, UA POC OHS: NEGATIVE
SPECIFIC GRAVITY, UA POC OHS: 1.02
UROBILINOGEN, UA POC OHS: 0.2

## 2025-07-21 PROCEDURE — 1159F MED LIST DOCD IN RCRD: CPT | Mod: CPTII,HCNC,S$GLB, | Performed by: NURSE PRACTITIONER

## 2025-07-21 PROCEDURE — 99214 OFFICE O/P EST MOD 30 MIN: CPT | Mod: HCNC,S$GLB,, | Performed by: NURSE PRACTITIONER

## 2025-07-21 PROCEDURE — G2211 COMPLEX E/M VISIT ADD ON: HCPCS | Mod: HCNC,S$GLB,, | Performed by: NURSE PRACTITIONER

## 2025-07-21 PROCEDURE — 1126F AMNT PAIN NOTED NONE PRSNT: CPT | Mod: CPTII,HCNC,S$GLB, | Performed by: NURSE PRACTITIONER

## 2025-07-21 PROCEDURE — 3074F SYST BP LT 130 MM HG: CPT | Mod: CPTII,HCNC,S$GLB, | Performed by: NURSE PRACTITIONER

## 2025-07-21 PROCEDURE — 3288F FALL RISK ASSESSMENT DOCD: CPT | Mod: CPTII,HCNC,S$GLB, | Performed by: NURSE PRACTITIONER

## 2025-07-21 PROCEDURE — 99999 PR PBB SHADOW E&M-EST. PATIENT-LVL V: CPT | Mod: PBBFAC,HCNC,, | Performed by: NURSE PRACTITIONER

## 2025-07-21 PROCEDURE — 81003 URINALYSIS AUTO W/O SCOPE: CPT | Mod: QW,HCNC,S$GLB, | Performed by: NURSE PRACTITIONER

## 2025-07-21 PROCEDURE — 1101F PT FALLS ASSESS-DOCD LE1/YR: CPT | Mod: CPTII,HCNC,S$GLB, | Performed by: NURSE PRACTITIONER

## 2025-07-21 PROCEDURE — 3078F DIAST BP <80 MM HG: CPT | Mod: CPTII,HCNC,S$GLB, | Performed by: NURSE PRACTITIONER

## 2025-07-21 PROCEDURE — 1160F RVW MEDS BY RX/DR IN RCRD: CPT | Mod: CPTII,HCNC,S$GLB, | Performed by: NURSE PRACTITIONER

## 2025-07-21 RX ORDER — TESTOSTERONE 40.5 MG/2.5G
2.5 GEL TOPICAL DAILY
Qty: 75 G | Refills: 5 | Status: SHIPPED | OUTPATIENT
Start: 2025-07-21 | End: 2026-01-17

## 2025-07-21 RX ORDER — SILDENAFIL 100 MG/1
100 TABLET, FILM COATED ORAL DAILY PRN
Qty: 10 TABLET | Refills: 12 | Status: SHIPPED | OUTPATIENT
Start: 2025-07-21 | End: 2026-07-21

## 2025-07-21 NOTE — PROGRESS NOTES
CHIEF COMPLAINT:    Cricket Mcdonnell is a 84 y.o. male presents today for Low T    HISTORY OF PRESENTING ILLINESS:    Cricket Mcdonnell is a 84 y.o. male with a history of low T and fatigue and ED  01/16/2020 T was 126  01/20/2020 T was 105  He was started on Androgel 1.62%/2.5gm  More energy with TRT.         11/07/2022 MRI of Prostate was clear of any areas of concern.   - PSA was 7.0.  - 50cc    He has been having issues with fatigue; repeated a sleep study.   He is actually sleeping much better. 8-9 hours.    Coffee drinker in the afternoon but trying to cut back   Sildenafil 100mg PRN helps with the ED but he also have trouble achieving orgasm     Today for f/u visit.  No urinary complaints. Getting up 2x.  He reports a lot of fatigue, but T is normal. PCP told him it was also age related.   Has not been himself since the time he was admitted for Pneumonia 08/2023.         TRT labs completed 07/18/2025:   - T was 639  - PSA was 7.97  - HCT was 41.(stable)  - normal lipids/LFT's     No family history of Prostate Cancer. Father & mother lived to .   His sister passed away from emphysema           REVIEW OF SYSTEMS:  Review of Systems   Constitutional:  Positive for malaise/fatigue (T levels normal on TRT). Negative for chills and fever.   Eyes:  Negative for double vision.   Respiratory:  Negative for cough and shortness of breath.    Cardiovascular:  Negative for chest pain and palpitations.   Gastrointestinal:  Negative for abdominal pain, constipation, diarrhea, nausea and vomiting.   Genitourinary:  Negative for dysuria, flank pain, hematuria and urgency.        See HPI   Musculoskeletal:  Negative for falls.   Neurological:  Positive for weakness. Negative for dizziness and seizures.   Endo/Heme/Allergies:  Negative for polydipsia.         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    L central zygoma    Squamous cell carcinoma of skin        Past Surgical History:   Procedure Laterality Date    CATARACT EXTRACTION      COLONOSCOPY N/A 1/3/2019    Procedure: COLONOSCOPY;  Surgeon: Cholo Yates MD;  Location: [facility] ([address] [place] [address]);  Service: Endoscopy;  Laterality: N/A;    EYE SURGERY      galucoma surgery      HERNIA REPAIR      tonsillectomy      TONSILLECTOMY         Family History   Problem Relation Name Age of Onset    COPD Sister Shaylee Mcdonnell     Cancer Sister Shaylee Mcdonnell         lung cancer from smoking    No Known Problems Son x1     No Known Problems Daughter x2        Social History[1]    Allergies:  Poison ivy extract and Cooper    Medications:  Current Medications[2]    PHYSICAL EXAMINATION:  Physical Exam  Vitals and nursing note reviewed.   Constitutional:       General: He is awake.      Appearance: Normal appearance.   HENT:      Head: Normocephalic.      Right Ear: External ear normal.      Left Ear: External ear normal.      Nose: Nose normal.   Cardiovascular:      Rate and Rhythm: Normal rate.   Pulmonary:      Effort: Pulmonary effort is normal. No respiratory distress.   Abdominal:      Palpations: Abdomen is soft.      Tenderness: There is no abdominal tenderness. There is no right CVA tenderness or left CVA tenderness.   Musculoskeletal:         General: Normal range of motion.      Cervical back: Normal range of motion.   Skin:     General: Skin is warm and dry.   Neurological:      General: No focal deficit present.      Mental Status: He is alert and oriented to person, place, and time.   Psychiatric:         Mood and Affect: Mood normal.         Behavior: Behavior is cooperative.           LABS:      In office UA today was clear of active infection and blood.       Lab Results   Component Value Date    PSA 7.97 (H) 07/18/2025    PSA 2.1 05/21/2018    PSA 1.8 03/27/2017    PSADIAG 5.0 (H) 01/29/2025    PSADIAG 6.4 (H) 05/28/2024     PSADIAG 6.2 (H) 11/20/2023    PSATOTAL 2.1 07/06/2015       Lab Results   Component Value Date    CREATININE 0.7 07/18/2025    EGFRNORACEVR >60 07/18/2025               IMPRESSION:    Encounter Diagnoses   Name Primary?    Primary male hypogonadism Yes    Fatigue, unspecified type     BPH with urinary obstruction     Elevated PSA     ED (erectile dysfunction) of organic origin     Dyslipidemia     Encounter for monitoring testosterone replacement therapy          Assessment:       1. Primary male hypogonadism    2. Fatigue, unspecified type    3. BPH with urinary obstruction    4. Elevated PSA    5. ED (erectile dysfunction) of organic origin    6. Dyslipidemia    7. Encounter for monitoring testosterone replacement therapy        Plan:         I spent a total of 30 minutes on the day of the visit. This includes face to face time and non-face to face time preparing to see the patient (eg, review of tests), obtaining and/or reviewing separately obtained history, documenting clinical information in the electronic or other health record, independently interpreting results and communicating results to the patient/family/caregiver, or care coordinator  Reviewed his TRT management plan, expectations, risks, monitoring.   Reassurance with previous TRT labs; what is expected to keep labs WNL..  We discussed his PSA and past results. Expectations with PSAs with age  Recommendations for PCP follow up visit regarding labs; any need to go to the ER.    Recommended lifestyle modifications with a proper, healthy diet, good hydration but during the day. Reducing bladder irritants.   Benefits of regular exercise. This can help with fatigue.   Refilled his TRT; completed any needed PA   RTC 6 months with full TRT labs.    Visit today included increased complexity associated with the care of the episodic problem of hypogonadism addressed and managing the longitudinal care of the patient due to the serious and/or complex managed  problem(s) requiring high risk medication use (testosterone).               [1]   Social History  Socioeconomic History    Marital status:    Occupational History     Employer: [employer]   Tobacco Use    Smoking status: Former     Current packs/day: 0.00     Average packs/day: 0.3 packs/day for 30.0 years (7.5 ttl pk-yrs)     Types: Cigarettes     Start date: 1971     Quit date: 2001     Years since quittin.0     Passive exposure: Past    Smokeless tobacco: Never    Tobacco comments:     Quit 26 year ago   Substance and Sexual Activity    Alcohol use: Not Currently     Comment: I gave up drinking 23 years ago    Drug use: No    Sexual activity: Yes     Partners: Female     Birth control/protection: Diaphragm     Social Drivers of Health     Financial Resource Strain: Low Risk  (2024)    Overall Financial Resource Strain (CARDIA)     Difficulty of Paying Living Expenses: Not hard at all   Food Insecurity: No Food Insecurity (2024)    Hunger Vital Sign     Worried About Running Out of Food in the Last Year: Never true     Ran Out of Food in the Last Year: Never true   Transportation Needs: No Transportation Needs (2024)    PRAPARE - Transportation     Lack of Transportation (Medical): No     Lack of Transportation (Non-Medical): No   Physical Activity: Insufficiently Active (2024)    Exercise Vital Sign     Days of Exercise per Week: 6 days     Minutes of Exercise per Session: 20 min   Stress: Stress Concern Present (2024)    [place] [employer] - Occupational Stress Questionnaire     Feeling of Stress : Very much   Housing Stability: Low Risk  (2024)    Housing Stability Vital Sign     Unable to Pay for Housing in the Last Year: No     Number of Places Lived in the Last Year: 1     Unstable Housing in the Last Year: No   [2]   Current Outpatient Medications:     brimonidine 0.2% (ALPHAGAN) 0.2 % Drop, Instill 1 drop into left eye  three times a day, Disp: 10 mL, Rfl: 6    brimonidine 0.2% (ALPHAGAN) 0.2 % Drop, Instill 1 drop into left eye three times a day, Disp: 10 mL, Rfl: 6    brimonidine 0.2% (ALPHAGAN) 0.2 % Drop, Instill 1 drop into left eye three times a day, Disp: 10 mL, Rfl: 6    brimonidine 0.2% (ALPHAGAN) 0.2 % Drop, Instill 1 drop into both eyes three times a day, Disp: 10 mL, Rfl: 6    brimonidine 0.2% (ALPHAGAN) 0.2 % Drop, Instill 1 drop into both eyes three times a day, Disp: 10 mL, Rfl: 6    brimonidine 0.2% (ALPHAGAN) 0.2 % Drop, Instill 1 drop Left Eye 3 times a day, Disp: 10 mL, Rfl: 0    brimonidine 0.2% (ALPHAGAN) 0.2 % Drop, Instill 1 drop into both eyes three times a day, Disp: 10 mL, Rfl: 6    dorzolamide-timolol 2-0.5% (COSOPT) 22.3-6.8 mg/mL ophthalmic solution, Instill 1 drop into both eyes twice a day, Disp: 10 mL, Rfl: 2    dorzolamide-timolol 2-0.5% (COSOPT) 22.3-6.8 mg/mL ophthalmic solution, Instill 1 drop into Both Eyes twice a day, Disp: 30 mL, Rfl: 1    dorzolamide-timolol 2-0.5% (COSOPT) 22.3-6.8 mg/mL ophthalmic solution, Instill 1 drop into both eyes twice a day, Disp: 10 mL, Rfl: 6    dorzolamide-timolol 2-0.5% (COSOPT) 22.3-6.8 mg/mL ophthalmic solution, Instill 1 drop into both eyes twice a day, Disp: 10 mL, Rfl: 6    dorzolamide-timolol 2-0.5% (COSOPT) 22.3-6.8 mg/mL ophthalmic solution, Instill 1 drop into both eyes twice a day, Disp: 10 mL, Rfl: 6    dorzolamide-timolol 2-0.5% (COSOPT) 22.3-6.8 mg/mL ophthalmic solution, Instill 1 drop Both Eyes 3 times a day, Disp: 10 mL, Rfl: 0    dorzolamide-timolol 2-0.5% (COSOPT) 22.3-6.8 mg/mL ophthalmic solution, Use 1 drop into left eye twice a day, Disp: 10 mL, Rfl: 4    erythromycin (ROMYCIN) ophthalmic ointment, Apply 1 a thin layer into left eye four times a day, Disp: 3.5 g, Rfl: 6    ketoconazole (NIZORAL) 2 % cream, Apply daily to rash on face, Disp: 30 g, Rfl: 6    ketorolac 0.4% (ACULAR) 0.4 % Drop, Instill 1 drop into left eye four times a day,  Disp: 5 mL, Rfl: 1    ketorolac 0.4% (ACULAR) 0.4 % Drop, Instill 1 drop into left eye four times a day **START MONDAY PRIOR TO SURGERY**, Disp: 5 mL, Rfl: 1    moxifloxacin (VIGAMOX) 0.5 % ophthalmic solution, Instill 1 drop into left eye four times a day **START MONDAY PRIOR TO SURGERY**, Disp: 3 mL, Rfl: 1    neomycin-polymyxin-dexamethasone (MAXITROL) 3.5 mg/g-10,000 unit/g-0.1 % Oint, Place 1/4 inch into left eye three times a day, Disp: 3.5 g, Rfl: 0    neomycin-polymyxin-dexamethasone (MAXITROL) 3.5 mg/g-10,000 unit/g-0.1 % Oint, 1/4 inch into right eye three times a day, Disp: 3.5 g, Rfl: 0    neomycin-polymyxin-dexamethasone (MAXITROL) 3.5 mg/g-10,000 unit/g-0.1 % Oint, Apply 1/4 inch into left eye three times a day X 3 DAYS THEN STOP, Disp: 3.5 g, Rfl: 0    neomycin-polymyxin-dexamethasone (MAXITROL) 3.5 mg/g-10,000 unit/g-0.1 % Oint, Apply 0.25 inch Left Eye 3 times a day X 3 DAYS THEN STOP, Disp: 3.5 g, Rfl: 0    netarsudiL-latanoprost (ROCKLATAN) 0.02-0.005 % Drop, Instill 1 drop into both eyes at bedtime, Disp: 2.5 mL, Rfl: 6    netarsudiL-latanoprost (ROCKLATAN) 0.02-0.005 % Drop, Instill 1 drop into both eyes at bedtime, Disp: 2.5 mL, Rfl: 6    netarsudiL-latanoprost (ROCKLATAN) 0.02-0.005 % Drop, Instill 1 drop into both eyes at bedtime, Disp: 2.5 mL, Rfl: 6    netarsudiL-latanoprost (ROCKLATAN) 0.02-0.005 % Drop, Instill 1 drop into both eyes at bedtime, Disp: 2.5 mL, Rfl: 6    netarsudiL-latanoprost (ROCKLATAN) 0.02-0.005 % Drop, Use 1 drop into left eye every night at bedtime, Disp: 2.5 mL, Rfl: 4    prednisoLONE acetate (PRED FORTE) 1 % DrpS, Instill 1 drop into left eye three times a day, Disp: 10 mL, Rfl: 0    suvorexant (BELSOMRA) 20 mg Tab, Take 10-20 mg by mouth nightly as needed (trouble sleeping)., Disp: 30 tablet, Rfl: 5    tobramycin-dexAMETHasone 0.3-0.1% (TOBRADEX) 0.3-0.1 % Oint, Apply 1 a small amount into left eye as directed **DO NOT USE UNTIL AFTER SURGERY**, Disp: 3.5 g, Rfl:  0    triamcinolone acetonide 0.1% (KENALOG) 0.1 % ointment, Apply to rash on body twice daily for up to two weeks per month, Disp: 80 g, Rfl: 11    sildenafiL (VIAGRA) 100 MG tablet, Take 1 tablet (100 mg total) by mouth daily as needed for Erectile Dysfunction (take on an empty stomach 30-60 minutes before)., Disp: 10 tablet, Rfl: 12    testosterone (ANDROGEL) 1.62 % (40.5 mg/2.5 gram) GlPk, Place 2.5 g onto the skin Daily., Disp: 75 g, Rfl: 5